# Patient Record
Sex: MALE | Race: WHITE
[De-identification: names, ages, dates, MRNs, and addresses within clinical notes are randomized per-mention and may not be internally consistent; named-entity substitution may affect disease eponyms.]

---

## 2018-07-10 ENCOUNTER — HOSPITAL ENCOUNTER (EMERGENCY)
Dept: HOSPITAL 97 - ER | Age: 83
Discharge: HOME | End: 2018-07-10
Payer: COMMERCIAL

## 2018-07-10 VITALS — TEMPERATURE: 96.9 F | DIASTOLIC BLOOD PRESSURE: 73 MMHG | SYSTOLIC BLOOD PRESSURE: 113 MMHG | OXYGEN SATURATION: 98 %

## 2018-07-10 DIAGNOSIS — Z88.1: ICD-10-CM

## 2018-07-10 DIAGNOSIS — E03.9: ICD-10-CM

## 2018-07-10 DIAGNOSIS — Z88.5: ICD-10-CM

## 2018-07-10 DIAGNOSIS — S51.801A: Primary | ICD-10-CM

## 2018-07-10 DIAGNOSIS — E78.5: ICD-10-CM

## 2018-07-10 DIAGNOSIS — Z79.01: ICD-10-CM

## 2018-07-10 DIAGNOSIS — Z95.1: ICD-10-CM

## 2018-07-10 DIAGNOSIS — I50.9: ICD-10-CM

## 2018-07-10 PROCEDURE — 99282 EMERGENCY DEPT VISIT SF MDM: CPT

## 2018-07-10 NOTE — XMS REPORT
Encounter CCD: 2013 to 2013

 Created on:2018



Patient:TAMAR BREWSTER

Sex:Male

:1935

External Reference #:[id number]





Demographics







 Address  [address] [place] [address]-

 

 Home Phone  [phone]

 

 Preferred Language  Unknown

 

 Marital Status  

 

 Baptism Affiliation  Adventist

 

 Race  White/

 

 Ethnic Group  Non-









Author







 Organization  [facility]









Care Team Providers







 Name  Role  Phone

 

 Jasmin Macedo POLLY  Referring Provider  [phone]









Allergies, Adverse Reactions, Alerts







 Substance  Reaction  Status

 

 morphine    Active

 

 tetracyclines    Active







Problem List







 Condition  Effective Dates  Status

 

 Atrial fibrillation  < 2012  Inactive

 

 Atrial fibrillation    Resolved

 

 CAD - Coronary artery disease    Resolved

 

 Depression    Resolved

 

 GERD - Gastro-esophageal reflux disease    Resolved

 

 Hepatitis C    Resolved

 

 HLD - Hyperlipidemia    Resolved

 

 HTN - Hypertension    Resolved

 

 Hyperlipidemia    Active

 

 Hypertension    Active

 

 Hypothyroidism    Active

 

 Methicillin resistant Staphylococcus aureus1  10/14/2009  Active

 

 Tongue carcinoma    Resolved



1Problem added by Discern Expert.



Medications







 Medication  Instructions  Start Date  End Date  Status

 

 pneumococcal 23-valent  0.5 ml, Route: IM, Drug  2012  
Completed



 vaccine  Form: INJ, Start date:      



   12 9:00:00, Stop      



   date: 12 9:00:00      

 

 influenza virus vaccine,  0.5 ml, Route: IM, Drug  10/15/2009  10/15/2009  
Completed



 inactivated  Form: INJ, ONCE, Start      



   date: 10/15/09 9:00:00,      



   Stop date: 10/15/09      



   9:00:00      







Immunizations







 Vaccine  Date  Status

 

 influenza virus vaccine, inactivated  10/15/2009  Auth (Verified)

 

 pneumococcal 23-valent vaccine  2012  Auth (Verified)

## 2018-07-10 NOTE — XMS REPORT
Summary of Care: 12/10/15 - 12/10/15

 Created on:2017



Patient:TAMAR BREWSTER

Sex:Male

:1935

External Reference #:[id number]





Demographics







 Address  [address] [place] [address]-

 

 Home Phone  [phone]

 

 Preferred Language  English

 

 Marital Status  

 

 Evangelical Affiliation  Adventism

 

 Race  White/

 

 Ethnic Group  Not  or 









Author







 Organization  [facility]

 

 Address  [address] [place] [address]-

 

 Phone  [phone]









Encounter

HQ Encntr_juan(FIN) [id number] Date(s): 12/10/15 - 12/10/15

[address] [address]     
[phone]

Discharge Disposition: Home

Attending Physician: Physician, Non Associated MD





Vital Signs

No data available for this section



Problem List







 Condition  Effective Dates  Status  Health Status  Informant

 

 Atrial fibrillation(Confirmed)    Resolved    

 

 CAD - Coronary artery    Resolved    



 disease(Confirmed)        

 

 Depression(Confirmed)    Resolved    

 

 GERD - Gastro-esophageal reflux    Resolved    



 disease(Confirmed)        

 

 Hepatitis C(Confirmed)    Resolved    

 

 HLD - Hyperlipidemia(Confirmed)    Resolved    

 

 HTN - Hypertension(Confirmed)    Resolved    

 

 Hyperlipidemia(Confirmed)    Active    

 

 Hypertension(Confirmed)    Active    

 

 Hypothyroidism(Confirmed)    Active    

 

 Methicillin resistant Staphylococcus  10/14/09  Active    



 aureus - nares(Confirmed)1        

 

 Tongue carcinoma(Confirmed)    Resolved    



1Problem added by Discern Expert.



Allergies, Adverse Reactions, Alerts







 Substance  Reaction  Severity  Status

 

 morphine      Active

 

 tetracyclines      Active







Medications

No data available for this section



Results

No data available for this section



Immunizations







 Vaccine  Date  Refusal Reason

 

 influenza virus vaccine, inactivated  10/15/09  

 

 pneumococcal 23-valent vaccine  12  







Procedures







 Procedure  Date  Related Diagnosis  Body Site

 

 CABG - Coronary artery bypass graft1      

 

 Hernia repair      

 

 Knee replacement      

 

 Operation2      



[id number] of Pacemaker and AICD ()



Social History

No data available for this section



Assessment and Plan

No data available for this section

## 2018-07-10 NOTE — XMS REPORT
Encounter CCD: 2012 to 2012

 Created on:April 3, 2084



Patient:TAMAR BREWSTER

Sex:Male

:1935

External Reference #:[id number]





Demographics







 Address  [address] [place] [address] [address]



   [place] [address]

 

 Home Phone  [phone]

 

 Preferred Language  Unknown

 

 Marital Status  

 

 Cheondoism Affiliation  Mormon

 

 Race  White/

 

 Ethnic Group  Non-









Author







 Organization  [facility]









Care Team Providers







 Name  Role  Phone

 

 Braden Thomas  Referring Provider  [phone]









Allergies, Adverse Reactions, Alerts







 Substance  Reaction  Status

 

 morphine    Active

 

 tetracyclines    Active







Problem List







 Condition  Effective Dates  Status

 

 Methicillin resistant Staphylococcus aureus1  10/14/2009  Active



1Problem added by Discern Expert.



Medications







 Medication  Instructions  Start Date  End Date  Status

 

 influenza virus vaccine,  0.5 ml, Route: IM, Drug  10/15/2009  10/15/2009  
Completed



 inactivated  Form: INJ, ONCE, Start      



   date: 10/15/09 9:00:00,      



   Stop date: 10/15/09 9:00:00      







Immunizations







 Vaccine  Date  Status

 

 influenza virus vaccine, inactivated  10/15/2009  Auth (Verified)

## 2018-07-10 NOTE — EDPHYS
Physician Documentation                                                                           

 [facility]                                                                

Name: Kennedy Flynn                                                                               

Age: 82 yrs                                                                                       

Sex: Male                                                                                         

: 1935                                                                                   

MRN: [id number]                                                                                   

Arrival Date: 07/10/2018                                                                          

Time: 15:24                                                                                       

Account#: [id number]                                                                            

Bed 25                                                                                            

Private MD: out of town, doctor                                                                   

ED Physician Dinesh Coleman                                                                       

HPI:                                                                                              

07/10                                                                                             

16:20 This 82 yrs old  Male presents to ER via Ambulatory with complaints of Skin    gs  

      Tear(s).                                                                                    

16:20 The laceration(s) is(are) located on the dorsal aspect of right forearm. Onset: The     gs  

      symptoms/episode began/occurred 4 day(s) ago. Associated signs and symptoms: Pertinent      

      positives: mild to mod bleeding, Pertinent negatives: heavy bleeding. The patient has       

      experienced similar episodes in the past, a few times.                                      

                                                                                                  

Historical:                                                                                       

- Allergies:                                                                                      

16:01 TETRACYCLINES;                                                                          aj  

16:01 Morphine;                                                                               aj  

- Home Meds:                                                                                      

16:01 Coumadin 2.5 mg 5 days per week [Active]; digoxin 125 mcg Oral tab 1 tab once daily     aj  

      [Active]; Lipitor 40 mg Oral tab 1 tab once daily [Active]; finasteride 5 mg oral tab 1     

      tab once daily [Active]; Entresto 24-26 mg oral tab [Active]; levothyroxine 100 mcg tab     

      [Active];                                                                                   

- PMHx:                                                                                           

16:01 CHF; Hypothyroidism; Hyperlipidemia;                                                    aj  

- PSHx:                                                                                           

16:01 Hernia repair; defibrillator/pacemaker; CABG; bilateral knee replacements;              aj  

                                                                                                  

- Immunization history:: Adult Immunizations up to date.                                          

- Social history:: Smoking status: Patient/guardian denies using tobacco.                         

- Ebola Screening: : Patient negative for fever greater than or equal to 101.5 degrees            

  Fahrenheit, and additional compatible Ebola Virus Disease symptoms Patient denies               

  exposure to infectious person Patient denies travel to an Ebola-affected area in the            

  21 days before illness onset No symptoms or risks identified at this time.                      

                                                                                                  

                                                                                                  

ROS:                                                                                              

16:20 All other systems are negative.                                                         gs  

                                                                                                  

Exam:                                                                                             

16:20 Constitutional: The patient appears alert, awake.                                       gs  

16:20 Musculoskeletal/extremity: ROM: intact in all extremities, Circulation is intact in all     

      extremities. Sensation intact.                                                              

16:20 Skin: injury, avulsion(s), A moderate sized 6 cm(s), 2 mod sized skin tears active          

      bleeding from edges good result with silver nitrate application tolerated well.             

                                                                                                  

Vital Signs:                                                                                      

16:01  / 73; Pulse 85; Resp 16; Temp 96.9; Pulse Ox 98% on R/A; Weight 71.21 kg; Height aj  

      5 ft. 10 in. (177.80 cm);                                                                   

16:01 Body Mass Index 22.53 (71.21 kg, 177.80 cm)                                               

                                                                                                  

MDM:                                                                                              

16:14 Patient medically screened.                                                               

16:20 Data reviewed: vital signs, nurses notes.                                                 

                                                                                                  

Administered Medications:                                                                         

No medications were administered                                                                  

                                                                                                  

                                                                                                  

Disposition:                                                                                      

07/10/18 16:25 Discharged to Home. Impression: Unspecified open wound of right forearm.           

- Condition is Stable.                                                                            

- Discharge Instructions: Skin Tear Care.                                                         

                                                                                                  

- Medication Reconciliation Form, Thank You Letter, Antibiotic Education, Prescription            

  Opioid Use form.                                                                                

- Follow up: Private Physician; When: 2 - 3 days; Reason: Re-evaluation by your                   

  physician.                                                                                      

                                                                                                  

                                                                                                  

                                                                                                  

Signatures:                                                                                       

Ayesha Hopson RN                       RN   aj                                                   

Dinesh Coleman MD MD                                                      

Kim Ybarra RN                       RN   kr2                                                  

                                                                                                  

Corrections: (The following items were deleted from the chart)                                    

16:30 16:25 07/10/2018 16:25 Discharged to Home. Impression: Unspecified open wound of right  kr2 

      forearm. Condition is Stable. Forms are Medication Reconciliation Form, Thank You           

      Letter, Antibiotic Education, Prescription Opioid Use. Follow up: Private Physician;        

      When: 2 - 3 days; Reason: Re-evaluation by your physician.                                

                                                                                                  

**************************************************************************************************

## 2018-07-10 NOTE — XMS REPORT
Summary of Care: 16 - 16

 Created on:2099



Patient:TAMAR BREWSTER

Sex:Male

:1935

External Reference #:[id number]





Demographics







 Address  [address] [place] [address]

 

 Home Phone  [phone]

 

 Preferred Language  English

 

 Marital Status  

 

 Mormonism Affiliation  Confucianist

 

 Race  White/

 

 Ethnic Group  Not  or 









Author







 Organization  [facility]

 

 Address  [address] [place], [place] [address]-

 

 Phone  [phone]









Encounter

HQ Javed(FIN) [id number] Date(s): 16 - 16

[facility] [address] [place] [address]-     
 

Discharge Disposition: Home

Attending Physician: Rakesh De Dios MD

Referring Physician: Rakesh De Dios MD





Vital Signs







 Most recent to oldest [Reference Range]:  1

 

 Height  177.8 cm



   (16 9:14 AM)

 

 Temperature Oral [96.4-99.1 DegF]  96.9 DegF



   (16 9:14 AM)

 

 Blood Pressure [/60-90 mmHg]  107/67 mmHg



   (16 9:14 AM)

 

 Peripheral Pulse Rate [ bpm]  84 bpm



   (16 9:14 AM)

 

 Weight  64.716 kg



   (16 9:14 AM)

 

 Body Mass Index  20.47 m2



   (16 9:14 AM)







Problem List







 Condition  Effective Dates  Status  Health Status  Informant

 

 Atrial fibrillation(Confirmed)    Resolved    

 

 CAD - Coronary artery    Active    



 disease(Confirmed)        

 

 Depression(Confirmed)    Active    

 

 GERD - Gastro-esophageal reflux    Active    



 disease(Confirmed)        

 

 Hyperlipidemia(Confirmed)    Active    

 

 Hypertension(Confirmed)    Active    

 

 Hypothyroidism(Confirmed)    Active    

 

 Hypothyroidism(Confirmed)    Active    

 

 Other(Confirmed)1    Resolved    

 

 Tongue carcinoma(Confirmed)    Resolved    



1VP arrest- with AICD



Allergies, Adverse Reactions, Alerts







 Substance  Reaction  Severity  Status

 

 morphine      Active

 

 tetracyclines      Active







Medications

AMIODarone 200 mg oral tablet

200 mg=1 tab, PO, Daily, 0 Refill(s)

Start Date: 16

Status: OrderedCoumadin 2.5 mg oral tablet

2.5 mg=1 tab, PO, Daily, 0 Refill(s)

Start Date: 16

Status: OrderedProscar 5 mg oral tablet

5 mg=1 tab, PO, Daily, 0 Refill(s)

Start Date: 16

Status: OrderedRapaflo 8 mg oral capsule

8 mg=1 cap, PO, Daily, 0 Refill(s)

Start Date: 16

Status: Orderedsildenafil 20 mg oral tablet

60 mg=3 tab, PO, TID, 0 Refill(s)

Start Date: 16

Status: Orderedsilver nitrate topical stick

1 appl, TOP, ONCE, APPLY TO AFFECTED AREA ONCE DAILY FOR 6 DAYS, # 6 ea, 0 
Refill(s), Pharmacy: [facility]

Start Date: 16

Status: Orderedspironolactone 25 mg oral tablet

25 mg=1 tab, PO, BID, 0 Refill(s)

Start Date: 16

Status: Ordered



Results

No data available for this section



Immunizations

Given and Recorded





 Vaccine  Date  Status  Refusal Reason

 

 influenza virus vaccine, inactivated  10/15/09  Given  

 

 pneumococcal 23-valent vaccine  12  Given  







Procedures







 Procedure  Date  Related Diagnosis  Body Site

 

 Abdomen endoscopy      

 

 Barium swallow      

 

 CABG - Coronary artery bypass graft1, 2      

 

 Cataract surgery3      

 

 Colonoscopy4      

 

 Hernia repair5, 6      

 

 Knee replacement      

 

 Operation7      



[id number] states had quadraple CWKV8foal eye with [id number] repair (
INGUINAL) x46x'3 in the o0Xnympyfnx of Pacemaker and AICD ()



Social History







 Social History Type  Response

 

 Substance Abuse  Use: None.

 

 Alcohol  Current, Type Wine.  Last use: occational.

 

 Smoking Status  Never smoker; Exposure to Tobacco Smoke None; Cigarette Smoking



   Last 365 Days No; Reg Smoking Cessation Counseling No







Assessment and Plan

No data available for this section

## 2018-07-10 NOTE — XMS REPORT
Summary of Care: 16 - 16

 Created on:2120



Patient:TAMAR BREWSTER

Sex:Male

:1935

External Reference #:[id number]





Demographics







 Address  [address] [place] [address]

 

 Home Phone  [phone]

 

 Preferred Language  English

 

 Marital Status  

 

 Oriental orthodox Affiliation  Jew

 

 Race  White/

 

 Ethnic Group  Not  or 









Author







 Organization  [facility]

 

 Address  [address] [place] [address]-

 

 Phone  [phone]









Encounter

HQ Javed(FIN) [id number] Date(s): 16 - 16

[address] [address]-     [employer] [phone]
[phone]

Discharge Disposition: Home

Attending Physician: Rakesh De Dios MD

Referring Physician: Jasmin Macedo MD





Vital Signs







 Most recent to oldest [Reference Range]:  1

 

 Height  177.8 cm



   (16 9:10 AM)

 

 Temperature Oral [96.4-99.1 DegF]  97.8 DegF



   (16 9:10 AM)

 

 Weight  75.227 kg



   (16 9:10 AM)

 

 Body Mass Index  23.8 m2



   (16 9:10 AM)







Problem List







 Condition  Effective Dates  Status  Health Status  Informant

 

 Atrial fibrillation(Confirmed)    Resolved    

 

 CAD - Coronary artery    Resolved    



 disease(Confirmed)        

 

 Depression(Confirmed)    Active    

 

 GERD - Gastro-esophageal reflux    Resolved    



 disease(Confirmed)        

 

 Hepatitis C(Confirmed)    Resolved    

 

 HLD - Hyperlipidemia(Confirmed)    Active    

 

 HTN - Hypertension(Confirmed)    Active    

 

 Hyperlipidemia(Confirmed)    Active    

 

 Hypertension(Confirmed)    Active    

 

 Hypothyroidism(Confirmed)    Active    

 

 Methicillin resistant Staphylococcus  10/14/09  Active    



 aureus - nares(Confirmed)1        

 

 Tongue carcinoma(Confirmed)    Resolved    



1Problem added by Discern Expert.



Allergies, Adverse Reactions, Alerts







 Substance  Reaction  Severity  Status

 

 morphine      Active

 

 tetracyclines      Active







Medications

digoxin 125 mcg (0.125 mg) oral tablet

125 microgram=1 tab, PO, Daily, 0 Refill(s)

Start Date: 16

Status: OrderedEliquis 5 mg oral tablet

5 mg=1 tab, PO, BID, 0 Refill(s)

Start Date: 16

Status: Orderedfurosemide 40 mg oral tablet

40 mg=1 tab, PO, Daily, 0 Refill(s)

Start Date: 16

Status: OrderedNitrostat 0.4 mg sublingual tablet

0.4 mg=1 tab, SL, Q5Min, PRN Chest Pain, # 100 tab, 0 Refill(s)

Start Date: 16

Status: Ordered



Results

No data available for this section



Immunizations







 Vaccine  Date  Refusal Reason

 

 influenza virus vaccine, inactivated  10/15/09  

 

 pneumococcal 23-valent vaccine  12  







Procedures







 Procedure  Date  Related Diagnosis  Body Site

 

 CABG - Coronary artery bypass graft1      

 

 Hernia repair      

 

 Knee replacement      

 

 Operation2      



651080Appkoupam of Pacemaker and AICD ()



Social History







 Social History Type  Response

 

 Smoking Status  Never smoker; Exposure to Tobacco Smoke None; Cigarette Smoking



   Last 365 Days No; Reg Smoking Cessation Counseling No







Assessment and Plan

No data available for this section

## 2018-07-10 NOTE — XMS REPORT
Summary of Care: 3/8/16 - 3/8/16

 Created on:2031



Patient:TAMAR BREWSTER

Sex:Male

:1935

External Reference #:[id number]





Demographics







 Address  [address] [place] [address]

 

 Home Phone  [phone]

 

 Preferred Language  English

 

 Marital Status  

 

 Spiritism Affiliation  Denominational

 

 Race  White/

 

 Ethnic Group  Not  or 









Author







 Organization  [facility]

 

 Address  [address] [address]-

 

 Phone  [phone]









Encounter

HQ Javed(FIN) [id number] Date(s): 3/8/16 - 3/8/16

[facility] [address] [facility] [address] 

Discharge Disposition: Home

Attending Physician: Rakesh De Dios MD

Referring Physician: Rakesh De Dios MD





Vital Signs







 Most recent to oldest [Reference Range]:  1

 

 Height  160.02 cm



   (3/8/16 1:43 PM)

 

 Temperature Oral [96.4-99.1 DegF]  97.2 DegF



   (3/8/16 1:43 PM)

 

 Blood Pressure [/60-90 mmHg]  120/71 mmHg



   (3/8/16 1:43 PM)

 

 Peripheral Pulse Rate [ bpm]  53 bpm



   *LOW*



   (3/8/16 1:43 PM)

 

 Weight  68.864 kg



   (3/8/16 1:43 PM)

 

 Body Mass Index  26.89 m2



   (3/8/16 1:43 PM)







Problem List







 Condition  Effective Dates  Status  Health Status  Informant

 

 Atrial fibrillation(Confirmed)    Resolved    

 

 CAD - Coronary artery    Active    



 disease(Confirmed)        

 

 Depression(Confirmed)    Active    

 

 Depression(Confirmed)    Active    

 

 GERD - Gastro-esophageal reflux    Active    



 disease(Confirmed)        

 

 HLD - Hyperlipidemia(Confirmed)    Active    

 

 HTN - Hypertension(Confirmed)    Active    

 

 Hyperlipidemia(Confirmed)    Active    

 

 Hypertension(Confirmed)    Active    

 

 Hypothyroidism(Confirmed)    Active    

 

 Hypothyroidism(Confirmed)    Active    

 

 Methicillin resistant Staphylococcus  10/14/09  Active    



 aureus - nares(Confirmed)1        

 

 Other(Confirmed)2    Resolved    

 

 Tongue carcinoma(Confirmed)    Resolved    



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



Allergies, Adverse Reactions, Alerts







 Substance  Reaction  Severity  Status

 

 morphine      Active

 

 tetracyclines      Active







Medications

Nutren 2.0

Nutren 2.0, See Instructions, Nutren 2.0 via PEG 250mL q 4hrs x 4/day + 180 mL 
x 1/day  Water flush 30mL before and after feeds, # 150 can, Refill(s) 12

Start Date: 3/8/16

Status: Ordered



Results

No data available for this section



Immunizations







 Vaccine  Date  Refusal Reason

 

 influenza virus vaccine, inactivated  10/15/09  

 

 pneumococcal 23-valent vaccine  12  







Procedures







 Procedure  Date  Related Diagnosis  Body Site

 

 Abdomen endoscopy      

 

 Barium swallow      

 

 CABG - Coronary artery bypass graft1, 2      

 

 Cataract surgery3      

 

 Colonoscopy4      

 

 Hernia repair5, 6      

 

 Knee replacement      

 

 Operation7      



1patietn states had quadraple [id number] eye with [id number]'3 in the 
l8xieklx repair (INGUINAL) q02Adfworhvd of Pacemaker and AICD ()



Social History







 Social History Type  Response

 

 Substance Abuse  Use: None.

 

 Alcohol  Current, Type Wine.  Last use: occational.

 

 Smoking Status  Never smoker; Exposure to Tobacco Smoke None; Cigarette Smoking



   Last 365 Days No; Reg Smoking Cessation Counseling No







Assessment and Plan

No data available for this section

## 2018-07-10 NOTE — XMS REPORT
Summary of Care: 16 - 16

 Created on:2026



Patient:TAMAR BREWSTER

Sex:Male

:1935

External Reference #:[id number]





Demographics







 Address  [address] [place] [address]-

 

 Home Phone  [phone]

 

 Preferred Language  English

 

 Marital Status  

 

 Evangelical Affiliation  Amish

 

 Race  White/

 

 Ethnic Group  Not  or 









Author







 Organization  [facility]

 

 Address  [address] [place], [place] [address]-

 

 Phone  [phone]









Encounter

HQ Encntr_juan(FIN) [id number] Date(s): 16 - 16

[facility] [address] [place] [address]     [employer] [phone]
[phone]

Discharge Disposition: Home

Attending Physician: Rakesh De Dios MD

Referring Physician: Rakesh De Dios MD





Vital Signs

No data available for this section



Problem List







 Condition  Effective Dates  Status  Health Status  Informant

 

 Atrial fibrillation(Confirmed)    Resolved    

 

 CAD - Coronary artery    Resolved    



 disease(Confirmed)        

 

 Depression(Confirmed)    Active    

 

 GERD - Gastro-esophageal reflux    Resolved    



 disease(Confirmed)        

 

 Hepatitis C(Confirmed)    Resolved    

 

 HLD - Hyperlipidemia(Confirmed)    Active    

 

 HTN - Hypertension(Confirmed)    Active    

 

 Hyperlipidemia(Confirmed)    Active    

 

 Hypertension(Confirmed)    Active    

 

 Hypothyroidism(Confirmed)    Active    

 

 Methicillin resistant Staphylococcus  10/14/09  Active    



 aureus - nares(Confirmed)1        

 

 Tongue carcinoma(Confirmed)    Resolved    



1Problem added by Discern Expert.



Allergies, Adverse Reactions, Alerts







 Substance  Reaction  Severity  Status

 

 morphine      Active

 

 tetracyclines      Active







Medications

No data available for this section



Results

No data available for this section



Immunizations







 Vaccine  Date  Refusal Reason

 

 influenza virus vaccine, inactivated  10/15/09  

 

 pneumococcal 23-valent vaccine  12  







Procedures







 Procedure  Date  Related Diagnosis  Body Site

 

 CABG - Coronary artery bypass graft1      

 

 Hernia repair      

 

 Knee replacement      

 

 Operation2      



[id number] of Pacemaker and AICD ()



Social History







 Social History Type  Response

 

 Smoking Status  Never smoker; Exposure to Tobacco Smoke None; Cigarette Smoking



   Last 365 Days No; Reg Smoking Cessation Counseling No







Assessment and Plan

No data available for this section

## 2018-07-10 NOTE — XMS REPORT
Summary of Care: 16 - 16

 Created on:2039



Patient:KENNEDY BREWSTER

Sex:Male

:1935

External Reference #:[id number]





Demographics







 Address  [address] [place] [address]

 

 Home Phone  [phone]

 

 Preferred Language  English

 

 Marital Status  

 

 Mandaeism Affiliation  Zoroastrianism

 

 Race  White/

 

 Ethnic Group  Not  or 









Author







 Organization  [facility]

 

 Address  [address] [place] [address]

 

 Phone  [phone]









Encounter

HQ Morris_juan(FIN) [id number] Date(s): 16 - 16

[facility] [address] [place] [address] Professional Services provided by        
    [facility]       [place] [address]-  
   [phone]

Discharge Disposition: Home

Attending Physician: Breezy Qiu MD

Admitting Physician: Breezy Qiu MD





Vital Signs







 Most recent to oldest  1  2  3



 [Reference Range]:      

 

 Height  177.8 cm    



   (16 6:59 PM)    

 

 Temperature Oral [96.4-99.1  97.1 DegF  96.9 DegF  



 DegF]  (16 3:56 AM)  (16 11:43 PM)  

 

 Blood Pressure [/60-90  107/63 mmHg  111/66 mmHg  121/76 mmHg



 mmHg]  (16 11:06 AM)  (16 10:00 AM)  (16 8:02 AM)

 

 Respiratory Rate [14-20  18 BRMIN  18 BRMIN  18 BRMIN



 BRMIN]  (16 11:06 AM)  (16 8:02 AM)  (16 4:36 AM)

 

 Peripheral Pulse Rate  86 bpm  86 bpm  85 bpm



 [ bpm]  (16 10:00 AM)  (16 4:36 AM)  (16 11:37 PM)

 

 Weight  78.636 kg    



   (16 6:59 PM)    

 

 Body Mass Index  24.87 m2    



   (16 6:59 PM)    







Problem List







 Condition  Effective Dates  Status  Health Status  Informant

 

 Atrial fibrillation(Confirmed)    Resolved    

 

 CAD - Coronary artery    Active    



 disease(Confirmed)        

 

 Depression(Confirmed)    Active    

 

 Depression(Confirmed)    Active    

 

 GERD - Gastro-esophageal reflux    Active    



 disease(Confirmed)        

 

 HLD - Hyperlipidemia(Confirmed)    Active    

 

 HTN - Hypertension(Confirmed)    Active    

 

 Hyperlipidemia(Confirmed)    Active    

 

 Hypertension(Confirmed)    Active    

 

 Hypothyroidism(Confirmed)    Active    

 

 Hypothyroidism(Confirmed)    Active    

 

 Methicillin resistant Staphylococcus  10/14/09  Active    



 aureus - nares(Confirmed)1        

 

 Other(Confirmed)2    Resolved    

 

 Tongue carcinoma(Confirmed)    Resolved    



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



Allergies, Adverse Reactions, Alerts







 Substance  Reaction  Severity  Status

 

 morphine      Active

 

 tetracyclines      Active







Medications

amLODIPine 5 mg oral tablet

5 mg=1 tab, PO, Daily, # 30 tab, 0 Refill(s)

Start Date: 16

Stop Date: 3/19/16

Status: Orderedcitalopram

20 mg, 1 tab, Route: PO, Drug form: TAB, Daily, Dosing Weight 78.636, kg, Start 
date: 16 9:00:00, Duration: 30 day, Stop date: 16 9:00:00

Notes: (Same As: CeleXA)

Start Date: 16

Stop Date: 16

Status: Discontinuedcitalopram 20 mg oral tablet

20 mg=1 tab, PO, Daily, # 30 tab, 0 Refill(s)

Start Date: 16

Stop Date: 16

Status: Discontinueddigoxin 125 mcg (0.125 mg) oral tablet

125 microgram, 1 tab, Route: PO, Drug form: TAB, Daily, Dosing Weight 78.636, kg
, Start date: 16 9:00:00, Duration: 30 day, Stop date: 16 9:00:00

Notes: Take on an Empty Stomach  (Same as: Lanoxin)

Start Date: 16

Stop Date: 16

Status: Discontinueddigoxin 125 mcg (0.125 mg) oral tablet

0.125 mg, PO, Daily, # 30 tab, 0 Refill(s)

Start Date: 16

Stop Date: 16

Status: DiscontinuedEliquis 5 mg oral tablet

5 mg=1 tab, PO, BID, 0 Refill(s)

Start Date: 16

Stop Date: 16

Status: Discontinuedenalapril

5 mg, 1 tab, Route: PO, Drug form: TAB, BID, Dosing Weight 78.636, kg, Start 
date: 16 21:00:00, Duration: 30 day, Stop date: 16 17:00:00

Notes: (Same as: Vasotec)

Start Date: 16

Stop Date: 16

Status: Discontinuedenalapril 5 mg oral tablet

5 mg=1 tab, PO, BID, 0 Refill(s)

Start Date: 16

Stop Date: 16

Status: Discontinuedfurosemide 40 mg oral tablet

40 mg=1 tab, PO, Daily, # 30 tab, 0 Refill(s)

Start Date: 16

Stop Date: 16

Status: Discontinuedfurosemide 40 mg oral tablet

40 mg, 1 tab, Route: PO, Drug form: TAB, Daily, Dosing Weight 78.636, kg, Start 
date: 16 9:00:00, Duration: 30 day, Stop date: 16 9:00:00

Notes: (Same as: Lasix)  May cause GI upset.  Give with food or milk.

Start Date: 16

Stop Date: 16

Status: Discontinuedlevothyroxine

100 microgram, 1 tab, Route: PO, Drug form: TAB, Daily, Dosing Weight 78.636, kg
, Start date: 16 9:00:00, Duration: 30 day, Stop date: 16 9:00:00

Notes: Take 1 hour before or 2 hours after meal; Enteral feeds may interefere 
with the absorption ofthis medication. (Same as:Levothroid, Synthroid)

Start Date: 16

Stop Date: 16

Status: Discontinuedlevothyroxine 100 mcg (0.1 mg) oral tablet

100 microgram=1 tab, PO, Daily, # 30 tab, 0 Refill(s)

Start Date: 16

Status: OrderedLipitor

40 mg, 1 tab, Route: PO, Drug form: TAB, Bedtime, Dosing Weight 78.636, kg, 
Start date: 16 21:00:00, Duration: 30 day, Stop date: 16 21:00:00

Notes: (Same as: Lipitor)

Start Date: 16

Stop Date: 16

Status: DiscontinuedLipitor 40 mg oral tablet

40 mg=1 tab, PO, Bedtime, # 30 tab, 0 Refill(s)

Start Date: 16

Stop Date: 16

Status: Discontinuedmagnesium sulfate 2 gm in Water 50 ml

2 gm, 50 mL, Route: IVPB, Drug form: INJ, ONCE, Dosing Weight 78.636, kg, Start 
date: 16 8:01:00, Duration: 2 hr, Stop date: 16 8:01:00

Notes: WASTE: F/P - Sink; E - Municipal Trash Bin

Start Date: 16

Stop Date: 16

Status: CompletedMephyton

5 mg, 1 tab, Route: PO, Drug form: TAB, ONCE, Start date: 16 10:30:00, 
Stop date: 16 10:30:00

Notes: (Same as: Mephyton, Vitamin K)

Start Date: 16

Stop Date: 16

Status: Completedmetoprolol 25 mg oral tablet, extended release

25 mg=1 tab, PO, BID, # 60 tab, 0 Refill(s)

Start Date: 16

Stop Date: 3/19/16

Status: Orderedmetoprolol extended release

25 mg, 1 tab, Route: PO, Drug form: ERTAB, BID, Start date: 16 21:00:00, 
Stop date: 16 17:00:00

Notes: (Same as: Toprol XL) Do Not Crush

Start Date: 16

Stop Date: 16

Status: Discontinuedmetoprolol extended release

See Instructions, 75 mgPO BID, 0 Refill(s)

Start Date: 16

Stop Date: 16

Status: DiscontinuedNitrostat 0.4 mg sublingual tablet

0.4 mg, 1 tab, Route: SL, Drug form: TAB, Q5Min, Dosing Weight 78.636, kg, PRN 
Chest Pain, Start date: 16 19:37:00, Duration: 30 day, Stop date:  20:36:00

Notes: (Same as:Nitroquick, Nitrostat)"Do Not Crush"  Sublingual tablet

Start Date: 16

Stop Date: 16

Status: DiscontinuedNorvasc

5 mg, 1 tab, Route: PO, Drug form: TAB, Daily, Dosing Weight 78.636, kg, Start 
date: 16 9:00:00, Stop date: 16 9:00:00

Notes: (Same as: Norvasc)

Start Date: 16

Stop Date: 16

Status: DiscontinuedProtonix

40 mg, 1 pkt, Route: PO, Drug form: GRAN/REC, Daily, Dosing Weight 78.636, kg, 
Start date: 16 9:00:00, Stop date: 16 9:00:00

Notes: Same as: Protonix  Mix in 5 mL apple juice or applesauce for oral &amp; 
10mL apple juice for NG tube

Start Date: 16

Stop Date: 16

Status: DiscontinuedProtonix

40 mg, 1 pkt, Route: PO, Drug form: GRAN/REC, Daily, Start date: 16 11:00:
00, Duration: 30 day, Stop date: 16 9:00:00

Notes: Same as: Protonix  Mix in 5 mL apple juice or applesauce for oral &amp; 
10mL apple juice for NG tube

Start Date: 16

Stop Date: 16

Status: DiscontinuedSaline Flush 0.9%

10 ml, Route: IVP, Drug Form: INJ, Dosing Weight 77.273, kg, PRN, PRN Line Flush
, Start date: 16 14:50:00, Duration: 30 day, Stop date: 16 15:49:00

Notes: (Same as: BD Posiflush)

Start Date: 16

Stop Date: 16

Status: DiscontinuedTylenol

500 mg, 15.62 mL, Route: PEG, Drug form: LIQ, Q4H, Dosing Weight 78.636, kg, 
PRN Pain 4-6/Temp > 100.4 F, Start date: 16 21:45:00, Duration: 30 day, 
Stop date: 16 21:44:00

Notes: Max acetaminophen=4000mg/day (4 gm/day).  (Same as: Tylenol)

Start Date: 16

Stop Date: 16

Status: DiscontinuedVitamin K1

5 mg, 0.5 mL, Route: SUB-Q, Drug form: INJ, ONCE, Dosing Weight 78.636, kg, 
Start date: 16 8:00:00, Duration: 1 doses or times, Stop date: 16 8:
00:00

Notes: (Same as: Aqua-Mephyton, Vitamin K)  *** MEDICATION WASTE ***Product Size
:  10 mgProduct Wasted:  ___ mg

Start Date: 16

Stop Date: 16

Status: Deleted



Results

ELECTROLYTES





 Most recent to oldest [Reference Range]:  1  2  3

 

 Sodium Lvl [135-145 mEq/L]  141 mEq/L    



   (16 6:56 PM)    

 

 Potassium Lvl [3.5-5.1 mEq/L]  4.1 mEq/L    



   (16 6:56 PM)    

 

 Chloride Lvl [ mEq/L]  105 mEq/L    



   (16 6:56 PM)    

 

 CO2 [24-32 mEq/L]  28 mEq/L    



   (16 6:56 PM)    

 

 AGAP [10.0-20.0 mEq/L]  12.1 mEq/L    



   (16 6:56 PM)    



CHEM PANEL





 Most recent to oldest [Reference Range]:  1  2  3

 

 Creatinine Lvl [0.50-1.40 mg/dL]  1.35 mg/dL    



   (16 6:56 PM)    

 

 eGFR  50 mL/min/1.73m2 1    



   *NA*    



   (16 6:56 PM)    

 

 BUN [7-22 mg/dL]  34 mg/dL    



   *HI*    



   (16 6:56 PM)    

 

 Glucose Lvl [70-99 mg/dL]  72 mg/dL    



   (16 6:56 PM)    

 

 Calcium Lvl [8.5-10.5 mg/dL]  8.9 mg/dL    



   (16 6:56 PM)    

 

 Phosphorus [2.5-4.5 mg/dL]  2.9 mg/dL    



   (16 6:56 PM)    

 

 Magnesium Lvl [1.8-2.4 mg/dL]  1.7 mg/dL    



   *LOW*    



   (16 6:56 PM)    



1Result Comment: The eGFR is calculated using the CKD-EPI formula. In most young
, healthy individualsthe eGFR will be >90 mL/min/1.73m2. The eGFR declines with 
age. An eGFR of 60-89 may be normal in some populations, particularly the 
elderly, for whom the CKD-EPI formula has not been extensively validated. Use 
of the eGFR is not recommended in the following populations:



Individuals with unstable creatinine concentrations, including pregnant 
patients and those with serious co-morbid conditions.



Patients with extremes in muscle mass or diet.



The data above are obtained from the National Kidney Disease Education Program (
NKDEP) which additionally recommends that when the eGFR is used in patients 
with extremes of body mass index for purposesof drug dosing, the eGFR should be 
multiplied by the estimated BMI.HEMATOLOGY





 Most recent to oldest  1  2  3



 [Reference Range]:      

 

 WBC [3.7-10.4 K/CMM]  7.0 K/CMM    



   (16 6:56 PM)    

 

 RBC [4.70-6.10 M/CMM]  5.41 M/CMM    



   (16 6:56 PM)    

 

 Hgb [14.0-18.0 g/dL]  13.6 g/dL    



   *LOW*    



   (16 6:56 PM)    

 

 Hct [42.0-54.0 %]  42.4 %    



   (16 6:56 PM)    

 

 MCV [80.0-94.0 fL]  78.3 fL    



   *LOW*    



   (16 6:56 PM)    

 

 MCH [27.0-31.0 pg]  25.2 pg    



   *LOW*    



   (16 6:56 PM)    

 

 MCHC [32.0-36.0 g/dL]  32.1 g/dL    



   (16 6:56 PM)    

 

 RDW [11.5-14.5 %]  18.5 %    



   *HI*    



   (16 6:56 PM)    

 

 Platelet [133-450 K/CMM]  148 K/CMM    



   (16 6:56 PM)    

 

 MPV [7.4-10.4 fL]  8.5 fL    



   (16 6:56 PM)    

 

 Segs [45.0-75.0 %]  53.5 %    



   (16 6:56 PM)    

 

 Lymphocytes [20.0-40.0 %]  33.8 %    



   (16 6:56 PM)    

 

 Monocytes [2.0-12.0 %]  10.9 %    



   (16 6:56 PM)    

 

 Eosinophils [0.0-4.0 %]  1.5 %    



   (16 6:56 PM)    

 

 Basophils [0.0-1.0 %]  0.3 %    



   (16 6:56 PM)    

 

 Segs-Bands # [1.5-8.1 K/CMM]  3.7 K/CMM    



   (16 6:56 PM)    

 

 Lymphocytes # [1.0-5.5  2.4 K/CMM    



 K/CMM]  (16 6:56 PM)    

 

 Monocytes # [0.0-0.8 K/CMM]  0.8 K/CMM    



   (16 6:56 PM)    

 

 Eosinophils # [0.0-0.5  0.1 K/CMM    



 K/CMM]  (16 6:56 PM)    

 

 Microcyte [None Seen]  1+    



   *ABN*    



   (16 6:56 PM)    

 

 PT [12.0-14.7 seconds]  23.0 seconds  29.4 seconds  28.6 seconds



   *HI*  *HI*  *HI*



   (16 9:24 AM)  (16 3:53 AM)  (16 6:56 PM)

 

 INR [0.85-1.17]  2.00  2.75  2.65



   *HI*  *HI*  *HI*



   (16 9:24 AM)  (16 3:53 AM)  (16 6:56 PM)

 

 PTT [22.9-35.8 seconds]  37.6 seconds    



   *HI*    



   (16 6:56 PM)    







Immunizations







 Vaccine  Date  Refusal Reason

 

 influenza virus vaccine, inactivated  10/15/09  

 

 pneumococcal 23-valent vaccine  12  







Procedures







 Procedure  Date  Related Diagnosis  Body Site

 

 Abdomen endoscopy      

 

 Barium swallow      

 

 CABG - Coronary artery bypass graft1, 2      

 

 Cataract surgery3      

 

 Colonoscopy4      

 

 Hernia repair5, 6      

 

 Knee replacement      

 

 Operation7      



1patietn states had quadraple [id number] eye with [id number]'3 in the 
j2tqmtcn repair (INGUINAL) b35Dptyiwtdc of Pacemaker and AICD ()



Social History







 Social History Type  Response

 

 Substance Abuse  Use: None.

 

 Alcohol  Current, Type Wine.  Last use: occational.

 

 Smoking Status  Never smoker; Exposure to Tobacco Smoke None; Cigarette Smoking



   Last 365 Days No; Reg Smoking Cessation Counseling No







Assessment and Plan

Extracted from:





 Title: Clinical Document  Author: Parish Solis  Date: 16









 Discharge Summary



 Name: Kennedy Brewster



 Record Number: [id number]



 Admission Date: 2016



 Discharge Date: 2016



 Copies to:



 



 Diagnoses: Dyphagia



 Procedures: PEG tube placement by GI



 Chief complaint: Dysphagia



 



 Hospital course:



 Patient is a 79 y/o CM with hx of throat cancer s/p radiation and chemo in the 
past, Atrial fibrillation on Eliquis, HTN, CAD, systolic CHF who was admitted 
for worsening of dysphagia since 2015. B



 arium swallow study done showed symptomatic aspiration with thin and nectar 
Barium.  Patient received PEG tube placement on 2016 by GI.  GI re-
evaluated the PEG tube today (2016) and cleared p



 atient for discharge.  Patients denies any fever, abdominal pain, nausea, and 
vomiting.  While patient was admitted, he has been evaluated by Home Health and 
nutritionist.



 



 Discharge therapy:



    Medications: see medication reconciliation



    Diet: enteral tube feedings per PEG



    Activity: regular ADL



    Follow up: Dr. Ng in 2 weeks.  Primary care physician in 2 days









 Addendum by Ela Condon MD on 2016  pt seen and examined on discharge 
day. agree with d/c summary



 19:47  total time 32 min





Extracted from:





 Title: Infection Control Isolation Alert  Author: Korey Monson  Date: 









 ISOLATION ALERT



 



 This patient has a history of infection/colonization with MRSA.



 



  Site  Date



 



  Nares  10/14/2009



 



 



 Isolation Required:  CONTACT



 



 Before isolation precautions may be discontinued for this hospital visit, the 
following protocol must be followed and Infection Control should be notified:



 

 Patient must be off antibiotics for 72 hours or 7 days if on dialysis and 
vancomycin.



 

 Send one MRSA PCR nares specimen.  Please place order for 



MRSA PCR.

  Do not order 



MRSA Culture.





 

 If MRSA PCR is negative, isolation may be discontinued.



 



 



 Patient can only be cleared from isolation for this visit.  If readmitted, 
patient must be isolated and screened for MRSA again.  Consult Infection 
Control for suggested regimens for decolonization of p



 atients with MRSA as well as for any questions.  We can be reached at [phone]
[phone].





Extracted from:





 Title: Hospitalist History and Physical  Author: Siomara Jauregui MD  Date: 16









  Assessment/Plan 1.Dysphagia  PEG in am. npo after midnight will recheck 
INR in am and hold eliquis



  2.Atrial fibrillation  beta blocker and dig has pacemaker in place. hold 
eliquis



  3.CAD - Coronary artery disease  beta blokcer, statin



  4.Systolic CHF  beta blocker, ace i and lasix will continue and monitor. 
has AICD in place



  5.Depression  continue home meds



  6.HLD - Hyperlipidemia  statin



  7.HTN - Hypertension  controlled continuecurrent regimen andmonitor



  8.Hypothyroidism  synthroid at home dose



  Orders:  amLODIPine, 10 mg, 1 tab, Route: PO, Drug form: TAB, Daily, Dosing 
Weight 78.636, kg, Start date: 16 9:00:00, Duration: 30 day, Stop date:  9:00:00



   atorvastatin, 40 mg, 1 tab, Route: PO, Drug form: TAB, Bedtime, Dosing 
Weight 78.636, kg, Start date: 16 21:00:00, Duration: 30 day, Stop date: 
16 21:00:00



   citalopram, 20 mg, 1 tab, Route: PO, Drug form: TAB, Daily, Dosing Weight 
78.636, kg, Start date: 16 9:00:00, Duration: 30 day, Stop date: 16 
9:00:00



   digoxin, 125 microgram, 1 tab, Route: PO, Drug form: TAB, Daily, Dosing 
Weight 78.636, kg, Start date: 16 9:00:00, Duration: 30 day, Stop date:  9:00:00



   enalapril, 5 mg, 1 tab, Route: PO, Drug form: TAB, BID, Dosing Weight 78.636
, kg, Start date: 16 21:00:00, Duration: 30 day, Stop date: 16 17:00
:00



   furosemide, 40 mg, 1 tab, Route: PO, Drug form: TAB, Daily, Dosing Weight 
78.636, kg, Start date: 16 9:00:00, Duration: 30 day, Stop date: 16 
9:00:00



   levothyroxine, 100 microgram, 1 tab, Route: PO, Drug form: TAB, Daily, 
Dosing Weight 78.636, kg, Start date: 16 9:00:00, Duration: 30 day, Stop 
date: 16 9:00:00



   metoprolol, 75 mg, 3 tab, Route: PO, Drug form: ERTAB, BID, Start date:  21:00:00, Duration: 30 day, Stop date: 16 17:00:00



   nitroglycerin, 0.4 mg, 1 tab, Route: SL, Drug form: TAB, Q5Min, Dosing 
Weight 78.636, kg, PRN Chest Pain, Start date: 16 19:37:00, Duration: 30 
day, Stop date: 16 20:36:00



   pantoprazole, 40 mg, 1 tab, Route: PO, Drug form: ECTAB, Daily, Dosing 
Weight 78.636, kg, Start date: 16 9:00:00, Duration: 30 day, Stop date:  9:00:00



   Diet NPO After Midnight



   Diet NPO Except For



   Prophylaxis



   scds on eliquis will hold for procedure



   Disposition

## 2018-07-10 NOTE — XMS REPORT
Continuity of Care Document

 Created on:2017



Patient:TAMAR BREWSTER

Sex:Male

:1935

External Reference #:[id number]





Demographics







 Address  [address]



   [place] [address]

 

 Phone  [phone]

 

 Phone  [phone]

 

 Preferred Language  Unknown

 

 Marital Status  Unknown

 

 Worship Affiliation  Unknown

 

 Race  Unknown

 

 Ethnic Group  Unknown









Author







 Organization  Interface









Problems







 Problem  Status  Onset  Classification  Date  Comments  Source



     Date    Reported    



             



             



             

 

 DEGLOVING INJURY R  Active  20        [facility]



 HAND    [address] [place] [address]



             



             

 

 OTHER  Active  20        [address]



             



             

 

 FOLLOW UP  Active  20        [address]



             



             

 

 FOLLOW UP ***GTUBE  Active  20        [facility]



 REMOVAL***    [address] [place] [address]



             



             

 

 BDDC - F/U  Active  20        [address]



             



             

 

 CRICOPHARYNGEAL  Active  20        [address]



             



             

 

 R10.84 -  Active  20         [facility]



 GENERALIZED    16        [place]



 ABDOMINAL PAIN            



 R60.            



             

 

 HOSPITAL F/U  Active  20        [address]



             



             

 

 DYSPHAGIA WITH  Active  20        [facility]



 ASPIRATION    [address] [place] [address]



             



             

 

 DYSPHAGIA  Active  20        [address]



             



             

 

 DSU-DYSPHAGIA  Active  20        [address]



             



             

 

 R13.10 -  Active  20         [facility]



 "DYSPHAGIA,    16        [place]



 UNSPECIFIED"            



             

 

 BDDC-DYSPHAGIA  Active  20        [address]



             



             

 

 SWALLOWING ISSUE  Active  01/15/20        [address]



             



             

 

 WT. GAIN  Active  20        [address]



             



             

 

 783.21 - ABNORMAL  Active  20         [facility]



 LOSS O 478.2 -    13        [place]



 DISEASE            



             

 

 ABNORMAL WEIGHT  Active  20        [facility]



 LOSS    [address] [place] [address]



             Center



             



             

 

 478.29 - DISEASE  Active  20         [facility]



 OF PHAR    13        [place]



             



             

 

 DYSPHAGIA,ICD.9-78  Active  20        [facility]



 7.2            Medical



             Center



             



             

 

 13,  Active  20        [facility]



 ESCOGSCOPY    [address] [place] [address]



             



             

 

 787.20/478.29  Active  20        [address]



             



             

 

 DDC/ DYSPHAGIA  Active  10/30/20        [address]



             



             

 

 RUQ PAIN  Active  20        [address]



             



             

 

 NEW CLINIC VISIT  Active  20        [address]



             



             

 

 DDC- NEW CLINIC  Active  20        [facility]



 VISIT            Medical



             [place]



             



             

 

 RIGHT UPPER  Active  20        [facility]



 QUADRANT PAIN    [address] [place] [address]



             



             

 

 Methicillin  Active  10/14/20  Problem  2013  1Problem  [facility]



 resistant    09      added by  [facility],



 aureus<sup>1</sup>          Expert.  ALEX Bustamante



             



             

 

 Methicillin  Active  10/14/20  Problem  2016  Problem  



 resistant    09      added by  [facility],



 aureus -          Expert.  [facility]<sup>1</sup>            [facility]



             



             

 

 Atrial  Inactive    Problem  2013    [facility]



             



             

 

 Hyperlipidemia  Active    Problem  2013    [facility]



             



             

 

 Hypertension  Active    Problem  2013    [facility]



             



             

 

 Hypothyroidism  Active    Problem  2013    [facility]



             



             

 

 CAD - Coronary  Resolved    Problem  2013    [facility]



             



             

 

 Depression  Resolved    Problem  2013    [facility]



             



             

 

 GERD -  Resolved    Problem  2013    [facility]



             



             

 

 Hepatitis C  Resolved    Problem  2013    [facility]



             



             

 

 HLD -  Resolved    Problem  2013    [facility]



             



             

 

 HTN - Hypertension  Resolved    Problem  2013    [facility]



             



             

 

 Tongue carcinoma  Resolved    Problem  2013    [facility]



             



             

 

 Atrial  Resolved    Problem  2017    



 fibrillation            [facility]



             



             

 

 CAD - Coronary  Active    Problem  2017    



 artery disease            [facility]



             



             

 

 Depression  Active    Problem  2016    [facility]



             



             

 

 GERD -  Active    Problem  2017    



 [facility]



 [facility] disease            [facility]



             



             

 

 Hepatitis C  Resolved    Problem  2016    [facility]



             



             

 

 HLD -  Active    Problem  2016    



 Hyperlipidemia            [facility]



             



             

 

 HTN - Hypertension  Active    Problem  2016    [facility]



             



             

 

 Hyperlipidemia  Active    Problem  2017    [facility]



             



             

 

 Hypertension  Active    Problem  2017    [facility]



             



             

 

 Hypothyroidism  Active    Problem  2017    [facility]



             



             

 

 Tongue carcinoma  Resolved    Problem  2017    [facility]



             



             

 

 Depression  Active    Problem  2017    [facility]



             



             

 

 Other<sup>1</sup>  Resolved    Problem  2017   arrest-  [facility]



             



             

 

 Other<sup>2</sup>  Resolved    Problem  2016   arrest-  [facility]



           with AICD  Medical



             Center



             



             

 

 Pulmonary  Resolved    Problem  2017  Diagnosed  [facility]



 hypertension<sup>2          in March  Medical



 </sup>          2016  Center



             



             

 

 ABDMNAL PAIN RT  Active          [facility]



 UPR QUAD            Medical



             Center



             



             

 

 DYSPHAGIA NOS  Active          [facility]



             



             

 

 DISEASE OF PHARYNX  Active          [facility]



             



             

 

 MEDICAL SERVICES  Active          [facility]



 NOT AVAILABLE IN            Medical



 HOME            Center



             



             

 

 MALIGNANT TERESSA  Active          [facility]



 LARYNX NOS            Medical



             Center



             



             

 

 UNDERWEIGHT  Active          [facility]



             



             

 

 ALCOHOLIC FATTY  Active          [facility]



 LIVER            [facility]



             Center



             



             

 

 DYSPHAGIA,  Active          [facility]



 UNSPECIFIED            Medical



             Center



             



             

 

 OTHER DISEASES OF  Active          [facility]



 PHARYNX            [facility]



             Center



             



             

 

 ENCNTR FOR GENERAL  Active          [facility]



 ADULT MEDICAL EXAM            Medical



 W/            Center



             



             

 

 UNSPECIFIED OPEN  Active          [facility] [place]



 WOUND OF RIGHT            Medical



 HAND, IN            Center



             



             







Medications







 Medication  Details  Route  Status  Patient  Ordering  Order  Source



         Instructions  Provider  Date  



               



               



               

 

 Bacitracin  1 appl, Route:    No Longer      [facility]



   TOP, Daily,    Active      2017  Medical



   Drug form:            [place]



   OINT, Apply on            



   the affected            



   area on the b/l            



   hands., Start            



   date: 17            



   9:00:00 CDT,            



   Duration: 30            



   day, Stop date:            



   17            



   9:00:00 CDT            



               



               

 

 calcium-vitamin D  See    Active      /  [facility]



 600 mg-400 intl  Instructions,          2017  Medical



 units oral  Take 1 tab BID,            Center



 tablet, chewable  # 30 tab, 0            



   Refill(s)            



               



               

 

 Acetaminophen 300  1 tab, PO, Q6H,    Active      [facility]



 MG / Codeine  PRN Pain, X 7          2017  Medical



 Phosphate 30 MG  day, # 28 tab,            Center



 Oral Tablet  0 Refill(s)            



 [Tylenol with              



 Codeine #3]              



               

 

 Cephalexin 750 MG  750 mg=1 cap,    Active      /  [facility] [place]



 Oral Capsule  PO, Q12H, X 5          2017  Medical



 [Keflex]  day, # 10 cap,            Center



   0 Refill(s)            



               



               

 

 Amiodarone  100 mg, 1 tab,    Inactive      [facility]



   Route: PO, Drug          2017  Medical



   form: TAB,            Center



   Daily, Dosing            



   Weight 68.182,            



   kg, Start date:            



   17            



   9:00:00 CDT,            



   Duration: 30            



   day, Stop date:            



   17            



   9:00:00            



   CDTNotes: Same            



   as: [place],            



   Pacerone            



               

 

 Sodium Chloride  500 mL, 500    Inactive      /  [facility] [place]



 0.154 MEQ/ML  ml/hr, Infuse            Medical



 Injectable  Over: 1 hr,            Center



 Solution  Route: IV, 500,            



   Drug form: INJ,            



   ONCE, Priority:            



   STAT, Dosing            



   Weight 68.182            



   kg, Start date:            



   17            



   13:28:00 CDT,            



   Duration: 1            



   doses or times,            



   Stop date:            



   17            



   13:28:00 CDT            



               



               

 

 Naloxone  0.4 mg, 1 mL,    Inactive         [place]



   Route: IVP,            Medical



   Drug form: INJ,            Center



   Q2MIN, Dosing            



   Weight 68.182,            



   kg, PRN            



   Narcotic            



   Reversal, Start            



   date: 17            



   21:42:00 CDT,            



   Duration: 8            



   doses or times,            



   Stop date:            



   Limited # of            



   timesNotes:            



   Same as Narcan            



               



               

 

 Promethazine  6.25 mg, 0.25    Inactive         [place]



   mL, Route:          2017  Medical



   IVPB, Drug            Center



   form: INJ,            



   ONCE, Dosing            



   Weight 68.182,            



   kg, PRN Nausea            



   & Vomiting,            



   Start date:            



   17            



   21:42:00            



   CDTNotes: Do            



   not give IV            



   push.  (Same            



   as: Phenergan)            



               



               

 

 Acetaminophen  1,000 mg, 2    Inactive         [place]



   tab, Route: PO,            Medical



   Drug form: TAB,            Center



   ONCE, Dosing            



   Weight 68.182,            



   kg, PRN Pain            



   Score 1-3,            



   Start date:            



   17            



   21:42:00 CDT,            



   Duration: 1            



   doses or times,            



   Stop date:            



   Limited # of            



   timesNotes: Max            



   acetaminophen            



   4000 mg/day (4            



   gm/day).  (Same            



   as: Tylenol            



   Extra Strength)            



               



               

 

 Fentanyl  25 microgram,    Inactive         [place]



   0.5 mL, Route:          2017  Medical



   IVP, Drug form:            Center



   INJ, Q5Min,            



   Dosing Weight            



   68.182, kg, PRN            



   Pain Score 4-6,            



   Priority:            



   Routine, Start            



   date: 17            



   21:42:00 CDT,            



   Duration: 4            



   doses or times,            



   Stop date:            



   Limited # of            



   timesNotes:            



   (Same as:            



   Sublimaze)            



   Preservative            



   free.            



               

 

 Oxycodone  5 mg, 1 tab,    Inactive         [place]



   Route: PO, Drug            Medical



   form: TAB, Q4H,            Center



   Dosing Weight            



   68.182, kg, PRN            



   Pain Score 4-6,            



   Start date:            



   17            



   21:42:00 CDT,            



   Duration: 30            



   day, Stop date:            



   17            



   21:41:00            



   CDTNotes: (Same            



   as: Roxicodone)            



               



               

 

 Flumazenil  0.2 mg, 2 mL,    Inactive       [place]



   Route: IV2017  Medical



   Drug form: INJ,            Center



   PRN, Dosing            



   Weight 68.182,            



   kg, PRN            



   Benzodiazepine            



   Reversal,            



   Initial dose,            



   Start date:            



   17            



   21:42:00 CDT,            



   Duration: 30            



   day, Stop date:            



   17            



   21:41:00            



   CDTNotes: (Same            



   as: Romazicon)            



               



               

 

 Lasix  20 mg, 2 mL,    Inactive       [place]



   Route: IVP2017  Medical



   Drug form: INJ,            Center



   ONCE, Dosing            



   Weight 68.182,            



   kg, Start date:            



   17            



   13:28:00 CDT,            



   Stop date:            



   17            



   13:28:00            



   CDTNotes: (Same            



   as: Lasix)            



               



               

 

 sacubitril 24 MG  1 tab, PO, BID,    Active       [place]



 / valsartan 26 MG  # 28 tab, 0          2017  Medical



 Oral Tablet  Refill(s)            Center



 [Entresto]              



               

 

 spironolactone 25  25 mg=1 tab,    Active      03/21/  [facility] [place]



 mg oral tablet  PO, Daily, # 30          2017  Medical



   tab, 3            Center



   Refill(s)            



               



               

 

 digoxin 125 mcg  125 microgram,    No Longer       [place]



 (0.125 mg) oral  1 tab, Route:    Active        Medical



 tablet  PO, Drug form:            Center



   TAB, Daily,            



   Dosing Weight            



   68.182, kg,            



   Start date:            



   17            



   9:00:00 CDT,            



   Duration: 30            



   day, Stop date:            



   17            



   9:00:00            



   CDTNotes: Take            



   on an Empty            



   Stomach  (Same            



   as: Lanoxin)            



               



               

 

 Citalopram  20 mg, 1 tab,    No Longer       [place]



   Route: PO, Drug    Active        Medical



   form: TAB,            Center



   Daily, Dosing            



   Weight 68.182,            



   kg, Start date:            



   17            



   9:00:00 CDT,            



   Duration: 30            



   day, Stop date:            



   17            



   9:00:00            



   CDTNotes: (Same            



   As: CeleXA)            



               



               

 

 Lipitor  40 mg, 1 tab,    No Longer       [place]



   Route: PO, Drug    Active        Medical



   form: TAB,            Center



   Daily, Dosing            



   Weight 68.182,            



   kg, Start date:            



   17            



   9:00:00 CDT,            



   Duration: 30            



   day, Stop date:            



   17            



   9:00:00            



   CDTNotes: (Same            



   as: Lipitor)            



               



               

 

 Amiodarone  200 mg, 1 tab,    No Longer       [place]



   Route: PO, Drug    Active        Medical



   form: TAB,            Center



   Daily, Dosing            



   Weight 68.182,            



   kg, Start date:            



   17            



   9:00:00 CDT,            



   Duration: 30            



   day, Stop date:            



   17            



   9:00:00            



   CDTNotes: (Same            



   as: Cordarone)            



               



               

 

 Calcium Carbonate  1 tab, Route:    No Longer       [place]



 1250 MG /  PO, Drug Form:    Active      2017  Medical



 Cholecalciferol  TAB, Dosing            Center



 200 UNT Oral  Weight 68.182,            



 Tablet  kg, BID, Start            



   date: 17            



   9:00:00 CDT,            



   Duration: 30            



   day, Stop date:            



   17            



   17:00:00            



   CDTNotes: (Same            



   As: Hudson-D,            



   OsCal-D, [place]            



   Calcium)            



               

 

 Spironolactone  25 mg, 1 tab,    No Longer       [place]



   Route: PO, Drug    Active        Medical



   form: TAB, BID,            Center



   Dosing Weight            



   68.182, kg,            



   Start date:            



   17            



   9:00:00 CDT,            



   Duration: 30            



   day, Stop date:            



   17            



   17:00:00            



   CDTNotes: (Same            



   As: Aldactone)            



               



               

 

 Streptococcus  0.5 mL, Route:    Inactive       [place]



 pneumoniae  IM, Drug Form:          2017  Medical



 serotype 1  INJ, Daily,            Center



 capsular antigen  Start date:            



 diphtheria [id number]  17            



 protein conjugate  9:00:00 CDT,            



 vaccine /  Duration: 1            



 Streptococcus  doses or times,            



 pneumoniae  Stop date:            



 serotype 14  17            



 capsular antigen  9:00:00            



 diphtheria [id number]  CDTNotes:            



 protein conjugate  Lightly roll            



 vaccine /  vial (DO NOT            



 Streptococcus  SHAKE) before            



 pneumoniae  administration.            



 serotype 18C   (Same as:            



 capsular antigen  Prevnar 13)            



 d              



               

 

 metoprolol  25 mg, 1 tab,    No Longer       [place]



 tartrate  Route: PO, Drug    Active        Medical



   form: ERTAB,            Center



   BID, Dosing            



   Weight 68.182,            



   kg, Start date:            



   17            



   9:00:00 CDT,            



   Duration: 30            



   day, Stop date:            



   17            



   17:00:00            



   CDTNotes: (Same            



   as: Toprol XL)            



   Do Not Crush            



               



               

 

 Levoxyl  88 microgram, 1    No Longer       [place]



   tab, Route: PO,    Active        Medical



   Drug form: TAB,            Center



   Daily, Dosing            



   Weight 68.182,            



   kg, Start date:            



   17            



   9:00:00 CDT,            



   Duration: 30            



   day, Stop date:            



   17            



   9:00:00            



   CDTNotes: Take            



   1 hour before            



   or 2 hours            



   after meal;            



   Enteral feeds            



   may interefere            



   with the            



   absorption of            



   this            



   medication.            



   (Same            



   as:Synthroid)            



               



               

 

 Finasteride  5 mg, 1 tab,    No Longer       [place]



   Route: PO, Drug    Active        Medical



   form: TAB,            Center



   Daily, Dosing            



   Weight 68.182,            



   kg, Start date:            



   17            



   9:00:00 CDT,            



   Duration: 30            



   day, Stop date:            



   17            



   9:00:00            



   CDTNotes: (Same            



   as: Proscar)            



   "Do Not            



   Crush&quot;            



   Women of            



   childbearing            



   age should not            



   touch or handle            



   broken tablets            



               



               

 

 Lasix  80 mg, 1 tab,    No Longer       [place]



   Route: PO, Drug    Active        Medical



   form: TAB,            Center



   Daily, Dosing            



   Weight 68.182,            



   kg, Start date:            



   17            



   9:00:00 CDT,            



   Duration: 30            



   day, Stop date:            



   17            



   9:00:00            



   CDTNotes: (Same            



   as: Lasix)  May            



   cause GI upset.            



    Give with food            



   or milk.            



               

 

 Protonix  40 mg, 1 tab,    No Longer       [place]



   Route: PO, Drug    Active        Medical



   form: ECTAB,            Center



   Before            



   Breakfast,            



   Dosing Weight            



   68.182, kg,            



   Start date:            



   17            



   7:30:00 CDT,            



   Duration: 30            



   day, Stop date:            



   17            



   7:30:00            



   CDTNotes:            



   Tablet should            



   not be chewed            



   or crushed.            



   (Same as:            



   Protonix)            



               



               

 

 Levothyroxine  88 microgram=1    Active       [place]



 Sodium 0.088 MG  tab, PO, Daily,          2017  Medical



 Oral Tablet  # 30 tab, 0            Center



 [Levoxyl]  Refill(s)            



               



               

 

 Ondansetron  4 mg, 2 mL,    Inactive       [place]



   Route: IVP,          2017  Medical



   Drug form: INJ,            Center



   Q6H, Dosing            



   Weight 68.182,            



   kg, PRN Nausea            



   & Vomiting,            



   Start date:            



   17            



   4:30:00 CDT,            



   Duration: 30            



   day, Stop date:            



   17            



   4:29:00            



   CDTNotes: (Same            



   as: Zofran)            



   *** MEDICATION            



   WASTE ***            



   Product Size:            



   4 mg Product            



   Wasted:  ___ mg            



               



               

 

 Docusate  100 mg, 1 cap,    No Longer       [place]



   Route: PO, Drug    Active      2017  Medical



   form: CAP, BID,            Center



   Dosing Weight            



   68.182, kg, PRN            



   Constipation,            



   Start date:            



   17            



   4:30:00 CDT,            



   Duration: 30            



   day, Stop date:            



   17            



   4:29:00            



   CDTNotes: (Same            



   as: Colace) (Do            



   Not Crush)            



               



               

 

 Sodium Chloride  1,000 mL, Rate:    No Longer         [place]



 0.154 MEQ/ML  42 ml/hr,    Active      2017  Medical



 Injectable  Infuse over:            Center



 Solution  23.8 hr, Route:            



   IV, Dosing            



   Weight 68.182            



   kg, Total            



   Volume: 1,000,            



   Start date:            



   17            



   4:30:00 CDT,            



   Duration: 30            



   day, Stop date:            



   17            



   4:29:00 CDT            



               



               

 

 Saline Flush 0.9%  10 ml, Route:    No Longer       [place]



   IVP, Drug Form:    Active      2017  Medical



   INJ, Dosing            Center



   Weight 68.182,            



   kg, PRN, PRN            



   Line Flush,            



   Start date:            



   17            



   4:30:00 CDT,            



   Duration: 30            



   day, Stop date:            



   17            



   4:29:00            



   CDTNotes: Same            



   as: BD            



   Posiflush            



   Sterile            



               

 

 Acetaminophen 325  1 tab, Route:    No Longer         [place]



 MG / Hydrocodone  PO, Drug Form:    Active      2017  Medical



 Bitartrate 5 MG  TAB, Dosing            Center



 Oral Tablet  Weight 68.182,            



   kg, Q4H, PRN            



   Pain Score 4-6,            



   Start date:            



   17            



   4:30:00 CDT,            



   Duration: 30            



   day, Stop date:            



   17            



   4:29:00            



   CDTNotes: (Same            



   as: Norco            



   325/5)  Do not            



   exceed 4gm/day            



   of            



   acetaminophen.            



               



               

 

 Acetaminophen  650 mg, 2 tab,    No Longer       [place]



   Route: PO, Drug    Active        Medical



   form: TAB, Q4H,            Center



   Dosing Weight            



   68.182, kg, PRN            



   Pain 1-3/Temp >            



   100.4 F, Start            



   date: 17            



   4:30:00 CDT,            



   Duration: 30            



   day, Stop date:            



   17            



   4:29:00            



   CDTNotes: Do            



   not exceed 4            



   gm/day.  (Same            



   as: Tylenol)            



               



               

 

 Dilaudid  0.5 mg, 0.25    Inactive       [place]



   mL, Route: IVP,            Medical



   Drug form: INJ,            Center



   ONCE, Dosing            



   Weight 68.182,            



   kg, Priority:            



   STAT, Start            



   date: 17            



   3:35:00 CDT,            



   Stop date:            



   17            



   3:35:00            



   CDTNotes: Same            



   as: Dilaudid            



               



               

 

 Ancef  1 gm, Route:    No Longer       [place]



   IV, Drug form:    Active        Medical



   PDR/INJ,            Center



   ABXQ8H, Dosing            



   Weight 68.182,            



   kg, Start date:            



   17            



   1:18:00 CDT,            



   Duration: 30            



   day, Stop date:            



   17            



   21:00:00            



   CDTNotes: (Same            



   As: Ancef,            



   Kefzol)    ***            



   MEDICATION            



   WASTE ***            



   Product Size:            



   1000 mg Product            



   Wasted:  ___ mg            



               



               

 

 Gentamicin  341 mg, 8.53    Inactive       [place]



 Sulfate (USP)  mL, Route:          2017  Medical



   IVPB, ONCE,            Center



   Dosing Weight            



   68.182, kg,            



   Priority: STAT,            



   Start date:            



   17            



   1:16:00 CDT,            



   Stop date:            



   17            



   1:16:00            



   CDTNotes: TIME            



   CRITICAL            



   MEDICATION            



   (Same as            



   Garamycin)            



               



               

 

 sildenafil 20 MG  20 mg, 1 tab,    No Longer       [place]



 Oral Tablet  Route: PO, Drug    Active        Medical



   form: TAB, Q8H,            Center



   Dosing Weight            



   68.182, kg,            



   Start date:            



   17            



   0:58:00 CDT,            



   Duration: 30            



   day, Stop date:            



   17            



   0:00:00            



   CDTNotes: (Same            



   as: Revatio)            



               



               

 

 Keflex  500 mg, 1 cap,    Inactive        [facility]



   Route: PO, Drug            Medical



   form: CAP,            Center



   ONCE, Dosing            



   Weight 68.182,            



   kg, Start date:            



   17            



   0:30:00 CDT,            



   Stop date:            



   17            



   0:30:00            



   CDTNotes: Take            



   on empty            



   stomach. (Same            



   As: Keflex)            



               



               

 

 Keflex  500 mg, 1 tab,    Inactive        [facility]



   Route: PO, Drug            Medical



   form: TAB,            Center



   ONCE, Dosing            



   Weight 68.182,            



   kg, Start date:            



   17            



   0:13:00 CDT,            



   Stop date:            



   17            



   0:13:00            



   CDTNotes: Take            



   on empty            



   stomach. (Same            



   As: Keftab)            



               



               

 

 Dilaudid  0.5 mg, 0.25    Inactive        [facility]



   mL, Route: IVP,            Medical



   Drug form: INJ,            Center



   ONCE, Dosing            



   Weight 68.182,            



   kg, Priority:            



   STAT, Start            



   date: 17            



   23:45:00 CDT,            



   Stop date:            



   17            



   23:45:00            



   CDTNotes: Same            



   as: Dilaudid            



               



               

 

 iodixanol  150 mL, Route:    Inactive        [facility]



   IVP, Drug Form:          2017  Medical



   SOLN, Dosing            Center



   Weight 68.182,            



   kg, ONCALL,            



   STAT, Start            



   date: 17            



   22:12:00 CDT,            



   Duration: 1            



   doses or times,            



   Dose=2.2ml/kg,            



   Max oevd=[id number]            



   -- "To be            



   infused by            



   Radiology Staff            



   ONLY"            



               

 

 Dilaudid  0.5 mg, 0.25    Inactive        [facility]



   mL, Route: IVP,            Medical



   Drug form: INJ,            Center



   ONCE, Dosing            



   Weight 68.182,            



   kg, Priority:            



   STAT, Start            



   date: 17            



   21:47:00 CDT,            



   Stop date:            



   17            



   21:47:00            



   CDTNotes: Same            



   as: Dilaudid            



               



               

 

 Ondansetron  4 mg, 2 mL,    Inactive        [facility]



   Route: IVP,          2017  Medical



   Drug form: INJ,            Center



   ONCE, Dosing            



   Weight 68.182,            



   kg, Priority:            



   STAT, Start            



   date: 17            



   21:46:00 CDT,            



   Stop date:            



   17            



   21:46:00            



   CDTNotes: (Same            



   as: Zofran)            



   *** MEDICATION            



   WASTE ***            



   Product Size:            



   4 mg Product            



   Wasted:  ___ mg            



               



               

 

 Saline Flush 0.9%  10 mL, Route:    No Longer        [facility]



   IVP, Drug Form:    Active      2017  Medical



   INJ, Dosing            Center



   Weight 68.182,            



   kg, PRN, PRN            



   Line Flush,            



   Start date:            



   17            



   20:54:00 CDT,            



   Duration: 30            



   day, Stop date:            



   17            



   20:53:00            



   CDTNotes: (Same            



   as: BD            



   Posiflush)            



               



               

 

 predniSONE 50 mg  50 mg=1 tab,    Active      10/21/  [facility] [place]



 oral tablet  PO, Daily, 0            Medical



   Refill(s)            Center



               



               

 

 Prednisone  20 mg, PO,    Inactive      10/21/  [facility]



   Daily, Quantity          2016  Medical



   sufficient, 0            Center



   Refill(s)            



               



               

 

 potassium nitrate  1 appl, TOP,    Active        [facility] [place]



 250 MG/ML /  ONCE, APPLY TO          2016  Medical



 Silver Nitrate  AFFECTED AREA            Center



 750 MG/ML  ONCE DAILY FOR            



 Medicated Pad  6 DAYS, # 6 ea,            



   0 Refill(s),            



   Pharmacy:            



   [facility]            



   Store [id number]            



               



               

 

 Warfarin Sodium  2.5 mg=1 tab,    Active        [facility]



 2.5 MG Oral  PO, Daily, 0          2016  Medical



 Tablet [Coumadin]  Refill(s)            Center



               



               

 

 spironolactone 25  25 mg=1 tab,    Active        [facility] [place]



 mg oral tablet  PO, BID, 0            Medical



   Refill(s)            Center



               



               

 

 silodosin 8 MG  8 mg=1 cap, PO,    Active        [facility] [place]



 Oral Capsule  Daily, 0          2016  Medical



 [Rapaflo]  Refill(s)            Center



               



               

 

 sildenafil 20 MG  60 mg=3 tab,    Active        [facility] [place]



 Oral Tablet  PO, TID, 0          2016  Medical



   Refill(s)            Center



               



               

 

 Finasteride 5 MG  5 mg=1 tab, PO,    Active        [facility] [place]



 Oral Tablet  Daily, 0          2016  Medical



 [Proscar]  Refill(s)            Center



               



               

 

 AMIODarone 200 mg  200 mg=1 tab,    Active        [facility] [place]



 oral tablet  PO, Daily, 0          2016  Medical



   Refill(s)            [place]



               



               

 

 metoprolol  25 mg=1 tab,    Active        [facility] [place]



 tartrate 25 mg  PO, BID, # 60          2016  Medical



 oral tablet  tab, 0            Center



   Refill(s)            



               



               

 

 Furosemide 80 MG  80 mg=1 tab,    Active        [facility] [place]



 Oral Tablet  PO, Daily, # 30          2016  Medical



 [Lasix]  tab, 0            Center



   Refill(s)            



               



               

 

 Warfarin Sodium 5  5 mg=1 tab, PO,    Active        [facility] [place]



 MG Oral Tablet  Daily, # 30            Medical



 [Coumadin]  tab, 0            Center



   Refill(s)            



               



               

 

 silodosin 8 mg  8 mg=1 cap, PO,    Active        [facility] [place]



 oral capsule  Daily, 0          2016  Medical



   Refill(s)            [place]



               



               

 

 sildenafil  0 Refill(s)    Active        [facility]



             2016  Medical



               [place]



               



               

 

 finasteride 5 mg  5 mg=1 tab, PO,    Active        [facility] [place]



 oral tablet  Daily, # 30          2016  Medical



   tab, 0            Center



   Refill(s)            



               



               

 

 Potassium  20 mEq=1 tab,    Active        [facility]



 Chloride 20 MEQ  PO, BID, 0          2016  Medical



 Extended Release  Refill(s)            [place]



 Tablet              



               

 

 Nutren 2.0  Nutren 2.0, See    Active        [facility] [place]



   Instructions,          2016  Medical



   Nutren 2.0 via            Center



   PEG 250mL q            



   4hrs x 4/day +            



   180 mL x 1/day            



   Water flush            



   30mL before and            



   after feeds, #            



   150 can,            



   Refill(s) 12            



               



               

 

 amLODIPine 5 mg  5 mg=1 tab, PO,    Active        [facility] [place]



 oral tablet  Daily, # 30            Medical



   tab, 0            Center



   Refill(s)            



               



               

 

 metoprolol 25 mg  25 mg=1 tab,    Active        [facility] [place]



 oral tablet,  PO, BID, # 60          2016  Medical



 extended release  tab, 0            Center



   Refill(s)            



               



               

 

 Protonix  40 mg, 1 pkt,    Inactive        [facility]



   Route: PO, Drug            Medical



   form: GRAN/REC,            Center



   Daily, Start            



   date: 16            



   11:00:00,            



   Duration: 30            



   day, Stop date:            



   16            



   9:00:00Notes:            



   Same as:            



   Protonix  Mix            



   in 5 mL apple            



   juice or            



   applesauce for            



   oral & 10mL            



   apple juice for            



   NG tube            



               

 

 Tylenol  500 mg, 15.62    No Longer        [facility]



   mL, Route: PEG,    Active        Medical



   Drug form: LIQ,            Center



   Q4H, Dosing            



   Weight 78.636,            



   kg, PRN Pain            



   4-6/Temp >            



   100.4 F, Start            



   date: 16            



   21:45:00,            



   Duration: 30            



   day, Stop date:            



   16            



   21:44:00Notes:            



   Max            



   acetaminophen=4            



   000mg/day (4            



   gm/day).  (Same            



   as: Tylenol)            



               



               

 

 Mephyton  5 mg, 1 tab,    Inactive       [place]



   Route: PO, Drug            Medical



   form: TAB,            Center



   ONCE, Start            



   date: 16            



   10:30:00, Stop            



   date: 16            



   10:30:00Notes:            



   (Same as:            



   Mephy[place],            



   Vitamin K)            



               



               

 

 Protonix  40 mg, 1 pkt,    No Longer       [place]



   Route: PO, Drug    Active        Medical



   form: GRAN/REC,            Center



   Daily, Dosing            



   Weight 78.636,            



   kg, Start date:            



   16            



   9:00:00, Stop            



   date: 16            



   9:00:00Notes:            



   Same as:            



   Protonix  Mix            



   in 5 mL apple            



   juice or            



   applesauce for            



   oral & 10mL            



   apple juice for            



   NG tube            



               

 

 Thyroxine  100 microgram,    No Longer       [place]



   1 tab, Route:    Active        Medical



   PO, Drug form:            Center



   TAB, Daily,            



   Dosing Weight            



   78.636, kg,            



   Start date:            



   16            



   9:00:00,            



   Duration: 30            



   day, Stop date:            



   16            



   9:00:00Notes:            



   Take 1 hour            



   before or 2            



   hours after            



   meal; Enteral            



   feeds may            



   interefere with            



   the absorption            



   of this            



   medication.            



   (Same            



   as:Levothroid,            



   Synthroid)            



               



               

 

 Furosemide 40 MG  40 mg, 1 tab,    No Longer       [place]



 Oral Tablet  Route: PO, Drug    Active        Medical



   form: TAB,            Center



   Daily, Dosing            



   Weight 78.636,            



   kg, Start date:            



   16            



   9:00:00,            



   Duration: 30            



   day, Stop date:            



   16            



   9:00:00Notes:            



   (Same as:            



   Lasix)  May            



   cause GI upset.            



    Give with food            



   or milk.            



               

 

 digoxin 125 mcg  125 microgram,    No Longer       [place]



 (0.125 mg) oral  1 tab, Route:    Active      2016  Medical



 tablet  PO, Drug form:            Center



   TAB, Daily,            



   Dosing Weight            



   78.636, kg,            



   Start date:            



   16            



   9:00:00,            



   Duration: 30            



   day, Stop date:            



   16            



   9:00:00Notes:            



   Take on an            



   Empty Stomach            



   (Same as:            



   Lanoxin)            



               

 

 Citalopram  20 mg, 1 tab,    No Longer       [place]



   Route: PO, Drug    Active        Medical



   form: TAB,            Center



   Daily, Dosing            



   Weight 78.636,            



   kg, Start date:            



   16            



   9:00:00,            



   Duration: 30            



   day, Stop date:            



   16            



   9:00:00Notes:            



   (Same As:            



   CeleXA)            



               

 

 Norvasc  5 mg, 1 tab,    No Longer       [place]



   Route: PO, Drug    Active        Medical



   form: TAB,            Center



   Daily, Dosing            



   Weight 78.636,            



   kg, Start date:            



   16            



   9:00:00, Stop            



   date: 16            



   9:00:00Notes:            



   (Same as:            



   Norvasc)            



               

 

 Magnesium Sulfate  2 gm, 50 mL,    Inactive       [place]



   Route: IVPB,            Medical



   Drug form: INJ,            Center



   ONCE, Dosing            



   Weight 78.636,            



   kg, Start date:            



   16            



   8:01:00,            



   Duration: 2 hr,            



   Stop date:            



   16            



   8:01:00Notes:            



   WASTE: F/P -            



   Sink; E -            



   Municipal Trash            



   Bin            



               

 

 Vitamin K1  5 mg, 0.5 mL,    Inactive       [place]



   Route: SUB-Q,            Medical



   Drug form: INJ,            Center



   ONCE, Dosing            



   Weight 78.636,            



   kg, Start date:            



   16            



   8:00:00,            



   Duration: 1            



   doses or times,            



   Stop date:            



   16            



   8:00:00Notes:            



   (Same as:            



   Aqua-[place]phy[place],            



   Vitamin K)            



   *** MEDICATION            



   WASTE ***            



   Product Size:            



   10 mg Product            



   Wasted:  ___ mg            



               



               

 

 metoprolol  25 mg, 1 tab,    No Longer       [place]



 extended release  Route: PO, Drug    Active        Medical



   form: ERTAB,            Center



   BID, Start            



   date: 16            



   21:00:00, Stop            



   date: 16            



   17:00:00Notes:            



   (Same as:            



   Toprol XL) Do            



   Not Crush            



               



               

 

 enalapril  5 mg, 1 tab,    No Longer       [place]



   Route: PO, Drug    Active        Medical



   form: TAB, BID,            Center



   Dosing Weight            



   78.636, kg,            



   Start date:            



   16            



   21:00:00,            



   Duration: 30            



   day, Stop date:            



   16            



   17:00:00Notes:            



   (Same as:            



   Vasotec)            



               

 

 Lipitor  40 mg, 1 tab,    No Longer       [place]



   Route: PO, Drug    Active        Medical



   form: TAB,            Center



   Bedtime, Dosing            



   Weight 78.636,            



   kg, Start date:            



   16            



   21:00:00,            



   Duration: 30            



   day, Stop date:            



   16            



   21:00:00Notes:            



   (Same as:            



   Lipitor)            



               

 

 Nitroglycerin 0.4  0.4 mg, 1 tab,    No Longer       [place]



 MG Sublingual  Route: SL, Drug    Active        Medical



 Tablet  form: TAB,            Center



 [Nitrostat]  Q5Min, Dosing            



   Weight 78.636,            



   kg, PRN Chest            



   Pain, Start            



   date: 16            



   19:37:00,            



   Duration: 30            



   day, Stop date:            



   16            



   20:36:00Notes:            



   (Same            



   as:Nitroquick,            



   Nitrostat) "Do            



   Not Crush"            



   Sublingual            



   tablet            



               

 

 enalapril 5 mg  5 mg=1 tab, PO,    No Longer       [place]



 oral tablet  BID, 0    Active        Medical



   Refill(s)            Center



               



               

 

 citalopram 20 mg  20 mg=1 tab,    No Longer       [place]



 oral tablet  PO, Daily, # 30    Active        Medical



   tab, 0            Center



   Refill(s)            



               



               

 

 metoprolol  See    No Longer       [place]



 extended release  Instructions,    Active        Medical



   75 mgPO BID, 0            Center



   Refill(s)            



               



               

 

 Furosemide 40 MG  40 mg=1 tab,    No Longer       [place]



 Oral Tablet  PO, Daily, # 30    Active        Medical



   tab, 0            Center



   Refill(s)            



               



               

 

 levothyroxine 100  100 microgram=1    Active       [place]



 mcg (0.1 mg) oral  tab, PO, Daily,          2016  Medical



 tablet  # 30 tab, 0            Center



   Refill(s)            



               



               

 

 digoxin 125 mcg  0.125 mg, PO,    No Longer       [place]



 (0.125 mg) oral  Daily, # 30    Active      2016  Medical



 tablet  tab, 0            Center



   Refill(s)            



               



               

 

 atorvastatin 40  40 mg=1 tab,    No Longer      02/17/  [facility] [place]



 MG Oral Tablet  PO, Bedtime, #    Active      2016  Medical



 [Lipitor]  30 tab, 0            Center



   Refill(s)            



               



               

 

 apixaban 5 MG  5 mg=1 tab, PO,    No Longer       [place]



 Oral Tablet  BID, 0    Active      2016  Medical



 [Eliquis]  Refill(s)            Center



               



               

 

 Saline Flush 0.9%  10 ml, Route:    No Longer       [place]



   IVP, Drug Form:    Active      2016  Medical



   INJ, Dosing            Center



   Weight 77.273,            



   kg, PRN, PRN            



   Line Flush,            



   Start date:            



   16            



   14:50:00,            



   Duration: 30            



   day, Stop date:            



   16            



   15:49:00Notes:            



   (Same as: BD            



   Posiflush)            



               



               

 

 Naloxone  0.04 mg, 0.1    No Longer       [place]



   mL, Route: IVP,    Active      2016  Medical



   Drug form: INJ,            Center



   Q2MIN, Dosing            



   Weight 77.273,            



   kg, PRN            



   Narcotic            



   Reversal, Start            



   date: 16            



   16:02:00,            



   Duration: 8            



   doses or times,            



   Stop date:            



   Limited # of            



   timesNotes:            



   Same as Narcan            



               



               

 

 Flumazenil  0.2 mg, 2 mL,    No Longer       [place]



   Route: IVP,    Active      2016  Medical



   Drug form: INJ,            Center



   PRN, Dosing            



   Weight 77.273,            



   kg, PRN            



   Benzodiazepine            



   Reversal,            



   Initial dose,            



   Start date:            



   16            



   16:02:00,            



   Duration: 30            



   day, Stop date:            



   16            



   16:01:00Notes:            



   (Same as:            



   Romazicon)            



               



               

 

 Ondansetron  4 mg, 2 mL,    No Longer       [place]



   Route: IVP,    Active      2016  Medical



   Drug form: INJ,            Center



   ONCE, Dosing            



   Weight 77.273,            



   kg, PRN Nausea            



   & Vomiting,            



   Start date:            



   16            



   16:02:00Notes:            



   (Same as:            



   Zofran)   ***            



   MEDICATION            



   WASTE ***            



   Product Size:            



   4 mg Product            



   Wasted:  ___ mg            



               



               

 

 Labetalol  10 mg, 2 mL,    No Longer       [place]



   Route: IVP,    Active      2016  Medical



   Drug form: INJ,            Center



   Q5Min, Dosing            



   Weight 77.273,            



   kg, PRN            



   Elevated BP,            



   Start date:            



   16            



   16:02:00,            



   Duration: 5            



   doses or times,            



   Stop date:            



   Limited # of            



   times            



               

 

 Oxycodone  5 mg, 1 tab,    No Longer       [place]



   Route: PO, Drug    Active        Medical



   form: TAB, Q4H,            Center



   Dosing Weight            



   77.273, kg, PRN            



   Pain Score 4-6,            



   Start date:            



   16            



   16:02:00,            



   Duration: 30            



   day, Stop date:            



   16            



   16:01:00Notes:            



   (Same as:            



   Roxicodone)            



               



               

 

 Acetaminophen  1,000 mg, 100    No Longer       [place]



   mL, Route:    Active        Medical



   IVPB, Drug            Center



   form: INJ,            



   ONCE, Dosing            



   Weight 77.273,            



   kg, PRN Pain            



   Score 1-3,            



   Start date:            



   16            



   16:02:00,            



   Duration: 1            



   doses or times,            



   Stop date:            



   Limited # of            



   timesNotes:            



   Infuse over 15            



   minutes  Do not            



   exceed 4gm/day            



   of            



   acetaminophen            



    *** MEDICATION            



   WASTE ***            



   Product Size:            



   1000 mg Product            



   Wasted:  ___ mg            



               



               

 

 Furosemide 40 MG  40 mg=1 tab,    Active       [place]



 Oral Tablet  PO, Daily, 0            Medical



   Refill(s)            Center



               



               

 

 apixaban 5 MG  5 mg=1 tab, PO,    Active       [place]



 Oral Tablet  BID, 0            Medical



 [Eliquis]  Refill(s)            Center



               



               

 

 digoxin 125 mcg  125 microgram=1    Active        [facility] [place]



 (0.125 mg) oral  tab, PO, Daily,          2016  Medical



 tablet  0 Refill(s)            Center



               



               

 

 Nitroglycerin 0.4  0.4 mg=1 tab,    Active        [facility] [place]



 MG Sublingual  SL, Q5Min, PRN            Medical



 Tablet  Chest Pain, #            Center



 [Nitrostat]  100 tab, 0            



   Refill(s)            



               



               

 

 acetaminophen-hyd  15 mL, Route:  PO  No Longer    Solomon   [place]



 rocodone 325  PO, Drug Form:    Active        Medical



 mg-10 mg/15 mL  SOLN, Dosing            Center



 oral solution  Weight 82.273,            



   kg, Q4H, PRN            



   Pain Score 4-6,            



   Start date:            



   13            



   12:41:00,            



   Duration: 30            



   day, Stop date:            



   13            



   12:40:00            



               

 

 metoprolol  1 mg, 1 mL,  IVP  No Longer    Solomon    [facility]



   Route: IVP,    Active        Medical



   Drug form: INJ,            Center



   Q5Min, Dosing            



   Weight 82.273,            



   kg, PRN            



   Elevated BP,            



   Start date:            



   13            



   12:41:00,            



   Duration: 5            



   doses or times,            



   Stop date:            



   13            



   0:00:00            



               

 

 labetalol  5 mg, 1 mL,  IVP  No Longer    Solomon    [facility]



   Route: IVP,    Active        Medical



   Drug form: INJ,            Center



   Q5Min, Dosing            



   Weight 82.273,            



   kg, PRN            



   Elevated BP,            



   Start date:            



   13            



   12:41:00,            



   Duration: 5            



   doses or times,            



   Stop date:            



   13            



   0:00:00            



               

 

 esmolol IV Push  10 mg, 1 mL,  IVP  No Longer    Solomon    [facility]



   Route: IVP,    Active        Medical



   Drug form: INJ,            Center



   Q5Min, Dosing            



   Weight 82.273,            



   kg, PRN            



   Elevated BP,            



   Start date:            



   13            



   12:41:00,            



   Duration: 5            



   doses or times,            



   Stop date:            



   13            



   0:00:00            



               

 

 ondansetron  4 mg, 2 mL,  IVP  No Longer    Solomon    [facility]



   Route: IVP,    Active        Medical



   Drug form: INJ,            Center



   ONCE, Dosing            



   Weight 82.273,            



   kg, PRN Nausea            



   & Vomiting,            



   Start date:            



   13            



   12:41:00            



               

 

 naloxone  0.04 mg, 0.1  IVP  No Longer    Solomon    [facility]



   mL, Route: IVP,    Active        Medical



   Drug form: INJ,            Center



   Q2MIN, Dosing            



   Weight 82.273,            



   kg, PRN            



   Narcotic            



   Reversal, Start            



   date: 13            



   12:41:00,            



   Duration: 8            



   doses or times,            



   Stop date:            



   13            



   0:00:00            



               

 

 flumazenil  0.2 mg, 2 mL,  IVP  No Longer    Solomon    [facility]



   Route: IVP,    Active        Medical



   Drug form: INJ,            Center



   PRN, Dosing            



   Weight 82.273,            



   kg, PRN            



   Benzodiazepine            



   Reversal,            



   Initial dose,            



   Start date:            



   13            



   12:41:00,            



   Duration: 30            



   day, Stop date:            



   13            



   13:40:00            



               

 

 Cleocin HCl  600 mg, 4 mL,  IVPB  No Longer    [place]    [facility]



   Route: IVPB,    Active        Medical



   Drug form: INJ,            Center



   PRE OP, Start            



   date: 13            



   6:00:00,            



   Duration: 1            



   day, Stop date:            



   13            



   5:59:00            



               

 

 rivaroxaban  20 mg, Route:  PO  No Longer    [place]    [facility]



   PO, QPM, Dosing    Active        Medical



   Weight 79.545,            Center



   kg, Start date:            



   12            



   17:00:00,            



   Duration: 30            



   day, Stop date:            



   12            



   17:00:00            



               

 

 Protonix  40 mg, 1 tab,  PO  No Longer    [place]  [facility]



   Route: PO, Drug    Active        Medical



   form: ECTAB,            Center



   Before Dinner,            



   Dosing Weight            



   79.545, kg,            



   Start date:            



   12            



   16:30:00,            



   Duration: 30            



   day, Stop date:            



   12            



   16:30:00            



               

 

 pneumococcal  0.5 ml, Route:  IM  No Longer    SYSTEM    [facility]



 23-valent vaccine  IM, Drug Form:    Active        Medical



   INJ, Start            Center



   date: 12            



   9:00:00, Stop            



   date: 12            



   9:00:00            



               

 

 citalopram  20 mg, 1 tab,  PO  No Longer    [place]  [facility]



   Route: PO, Drug    Active        Medical



   form: TAB,            Center



   Daily, Dosing            



   Weight 79.545,            



   kg, Start date:            



   12            



   9:00:00,            



   Duration: 30            



   day, Stop date:            



   12            



   9:00:00            



               

 

 Toprol-XL 50 mg  50 mg, 1 tab,  PO  No Longer    [place]  [facility]



 oral tablet,  Route: PO, Drug    Active        Medical



 extended release  form: ERTAB,            Center



   Daily, Start            



   date: 12            



   9:00:00,            



   Duration: 30            



   day, Stop date:            



   12            



   9:00:00            



               

 

 enalapril  10 mg, 1 tab,  PO  No Longer    [place]  [facility]



   Route: PO, Drug    Active        Medical



   form: TAB,            Center



   Daily, Dosing            



   Weight 79.545,            



   kg, Start date:            



   12            



   9:00:00,            



   Duration: 30            



   day, Stop date:            



   12            



   9:00:00            



               

 

 dabigatran  150 mg, 1 cap,  PO  No Longer    [place]cker    [facility]



   Route: PO, Drug    Active        Medical



   form: CAP, BID,            Center



   Dosing Weight            



   79.545, kg,            



   Start date:            



   12            



   9:00:00,            



   Duration: 30            



   day, Stop date:            



   12            



   17:00:00            



               

 

 Norvasc  10 mg, 1 tab,  PO  No Longer    [place]    [facility]



   Route: PO, Drug    Active        Medical



   form: TAB,            Center



   Daily, Dosing            



   Weight 79.545,            



   kg, Start date:            



   12            



   9:00:00,            



   Duration: 30            



   day, Stop date:            



   12            



   9:00:00            



               

 

 Lipitor  40 mg, 1 tab,  PO  No Longer    [place]    [facility]



   Route: PO, Drug    Active        Medical



   form: TAB,            Center



   Daily, Dosing            



   Weight 79.545,            



   kg, Start date:            



   12            



   9:00:00,            



   Duration: 30            



   day, Stop date:            



   12            



   9:00:00            



               

 

 levothyroxine  125 microgram,  PO  No Longer    [place]    [facility]



   1 tab, Route:    Active        Medical



   PO, Drug form:            Center



   TAB, Q630AM,            



   Dosing Weight            



   79.545, kg,            



   Start date:            



   12            



   6:30:00,            



   Duration: 30            



   day, Stop date:            



   12            



   6:30:00            



               

 

 clindamycin  600 mg, 4 mL,  IVPB  No Longer    [place]    [facility]



   Route: IVPB,    Active        Medical



   Drug form: INJ,            Center



   ABXQ8H, Dosing            



   Weight 79.545,            



   kg, Priority:            



   STAT, Start            



   date: 12            



   5:47:00,            



   Duration: 30            



   day, Stop date:            



   12            



   21:47:00            



               

 

 magnesium sulfate  2 gm, 50 mL,  IVPB  No Longer    [place]    [facility]



   Route: IVPB,    Active        Medical



   Drug form: INJ,            Center



   ONCE, Dosing            



   Weight 79.545,            



   kg, Total            



   dose=2 gm,            



   Start date:            



   12            



   0:30:00,            



   Duration: 1            



   doses or times,            



   Stop date:            



   12            



   0:30:00            



               

 

 Omnipaque  80 mL, Route:  IVP  No Longer    Decker  [facility]



 350mg/ml  IVP, Drug Form:    Active        Medical



   SOLN, Dosing            Center



   Weight 79.545,            



   kg, ONCALL,            



   STAT, Start            



   date: 12            



   21:07:00,            



   Duration: 1            



   doses or times,            



   Weight=75 -            



   94kgWeight=75 -            



   94kg            



               

 

 docusate  100 mg, 1 cap,  PO  No Longer    Decker    [facility]



   Route: PO, Drug    Active        Medical



   form: CAP, BID,            Center



   Dosing Weight            



   79.545, kg, PRN            



   Constipation,            



   Start date:            



   12            



   18:00:00,            



   Duration: 30            



   day, Stop date:            



   12            



   17:59:00            



               

 

 Trandate  5 mg, 1 mL,  IVP  No Longer    T[place]  [facility]



   Route: IVP,    Active        Medical



   Drug form: INJ,            Center



   Q5Min, PRN            



   Elevated BP,            



   Start date:            



   12            



   14:50:00,            



   Duration: 30            



   day, Stop date:            



   12            



   14:49:00            



               

 

 flumazenil  0.2 mg, 2 mL,  IVP  No Longer    [place]  [facility]



   Route: IVP,    Active        Medical



   Drug form: INJ,            Center



   PRN, Dosing            



   Weight 79.545,            



   kg, PRN            



   Benzodiazepine            



   Reversal,            



   Initial dose,            



   Start date:            



   12            



   13:57:00,            



   Duration: 5            



   doses or times,            



   Stop date:            



   12            



   0:00:00            



               

 

 naloxone  0.04 mg, 0.1  IVP  No Longer    [place]  [facility]



   mL, Route: IVP,    Active        Medical



   Drug form: INJ,            Center



   Q2MIN, Dosing            



   Weight 79.545,            



   kg, PRN            



   Narcotic            



   Reversal, Start            



   date: 12            



   13:57:00,            



   Duration: 8            



   doses or times,            



   Stop date:            



   12            



   0:00:00            



               

 

 ondansetron  4 mg, 2 mL,  IVP  No Longer    [place]  [facility]



   Route: IVP,    Active        Medical



   Drug form: INJ,            Center



   ONCE, Dosing            



   Weight 79.545,            



   kg, PRN Nausea            



   & Vomiting,            



   Start date:            



   12            



   13:57:00            



               

 

 hydrALAZINE  5 mg, 0.25 mL,  IVP  No Longer    [place]  [facility]



   Route: IVP,    Active        Medical



   Drug form: INJ,            Center



   Q5Min, Dosing            



   Weight 79.545,            



   kg, PRN            



   Elevated BP,            



   Start date:            



   12            



   13:57:00,            



   Duration: 4            



   doses or times,            



   Stop date:            



   12            



   0:00:00            



               

 

 niCARdipine  0.25 mg, 0.1  IVP  No Longer    [place]  [facility]



   mL, Route: IVP,    Active        Medical



   Drug form: INJ,            Center



   Q5Min, Dosing            



   Weight 79.545,            



   kg, PRN            



   Elevated BP,            



   Start date:            



   12            



   13:57:00,            



   Duration: 4            



   doses or times,            



   Stop date:            



   12            



   0:00:00            



               

 

 labetalol  5 mg, Route:  IVP  No Longer    [place]  [facility]



   IVP, Q5Min,    Active        Medical



   Dosing Weight            Center



   79.545, kg, PRN            



   Elevated BP,            



   Start date:            



   12            



   13:57:00,            



   Duration: 5            



   doses or times,            



   Stop date:            



   Limited # of            



   times            



               

 

 hydromorphone  0.2 mg, 0.1 mL,  IVP  No Longer    [place] [address]



   Route: IVP,    Active        Medical



   Drug form: INJ,            Center



   Q5Min, Dosing            



   Weight 79.545,            



   kg, PRN Pain            



   Score 4-6,            



   Start date:            



   12            



   13:57:00,            



   Duration: 5            



   doses or times,            



   Stop date:            



   12            



   0:00:00            



               

 

 Xarelto 20 mg  Substitution    Active      [facility]



 oral tablet  Allowed            [facility]



               



               

 

 citalopram 20 mg  20 mg, 1 tab,  PO  Active    [place]  [facility]



 oral tablet  PO, Daily, 2012  Medical



   tab,            Center



   Substitution            



   Allowed, TAB            



               



               

 

 Protonix 40 mg  40 mg, 1 tab,  PO  Active    Decker  [facility]



 oral enteric  PO, Daily, 2012  Medical



 coated tablet  tab,            Center



   Substitution            



   Allowed, ECTAB            



               



               

 

 Pradaxa 150 mg  PO, BID,  PO  No Longer    Decker  [facility]



 oral capsule  Substitution    Active        Medical



   [facility]



               



               

 

 enalapril 10 mg  10 mg, 1 tab,  PO  Active    Decker  [facility]



 oral tablet  PO, Daily, 2012  Medical



   tab,            Center



   Substitution            



   Allowed, TAB            



               



               

 

 metoprolol 50 mg  50 mg, PO,  PO  Active    Decker    [facility]



 oral tablet,  Daily, 30 tab,            Medical



 extended release  Substitution            Center



   Allowed            



               

 

 Norvasc 10 mg  10 mg, 1 tab,  PO  Active    [place]     [place]



 oral tablet  PO, Daily, 30            Medical



   tab,            Center



   Substitution            



   Allowed, TAB            



               



               

 

 Norvasc 10 mg  10 mg, 1 tab,  PO  No Longer         [place]



 oral tablet  PO, Daily, 30    Active        Medical



   tab,            Center



   Substitution            



   Allowed, TAB            



               



               

 

 Lipitor 40 mg  40 mg, 1 tab,  PO  Active    Decker    [facility]



 oral tablet  PO, Daily, 30            Medical



   tab,            Center



   Substitution            



   Allowed, TAB            



               



               

 

 levothyroxine 125  Substitution    Active        [facility] [place]



 mcg (0.125 mg)  Allowed            Medical



 oral capsule              [place]



               



               

 

 influenza virus  0.5 ml, Route:  IM  No Longer    SYSTEM  10/15/  [facility]



 vaccine,  IM, Drug Form:    Active        Medical



 inactivated  INJ, ONCE,            [place],



   Start date:            OPID



   10/15/09            [place]



   9:00:00, Stop            



   date: 10/15/09            



   9:00:00            



               







Allergies, Adverse Reactions, Alerts







 Substance  Category  Reaction  Severity  Reaction  Status  Date  Comments  
Source



         type    Reported    



                 



                 



                 

 

 morphine  Assertion      Drug  Active      [facility]



                 



                 

 

 tetracyclines  Assertion      Drug  Active      [facility]



                 



                 







Immunizations







 Immunization  Date  Site  Status  Last  Comments  Source



   Given      Updated    



             



             

 

 diphtheria/pertus    Left  completed  William    [facility] [place]



 sis, acel/tetanus  7  [facility]



             

 

 pneumococcal      completed  Jose Antonio    [facility]



 23-valent vaccine  2          [facility]



             

 

 pneumococcal    Left  completed  Jose Antonio    [facility]



 23-valent vaccine  2  [facility]



             

 

 influenza virus  10/15/200    completed  Daryn    [facility]



 vaccine,  9      [facility]



             OPID



             [place]



             



             

 

 influenza virus  10/15/200  Right  completed  Augustine    [facility]



 vaccine,  9  [facility]



             







Results







 Order Name  Results  Value  Reference  Date  Interpretation  Comments  Source



       [facility]        



               



               



               

 

 HEMATOLOGY  Monocytes #  1.2 K/CMM  0.0 - 0.8        [address]



               



               



               

 

 HEMATOLOGY  Lymphocytes #  0.9 K/CMM  1.0 - 5.5        [address]



               



               



               

 

 HEMATOLOGY  Basophils #  0.1 K/CMM  0.0 - 0.2        [address]



               



               



               

 

 HEMATOLOGY  Eosinophils #  0.1 K/CMM  0.0 - 0.5         [place]



         /2017      [facility]



               



               



               

 

 HEMATOLOGY  Basophils  0.6 %  0.0 - 1.0         [place]



         /2017      [facility]



               



               



               

 

 HEMATOLOGY  Eosinophils  1.2 %  0.0 - 4.0         [place]



         /2017      [facility]



               



               



               

 

 HEMATOLOGY  Segs-Bands #  10.4 K/CMM  1.5 - 8.1         [place]



         /2017      [facility]



               



               



               

 

 HEMATOLOGY  Monocytes  9.5 %  2.0 - 12.0         [place]



         /2017      [facility]



               



               



               

 

 HEMATOLOGY  Lymphocytes  7.1 %  20.0 -         [place]



       40.0        [facility]



               



               



               

 

 HEMATOLOGY  RBC Morph  Normal           [place]



         /2017      [facility]



     (3/23/17 5:17 AM)          [place]



               



               



               

 

 HEMATOLOGY  Segs  81.6 %  45.0 -         [place]



       75.0        [facility]



               



               



               

 

 HEMATOLOGY  Plt Morph  Normal           [place]



         /2017      [facility]



     (3/23/17 5:17 AM)          [place]



               



               



               

 

 HEMATOLOGY  WBC  12.8 K/CMM  3.7 - 10.4         [place]



         /2017      [facility]



               



               



               

 

 HEMATOLOGY  RBC  3.95 M/CMM  4.70 -         [place]



       6.10        [facility]



               



               



               

 

 HEMATOLOGY  Platelet  146 K/CMM  133 - 450         [place]



         /2017      [facility]



               



               



               

 

 HEMATOLOGY  MCV  91.3 fL  80.0 -         [place]



       94.0        [facility]



               



               



               

 

 HEMATOLOGY  Hgb  12.2 g/dL  14.0 -         [place]



       18.0        [facility]



               



               



               

 

 HEMATOLOGY  MCH  30.8 pg  27.0 -         [place]



       31.0        [facility]



               



               



               

 

 HEMATOLOGY  Hct  36.0 %  42.0 -         [place]



       54.0        [facility]



               



               



               

 

 HEMATOLOGY  MCHC  33.8 g/dL  32.0 -         [place]



       36.0        [facility]



               



               



               

 

 HEMATOLOGY  RDW  15.2 %  11.5 -         [place]



       14.5        [facility]



               



               



               

 

 HEMATOLOGY  MPV  9.2 fL  7.4 - 10.4         [place]



         /2017      [facility]



               



               



               

 

 CHEM PANEL  eGFR  61        Result Comment: The eGFR is calculated using 
the CKD-EPI formula. In most young, healthy individuals the eGFR will be >90 mL/
min/1.73m2. The eGFR declines with age. An eGFR of 60-89 may be normal in  [facility] 
[place]



     mL/min/1.7        some populations, particularly the elderly, for 
whom the CKD-EPI formula has not been extensively validated. Use of the eGFR is 
not recommended in the following populations:  [address]



             Individuals with unstable creatinine concentrations, including 
pregnant patients and those with serious co-morbid conditions.  



               



             Patients with extremes in muscle mass or diet.  



               



             The data above are obtained from the National Kidney Disease 
Education Program (NKDEP) which additionally recommends that when the eGFR is 
used in patients with extremes of body mass index for purposes  



             of drug dosing, the eGFR should be multiplied by the estimated 
BMI.  

 

 CHEM PANEL  Alk Phos  68 unit/L  39 - 136        [address]



               



               



               

 

 CHEM PANEL  Bili Total  1.2 mg/dL  0.2 - 1.3        [address]



               



               



               

 

 CHEM PANEL  Potassium Lvl  4.4 meq/L  3.5 - 5.1        [address]



               



               



               

 

 CHEM PANEL  Chloride Lvl  105 meq/L  95 - 109        [address]



               



               



               

 

 CHEM PANEL  CO2  27 meq/L  24 - 32        [address]



               



               



               

 

 CHEM PANEL  Calcium Lvl  8.9 mg/dL  8.5 - 10.5        [address]



               



               



               

 

 CHEM PANEL  Total Protein  6.0 g/dL  6.4 - 8.4        [address]



               



               



               

 

 CHEM PANEL  Glucose Lvl  114 mg/dL  70 - 99        [address]



               



               



               

 

 CHEM PANEL  BUN  27 mg/dL  7 - 22        [address]



               



               



               

 

 CHEM PANEL  Creatinine  1.14 mg/dL  0.50 -        [facility]



   Lvl    1.40        [facility]



               



               



               

 

 CHEM PANEL  Sodium Lvl  140 meq/L  135 - 145        [address]



               



               



               

 

 CHEM PANEL  Albumin Lvl  2.8 g/dL  3.5 - 5.0        [address]



               



               



               

 

 CHEM PANEL  ALT  25 unit/L  0 - 65        [address]



               



               



               

 

 CHEM PANEL  AST  23 unit/L  0 - 37        [address]



               



               



               

 

 CHEM PANEL  A/G Ratio  0.9  0.7 - 1.6        [address]



               



               



               

 

 CHEM PANEL  Globulin  3.2 g/dL  2.7 - 4.2        [address]



               



               



               

 

 CHEM PANEL  AGAP  12.4 meq/L  10.0 -        [facility]



       .      [facility]



               



               



               

 

 CHEM PANEL  B/C Ratio  24  6 - 25         [place]



         /2017      [facility]



               



               



               

 

 CHEM PANEL  Phosphorus  2.8 mg/dL  2.5 - 4.5        [facility]



               [facility]



               



               



               

 

 CHEM PANEL  Magnesium Lvl  2.3 mg/dL  1.8 - 2.4        [facility]



               [facility]



               



               



               

 

 ELECTROLYTE  AGAP  12.4 meq/L  10.0 -        [facility]      .      [facility]



               



               



               

 

 ELECTROLYTE  eGFR  61        Result Comment: The eGFR is calculated using 
the CKD-EPI formula. In most young, healthy individuals the eGFR will be >90 mL/
min/1.73m2. The eGFR declines with age. An eGFR of 60-89 may be normal in  [facility] 
[place]



 S    mL/min/1.    some populations, particularly the elderly, for 
whom the CKD-EPI formula has not been extensively validated. Use of the eGFR is 
not recommended in the following populations:  [address]



             Individuals with unstable creatinine concentrations, including 
pregnant patients and those with serious co-morbid conditions.  



               



             Patients with extremes in muscle mass or diet.  



               



             The data above are obtained from the National Kidney Disease 
Education Program (NKDEP) which additionally recommends that when the eGFR is 
used in patients with extremes of body mass index for purposes  



             of drug dosing, the eGFR should be multiplied by the estimated 
BMI.  

 

 ELECTROLYTE  CO2  27 meq/L  24 - 32        [facility]



       [facility]



               



               



               

 

 ELECTROLYTE  Calcium Lvl  8.9 mg/dL  8.5 - 10.5        [facility]



       [facility]



               



               



               

 

 ELECTROLYTE  Glucose Lvl  114 mg/dL  70 - 99        [facility]



       [facility]



               



               



               

 

 ELECTROLYTE  Potassium Lvl  4.4 meq/L  3.5 - 5.1        [facility]



       [facility]



               



               



               

 

 ELECTROLYTE  Sodium Lvl  140 meq/L  135 - 145        [facility]



       [facility]



               



               



               

 

 ELECTROLYTE  Creatinine  1.14 mg/dL  0.50 -        [facility]  Lvl    1.40        [facility]



               



               



               

 

 ELECTROLYTE  BUN  27 mg/dL  7 -       [facility]



       [facility]



               



               



               

 

 ELECTROLYTE  Chloride Lvl  105 meq/L  95 - 109        [facility]



       [facility]



               



               



               

 

 HEMATOLOGY  INR  1.85  0.85 -        [facility]



       1.17        [facility]



               



               



               

 

 HEMATOLOGY  PT  21.7 s  12.0 -         [place]



       14.      [facility]



               



               



               

 

 HEMATOLOGY  Segs  83.5 %  45.0 -         [place]



       75.0        [facility]



               



               



               

 

 HEMATOLOGY  Eosinophils  0.1 %  0.0 - 4.0         [place]



         /2017      [facility]



               



               



               

 

 HEMATOLOGY  Basophils  0.1 %  0.0 - 1.0         [place]



         /2017      [facility]



               



               



               

 

 HEMATOLOGY  Lymphocytes  10.2 %  20.0 -         [place]



       40.0        [facility]



               



               



               

 

 HEMATOLOGY  Monocytes  6.1 %  2.0 - 12.0         [place]



         /2017      [facility]



               



               



               

 

 HEMATOLOGY  Segs-Bands #  6.1 K/CMM  1.5 - 8.1         [place]



         /2017      [facility]



               



               



               

 

 HEMATOLOGY  Lymphocytes #  0.7 K/CMM  1.0 - 5.5         [place]



         /2017      [facility]



               



               



               

 

 HEMATOLOGY  Monocytes #  0.4 K/CMM  0.0 - 0.8         [place]



         /[address] [place] [address]



               



               



               

 

 HEMATOLOGY  MPV  8.2 fL  7.4 - 10.4         [place]



         /2017      [facility]



               



               



               

 

 HEMATOLOGY  Platelet  129 K/CMM  133 - 450         [place]



         /2017      [facility]



               



               



               

 

 HEMATOLOGY  WBC  7.3 K/CMM  3.7 - 10.4         [place]



         /2017      [facility]



               



               



               

 

 HEMATOLOGY  RBC  4.04 M/CMM  4.70 -         [place]



       6.10        [facility]



               



               



               

 

 HEMATOLOGY  Hgb  12.2 g/dL  14.0 -         [place]



       18.0        [facility]



               



               



               

 

 HEMATOLOGY  Hct  37.0 %  42.0 -         [place]



       54.0        [facility]



               



               



               

 

 HEMATOLOGY  MCV  91.7 fL  80.0 -         [place]



       94.0        [facility]



               



               



               

 

 HEMATOLOGY  MCHC  33.0 g/dL  32.0 -         [place]



       36.0        [facility]



               



               



               

 

 HEMATOLOGY  MCH  30.2 pg  27.0 -         [place]



       31.0        [facility]



               



               



               

 

 HEMATOLOGY  RDW  15.4 %  11.5 -         [place]



       14.      [facility]



               



               



               

 

 HEMATOLOGY  INR  1.79  0.85 -         [place]



       1.17        [facility]



               



               



               

 

 HEMATOLOGY  PT  21.1 s  12.0 -         [place]



       14.      [facility]



               



               



               

 

 DRUG SCREEN  UDS Note  See Note           [place]



         /2017      [facility]



     (3/21/17 2:53 AM)          Center



               



               



               

 

 DRUG SCREEN  U Phencyc Scr  Negative  Negative         [place]



         /2017      [facility]



     *NA*          Center



               



     (3/21/17 2:53 AM)          



               

 

 DRUG SCREEN  U Opiate Scr  Positive  Negative         [place]



         /2017      Medical



     *ABN*          [place]



               



     (3/21/17 2:53 AM)          



               

 

 DRUG SCREEN  U Cocaine Scr  Negative  Negative         [place]



         /2017      Medical



     *NA*          [place]



               



     (3/21/17 2:53 AM)          



               

 

 DRUG SCREEN  U Benzodia  Negative  Negative         [place]



   Scr      /2017      Medical



     *NA*          [place]



               



     (3/21/17 2:53 AM)          



               

 

 DRUG SCREEN  U Mirna Scr  Negative  Negative         [place]



         /2017      Medical



     *NA*          [place]



               



     (3/21/17 2:53 AM)          



               

 

 DRUG SCREEN  U Amph Scr  Negative  Negative         [place]



         /2017      Medical



     *NA*          [place]



               



     (3/21/17 2:53 AM)          



               

 

 DRUG SCREEN  U Cannab Scr  Negative  Negative         [place]



         /2017      Medical



     *NA*          [place]



               



     (3/21/17 2:53 AM)          



               

 

 URINE AND  UA Nitrite  Negative  Negative         [place]



 STOOL              [facility]



     (3/21/17 2:53 AM)          [place]



               



               



               

 

 URINE AND  UA  0.2 EU/dL  0.1 - 1.0        [facility]  Urobilinogen            [facility]



               



               



               

 

 URINE AND  UA Blood  Small  Negative        [facility]



 STOOL        /2017      Medical



     *ABN*          [place]



               



     (3/21/17 2:53 AM)          



               

 

 URINE AND  UA Sq Epi  None Seen  Few        [facility]



 STOOL              [facility]



     (3/21/17 2:53 AM)          Center



               



               



               

 

 URINE AND  UA Spec Grav  null  <=1.030         [place]



 STOOL              [facility]



               



               



               

 

 URINE AND  UA Color  Yellow  Yellow         [place]



 STOOL        /2017      Medical



     *NA*          [place]



               



     (3/21/17 2:53 AM)          



               

 

 URINE AND  UA Bili  Negative  Negative        [facility]



 STOOL        /2017      Medical



     *NA*          [place]



               



     (3/21/17 2:53 AM)          



               

 

 URINE AND  UA Ketones  Negative  Negative        [facility]



 STOOL              Medical



     *NA*          [place]



               



     (3/21/17 2:53 AM)          



               

 

 URINE AND  UA Glucose  Negative  Negative        [facility]



 STOOL              [facility]



     (3/21/17 2:53 AM)          Center



               



               



               

 

 URINE AND  UA Protein  Trace  Negative        [facility]



 STOOL        /2017      Medical



     *ABN*          Center



               



     (3/21/17 2:53 AM)          



               

 

 URINE AND  UA pH  5.0  5.0 - 8.0        [facility]              [facility]



               



               



               

 

 URINE AND  UA Leuk Est  Negative  Negative        [facility]              [facility]



     (3/21/17 2:53 AM)          [place]



               



               



               

 

 URINE AND  UA WBC  None Seen  None Seen        [facility]              [facility]



     (3/21/17 2:53 AM)          [place]



               



               



               

 

 URINE AND  UA Turbidity  Clear  Clear        [facility]              [facility]



     (3/21/17 2:53 AM)          [place]



               



               



               

 

 URINE AND  UA RBC  None Seen  0 - 2        [facility]              [facility]



     (3/21/17 2:53 AM)          [place]



               



               



               

 

 CARDIAC  Total CK  92 unit/L  12 - 191        [facility]              [facility]



               



               



               

 

 CHEM PANEL  Lactic Acid  0.9 mMol/L  0.5 - 2.2        [facility]            [facility]



               



               



               

 

 ELECTROLYTE  AGAP  15.3 meq/L  10.0 -        [facility]      20.      [facility]



               



               



               

 

 ELECTROLYTE  eGFR  51        Result Comment: The eGFR is calculated using 
the CKD-EPI formula. In most young, healthy individuals the eGFR will be >90 mL/
min/1.73m2. The eGFR declines with age. An eGFR of 60-89 may be normal in  [facility] 
[place]



 S    mL/min/1.7        some populations, particularly the elderly, for 
whom the CKD-EPI formula has not been extensively validated. Use of the eGFR is 
not recommended in the following populations:  [address]



             Individuals with unstable creatinine concentrations, including 
pregnant patients and those with serious co-morbid conditions.  



               



             Patients with extremes in muscle mass or diet.  



               



             The data above are obtained from the National Kidney Disease 
Education Program (NKDEP) which additionally recommends that when the eGFR is 
used in patients with extremes of body mass index for purposes  



             of drug dosing, the eGFR should be multiplied by the estimated 
BMI.  

 

 ELECTROLYTE  Calcium Lvl  9.0 mg/dL  8.5 - 10.5        [facility]              [facility]



               



               



               

 

 ELECTROLYTE  Potassium Lvl  4.3 meq/L  3.5 - 5.1        [facility]      [facility]



               



               



               

 

 ELECTROLYTE  Chloride Lvl  101 meq/L  95 - 109        [facility]              [facility]



               



               



               

 

 ELECTROLYTE  CO2  26 meq/L  24 - 32        [facility] [place]



 S        /2017      [facility]



               



               



               

 

 ELECTROLYTE  Creatinine  1.32 mg/dL  0.50 -        [facility]



 S  Lvl    1.40  /      [facility]



               



               



               

 

 ELECTROLYTE  Sodium Lvl  138 meq/L  135 - 145         [place]



 S        /2017      [facility]



               



               



               

 

 ELECTROLYTE  Glucose Lvl  133 mg/dL  70 - 99         [place]



 S        /2017      [facility]



               



               



               

 

 ELECTROLYTE  BUN  26 mg/dL  7 - 22         [place]



 S        /2017      [facility]



               



               



               

 

 HEMATOLOGY  INR  2.00  0.85 -         [place]



       1.17        [facility]



               



               



               

 

 HEMATOLOGY  PT  23.0 s  12.0 -         [place]



       14.7        [facility]



               



               



               

 

 HEMATOLOGY  PTT  35.0 s  22.9 -         [place]



       35.8        [facility]



               



               



               

 

 HEMATOLOGY  MPV  8.1 fL  7.4 - 10.4         [place]



         /2017      [facility]



               



               



               

 

 HEMATOLOGY  MCHC  34.1 g/dL  32.0 -         [place]



       36.0        [facility]



               



               



               

 

 HEMATOLOGY  Platelet  133 K/CMM  133 - 450         [place]



         /2017      [facility]



               



               



               

 

 HEMATOLOGY  RDW  15.2 %  11.5 -         [place]



       14.5        [facility]



               



               



               

 

 HEMATOLOGY  Hgb  12.9 g/dL  14.0 -         [place]



       18.0        [facility]



               



               



               

 

 HEMATOLOGY  RBC  4.18 M/CMM  4.70 -         [place]



       6.10        [facility]



               



               



               

 

 HEMATOLOGY  MCH  30.9 pg  27.0 -         [place]



       31.0        [facility]



               



               



               

 

 HEMATOLOGY  MCV  90.6 fL  80.0 -         [place]



       94.0        [facility]



               



               



               

 

 HEMATOLOGY  Hct  37.9 %  42.0 -         [place]



       54.0        [facility]



               



               



               

 

 HEMATOLOGY  WBC  7.5 K/CMM  3.7 - 10.4         [place]



         /2017      [facility]



               



               



               

 

 HEMATOLOGY  G-value Rapid  9.4 K d/sc  5.0 - 11.6         [place]



         /2017      [facility]



               



               



               

 

 HEMATOLOGY  Max Amplitude  65 mm  52 - 71         [place]



   Rapid      /2017      [facility]



               



               



               

 

 HEMATOLOGY  R-time Rapid  0.9 min  0.4 - 0.7         [place]



         /2017      [facility]



               



               



               

 

 HEMATOLOGY  ACT (TEG)  136 s  86 - 118         [place]



   Rapid      /2017      [facility]



               



               



               

 

 HEMATOLOGY  Angle Rapid  76 degrees  64 - 80         [place]



         /2017      [facility]



               



               



               

 

 HEMATOLOGY  K-time Rapid  1.0 min  0.6 - 2.3         [place]



         /      [facility]



               



               



               

 

 HEMATOLOGY  [place]  0.8 min          [facility]



   Rapid      /2017      [facility]



               



               



               

 

 HEMATOLOGY  Estimated %  0.0 %  0.0 - 7.5        [facility]



   Lysis Rapid      /2017      [facility]



               



               



               

 

 HEMATOLOGY  Monocytes  8.2 %  2.0 - 12.0        [address]



               



               



               

 

 HEMATOLOGY  Eosinophils  1.5 %  0.0 - 4.0        [facility]      [facility]



               



               



               

 

 HEMATOLOGY  Segs-Bands #  5.8 K/CMM  1.5 - 8.1        [address]



               



               



               

 

 HEMATOLOGY  Lymphocytes #  0.9 K/CMM  1.0 - 5.5        [address]



               



               



               

 

 HEMATOLOGY  Basophils  0.4 %  0.0 - 1.0        [address]



               



               



               

 

 HEMATOLOGY  Monocytes #  0.6 K/CMM  0.0 - 0.8        [address]



               



               



               

 

 HEMATOLOGY  Eosinophils #  0.1 K/CMM  0.0 - 0.5        [address]



               



               



               

 

 HEMATOLOGY  Lymphocytes  12.5 %  20.0 -        [facility]



       40.0        [facility]



               



               



               

 

 HEMATOLOGY  Segs  77.4 %  45.0 -        [facility]



       75.      [facility]



               



               



               

 

 BLOOD BANK  Antibody Scrn  Negative          [facility]



 RESULTS        /      [facility]



     (3/20/17 9:13 PM)          [place]



               



               



               

 

 BLOOD BANK  ABO/Rh  O POS          [facility]



 RESULTS              [facility]



               



               



               

 

 Brain wo  Brain wo  CT HEAD WITHOUT CONTRAST        -  [facility]



 contrast CT  contrast CT      /    [facility]



               



     DATE: 3/20/2017 at 10:10 PM.        Read by:  Humphrey Rey MD  



             Dictated Date/time:  17 03:15  



             Electronically Signed by:  Humphrey Rey MD               
   17 03:16  



             FINAL REPORT  



     COMPARISON: None.          



               



               



               



     HISTORY: Pain Post Trauma - trauma.          



               



               



               



     TECHNIQUE: Contiguous axial images of the brain were obtained without 
intravenous contrast administration. Sagittal and coronal reformats were also 
provided. DLP: 997 mGy-cm.          



               



               



               



     FINDINGS:          



               



     There are no acute hemorrhages or acute infarcts. The gray-white 
interfaces are well defined.          



               



               



               



     Low density is noted within the periventricular white matter consistent 
with chronic small vessel ischemic disease. There is prominence of the cortical 
sulci, fissures, cisterns and ventricles consisten          



     t with cortical atrophy appropriate for the patient's stated age of 81 
years.          



               



               



               



     There are no acute bony abnormalities. The calvarium is intact. 
Atherosclerotic calcification is noted within the walls of the left vertebral 
and bilateral cavernous internal carotid arteries. A prosthe          



     tic lens is noted within the left lobe consistent with prior cataract 
surgery.          



               



               



               



     IMPRESSION:          



               



     1. No acute intracranial abnormality.          



               



     2. Age related volume loss.          



               



     3. White matter hypodensity consistent with chronic small vessel ischemic 
disease.          



               



     4. Arterial atherosclerosis.          



               



               



               



     Resident preliminary report by Dr. Connor Dan:  neg acute diffuse 
cerebral volume loss and chronic microangiopathic ischemic changes.          



               



               



               



               

 

 Chest/Abdom  Chest/Abdomen  EXAM: CT CHEST WITH CONTRAST        -  [facility]



 en/Pelvis w  /Pelvis w       /    -  Medical



 IV contrast  contrast CT  EXAM: CT ABDOMEN AND PELVIS WITH CONTRAST        
This report was dictated by a Radiology Resident/Fellow.  I have personally 
reviewed the images as  Center



 CT            well as the Resident's interpretation and agree with the 
findings.  



             Read by:  [id number] Segovia RES [id number]       Resident:  [id number] Dan RES [id number]  



             Dictated Date/time:  17 22:32  



     DATE: 3/20/2017 8:54 PM CDT        Electronically Signed by:  Nolbe Patel MD                   17 23:52  



             FINAL REPORT  



               



               



     INDICATION: Pain Post Trauma - trauma          



               



               



               



     COMPARISON: None.          



               



               



               



     TECHNIQUE: Volumetric acquisition of the chest, abdomen and pelvis 
following intravenous administration of contrast. Delayed imaging was then 
performed through the abdomen and pelvis, using a radiation          



     reduction technique. Axial, coronal and sagittal reformats.          



               



     IV contrast: 150 mL Visipaque 320          



               



     Oral contrast: None.          



               



     DLP: 2304 mGy-cm          



               



               



               



     FINDINGS:          



               



               



               



     Lines and Tubes: None.          



               



               



               



     Lower Neck: Visible portions unremarkable.          



               



               



               



     Thoracic Aorta and Mediastinum: No mediastinal hematoma or thoracic aortic 
injury. Postsurgical changes status post coronary artery          



               



               



               



     Lungs and Pleura: There is biapical scarring and/or atelectasis. right 
upper lung scattered hazy groundglass opacities. Mild bibasilar subsegmental 
atelectasis. No pleural fluid or pneumothorax.          



               



               



               



     Hepatobiliary: Normal.          



               



               



               



     Gallbladder: No injury.          



               



               



               



     Spleen: Normal.          



               



               



               



     Pancreas: Normal.          



               



               



               



     Adrenals: Normal.          



               



               



               



     Kidneys: No acute renal injury. Multiple cysts are noted bilaterally with 
the largest measuring 2.4 cm in the lower pole of the right kidney.          



               



               



               



     Ureters and Bladder: No injury.          



               



               



               



     Reproductive Organs: No injury.          



               



               



               



     Gastrointestinal Tract: No injury.          



               



               



               



     Peritoneum and Retroperitoneum: No fluid collections or free air.          



               



               



               



     Abdominal/Pelvic Vasculature: No vascular injury. The abdominal aorta and 
bilateral proximal iliac arteries are severely calcified.          



               



               



               



     Lymphadenopathy: None.          



               



               



               



     Spine/Bones: There is a chronic appearing compression deformity of L2 40% 
height loss. No acute abnormalities noted of spine. Mild multilevel 
degenerative changes are present. No other acute bony injury.  Diffuse 
osteopenia seen          



               



               



               



     Soft Tissues: No acute injury. A small lipoma is seen in the left anterior 
pelvic wall.          



               



               



               



     IMPRESSION:          



               



     1.  No acute thoracic, abdominal, or pelvic abnormality          



               



     2.  Right upper lobe scarring and edema, but no anastasiya contusion          



               



     3.  Chronic compression deformity of L2 with approximately 40% height loss
          



               



     4.  Mild multilevel degenerative changes of thoracic and lumbar spine     
     



               



     5.  Biapical scarring and/or atelectasis          



               



     6.  Diffuse osteopenia          



               



     7.  Multiple bilateral renal cysts          



               



     8.  Small lipoma in the left anterior pelvic wall          



               



               



               



               

 

 Facial  Facial Bones  EXAM: CT ORBITS WITHOUT CONTRAST        -  [facility]



 Bones   wo       /    -  Medical



 contrast CT  CT (ER)          This report was dictated by a Radiology Resident/
Fellow.  I have personally reviewed the images as  Center



 (ER)            well as the Resident's interpretation and agree with the 
findings.  



     DATE: 3/20/2017 8:54 PM CDT        Read by:  [id number] Segovia RES 
[id number]       Resident:  [id number] Segovia [id number]  



             Dictated Date/time:  17 22:43  



             Electronically Signed by:  Noble Patel MD                
   17 23:13  



             FINAL REPORT  



     INDICATION: Pain Post Trauma - trauma          



               



               



               



     COMPARISON: None          



               



               



               



     TECHNIQUE: Volumetric CT acquisition of the orbits without contrast. Axial
, coronal and sagittal reconstructions.          



               



     IV contrast: None.          



               



     DLP: 123 mGy-cm          



               



               



               



     FINDINGS:          



               



               



               



     Bones: No fracture or other acute bony abnormality is identified.          



               



     The paranasal sinuses and mastoid air cells are clear. Dentures are 
visualized and are intact.          



               



               



               



     Soft tissues: No abnormality of the globes is seen. There is no intraconal 
hematoma. Mild soft tissue contusion is seen in the left frontal region and 
possibly at the left cheek. No radiopaque foreign body is identified.          



               



               



               



     IMPRESSION:          



               



     1.  Mild soft tissue contusion seen in the left frontal region and 
possibly the left cheek          



               



     2.  No acute fracture or malalignment abnormality.          



               



     3.  Diffuse osteopenia.          



               



     4.  Mild atherosclerotic calcification of the internal carotid arteries.  
        



               



               



               



               

 

 Spine  Spine  EXAM: CT CERVICAL SPINE WITHOUT CONTRAST        -  [facility]



 cervical wo  cervical     -  Medical



 contrast CT  contrast CT          This report was dictated by a Radiology 
Resident/Fellow.  I have personally reviewed the images as  Center



             well as the Resident's interpretation and agree with the findings.
  



     DATE: 3/20/2017 8:54 PM CDT        Read by:  [id number] Segovia RES 
[id number]       Resident:  [id number] Segovia RES [id number]  



             Dictated Date/time:  17 22:25  



             Electronically Signed by:  Noble Patel MD                
   17 23:10  



             FINAL REPORT  



     INDICATION: Pain Post Trauma - trauma          



               



               



               



     COMPARISON: None          



               



               



               



     TECHNIQUE:  Volumetric acquisition of the cervical spine without contrast. 
Axial, sagittal and coronal reconstructions.          



               



     IV contrast: None.          



               



     DLP: 478 mGy-cm          



               



               



               



     FINDINGS: The spine is imaged from the skull base to the level of superior 
endplate of T2.          



               



               



               



     No acute fracture or malalignment is identified. There is severe diffuse 
bone demineralization. Moderate to severe degenerative changes as evidenced by 
multilevel uncovertebral joint arthropathy C4-C7,          



     posterior disc osteophyte complexes from C5 to C7 and anterior endplate 
osteophytosis from C2 to C6. No soft tissue abnormality is identified.          



               



               



               



     Bilateral carotid bulbs are moderately calcified. Biapical atelectasis and/
or scarring is seen.          



               



               



               



     IMPRESSION:          



               



     1.  No acute abnormality of the cervical spine.          



               



     2.  Severe diffuse bone demineralization          



               



     3.  Moderate to severe degenerative changes.          



               



     4.  Biapical atelectasis and/or scarring.          



               



     5.  Moderate desiccation of bilateral carotid bulbs.          



               



               



               



               

 

 Elbow 3  Elbow 3 views  EXAM: XR LEFT ELBOW 3 VIEWS        -   [place]



 views DX  DX      /    -  Medical



               Center



               



     DATE: 3/20/2017 9:20 PM CDT        Read by:  Nolbe Patel MD  



             Dictated Date/time:  17 23:47  



             Electronically Signed by:  Noble Patel MD                
   17 23:47  



             FINAL REPORT  



     INDICATION: trauma - trauma          



               



               



               



     COMPARISON: None          



               



               



               



     TECHNIQUE:  AP, lateral and oblique radiographs of the left elbow          



               



               



               



     DISCUSSION: No acute fracture or malalignment is identified. There is no 
excessive joint fluid. No soft tissue abnormality is identified.          



               



               



               



     IMPRESSION:  No acute abnormality.          



               



               



               



               

 

 Hand 3  Hand 3 views  EXAM: XR LEFT HAND 3 VIEWS        -   [place]



 views DX      -  Medical



     EXAM: XR RIGHT HAND 3 VIEWS          Center



               



             Read by:  Noble Patel MD  



             Dictated Date/time:  17 23:48  



     DATE: 3/20/2017 9:20 PM CDT        Electronically Signed by:  Noble Patel MD                   17 23:49  



             FINAL REPORT  



               



               



     INDICATION: trauma - trauma          



               



               



               



     COMPARISON: None          



               



               



               



     TECHNIQUE:  PA, lateral and oblique radiographs of each hand          



               



               



               



     DISCUSSION: No acute fracture or malalignment is identified. 
Osteoarthrosis is noted bilaterally and rather diffuse distribution. There is 
swelling in the dorsal aspect of the metacarpophalangeal area o          



     f the right hand, on the lateral view. Swelling is also present in the 
left wrist area.          



               



               



               



     IMPRESSION:  Soft tissue swelling at the right hand, and left wrist, but 
no other acute abnormality.          



               



               



               



               

 

 Knee 3  Knee 3 views  EXAM: XR LEFT KNEE 3 VIEWS        -   [place]



 views DX  DX          -  Medical



               Center



               



     DATE: 3/20/2017 9:20 PM CDT        Read by:  Noble Patel MD  



             Dictated Date/time:  17 23:45  



             Electronically Signed by:  Noble Patel MD                
   17 23:46  



             FINAL REPORT  



     INDICATION: trauma - trauma          



               



               



               



     COMPARISON: None.          



               



               



               



     TECHNIQUE:  AP, lateral and oblique views of the left knee          



               



               



               



     DISCUSSION: Total arthroplasty hardware is seen in usual position and 
anatomic alignment. No acute fracture or malalignment is identified. There is 
no excessive joint fluid. No soft tissue abnormality is identified.          



               



               



               



     IMPRESSION:  No acute abnormality.          



               



               



               



               

 

 Hand 3  Hand 3 views  EXAM: XR LEFT HAND 3 VIEWS        -   [place]



 views DX  DX          -  Medical



     EXAM: XR RIGHT HAND 3 VIEWS          Center



               



             Read by:  Noble Patel MD  



             Dictated Date/time:  17 23:48  



     DATE: 3/20/2017 9:20 PM CDT        Electronically Signed by:  Noble Patle MD                   17 23:49  



             FINAL REPORT  



               



               



     INDICATION: trauma - trauma          



               



               



               



     COMPARISON: None          



               



               



               



     TECHNIQUE:  PA, lateral and oblique radiographs of each hand          



               



               



               



     DISCUSSION: No acute fracture or malalignment is identified. 
Osteoarthrosis is noted bilaterally and rather diffuse distribution. There is 
swelling in the dorsal aspect of the metacarpophalangeal area o          



     f the right hand, on the lateral view. Swelling is also present in the 
left wrist area.          



               



               



               



     IMPRESSION:  Soft tissue swelling at the right hand, and left wrist, but 
no other acute abnormality.          



               



               



               



               

 

 Knee 3  Knee 3 views  EXAM: XR RIGHT KNEE 3 VIEWS        -   [place]



 views DX  DX      /    -  Medical



               Center



               



     DATE: 3/20/2017 9:20 PM CDT        Read by:  Noble Patel MD  



             Dictated Date/time:  17 23:46  



             Electronically Signed by:  Noble Patel MD                
   17 23:46  



             FINAL REPORT  



     INDICATION: trauma - trauma          



               



               



               



     COMPARISON: None.          



               



               



               



     TECHNIQUE:  AP, lateral and oblique views of the right knee          



               



               



               



     DISCUSSION: Total arthroplasty hardware is seen in usual position and 
anatomic alignment. No acute fracture or malalignment is identified. There is 
no excessive joint fluid. No soft tissue abnormality is identified.          



               



               



               



     IMPRESSION:  No acute abnormality.          



               



               



               



               

 

 Pelvis AP  Pelvis AP DX  EXAM: XR PELVIS 1 VIEW      [facility]



 DX            -  Medical



             This report was dictated by a Radiology Resident/Fellow.  I have 
personally reviewed the images as  Center



             well as the Resident's interpretation and agree with the findings.
  



     DATE: 3/20/2017 at 2043 hours        Read by:  Jonnathan Farrell MD
          Resident:  Jonnathan Farrell MD  



             Dictated Date/time:  17 21:35  



             Electronically Signed by:  Noble Patel MD                
   17 22:50  



             FINAL REPORT  



     INDICATION: Pain Post Trauma - trauma          



               



               



               



     COMPARISON: None available.          



               



               



               



     TECHNIQUE:  A single AP supine radiograph of the pelvis          



               



               



               



     FINDINGS:     No acute fracture or malalignment is identified.          



               



               



               



     Large and small bowel loops are filled with stool and gas and are 
nondilated.          



               



               



               



     IMPRESSION:  No acute abnormality.          



               



               



               



               

 

 Chest 1view  Chest 1view  EXAM: XR CHEST 1 VIEW       [place]



 DX  DX          -  Medical



             This report was dictated by a Radiology Resident/Fellow.  I have 
personally reviewed the images as  Center



             well as the Resident's interpretation and agree with the findings.
  



     DATE: 3/20/2017 at 2043 hours        Read by:  Jonnathan Farrell MD
          Resident:  Jonnathan Farrell MD  



             Dictated Date/time:  17 21:29  



             Electronically Signed by:  Noble Patel MD                
   17 22:49  



             FINAL REPORT  



     INDICATION: Pain Post Trauma - trauma          



               



               



               



     COMPARISON: None available.          



               



               



               



     TECHNIQUE: AP chest          



               



               



               



     FINDINGS:          



               



               



               



     Lines, tubes and hardware: Pulse generator projects over the left anterior 
chest with pacing lead in the right atrium, AICD lead in the right ventricle, 
and pacing lead in the coronary sinus. Median sternotomy wires are seen and 
intact.          



               



               



               



     Lungs and pleura: Diffuse bilateral airspace disease consistent with 
pulmonary edema.          



               



               



               



     Heart and mediastinum: The cardiomediastinal silhouette is enlarged.      
    



               



               



               



     Bones: No acute bony abnormality is identified.          



               



               



               



     IMPRESSION:          



               



     Cardiomegaly with diffuse pulmonary edema          



               



               



               



               

 

 HEMATOLOGY  INR  2.00  0.85 -        [facility]



             [facility]



               



               



               

 

 HEMATOLOGY  PT  23.0 s  12.0 -        [facility]



             [facility]



               



               



               

 

 HEMATOLOGY  PT  29.4 s  12.0 -        [facility]



             [facility]



               



               



               

 

 HEMATOLOGY  INR  2.75  0.85 -        [facility]



             [facility]



               



               



               

 

 CHEM PANEL  Magnesium Lvl  1.7 mg/dL  1.8 - 2.4        [facility]      [facility]



               



               



               

 

 CHEM PANEL  Phosphorus  2.9 mg/dL  2.5 - 4.5        [facility]      [facility]



               



               



               

 

 ELECTROLYTE  AGAP  12.1 meq/L  10.0 -        [facility]            [facility]



               



               



               

 

 ELECTROLYTE  eGFR  50        Result Comment: The eGFR is calculated using 
the CKD-EPI formula. In most young, healthy individuals the eGFR will be >90 mL/
min/1.73m2. The eGFR declines with age. An eGFR of 60-89 may be normal in  [facility] 
[place]



 S    mL/min/1    some populations, particularly the elderly, for 
whom the CKD-EPI formula has not been extensively validated. Use of the eGFR is 
not recommended in the following populations:  [address]



             Individuals with unstable creatinine concentrations, including 
pregnant patients and those with serious co-morbid conditions.  



               



             Patients with extremes in muscle mass or diet.  



               



             The data above are obtained from the National Kidney Disease 
Education Program (NKDEP) which additionally recommends that when the eGFR is 
used in patients with extremes of body mass index for purposes  



             of drug dosing, the eGFR should be multiplied by the estimated 
BMI.  

 

 ELECTROLYTE  CO2  28 meq/L  24 - 32        [facility]              [facility]



               



               



               

 

 ELECTROLYTE  Calcium Lvl  8.9 mg/dL  8.5 - 10.5        [facility]



       [facility]



               



               



               

 

 ELECTROLYTE  Potassium Lvl  4.1 meq/L  3.5 - 5.1        [facility]              [facility]



               



               



               

 

 ELECTROLYTE  Sodium Lvl  141 meq/L  135 - 145        [facility]



       [facility]



               



               



               

 

 ELECTROLYTE  Chloride Lvl  105 meq/L  95 - 109        [facility]      [facility]



               



               



               

 

 ELECTROLYTE  Creatinine  1.35 mg/dL  0.50 -        [facility]  Lvl    1.40        [facility]



               



               



               

 

 ELECTROLYTE  BUN  34 mg/dL  7 - 22        [facility]      [facility]



               



               



               

 

 ELECTROLYTE  Glucose Lvl  72 mg/dL  70 - 99        [facility]      [facility]



               



               



               

 

 HEMATOLOGY  Microcyte  1+  None Seen         [place]



         /2016      [facility]



               



     (16 6:56 PM)          



               

 

 HEMATOLOGY  Lymphocytes #  2.4 K/CMM  1.0 - 5.5        [address]



               



               



               

 

 HEMATOLOGY  Monocytes #  0.8 K/CMM  0.0 - 0.8        [facility]      [facility]



               



               



               

 

 HEMATOLOGY  Eosinophils #  0.1 K/CMM  0.0 - 0.5         [place]



         [address]



               



               



               

 

 HEMATOLOGY  Segs  53.5 %  45.0 -        [facility]



       75.0        [facility]



               



               



               

 

 HEMATOLOGY  Monocytes  10.9 %  2.0 - 12.0        [address]



               



               



               

 

 HEMATOLOGY  Eosinophils  1.5 %  0.0 - 4.0         [place]



         [address]



               



               



               

 

 HEMATOLOGY  Basophils  0.3 %  0.0 - 1.0        [facility]      [facility]



               



               



               

 

 HEMATOLOGY  Lymphocytes  33.8 %  20.0 -         [place]



       40.0        [facility]



               



               



               

 

 HEMATOLOGY  Segs-Bands #  3.7 K/CMM  1.5 - 8.1        [facility]      [facility]



               



               



               

 

 HEMATOLOGY  MPV  8.5 fL  7.4 - 10.4        [facility]      [facility]



               



               



               

 

 HEMATOLOGY  WBC  7.0 K/CMM  3.7 - 10.4        [facility]      [facility]



               



               



               

 

 HEMATOLOGY  RBC  5.41 M/CMM  4.70 -         [place]



       6.10        [facility]



               



               



               

 

 HEMATOLOGY  Hgb  13.6 g/dL  14.0 -         [place]



       18.0        [facility]



               



               



               

 

 HEMATOLOGY  Hct  42.4 %  42.0 -         [place]



       54.0  /2016      [facility]



               



               



               

 

 HEMATOLOGY  MCV  78.3 fL  80.0 -         [place]



       94.0  /      [facility]



               



               



               

 

 HEMATOLOGY  MCH  25.2 pg  27.0 -         [place]



       31.0  /      [facility]



               



               



               

 

 HEMATOLOGY  MCHC  32.1 g/dL  32.0 -         [place]



       36.0  /      [facility]



               



               



               

 

 HEMATOLOGY  RDW  18.5 %  11.5 -         [place]



       14.5  /      [facility]



               



               



               

 

 HEMATOLOGY  Platelet  148 K/CMM  133 - 450         [place]



         /      [facility]



               



               



               

 

 HEMATOLOGY  PTT  37.6 s  22.9 -         [place]



       35.8  /      [facility]



               



               



               

 

 HEMATOLOGY  PT  28.6 s  12.0 -        [facility]



       14.7  /      [facility]



               



               



               

 

 HEMATOLOGY  INR  2.65  0.85 -        [facility]



       1.      [facility]



               



               



               

 

 Esophagus  Esophagus BA  EXAM: FLUOROSCOPY MODIFIED BARIUM SWALLOW        
-  [facility]



 BA swallow  swallow          -  Medical



 function  function          This report was dictated by a Radiology Resident/
Fellow.  I have personally reviewed the images as  Center



 video DX  video DX          well as the Resident's interpretation and agree 
with the findings.  



     DATE: 2016 at 0958 hours        Read by:  Brook Ford MD
           Resident:  Brook Ford MD  



             Dictated Date/time:  16 10:29  



             Electronically Signed by:  Jas Simpson MD                     
   16 18:04  



             FINAL REPORT  



     INDICATION: Dysphagia.          



               



               



               



     COMPARISON: Modified barium swallow 2013.          



               



               



               



     TECHNIQUE: Oral barium contrast of differing consistencies was given to 
the patient to assess swallowing mechanism. The study was performed in 
conjunction with speech pathology.          



               



               



               



     FLUOROSCOPY TIME: 2 minutes 8 seconds          



               



               



               



     DISCUSSION:          



               



               



               



     The patient was given barium contrast of different consistencies.  The 
swallowing reflex is delayed with difficulty with pharyngeal peristalsis.      
    



               



               



               



     Thin barium with spoon: Symptomatic aspiration.          



               



               



               



     Nectar barium with spoon: Symptomatic aspiration.          



               



               



               



     Honey barium with spoon: Severe vallecular pooling.          



               



               



               



     Pudding barium with spoon: Severe vallecular pooling causing laryngeal 
penetration.          



               



               



               



     IMPRESSION:          



               



               



               



     1.  Please refer to speech pathologist's notes for further details and 
recommendations.          



               



     2.  Symptomatic aspiration with thin and nectar barium.          



               



               



               



               

 

 ELECTROLYTE  Potassium WB  5.1 meq/L  3.5 - 5.1        [address]



               



               



               

 

 ELECTROLYTE  AGAP  12.2 meq/L  10.0 -        [facility]      20.0        [facility]



               



               



               

 

 ELECTROLYTE  eGFR  63        Result Comment: The eGFR is calculated using 
the CKD-EPI formula. In most young, healthy individuals the eGFR will be >90 mL/
min/1.73m2. The eGFR declines with age. An eGFR of 60-89 may be normal in  [facility] 
[place]



 S    mL/min/1.7        some populations, particularly the elderly, for 
whom the CKD-EPI formula has not been extensively validated. Use of the eGFR is 
not recommended in the following populations:  [address]



             Individuals with unstable creatinine concentrations, including 
pregnant patients and those with serious co-morbid conditions.  



               



             Patients with extremes in muscle mass or diet.  



               



             The data above are obtained from the National Kidney Disease 
Education Program (NKDEP) which additionally recommends that when the eGFR is 
used in patients with extremes of body mass index for purposes  



             of drug dosing, the eGFR should be multiplied by the estimated 
BMI.  

 

 ELECTROLYTE  CO2  30 meq/L  24 - 32        [address]



               



               



               

 

 ELECTROLYTE  Calcium Lvl  8.6 mg/dL  8.5 - 10.5        [address]



               



               



               

 

 ELECTROLYTE  Chloride Lvl  104 meq/L  95 - 109        [address]



               



               



               

 

 ELECTROLYTE  Potassium Lvl  5.2 meq/L  3.5 - 5.1        [address]



               



               



               

 

 ELECTROLYTE  Sodium Lvl  141 meq/L  135 - 145        [address]



               



               



               

 

 ELECTROLYTE  Creatinine  1.12 mg/dL  0.50 -        [facility]  Lvl    1.40  /      [facility]



               



               



               

 

 ELECTROLYTE  BUN  27 mg/dL  7 - 22        [address]



               



               



               

 

 ELECTROLYTE  Glucose Lvl  84 mg/dL  70 - 99        [address]



               



               



               

 

 HEMATOLOGY  Eosinophils #  0.1 K/CMM  0.0 - 0.5  02      [address]



               



               



               

 

 HEMATOLOGY  Monocytes #  0.7 K/CMM  0.0 - 0.8        [address]



               



               



               

 

 HEMATOLOGY  Lymphocytes #  2.0 K/CMM  1.0 - 5.5        [address]



               



               



               

 

 HEMATOLOGY  Segs-Bands #  3.3 K/CMM  1.5 - 8.1        [address]



               



               



               

 

 HEMATOLOGY  Lymphocytes  32.2 %  20.0 -         [place]



       40.0        [facility]



               



               



               

 

 HEMATOLOGY  Segs  53.9 %  45.0 -         [place]



       75.0        [facility]



               



               



               

 

 HEMATOLOGY  Basophils  0.6 %  0.0 - 1.0         [place]



         /2016      [facility]



               



               



               

 

 HEMATOLOGY  Eosinophils  1.7 %  0.0 - 4.0         [place]



         /2016      [facility]



               



               



               

 

 HEMATOLOGY  Monocytes  11.6 %  2.0 - 12.0         [place]



         /2016      [facility]



               



               



               

 

 HEMATOLOGY  G-value  5.3 K d/sc  4.5 - 11.0         [place]



         /2016      [facility]



               



               



               

 

 HEMATOLOGY  Angle  66.9  53.0 -        [facility]



     degrees  72.0        [facility]



               



               



               

 

 HEMATOLOGY  K-time  1.8 min  1.0 - 3.0        [facility]



               [facility]



               



               



               

 

 HEMATOLOGY  Max Amp  51.5 mm  50.0 -         [place]



       70.0        [facility]



               



               



               

 

 HEMATOLOGY  Ly30  3.7 %  0.0 - 7.5         [place]



         /2016      [facility]



               



               



               

 

 HEMATOLOGY  Coag Index  0.0  -3.0-3.0 -         [place]



       3.0        [facility]



               



               



               

 

 HEMATOLOGY  R-time  4.5 min  5.0 - 10.0        [facility]



               [facility]



               



               



               

 

 HEMATOLOGY  TEG Data  See Note           [place]



         /2016      [facility]



     (16 1:45 PM)          [place]



               



               



               

 

 HEMATOLOGY  TEG Interp  Thrombelas          [facility]



     ra      [facility]



     show          



     shortened          



     value of          



     R. This          



     finding is          



     suggestive          



     of          



     enzymatic          



     hypercoagu          



     lation.CPT          



     :32328          



               



               

 

 HEMATOLOGY  PT  20.2 s  12.0 -         [place]



       14.7  /      [facility]



               



               



               

 

 HEMATOLOGY  INR  1.69  0.85 -         [place]



       1.17        [facility]



               



               



               

 

 HEMATOLOGY  PTT  34.9 s  22.9 -         [place]



       35.8        [facility]



               



               



               

 

 HEMATOLOGY  MCHC  31.3 g/dL  32.0 -         [place]



       36.0        [facility]



               



               



               

 

 HEMATOLOGY  MCH  25.1 pg  27.0 -  02       [place]



       31.0        [facility]



               



               



               

 

 HEMATOLOGY  Platelet  153 K/CMM  133 - 450         [place]



         /2016      [facility]



               



               



               

 

 HEMATOLOGY  RDW  18.7 %  11.5 -  02      [facility] [place]



       14.5  /      [facility]



               



               



               

 

 HEMATOLOGY  MPV  9.0 fL  7.4 - 10.4         [place]



         /2016      [facility]



               



               



               

 

 HEMATOLOGY  RBC  5.12 M/CMM  4.70 -        [facility]



       6.10  /      [facility]



               



               



               

 

 HEMATOLOGY  WBC  6.1 K/CMM  3.7 - 10.4         [place]



         /2016      [facility]



               



               



               

 

 HEMATOLOGY  Hgb  12.9 g/dL  14.0 -        [facility]



       18.0  /      [facility]



               



               



               

 

 HEMATOLOGY  Hct  41.0 %  42.0 -        [facility]



       54.0  /      [facility]



               



               



               

 

 HEMATOLOGY  MCV  80.0 fL  80.0 -        [facility]



       94.0  /      [facility]



               



               



               

 

 HEMATOLOGY  INR  1.26  0.85 -    [place]  3Interpretive Data: RECOMMENDED 
RANGES FOR PROTIME INR:  [facility] [place]



              2.0-3.0 for most medical and surgical thromboembolic 
states.  Medical



                2.5-3.5 for artificial heart valves and recurrent embolism.  
Center



               



             INR SHOULD BE USED ONLY FOR PATIENTS ON STABLE ANTICOAGULANT 
THERAPY.  



               

 

 HEMATOLOGY  PT  16.0 s  12.0 -  03  \[facility]\""



       14.7  /      [facility]



               



               



               

 

 HEMATOLOGY  PTT  33.4 s  22.9 -  /  Normal  5Interpretive  [facility] [place]



       35.8      Data: Heparin  [facility]



             Range:  57 -  



             [address]  



               



               

 

 CHEMISTRY  AGAP  12.8 meq/L  10.0 -    Normal    [facility]



       20.0        [facility]



               



               



               

 

 CHEMISTRY  eGFR  73      NA  1Result Comment: The eGFR is calculated 
using the CKD-EPI formula. In most young, healthy individuals the eGFR will be >
90 mL/min/1.73m2. The eGFR declines with age. An eGFR of 60-89 may be normal in
  [facility] [place]



     mL/min/1.7        some populations, particularly the elderly, for 
whom the CKD-EPI formula has not been extensively validated. Use of the eGFR is 
not recommended in the following populations:  Medical



     [facility]          Center



             Individuals with unstable creatinine concentrations, including 
pregnant patients and those with serious co-morbid conditions.  



               



             Patients with extremes in muscle mass or diet.  



               



             The data above are obtained from the National Kidney Disease 
Education Program (NKDEP) which additionally recommends that when the eGFR is 
used in patients with extremes of body mass index for purposes  



             of drug dosing, the eGFR should be multiplied by the estimated 
BMI.  

 

 CHEMISTRY  Potassium Lvl  4.8 meq/L  3.5 - 5.1    Normal     [place]



         /2013      [facility]



               



               



               

 

 CHEMISTRY  Glucose Lvl  85 mg/dL  70 - 99    Normal  2Interpretive Data: 
Adult reference range values reflect the clinical guidelines   [place]



         /2013    of the American Diabetes Association.  Medical



               Center



               



               



               

 

 CHEMISTRY  CO2  32 meq/L  24 - 32    Normal     [place]



         /2013      [facility]



               



               



               

 

 CHEMISTRY  Chloride Lvl  103 meq/L  95 - 109    Normal     [place]



         /2013      [facility]



               



               



               

 

 CHEMISTRY  Sodium Lvl  143 meq/L  135 - 145    Normal     [place]



         /2013      [facility]



               



               



               

 

 CHEMISTRY  Creatinine  1.0 mg/dL  0.5 - 1.4    Normal    [facility]            [facility]



               



               



               

 

 CHEMISTRY  BUN  19 mg/dL  7 -   Normal     [place]



         /2013      [facility]



               



               



               

 

 CHEMISTRY  Calcium Lvl  8.9 mg/dL  8.5 - 10.5    Normal     [place]



         /2013      [facility]



               



               



               

 

 HEMATOLOGY  Segs  58.9 %  45.0 -    Normal    [facility]



       75.0        [facility]



               



               



               

 

 HEMATOLOGY  Monocytes #  0.8 K/CMM  0.0 - 0.8    Normal     [place]



         /2013      [facility]



               



               



               

 

 HEMATOLOGY  Eosinophils #  0.2 K/CMM  0.0 - 0.5    Normal     [place]



         /2013      [facility]



               



               



               

 

 HEMATOLOGY  Lymphocytes  26.3 %  20.0 -    Normal    [facility]



       40.0        [facility]



               



               



               

 

 HEMATOLOGY  Monocytes  11.1 %  2.0 - 12.0    Normal     [place]



         /2013      [facility]



               



               



               

 

 HEMATOLOGY  Eosinophils  3.2 %  0.0 - 4.0    Normal     [place]



         /2013      [facility]



               



               



               

 

 HEMATOLOGY  Basophils  0.5 %  0.0 - 1.0    Normal     [place]



         /2013      [facility]



               



               



               

 

 HEMATOLOGY  Segs-Bands #  4.4 K/CMM  1.5 - 8.1    Normal     [place]



         /2013      [facility]



               



               



               

 

 HEMATOLOGY  Lymphocytes #  2.0 K/CMM  1.0 - 5.5    Normal     [place]



         /2013      [facility]



               



               



               

 

 HEMATOLOGY  PTT  48.3 s  22.9 -    HI  6Interpretive  [facility] [place]



       35.8  /    Data: Heparin  Medical



             Therapeutic  Center



             Range:  57 -  



             [address]  



               



               

 

 HEMATOLOGY  INR  3.03  0.85 -    [place]  4Interpretive Data: RECOMMENDED 
RANGES FOR PROTIME INR:  [facility] [place]



       1.17         2.0-3.0 for most medical and surgical thromboembolic 
states.  Medical



                2.5-3.5 for artificial heart valves and recurrent embolism.  
Center



               



             INR SHOULD BE USED ONLY FOR PATIENTS ON STABLE ANTICOAGULANT 
THERAPY.  



               

 

 HEMATOLOGY  PT  31.2 s  12.0 -    \[facility]\""



       14.7  /      [facility]



               



               



               

 

 HEMATOLOGY  RBC  4.66 M/CMM  4.70 -    [facility]



       6.10  /      [facility]



               



               



               

 

 HEMATOLOGY  Platelet  154 K/CMM  133 - 450    Normal     [place]



         /2013      [facility]



               



               



               

 

 HEMATOLOGY  RDW  15.5 %  11.5 -    \[facility]\""



       14.5  /      [facility]



               



               



               

 

 HEMATOLOGY  MPV  8.7 fL  7.4 - 10.4    Normal     [place]



         /2013      [facility]



               



               



               

 

 HEMATOLOGY  Hct  41.4 %  42.0 -    [facility]



       54.0        [facility]



               



               



               

 

 HEMATOLOGY  Hgb  13.5 g/dL  14.0 -    [facility]



       18.0  /      [facility]



               



               



               

 

 HEMATOLOGY  MCHC  32.6 g/dL  32.0 -    [facility]



       36.0        [facility]



               



               



               

 

 HEMATOLOGY  MCV  88.9 fL  80.0 -    [facility]



       94.0        [facility]



               



               



               

 

 HEMATOLOGY  MCH  29.0 pg  27.0 -    [facility]



       31.0        [facility]



               



               



               

 

 HEMATOLOGY  WBC  7.5 K/CMM  3.7 - 10.4    Normal     [place]



         /2013      [facility]



               



               



               

 

 HEMATOLOGY  TEG Interp  Thrombelas      NA    [facility]



     tograph          [facility]



     are within          



     reference          



     ranges.          



     These          



     indicate          



     adequate          



     hemostasis          



     .  If          



     there is          



     evidence          



     of          



     bleeding,          



     consider          



     anatomical          



     or          



     surgical          



     bleeding.          



     CPT:50861          



               



               



               

 

 HEMATOLOGY  Angle  63.7  53.0 -    Normal    [facility]



     degrees  72.0        [facility]



               



               



               

 

 HEMATOLOGY  K-time  1.9 min  1.0 - 3.0    Normal    [facility]      [facility]



               



               



               

 

 HEMATOLOGY  R-time  7.2 min  5.0 - 10.0    Normal    [facility]      [facility]



               



               



               

 

 HEMATOLOGY  TEG Data  See Note 7      Normal  7Interpretive Data: Normal 
ranges are for citrated whole blood with kaolin activator.   [place]



         /2013      [facility]



     (2013 15:10:00)        R TIME:  Reflects the degree of anti-
coagulation due to LMWH, unfractionated heparin, and coumadin as well as non-
specific  Center



             factor deficiencies.  



               



             K TIME, ALPHA ANGLE, AND MA(MAX AMPLITUDE):  Have been  



             associated with the platelet release reaction and fibrin  



             polymer formation. These parameters assess the speed of clot  



             formation and the tensil strength of the clot.  Higher levels  



             are associated with hypercoagulable states.  



               



             G VALUE:  Another measure of clot strength.  



               



             LY30:  Percent lysis in 30 Minutes is associated with the degree  



             of fibrinolysis.  



               



             CI or COAG INDEX:  A calculation from the above measured data  



             indicating an overall hyper or hypo coagulability with values  



             above (plus) +3.0 being relatively hypercoagulable and those  



             below (minus) -3.0 being relatively hypocoagulable.  



               



             These measurements are functional in nature with many variables  



             and require close clinical correlation.  



               



             Thromboelasography (TEG) is mostly used to monitor significant 
coagulopathy in trauma patients or surgical patients.  It assesses gloabal (
primary and secondary) hemostasis using whole blood and therefo  



             re, not expected to correlate well with conventional coagulation 
tests.  TEG results need to be correlated with clinical evaluation for patient 
management.  

 

 HEMATOLOGY  Coag Index  -1.3  -3.0-3.0 -    Normal    [facility]



       3.0        [facility]



               



               



               

 

 HEMATOLOGY  Ly30  0.6 %  0.0 - 7.5    Normal    [facility]



         /      [facility]



               



               



               

 

 HEMATOLOGY  G-value  6.8 K d/sc  4.5 - 11.0    Normal    [facility]      [facility]



               



               



               

 

 HEMATOLOGY  Max Amp  57.7 mm  50.0 -    Normal    [facility]



       70.0        [facility]



               



               



               

 

 CHEMISTRY  Troponin-T  null  0.000 -    Normal    [facility]



       0.100        [facility]



               



               



               

 

 CHEMISTRY  Troponin-I  null  0.00 -    Normal    [facility]



       0.40        [facility]



               



               



               

 

 CHEMISTRY  Total CK  43 unit/L  12 - 191    Normal    [facility]      [facility]



               



               



               

 

 CHEMISTRY  eGFR  87      NA  1Result Comment: The eGFR is calculated 
using the CKD-EPI formula. In most young, healthy individuals the eGFR will be >
90 mL/min/1.73m2. The eGFR declines with age. An eGFR of 60-89 may be normal in
  [facility] [place]



     mL/min/1.7    /    some populations, particularly the elderly, for 
whom the CKD-EPI formula has not been extensively validated. Use of the eGFR is 
not recommended in the following populations:  [address]



             Individuals with unstable creatinine concentrations, including 
pregnant patients and those with serious co-morbid conditions.  



               



             Patients with extremes in muscle mass or diet.  



               



             The data above are obtained from the National Kidney Disease 
Education Program (NKDEP) which additionally recommends that when the eGFR is 
used in patients with extremes of body mass index for purposes  



             of drug dosing, the eGFR should be multiplied by the estimated 
BMI.  

 

 CHEMISTRY  Calcium Lvl  8.4 mg/dL  8.5 - 10.5    LOW    [facility]



               [facility]



               



               



               

 

 CHEMISTRY  CO2  24 meq/L  24 - 32    Normal    [facility]



               [facility]



               



               



               

 

 CHEMISTRY  Chloride Lvl  105 meq/L  95 - 109    Normal    [facility]



               [facility]



               



               



               

 

 CHEMISTRY  Potassium Lvl  4.3 meq/L  3.5 - 5.1    Normal    [facility]      [facility]



               



               



               

 

 CHEMISTRY  Sodium Lvl  142 meq/L  135 - 145    Normal    [facility]      [facility]



               



               



               

 

 CHEMISTRY  Creatinine  0.8 mg/dL  0.5 - 1.4    Normal    [facility]            [facility]



               



               



               

 

 CHEMISTRY  BUN  20 mg/dL  7 - 22    Normal    [facility]      [facility]



               



               



               

 

 CHEMISTRY  Glucose Lvl  131 mg/dL  70 - 99    HI  4Interpretive Data: 
Adult reference range values reflect the clinical guidelines   [place]



         /2012    of the [employer].  [facility]



               



               



               

 

 CHEMISTRY  AGAP  17.3 meq/L  10.0 -    Normal    [facility]



       20.0        [facility]



               



               



               

 

 CHEMISTRY  A/G Ratio  1.5  0.7 - 1.6    Normal     [place]



         /2012      [facility]



               



               



               

 

 CHEMISTRY  Globulin  2.5 g/dL  2.0 - 4.0    Normal     [place]



         /2012      [facility]



               



               



               

 

 CHEMISTRY  Bili Indirect  1.1 mg/dL  0.0 - 1.0    [facility] [place]



         /2012      [facility]



               



               



               

 

 CHEMISTRY  AST  22 unit/L  0 - 37    Normal    [facility]      [facility]



               



               



               

 

 CHEMISTRY  Total Protein  6.2 g/dL  6.4 - 8.4    LOW    [facility]      [facility]



               



               



               

 

 CHEMISTRY  Bili Total  1.7 mg/dL  0.2 - 1.3    \[facility]\""



               [facility]



               



               



               

 

 CHEMISTRY  Bili Direct  0.6 mg/dL  0.0 - 0.3    [facility] [place]



         /2012      [facility]



               



               



               

 

 CHEMISTRY  Alk Phos  113 unit/L  39 - 136    Normal    [facility]      Medical



               Center



               



               



               

 

 CHEMISTRY  Albumin Lvl  3.7 g/dL  3.5 - 5.0    Normal     [place]



         /2012      Medical



               Center



               



               



               

 

 CHEMISTRY  ALT  23 unit/L  0 - 65    Normal     [place]



         /2012      Medical



               Center



               



               



               

 

 CHEMISTRY  Phosphorus  4.3 mg/dL  2.5 - 4.5    Normal     [place]



         /2012      Medical



               Center



               



               



               

 

 CHEMISTRY  Magnesium Lvl  2.3 mg/dL  1.8 - 2.4    Normal     [place]



         /2012      [facility]



               



               



               

 

 CHEMISTRY  Ca Norm mgdL  4.64 mg/dL  4.65 -    LOW     [place]



       5.20        Medical



               Center



               



               



               

 

 CHEMISTRY  Ca Ion mgdL  4.64 mg/dL  4.65 -    LOW    [facility]



       5.20        Medical



               Center



               



               



               

 

 CHEMISTRY  Ca Norm  1.16  1.16 -    Normal     [place]



     mMol/L  1.      Medical



               [place]



               



               



               

 

 CHEMISTRY  Ca Ion  1.16  1.16 -    Normal     [place]



     mMol/L  1.30        Medical



               [place]



               



               



               

 

 CHEMISTRY  Troponin-T  null  0.000 -    Normal    [facility]



       0.100        Medical



               [place]



               



               



               

 

 CHEMISTRY  Troponin-I  null  0.00 -    Normal    [facility]



       0.40        Medical



               Center



               



               



               

 

 CHEMISTRY  Total CK  38 unit/L  12 - 191    Normal     [place]



         /2012      Medical



               Center



               



               



               

 

 CHEMISTRY  POC A O2 Sat  91.0 %  95.0 -    LOW    [facility]



       100.0        Medical



               [place]



               



               



               

 

 CHEMISTRY  POC A HCO3  25 mMol/L  22 - 26    Normal     [place]



         /2012      [facility]



               



               



               

 

 CHEMISTRY  POC A Na  138 meq/L  135 - 145    Normal     [place]



         /2012      [facility]



               



               



               

 

 CHEMISTRY  POC A K  4.0 meq/L  3.5 - 5.1    Normal     [place]



         /2012      [facility]



               



               



               

 

 CHEMISTRY  POC A BE  1 mMol/L  -2-2 - 2    Normal     [place]



         /2012      Medical



               Center



               



               



               

 

 CHEMISTRY  POC A LA  1.8 mMol/L  0.5 - 2.2    Normal     [place]



         /2012      Medical



               Center



               



               



               

 

 CHEMISTRY  POC A Glu  145 mg/dL  70 - 99    HI     [place]



         /2012      Medical



               [place]



               



               



               

 

 CHEMISTRY  POC A Ca Ion  1.16  1.16 -    Normal     [place]



     mMol/L  1.30        Medical



               [place]



               



               



               

 

 CHEMISTRY  POC A Source  ART      NA     [place]



         /2012      Medical



               Center



               



               



               

 

 CHEMISTRY  POC A Temp  37.0 Shanique      NA     [place]



         /2012      Medical



               Center



               



               



               

 

 CHEMISTRY  POC A PCO2  40 mm[Hg]  35 - 45    Normal     [place]



         /2012      [facility]



               



               



               

 

 CHEMISTRY  POC A pH  7.41  7.35 -    Normal     [place]



       7.45        [facility]



               



               



               

 

 CHEMISTRY  POC A PO2  60 mm[Hg]  80 - 100    CRIT     [place]



         /2012      [facility]



               



               



               

 

 HEMATOLOGY  Segs  84.9 %  45.0 -    HI    [facility]



       75.0        [facility]



               



               



               

 

 HEMATOLOGY  Lymphocytes  10.4 %  20.0 -    LOW    [facility]



       40.0        [facility]



               



               



               

 

 HEMATOLOGY  Monocytes  3.4 %  2.0 - 12.0    Normal     [place]



         /2012      [facility]



               



               



               

 

 HEMATOLOGY  Monocytes #  0.3 K/CMM  0.0 - 0.8    Normal     [place]



         /2012      [facility]



               



               



               

 

 HEMATOLOGY  Basophils #  0.1 K/CMM  0.0 - 0.2    Normal     [place]



         /2012      [facility]



               



               



               

 

 HEMATOLOGY  Eosinophils  0.1 %  0.0 - 4.0    Normal     [place]



         /2012      [facility]



               



               



               

 

 HEMATOLOGY  Basophils  1.2 %  0.0 - 1.0    HI     [place]



         /2012      [facility]



               



               



               

 

 HEMATOLOGY  Segs-Bands #  7.7 K/CMM  1.5 - 8.1    Normal     [place]



         /2012      [facility]



               



               



               

 

 HEMATOLOGY  Lymphocytes #  1.0 K/CMM  1.0 - 5.5    Normal     [place]



         /2012      [facility]



               



               



               

 

 HEMATOLOGY  PTT  31.2 s  22.9 -    Normal  10Interpretiv  [facility]



       35.8      e Data:  [facility]



             Therapeutic  



             Range:  57 -  



             92 Seconds  



               



               

 

 HEMATOLOGY  INR  1.24  0.85 -    HI  7Interpretive Data: RECOMMENDED 
RANGES FOR PROTIME INR:  [facility] [place]



       1.       2.0-3.0 for most medical and surgical thromboembolic 
states.  Medical



                2.5-3.5 for artificial heart valves and recurrent embolism.  
Center



               



             INR SHOULD BE USED ONLY FOR PATIENTS ON STABLE ANTICOAGULANT 
THERAPY.  



               

 

 HEMATOLOGY  PT  15.8 s  12.0 -    HI    [facility]



       14.7        [facility]



               



               



               

 

 HEMATOLOGY  Platelet  134 K/CMM  133 - 450    Normal     [place]



         /2012      [facility]



               



               



               

 

 HEMATOLOGY  MCH  30.0 pg  27.0 -    Normal    [facility]



       31.0        [facility]



               



               



               

 

 HEMATOLOGY  MCHC  33.5 g/dL  32.0 -    Normal    [facility]



       36.0        [facility]



               



               



               

 

 HEMATOLOGY  MCV  89.5 fL  80.0 -    Normal    [facility]



       94.0  /      [facility]



               



               



               

 

 HEMATOLOGY  RDW  16.2 %  11.5 -    HI    [facility]



       14.5  /      [facility]



               



               



               

 

 HEMATOLOGY  WBC  9.1 K/CMM  3.7 - 10.4    Normal     [place]



         /2012      [facility]



               



               



               

 

 HEMATOLOGY  RBC  4.22 M/CMM  4.70 -    LOW    [facility]



       6.10  /      [facility]



               



               



               

 

 HEMATOLOGY  Hgb  12.7 g/dL  14.0 -    LOW    [facility]



       18.0  /      [facility]



               



               



               

 

 HEMATOLOGY  Hct  37.8 %  42.0 -    LOW    [facility]



       54.0  /      [facility]



               



               



               

 

 HEMATOLOGY  MPV  8.8 fL  7.4 - 10.4    Normal     [place]



         /2012      [facility]



               



               



               

 

 URINALYSIS  UA  <=1.0  0.1 - 1.0    NA    [facility]



   Urobilinogen  mg/dL          Medical



     <br/>*NA*<          Center



     br/>(          



     22:50:00)          



     <sup>          



     </sup>          



               



               

 

 URINALYSIS  UA Sq Epi  None Seen      NA    [facility]



               [facility]



               



               



               

 

 URINALYSIS  UA Hyal Cast  1 /LPF  0 - 2    Normal    [facility]      [facility]



               



               



               

 

 URINALYSIS  UA Bacteria  Occasional /HPF  None Seen    [facility] [place]



         /2012      Medical



     *NA*          [place]



               



     (2012 22:50:00)          



               

 

 URINALYSIS  UA RBC  null  0 - 2    Normal     [place]



         /2012      [facility]



               



               



               

 

 URINALYSIS  UA WBC  2 /HPF  0 - 5    Normal    [facility]      [facility]



               



               



               

 

 URINALYSIS  UA Leuk Est  Trace  Negative    ABN     [place]



         /2012      Medical



     *ABN*          Center



               



     (2012 22:50:00)          



               

 

 URINALYSIS  UA Nitrite  Negative  Negative    Normal     [place]



         /2012      [facility]



     (2012 22:50:00)          Center



               



               



               

 

 URINALYSIS  UA Blood  Negative  Negative    Normal     [place]



         /2012      [facility]



     (2012 22:50:00)          Center



               



               



               

 

 URINALYSIS  UA Ketones  Negative mg/dL  Negative    NA     [place]



         /2012      Medical



     *NA*          Center



               



     (2012 22:50:00)          



               

 

 URINALYSIS  UA Mucus  Few /LPF  None Seen    NA     [place]



         /2012      Medical



     *NA*          Center



               



     (2012 22:50:00)          



               

 

 URINALYSIS  UA Bili  Negative  Negative    [facility] [place]



         /2012      Medical



     *NA*          [place]



               



     (2012 22:50:00)          



               

 

 URINALYSIS  UA Glucose  Negative mg/dL  Negative    NA     [place]



         /2012      Medical



     *NA*          [place]



               



     (2012 22:50:00)          



               

 

 URINALYSIS  UA Spec Grav  1.011  <=1.030    Normal    [facility]



               [facility]



               



               



               

 

 URINALYSIS  UA Turbidity  Clear  Clear    Normal    [facility]



               [facility]



     (2012 22:50:00)          [place]



               



               



               

 

 URINALYSIS  UA Color  Yellow  Yellow    NA     [place]



         /2012      Medical



     *NA*          [place]



               



     (2012 22:50:00)          



               

 

 URINALYSIS  UA Protein  Negative mg/dL  Negative    Normal     [place]



         /2012      [facility]



     (2012 22:50:00)          [place]



               



               



               

 

 URINALYSIS  UA pH  5.5  5.0 - 8.0    Normal    [facility]



               [facility]



               



               



               

 

 CHEMISTRY  Total CK  38 unit/L  12 - 191    Normal    [facility]



               [facility]



               



               



               

 

 CHEMISTRY  Troponin-T  null  0.000 -    Normal    [facility]



       0.100        [facility]



               



               



               

 

 CHEMISTRY  Troponin-I  null  0.00 -    Normal    [facility]



       0.40        [facility]



               



               



               

 

 CHEMISTRY  T4 Free  1.45 ng/dL  0.76 -    Normal    [facility]



       1.46        [facility]



               



               



               

 

 Microbiolog  Culture:            [facility]



 y  Urine            [facility]



               



               



               

 

 CHEMISTRY  Magnesium Lvl  1.7 mg/dL  1.8 - 2.4    LOW    [facility]



               [facility]



               



               



               

 

 CHEMISTRY  Phosphorus  4.5 mg/dL  2.5 - 4.5    Normal    [facility]



               [facility]



               



               



               

 

 CHEMISTRY  TSH  0.291  0.360 -    LOW    [facility]



     uIU/mL  3.740        [facility]



               



               



               

 

 CHEMISTRY  eGFR  83      NA  2Result Comment: The eGFR is calculated 
using the CKD-EPI formula. In most young, healthy individuals the eGFR will be >
90 mL/min/1.73m2. The eGFR declines with age. An eGFR of 60-89 may be normal in
  [facility] [place]



     mL/min/1.7        some populations, particularly the elderly, for 
whom the CKD-EPI formula has not been extensively validated. Use of the eGFR is 
not recommended in the following populations:  [address]



             Individuals with unstable creatinine concentrations, including 
pregnant patients and those with serious co-morbid conditions.  



               



             Patients with extremes in muscle mass or diet.  



               



             The data above are obtained from the National Kidney Disease 
Education Program (NKDEP) which additionally recommends that when the eGFR is 
used in patients with extremes of body mass index for purposes  



             of drug dosing, the eGFR should be multiplied by the estimated 
BMI.  

 

 CHEMISTRY  BUN  18 mg/dL  7 - 22    Normal    [facility]



               [facility]



               



               



               

 

 CHEMISTRY  Glucose Lvl  129 mg/dL  70 - 99    HI  5Interpretive Data: 
Adult reference range values reflect the clinical guidelines   [place]



         /2012    of the American Diabetes Association.  [facility]



               



               



               

 

 CHEMISTRY  Alk Phos  130 unit/L  39 - 136    Normal    [facility]      [facility]



               



               



               

 

 CHEMISTRY  Creatinine  0.9 mg/dL  0.5 - 1.4    Normal    [facility]            [facility]



               



               



               

 

 CHEMISTRY  Sodium Lvl  141 meq/L  135 - 145    Normal    [facility]      [facility]



               



               



               

 

 CHEMISTRY  ALT  24 unit/L  0 - 65    Normal    [facility]      [facility]



               



               



               

 

 CHEMISTRY  Albumin Lvl  4.4 g/dL  3.5 - 5.0    [facility]      [facility]



               



               



               

 

 CHEMISTRY  Total Protein  7.2 g/dL  6.4 - 8.4    Normal    [facility]      [facility]



               



               



               

 

 CHEMISTRY  AST  18 unit/L  0 - 37    Normal    [facility]      [facility]



               



               



               

 

 CHEMISTRY  Calcium Lvl  8.8 mg/dL  8.5 - 10.5    [facility]      [facility]



               



               



               

 

 CHEMISTRY  Potassium Lvl  4.5 meq/L  3.5 - 5.1    Normal    [facility]      [facility]



               



               



               

 

 CHEMISTRY  CO2  25 meq/L  24 - 32    [facility]      [facility]



               



               



               

 

 CHEMISTRY  Chloride Lvl  105 meq/L  95 - 109    Normal    [facility]      [facility]



               



               



               

 

 CHEMISTRY  Bili Total  1.9 mg/dL  0.2 - 1.3    HI    [facility]      [facility]



               



               



               

 

 CHEMISTRY  A/G Ratio  1.6  0.7 - 1.6    Normal    [facility]      [facility]



               



               



               

 

 CHEMISTRY  Globulin  2.8 g/dL  2.0 - 4.0    Normal    [facility]      [facility]



               



               



               

 

 CHEMISTRY  B/C Ratio  20  6 - 25    Normal    [facility]      [facility]



               



               



               

 

 CHEMISTRY  AGAP  15.5 meq/L  10.0 -    Normal     [place]



       20.0        Medical



               [place]



               



               



               

 

 HEMATOLOGY  RDW  16.0 %  11.5 -    HI     [place]



       14.5  /      Medical



               [place]



               



               



               

 

 HEMATOLOGY  MCHC  32.8 g/dL  32.0 -    Normal     [place]



       36.0  /      [facility]



               



               



               

 

 HEMATOLOGY  Platelet  156 K/CMM  133 - 450    Normal     [place]



         /2012      [facility]



               



               



               

 

 HEMATOLOGY  MPV  8.9 fL  7.4 - 10.4    Normal     [place]



         /2012      [facility]



               



               



               

 

 HEMATOLOGY  WBC  8.3 K/CMM  3.7 - 10.4    Normal     [place]



         /2012      [facility]



               



               



               

 

 HEMATOLOGY  RBC  4.57 M/CMM  4.70 -    LOW     [place]



       6.10        [facility]



               



               



               

 

 HEMATOLOGY  Hgb  13.5 g/dL  14.0 -    [facility] [place]



       18.0        [facility]



               



               



               

 

 HEMATOLOGY  MCV  90.0 fL  80.0 -    Normal     [place]



       94.0        [facility]



               



               



               

 

 HEMATOLOGY  Hct  41.1 %  42.0 -    [facility] [place]



       54.0        Medical



               [place]



               



               



               

 

 HEMATOLOGY  MCH  29.6 pg  27.0 -    Normal     [place]



       31.0        [facility]



               



               



               

 

 HEMATOLOGY  Basophils  0.2 %  0.0 - 1.0    Normal     [place]



         /2012      [facility]



               



               



               

 

 HEMATOLOGY  Monocytes #  0.1 K/CMM  0.0 - 0.8    Normal     [place]



         /2012      [facility]



               



               



               

 

 HEMATOLOGY  Lymphocytes #  0.9 K/CMM  1.0 - 5.5    [facility] [place]



         /2012      [facility]



               



               



               

 

 HEMATOLOGY  Segs-Bands #  7.2 K/CMM  1.5 - 8.1    Normal     [place]



         /2012      [facility]



               



               



               

 

 HEMATOLOGY  Segs  86.6 %  45.0 -    [facility] [place]



       75.0        [facility]



               



               



               

 

 HEMATOLOGY  Eosinophils  0.3 %  0.0 - 4.0    Normal     [place]



         /2012      [facility]



               



               



               

 

 HEMATOLOGY  Monocytes  1.5 %  2.0 - 12.0    [facility] [place]



         /2012      [facility]



               



               



               

 

 HEMATOLOGY  Lymphocytes  11.4 %  20.0 -    [facility] [place]



       40.0        [facility]



               



               



               

 

 CHEMISTRY  POC A Ca Ion  1.15  1.16 -    [facility] [place]



     mMol/L  1.30        [facility]



               



               



               

 

 CHEMISTRY  POC A LA  0.7 mMol/L  0.5 - 2.2    Normal     [place]



         /2012      [facility]



               



               



               

 

 CHEMISTRY  POC A O2 Sat  81.0 %  95.0 -    LOW    [facility]



       100.0        [facility]



               



               



               

 

 CHEMISTRY  POC A BE  1 mMol/L  -2-2 - 2    Normal     [place]



         /2012      [facility]



               



               



               

 

 CHEMISTRY  POC A Na  137 meq/L  135 - 145    Normal     [place]



         /2012      [facility]



               



               



               

 

 CHEMISTRY  POC A K  4.3 meq/L  3.5 - 5.1    Normal     [place]



         /2012      [facility]



               



               



               

 

 CHEMISTRY  POC A Hct  41.0 %  42.0 -    LOW    [facility]



       54.0        [facility]



               



               



               

 

 CHEMISTRY  POC A Glu  115 mg/dL  70 - 99    HI     [place]



         /2012      [facility]



               



               



               

 

 CHEMISTRY  POC A PO2  46 mm[Hg]  80 - 100    CRIT     [place]



         /2012      [facility]



               



               



               

 

 CHEMISTRY  POC A pH  7.39  7.35 -    Normal    [facility]



       7.45        [facility]



               



               



               

 

 CHEMISTRY  POC A HCO3  26 mMol/L  22 - 26    Normal     [place]



         /2012      [facility]



               



               



               

 

 CHEMISTRY  POC A PCO2  43 mm[Hg]  35 - 45    Normal     [place]



         /2012      [facility]



               



               



               

 

 CHEMISTRY  POC A Temp  37.0 Shanique      NA     [place]



         /2012      [facility]



               



               



               

 

 CHEMISTRY  POC A Source  ART      NA     [place]



         /2012      [facility]



               



               



               

 

 HEMATOLOGY  PT  15.4 s  12.0 -    HI    [facility]



       14.7        [facility]



               



               



               

 

 HEMATOLOGY  INR  1.20  0.85 -    HI  8Interpretive Data: RECOMMENDED 
RANGES FOR PROTIME INR:  [facility] [place]



       1.       2.0-3.0 for most medical and surgical thromboembolic 
states.  Medical



                2.5-3.5 for artificial heart valves and recurrent embolism.  
Center



               



             INR SHOULD BE USED ONLY FOR PATIENTS ON STABLE ANTICOAGULANT 
THERAPY.  



               

 

 HEMATOLOGY  PTT  32.9 s  22.9 -    Normal  11Interpretiv  [facility] [place]



       35.8      e Data:  [facility]  Center



             Therapeutic  



             Range:  57 -  



             [address] Second[address]  



               



               

 

 CHEMISTRY  Globulin  2.8 g/dL  2.0 - 4.0    Normal     [place]



         /2012      [facility]



               



               



               

 

 CHEMISTRY  A/G Ratio  1.5  0.7 - 1.6    Normal     [place]



         /2012      [facility]



               



               



               

 

 CHEMISTRY  AGAP  13.0 meq/L  10.0 -    Normal    [facility]



       20.      [facility]



               



               



               

 

 CHEMISTRY  B/C Ratio  20  6 - 25    Normal    [facility]      [facility]



               



               



               

 

 CHEMISTRY  eGFR  84      NA  3Result Comment: The eGFR is calculated 
using the CKD-EPI formula. In most young, healthy individuals the eGFR will be >
90 mL/min/1.73m2. The eGFR declines with age. An eGFR of 60-89 may be normal in
  [facility] [place]



     mL/min/1.7        some populations, particularly the elderly, for 
whom the CKD-EPI formula has not been extensively validated. Use of the eGFR is 
not recommended in the following populations:  [address]



             Individuals with unstable creatinine concentrations, including 
pregnant patients and those with serious co-morbid conditions.  



               



             Patients with extremes in muscle mass or diet.  



               



             The data above are obtained from the National Kidney Disease 
Education Program (NKDEP) which additionally recommends that when the eGFR is 
used in patients with extremes of body mass index for purposes  



             of drug dosing, the eGFR should be multiplied by the estimated 
BMI.  

 

 CHEMISTRY  AST  28 unit/L  0 - 37    Normal    [facility]      [facility]



               



               



               

 

 CHEMISTRY  Sodium Lvl  141 meq/L  135 - 145    Normal    [address]



               



               



               

 

 CHEMISTRY  Total Protein  7.0 g/dL  6.4 - 8.4    Normal    [address]



               



               



               

 

 CHEMISTRY  Chloride Lvl  104 meq/L  95 - 109    Normal    [address]



               



               



               

 

 CHEMISTRY  Potassium Lvl  5.0 meq/L  3.5 - 5.1    Normal    [address]



               



               



               

 

 CHEMISTRY  CO2  29 meq/L  24 - 32    Normal    [address]



               



               



               

 

 CHEMISTRY  Bili Total  1.3 mg/dL  0.2 - 1.3    Normal    [address]



               



               



               

 

 CHEMISTRY  Calcium Lvl  8.8 mg/dL  8.5 - 10.5    Normal    [address]



               



               



               

 

 CHEMISTRY  Creatinine  0.9 mg/dL  0.5 - 1.4    Normal    [facility]            [facility]



               



               



               

 

 CHEMISTRY  BUN  18 mg/dL  7 - 22    Normal    [address]



               



               



               

 

 CHEMISTRY  Albumin Lvl  4.2 g/dL  3.5 - 5.0    Normal    [address]



               



               



               

 

 CHEMISTRY  Glucose Lvl  93 mg/dL  70 - 99    Normal  6Interpretive Data: 
Adult reference range values reflect the clinical guidelines  [facility] [place]



         /2012    of the American Diabetes Association.  Medical



               Center



               



               



               

 

 CHEMISTRY  Alk Phos  102 unit/L  39 - 136  11  Normal     [place]



         /2012      Medical



               Center



               



               



               

 

 CHEMISTRY  ALT  27 unit/L  0 - 65    Normal     [place]



         /2012      [facility]



               



               



               

 

 HEMATOLOGY  Basophils #  0.1 K/CMM  0.0 - 0.2    Normal     [place]



         /2012      [facility]



               



               



               

 

 HEMATOLOGY  Basophils  2.0 %  0.0 - 1.0    HI     [place]



         /2012      Medical



               [place]



               



               



               

 

 HEMATOLOGY  Eosinophils  2.9 %  0.0 - 4.0    Normal     [place]



         /2012      [facility]



               



               



               

 

 HEMATOLOGY  Segs-Bands #  4.4 K/CMM  1.5 - 8.1    Normal     [place]



         /2012      Medical



               [place]



               



               



               

 

 HEMATOLOGY  Lymphocytes #  1.8 K/CMM  1.0 - 5.5    Normal     [place]



         /2012      [facility]



               



               



               

 

 HEMATOLOGY  Monocytes #  0.7 K/CMM  0.0 - 0.8    Normal     [place]



         /2012      Medical



               [place]



               



               



               

 

 HEMATOLOGY  Eosinophils #  0.2 K/CMM  0.0 - 0.5    Normal     [place]



         /2012      [facility]



               



               



               

 

 HEMATOLOGY  Monocytes  9.7 %  2.0 - 12.0    Normal     [place]



         /2012      Medical



               Center



               



               



               

 

 HEMATOLOGY  Segs  61.0 %  45.0 -  11  Normal     [place]



       75.0        Medical



               Center



               



               



               

 

 HEMATOLOGY  Lymphocytes  24.4 %  20.0 -  11  [facility] [place]



       40.0        [facility]



               



               



               

 

 HEMATOLOGY  RBC  4.44 M/CMM  4.70 -    LOW     [place]



       6.10        Medical



               [place]



               



               



               

 

 HEMATOLOGY  Hgb  13.0 g/dL  14.0 -    [facility]



       18.0        Medical



               [place]



               



               



               

 

 HEMATOLOGY  Hct  40.2 %  42.0 -  11  [facility] [place]



       54.0        Medical



               Center



               



               



               

 

 HEMATOLOGY  WBC  7.3 K/CMM  3.7 - 10.4    Normal     [place]



         /2012      Medical



               Center



               



               



               

 

 HEMATOLOGY  MCH  29.4 pg  27.0 -  11  [facility] [place]



       31.0        Medical



               [place]



               



               



               

 

 HEMATOLOGY  MCHC  32.4 g/dL  32.0 -  11  [facility] [place]



       36.0        Medical



               [place]



               



               



               

 

 HEMATOLOGY  RDW  16.0 %  11.5 -  11  [facility] [place]



       14.5        Medical



               Center



               



               



               

 

 HEMATOLOGY  MCV  90.6 fL  80.0 -  11  Normal     [place]



       94.0        Medical



               Center



               



               



               

 

 HEMATOLOGY  Platelet  153 K/CMM  133 - 450    Normal     [place]



         /2012      [facility]



               



               



               

 

 HEMATOLOGY  MPV  8.9 fL  7.4 - 10.4    Normal     [place]



         /2012      [facility]



               



               



               

 

 HEMATOLOGY  PT  20.1 s  12.0 -    [facility] [place]



       14.7  /      [facility]



               



               



               

 

 HEMATOLOGY  PTT  60.9 s  22.9 -    HI  12Interpretiv  [facility] [place]



       35.8  /2012    e Data:  Medical



             Heparin  Center



             Therapeutic  



             Range:  57 -  



             [address]  



               



               

 

 HEMATOLOGY  INR  1.70  0.85 -    HI  9Interpretive Data: RECOMMENDED 
RANGES FOR PROTIME INR:  [facility] [place]



       .       2.0-3.0 for most medical and surgical thromboembolic 
states.  Medical



                2.5-3.5 for artificial heart valves and recurrent embolism.  
Center



               



             INR SHOULD BE USED ONLY FOR PATIENTS ON STABLE ANTICOAGULANT 
THERAPY.  



               







Vital Signs







 Vital Sign  Value  Date  Comments  Source



         

 

 Respitory Rate  20  2017    [facility]



         

 

 Systolic (mm Hg)  125  2017    [facility]



         

 

 Diastolic (mm Hg)  74  2017    [facility]



         

 

 Heart Rate  85  2017    [facility]



         

 

 Temperature Oral (F)  97.9 F  2017    [facility]



         

 

 Temperature Oral (F)  97.6 F  2017    [facility]



         

 

 Heart Rate  86  2017    [facility]



         

 

 Respitory Rate  20  2017    [facility]



         

 

 Systolic (mm Hg)  99  2017    [facility]



         

 

 Diastolic (mm Hg)  59  2017    [facility]



         

 

 Temperature Oral (F)  98.0 F  2017    [facility]



         

 

 Systolic (mm Hg)  97  2017    [facility]



         

 

 Diastolic (mm Hg)  60  2017    [facility]



         

 

 Heart Rate  84  2017    [facility]



         

 

 Respitory Rate  20  2017    [facility]



         

 

 Height  180.34 cm  2017    [facility]



         

 

 Weight  68.182  2017    [facility]



         

 

 BMI Calculated  20.96  2017    [facility]



         

 

 BMI Calculated  20.96  2017    [facility]



         

 

 Weight  68.182  2017    [facility]



         

 

 Height  180.34 cm  2017    [facility]



         

 

 Height  180.34 cm  10/21/2016    [facility] [place] [facility]



         Center



         

 

 Respitory Rate  18  10/21/2016    [facility]



         Center



         

 

 Heart Rate  86  10/21/2016    [facility]



         

 

 BMI Calculated  19.18  10/21/2016    [facility]



         Center



         

 

 Weight  62.386  10/21/2016    [facility]



         Center



         

 

 Systolic (mm Hg)  105  10/21/2016    [facility]



         Center



         

 

 Diastolic (mm Hg)  63  10/21/2016    [facility]



         

 

 Weight  64.716  2016    [facility]



         

 

 Height  177.8 cm  2016    [facility]



         

 

 BMI Calculated  20.47  2016    [facility]



         Center



         

 

 Systolic (mm Hg)  107  2016    [facility]



         Center



         

 

 Diastolic (mm Hg)  67  2016    [facility]



         

 

 Heart Rate  84  2016    [facility]



         

 

 Temperature Oral (F)  96.9 F  2016    [facility]



         

 

 Weight  62.273  2016    [facility]



         

 

 BMI Calculated  19.7  2016    [facility]



         

 

 Height  177.8 cm  2016    [facility]



         

 

 Heart Rate  54  2016    [facility]



         Center



         

 

 Systolic (mm Hg)  76  2016    [facility]



         Center



         

 

 Diastolic (mm Hg)  51  2016    [facility]



         

 

 BMI Calculated  26.89  2016    [facility]



         

 

 Weight  68.864  2016    [facility]



         

 

 Height  160.02 cm  2016    [facility]



         Center



         

 

 Systolic (mm Hg)  120  2016    [facility]



         Center



         

 

 Diastolic (mm Hg)  71  2016    [facility]



         

 

 Temperature Oral (F)  97.2 F  2016    [facility]



         Center



         

 

 Heart Rate  53  2016    [facility]



         Center



         

 

 Systolic (mm Hg)  107  2016    [facility]



         Center



         

 

 Diastolic (mm Hg)  63  2016    [facility]



         Center



         

 

 Respitory Rate  18  2016    [facility]



         Center



         

 

 Heart Rate  86  2016    [facility]



         Center



         

 

 Systolic (mm Hg)  111  2016    [facility]



         Center



         

 

 Diastolic (mm Hg)  66  2016    [facility]



         Center



         

 

 Respitory Rate  18  2016    [facility]



         Center



         

 

 Systolic (mm Hg)  121  2016    [facility]



         Center



         

 

 Diastolic (mm Hg)  76  2016    [facility]



         Center



         

 

 Heart Rate  86  2016    [facility]



         Center



         

 

 Respitory Rate  18  2016    [facility]



         

 

 Heart Rate  85  2016    [facility]



         

 

 Temperature Oral (F)  97.1 F  2016    [facility]



         

 

 Temperature Oral (F)  96.9 F  2016    [facility]



         

 

 BMI Calculated  24.87  2016    [facility]



         

 

 Weight  78.636  2016    [facility]



         

 

 Height  177.8 cm  2016    [facility]



         Center



         

 

 Systolic (mm Hg)  117  2016    [facility]



         Center



         

 

 Diastolic (mm Hg)  74  2016    [facility]



         

 

 Respitory Rate  84  2016    [facility]



         

 

 BMI Calculated  24.44  2016    [facility]



         

 

 Height  177.8 cm  2016    [facility]



         

 

 Weight  77.273  2016    [facility]



         

 

 Respitory Rate  16  2016    [facility]



         Center



         

 

 Systolic (mm Hg)  107  2016    [facility]



         Center



         

 

 Diastolic (mm Hg)  65  2016    [facility]



         

 

 Respitory Rate  20  2016    [facility]



         Center



         

 

 Systolic (mm Hg)  112  2016    [facility]



         Center



         

 

 Diastolic (mm Hg)  74  2016    [facility]



         

 

 Respitory Rate  19  2016    [facility]



         Center



         

 

 Systolic (mm Hg)  110  2016    [facility]



         Center



         

 

 Diastolic (mm Hg)  71  2016    [facility]



         

 

 Heart Rate  93  2016    [facility]



         

 

 Height  177.8 cm  2016    [facility]



         

 

 BMI Calculated  24.44  2016    [facility]



         

 

 Weight  77.273  2016    [facility]



         

 

 Temperature Oral (F)  97.8 F  2016    [facility]



         

 

 BMI Calculated  23.8  2016    [facility]



         

 

 Weight  75.227  2016    [facility]



         

 

 Height  177.8 cm  2016    [facility]



         Center



         

 

 Diastolic (mm Hg)  63  2013    [facility]



         Center



         

 

 Systolic (mm Hg)  98  2013    [facility]



         Center



         

 

 Respitory Rate  18  2013    [facility]



         Center



         

 

 Diastolic (mm Hg)  66  2013    [facility]



         Center



         

 

 Systolic (mm Hg)  103  2013    [facility]



         Center



         

 

 Respitory Rate  18  2013    [facility]



         Center



         

 

 Systolic (mm Hg)  102  2013    [facility]



         Center



         

 

 Diastolic (mm Hg)  66  2013    [facility]



         

 

 Respitory Rate  20  2013    [facility]



         

 

 Height  180.34 cm  2013    [facility]



         

 

 Weight  82.273  2013    [facility]



         

 

 Heart Rate  63  2013    [facility]



         

 

 Weight  82.273  2013    [facility]



         

 

 Height  180.34 cm  2013    [facility]



         Center



         

 

 Diastolic (mm Hg)  58  2012    [facility]



         

 

 Respitory Rate  20  2012    [facility]



         

 

 Systolic (mm Hg)  112  2012    [facility]



         

 

 Temperature Oral (F)  96.0 F  2012    [facility]



         

 

 Heart Rate  83  2012    [facility]



         

 

 Systolic (mm Hg)  121  2012    [facility]



         Center



         

 

 Diastolic (mm Hg)  76  2012    [facility]



         

 

 Heart Rate  82  2012    [facility]



         

 

 Systolic (mm Hg)  120  2012    [facility]



         

 

 Respitory Rate  20  2012    [facility]



         

 

 Diastolic (mm Hg)  75  2012    [facility]



         

 

 Heart Rate  83  2012    [facility]



         

 

 Temperature Oral (F)  97.1 F  2012    [facility]



         

 

 Respitory Rate  18  2012    [facility]



         

 

 Temperature Oral (F)  97.0 F  2012    [facility]



         

 

 Weight  79.545  2012    [facility]



         

 

 Height  180.34 cm  2012    [facility]



         

 

 Height  180.34 cm  2012    [facility]



         

 

 Weight  80.909  2012    [facility]



         

 

 Weight  86.136  2012    [facility]



         

 

 Height  180.34 cm  2012    [facility]



         Center



         

 

 Systolic (mm Hg)  111  2012    [facility]



         

 

 Diastolic (mm Hg)  72  2012    [facility]



         

 

 Heart Rate  70  2012    [facility]



         







Encounters







 Location  Location  Encounter  Encounter  Reason  Attending  ADM  DC  Status  
Source



   Details  Type  Number  For  Provider  Date  Date    



         Visit          



                   



                   



                   

 

 [facility]    Outpatient  [id number]  RIGHT  ELLEN      Active  [facility]        UPPER  IBARRA  /      Medical



 [place]        QUADRANT          Center



         PAIN          



                   



                   



                   

 

 [facility] [place]    Outpatient  [id number]  DDC- NEW  JAS NORTON      Active  
[facility]        CLINIC    /      Medical



 [place]        VISIT          Center



                   



                   



                   

 

 [facility] [place]    Outpatient  [id number]  RUQ PAIN  JAS NORTON      Active  
[facility]            /      Medical



 [facility]



                   



                   



                   

 

 [facility] [place]    OU  [id number]  DDC/  JAS NORTON    Active  [facility] [place]



 Medical        DYSPHAGI    /2012    Medical



 [facility]



                   



                   



                   

 

 [facility] [place]    Outpatient  [id number]  787.20/4  GENTRY      Active  MH 
[facility]        78.29  [place]        [facility]



                   



                   



                   

 

 [facility] [place]    DS  [id number]  DYSPHAGI  GENTRY      Active  [facility],ICD.9-  [place]        [facility]        787.2          Center



                   



                   



                   

 

 [facility]    Outpatient  [id number]  DYSPHAGI  GENTRY      Active  MH 
[facility]        [facility]    Outpatient  [id number]    Non  12/10  12/11    



 Caleb          Physician  /2015    [facility]    Outpatient  [id number]    Rakesh      [facility]  /2016    [facility]    Bedded  [id number]        [facility]    Outpatient      Lanre  /2016    [facility]    OBS Day  [id number]    Rakesh      [facility]



 Ceres    Surgery      Waldorf      [facility]    Outpatient  [id number]    Rakesh      [facility]          W[place]  /2016    [facility]    Outpatient  [id number]    Rakesh      [facility]          W[place]  /2016    [facility]    OBS  [id number]    Breezy      [facility]    Observation      Carlos Welsh  /2016    [facility]    Outpatient  [id number]    Rakesh      [facility]  /2016    [facility]



                   



                   



                   

 

     Outpatient  [id number]    ALEXANDRA OSULLIVAN      Active  [facility]



                   [facility]    Outpatient  [id number]    Rakesh      [facility]  /2016    [facility]    Outpatient  [id number]    Gentry      [facility]  /2016    [facility]    Outpatient  [id number]    Rakesh      [facility]  /2016    [facility]



 [facility]  [id number]    Rakesh  10/21  10/22    [facility]  /2016    [facility]    Inpatient  [id number]    Carmen Willoughby      [facility]            /2017    [facility]



 Hospital                  [facility]    Outpatient  [id number]  ABNORMAL  NON      Cancel  [facility]  PHYSICIAN        [facility]        LOSS          Center



                   



                   



                   

 

     OD  [id number]  478.29 -  GENTRY      Cancel   OPID



         DISEASE  [place]        [place]



         OF PHAR          



                   



                   



                   

 

     OD  [id number]  783.21 -  GENTRY      Cancel   OPID



         ABNORMAL  [place]        [place]



         LOSS O          [place]



         478.2 -          



         DISEASE          



         OF PHAR          



         787.20          



                   



                   



                   







Procedures







 Procedure  Code  Date  Perfomer  Comments  Source



           



           

 

 CABG - Coronary  [id number]      [id number]  [facility]



 graft          [place]



 <sup>1</sup>          



           

 

 Hernia repair  [id number]        [facility] [facility]



           

 

 Knee replacement  [id number]        [facility]



           

 

 Operation  [id number]      2Placement of  [facility]



 <sup>2</sup>        Pacemaker and  Medical



         AICD ()  Center



           



           

 

 CABG - Coronary  [id number]        [facility]



 graft<sup>1</sup>          



           



           

 

 Hernia repair  [id number]        [facility]



           

 

 Knee replacement  [id number]        [facility]



           

 

 Operation<sup>2</  [id number]      Placement of  [facility]



 sup>        Pacemaker and  City



         AICD ()  



           



           

 

 CABG - Coronary  [id number]        [facility]          Medical



 graft<sup>1</sup>          Center



           



           

 

 Hernia repair  [id number]        [facility]



           

 

 Knee replacement  [id number]        [facility]



           

 

 Operation<sup>2</  [id number]      Placement of  [facility]



 sup>        Pacemaker and  Medical



         AICD ()  Center



           



           

 

 Abdomen endoscopy  [id number]        [facility]



           

 

 Barium swallow  [id number]        [facility]



           

 

 CABG - Coronary  [id number]      1993patietn  [facility]



 artery bypass        [facility] had  Medical



 graft<sup>1,        quadraple CABG  Center



 2</sup>          



           

 

 Cataract  [id number]      left eye with  [facility]



 surgery<sup>3</rosario        lens  Medical



 p>          Center



           

 

 Colonoscopy<sup>4  [id number]        [facility]



 </sup>          Medical



           [place]



           

 

 Hernia  [id number]      hernia repair  [facility]



 repair<sup>5,        (INGUINAL)  Medical



 6</sup>        x4x'3 in the  Center



           



           

 

 Operation<sup>7</  [id number]      Placement of  [facility]



 sup>        Pacemaker and  Medical



         AICD ()  Center



           



           

 

 Colonoscopy<sup>2  [id number]        [facility]



 </sup>          [facility]



           

 

 Hernia  [id number]      x'3 in the  [facility]



 repair<sup>3</sup          Medical



 >          Center



           

 

 Operation<sup>4</  [id number]      Placement of  [facility]



 sup>        Pacemaker and  Medical



         AICD (2009)  [place]

## 2018-07-10 NOTE — XMS REPORT
Encounter CCD: 2013 to 2013

 Created on:2028



Patient:TAMAR BREWSTER

Sex:Male

:1935

External Reference #:[id number]





Demographics







 Address  [address] [place] [address] [address]



   [place] [address]-

 

 Home Phone  [phone]

 

 Preferred Language  Unknown

 

 Marital Status  

 

 Taoist Affiliation  Hindu

 

 Race  White/

 

 Ethnic Group  Non-









Author







 Organization  [facility]









Care Team Providers







 Name  Role  Phone

 

 Jasmin Macedo POLLY  Referring Provider  [phone]









Allergies, Adverse Reactions, Alerts







 Substance  Reaction  Status

 

 morphine    Active

 

 tetracyclines    Active







Problem List







 Condition  Effective Dates  Status

 

 Atrial fibrillation  < 2012  Inactive

 

 Atrial fibrillation    Resolved

 

 CAD - Coronary artery disease    Resolved

 

 Depression    Resolved

 

 GERD - Gastro-esophageal reflux disease    Resolved

 

 Hepatitis C    Resolved

 

 HLD - Hyperlipidemia    Resolved

 

 HTN - Hypertension    Resolved

 

 Hyperlipidemia    Active

 

 Hypertension    Active

 

 Hypothyroidism    Active

 

 Methicillin resistant Staphylococcus aureus1  10/14/2009  Active

 

 Tongue carcinoma    Resolved



1Problem added by Discern Expert.



Medications







 Medication  Instructions  Start Date  End Date  Status

 

 acetaminophen-hydrocodon  15 mL, Route: PO, Drug  2013  
Discontinued



 e 325 mg-10 mg/15 mL  Form: SOLN, Dosing Weight      



 oral solution  82.273, kg, Q4H, PRN Pain      



   Score 4-6, Start date:      



   13 12:41:00,      



   Duration: 30 day, Stop      



   date: 13 12:40:00      

 

 metoprolol  1 mg, 1 mL, Route: IVP,  2013  Completed



   Drug form: INJ, Q5Min,      



   Dosing Weight 82.273, kg,      



   PRN Elevated BP, Start      



   date: 13 12:41:00,      



   Duration: 5 doses or      



   times, Stop date: 13      



   0:00:00      

 

 labetalol  5 mg, 1 mL, Route: IVP,  2013  Completed



   Drug form: INJ, Q5Min,      



   Dosing Weight 82.273, kg,      



   PRN Elevated BP, Start      



   date: 13 12:41:00,      



   Duration: 5 doses or      



   times, Stop date: 13      



   0:00:00      

 

 esmolol IV Push  10 mg, 1 mL, Route: IVP,  2013  Completed



   Drug form: INJ, Q5Min,      



   Dosing Weight 82.273, kg,      



   PRN Elevated BP, Start      



   date: 13 12:41:00,      



   Duration: 5 doses or      



   times, Stop date: 13      



   0:00:00      

 

 ondansetron  4 mg, 2 mL, Route: IVP,  2013  Discontinued



   Drug form: INJ, ONCE,      



   Dosing Weight 82.273, kg,      



   PRN Nausea & Vomiting,      



   Start date: 13      



   12:41:00      

 

 naloxone  0.04 mg, 0.1 mL, Route:  2013  Completed



   IVP, Drug form: INJ,      



   Q2MIN, Dosing Weight      



   82.273, kg, PRN Narcotic      



   Reversal, Start date:      



   13 12:41:00,      



   Duration: 8 doses or      



   times, Stop date: 13      



   0:00:00      

 

 flumazenil  0.2 mg, 2 mL, Route: IVP,  2013  Discontinued



   Drug form: INJ, PRN,      



   Dosing Weight 82.273, kg,      



   PRN Benzodiazepine      



   Reversal, Initial dose,      



   Start date: 13      



   12:41:00, Duration: 30      



   day, Stop date: 13      



   13:40:00      

 

 pneumococcal 23-valent  0.5 ml, Route: IM, Drug  2012  
Completed



 vaccine  Form: INJ, Start date:      



   12 9:00:00, Stop      



   date: 12 9:00:00      

 

 Cleocin HCl  600 mg, 4 mL, Route: IVPB,  2013  Discontinued



   Drug form: INJ, PRE OP,      



   Start date: 13      



   6:00:00, Duration: 1 day,      



   Stop date: 13      



   5:59:00      

 

 influenza virus vaccine,  0.5 ml, Route: IM, Drug  10/15/2009  10/15/2009  
Completed



 inactivated  Form: INJ, ONCE, Start      



   date: 10/15/09 9:00:00,      



   Stop date: 10/15/09      



   9:00:00      







Immunizations







 Vaccine  Date  Status

 

 influenza virus vaccine, inactivated  10/15/2009  Auth (Verified)

 

 pneumococcal 23-valent vaccine  2012  Auth (Verified)







Vital Signs







 Most recent to oldest  1  2  3



 [Reference Range]:      

 

 Height  180.34 cm  180.34 cm  



   (2013 08:29:00)  (2013 15:54:00)  

 

 Systolic Blood Pressure  98 mmHg  103 mmHg  102 mmHg



 [ mmHg]  (2013 14:15:00)  (2013 13:45:00)  (2013 13:30:
00)

 

 Diastolic Blood Pressure  63 mmHg  66 mmHg  66 mmHg



 [60-90 mmHg]  (2013 14:15:00)  (2013 13:45:00)  (2013 13:30:
00)

 

 Respiratory Rate [14-20  18 BRMIN  18 BRMIN  20 BRMIN



 BRMIN]  (2013 14:15:00)  (2013 13:45:00)  (2013 13:30:00)

 

 Peripheral Pulse Rate  63 bpm    



 [ bpm]  (2013 08:20:00)    

 

 Weight  82.273 kg  82.273 kg  



   (2013 08:29:00)  (2013 15:54:00)  







Results

CHEMISTRY





 Most recent to oldest [Reference Range]:  1  2

 

 Sodium Lvl [135-145 mEq/L]  143 mEq/L  



   (2013 15:10:00)  

 

 Potassium Lvl [3.5-5.1 mEq/L]  4.8 mEq/L  



   (2013 15:10:00)  

 

 Chloride Lvl [ mEq/L]  103 mEq/L  



   (2013 15:10:00)  

 

 CO2 [24-32 mEq/L]  32 mEq/L  



   (2013 15:10:00)  

 

 AGAP [10.0-20.0 mEq/L]  12.8 mEq/L  



   (2013 15:10:00)  

 

 Creatinine Lvl [0.5-1.4 mg/dL]  1.0 mg/dL  



   (2013 15:10:00)  

 

 eGFR  73 mL/min/1.73m2 1  



   *NA*  



   (2013 15:10:00)  

 

 BUN [7-22 mg/dL]  19 mg/dL  



   (2013 15:10:00)  

 

 Glucose Lvl [70-99 mg/dL]  85 mg/dL 2  



   (2013 15:10:00)  

 

 Calcium Lvl [8.5-10.5 mg/dL]  8.9 mg/dL  



   (2013 15:10:)  



1Result Comment: The eGFR is calculated using the CKD-EPI formula. In most young
, healthy individualsthe eGFR will be >90 mL/min/1.73m2. The eGFR declines with 
age. An eGFR of 60-89 may be normal in some populations, particularly the 
elderly, for whom the CKD-EPI formula has not been extensively validated. Use 
of the eGFR is not recommended in the following populations:



Individuals with unstable creatinine concentrations, including pregnant 
patients and those with serious co-morbid conditions.



Patients with extremes in muscle mass or diet.



The data above are obtained from the National Kidney Disease Education Program (
NKDEP) which additionally recommends that when the eGFR is used in patients 
with extremes of body mass index for purposesof drug dosing, the eGFR should be 
multiplied by the estimated BMI.2Interpretive Data: Adult reference range 
values reflect the clinical guidelines

of the American Diabetes Association.HEMATOLOGY





 Most recent to oldest  1  2



 [Reference Range]:    

 

 WBC [3.7-10.4 K/CMM]  7.5 K/CMM  



   (2013 15:10:00)  

 

 RBC [4.70-6.10 M/CMM]  4.66 M/CMM  



   *LOW*  



   (2013 15:10:)  

 

 Hgb [14.0-18.0 g/dL]  13.5 g/dL  



   *LOW*  



   (2013 15:10:)  

 

 Hct [42.0-54.0 %]  41.4 %  



   *LOW*  



   (2013 15:10:)  

 

 MCV [80.0-94.0 fL]  88.9 fL  



   (2013 15:10:00)  

 

 MCH [27.0-31.0 pg]  29.0 pg  



   (2013 15:10:)  

 

 MCHC [32.0-36.0 g/dL]  32.6 g/dL  



   (2013 15:10:00)  

 

 RDW [11.5-14.5 %]  15.5 %  



   *HI*  



   (2013 15:10:00)  

 

 Platelet [133-450 K/CMM]  154 K/CMM  



   (2013 15:10:00)  

 

 MPV [7.4-10.4 fL]  8.7 fL  



   (2013 15:10:00)  

 

 Segs [45.0-75.0 %]  58.9 %  



   (2013 15:10:00)  

 

 Lymphocytes [20.0-40.0 %]  26.3 %  



   (2013 15:10:00)  

 

 Monocytes [2.0-12.0 %]  11.1 %  



   (2013 15:10:00)  

 

 Eosinophils [0.0-4.0 %]  3.2 %  



   (2013 15:10:00)  

 

 Basophils [0.0-1.0 %]  0.5 %  



   (2013 15:10:00)  

 

 Segs-Bands # [1.5-8.1 K/CMM]  4.4 K/CMM  



   (2013 15:10:00)  

 

 Lymphocytes # [1.0-5.5  2.0 K/CMM  



 K/CMM]  (2013 15:10:00)  

 

 Monocytes # [0.0-0.8 K/CMM]  0.8 K/CMM  



   (2013 15:10:00)  

 

 Eosinophils # [0.0-0.5  0.2 K/CMM  



 K/CMM]  (2013 15:10:00)  

 

 PT [12.0-14.7 seconds]  16.0 seconds  31.2 seconds



   *HI*  *HI*



   (2013 08:35:00)  (2013 15:10:00)

 

 INR [0.85-1.17]  1.26 3  3.03 4



   *HI*  *HI*



   (2013 08:35:00)  (2013 15:10:00)

 

 PTT [22.9-35.8 seconds]  33.4 seconds 5  48.3 seconds 6



   (2013 08:35:00)  *HI*



     (2013 15:10:00)

 

 R-time [5.0-10.0 minutes]  7.2 minutes  



   (2013 15:10:00)  

 

 K-time [1.0-3.0 minutes]  1.9 minutes  



   (2013 15:10:)  

 

 Angle [53.0-72.0 degrees]  63.7 degrees  



   (2013 15:10:)  

 

 Max Amp [50.0-70.0 mm]  57.7 mm  



   (2013 15:10:00)  

 

 G-value [4.5-11.0 K d/sc]  6.8 K d/sc  



   (2013 15:10:)  

 

 Ly30 [0.0-7.5 %]  0.6 %  



   (2013 15:10:)  

 

 Coag Index [-3.0-3.0]  -1.3  



   (2013 15:10:)  

 

 TEG Interp  Thrombelastograph results are within reference ranges.  These 
indicate adequate hemostasis.  If there is evidence of bleeding, consider 
anatomical or surgical bleeding.  



   CPT:[id number]  



   *NA*  



   (2013 15:10:)  

 

 TEG Data  See Note 7  



   (2013 15:10:)  



3Interpretive Data: RECOMMENDED RANGES FOR PROTIME INR:

   2.0-3.0 for most medical and surgical thromboembolic states.

   2.5-3.5 for artificial heart valves and recurrent embolism.



INR SHOULD BE USED ONLY FOR PATIENTS ON STABLE ANTICOAGULANT 
THERAPY.4Interpretive Data: RECOMMENDED RANGES FOR PROTIME INR:

   2.0-3.0 for most medical and surgical thromboembolic states.

   2.5-3.5 for artificial heart valves and recurrent embolism.



INR SHOULD BE USED ONLY FOR PATIENTS ON STABLE ANTICOAGULANT 
THERAPY.5Interpretive Data: Heparin Therapeutic Range:  57 - 92 
[id number] Data: Heparin Therapeutic Range:  57 - 92 
[id number] Data: Normal ranges are for citrated whole blood with 
kaolin activator.



R TIME:  Reflects the degree of anti-coagulation due to LMWH, unfractionated 
heparin, and coumadin as well as non-specific

factor deficiencies.



K TIME, ALPHA ANGLE, AND MA(MAX AMPLITUDE):  Have been

associated with the platelet release reaction and fibrin

polymer formation. These parameters assess the speed of clot

formation and the tensil strength of the clot.  Higher levels

are associated with hypercoagulable states.



G VALUE:  Another measure of clot strength.



LY30:  Percent lysis in 30 Minutes is associated with the degree

of fibrinolysis.



CI or COAG INDEX:  A calculation from the above measured data

indicating an overall hyper or hypo coagulability with values

above (plus) +3.0 being relatively hypercoagulable and those

below (minus) -3.0 being relatively hypocoagulable.



These measurements are functional in nature with many variables

and require close clinical correlation.



Thromboelasography (TEG) is mostly used to monitor significant coagulopathy in 
trauma patients or surgical patients.  It assesses gloabal (primary and 
secondary) hemostasis using whole blood and therefore, not expected to 
correlate well with conventional coagulation tests.  TEG results need to be 
correlated with clinical evaluation for patient management.



Procedures







 Procedures  Date  Related Diagnosis

 

 CABG - Coronary artery bypass graft  1    

 

 Hernia repair    

 

 Knee replacement    

 

 Operation  2    



[id number] of Pacemaker and AICD ()

## 2018-07-10 NOTE — XMS REPORT
Encounter CCD: 2012 to 2012

 Created on:2075



Patient:TAMAR BREWSTER

Sex:Male

:1935

External Reference #:[id number]





Demographics







 Address  [address]



   [place] [address]-

 

 Home Phone  [phone]

 

 Preferred Language  Unknown

 

 Marital Status  

 

 Shinto Affiliation  Che[employer]ism

 

 Race  White/

 

 Ethnic Group  Non-









Author







 Organization  [facility]









Care Team Providers







 Name  Role  Phone

 

 Braden Thomas  Referring Provider  [phone]









Allergies, Adverse Reactions, Alerts







 Substance  Reaction  Status

 

 morphine    Active

 

 tetracyclines    Active







Problem List







 Condition  Effective Dates  Status

 

 Atrial fibrillation  < 2012  Inactive

 

 Hyperlipidemia    Active

 

 Hypertension    Active

 

 Hypothyroidism    Active

 

 Methicillin resistant Staphylococcus aureus1  10/14/2009  Active



1Problem added by Discern Expert.



Medications







 Medication  Instructions  Start Date  End Date  Status

 

 citalopram  20 mg, 1 tab, Route: PO,  2012  Discontinued



   Drug form: TAB, Daily,      



   Dosing Weight 79.545, kg,      



   Start date: 12      



   9:00:00, Duration: 30      



   day, Stop date: 12      



   9:00:00      

 

 rivaroxaban  20 mg, Route: PO, QPM,  2012  Deleted



   Dosing Weight 79.545, kg,      



   Start date: 12      



   17:00:00, Duration: 30      



   day, Stop date: 12      



   17:00:00      

 

 Protonix  40 mg, 1 tab, Route: PO,  2012  Discontinued



   Drug form: ECTAB, Before      



   Dinner, Dosing Weight      



   79.545, kg, Start date:      



   12 16:30:00,      



   Duration: 30 day, Stop      



   date: 12 16:30:00      

 

 Toprol-XL 50 mg oral  50 mg, 1 tab, Route: PO,  2012  
Discontinued



 tablet, extended release  Drug form: ERTAB, Daily,      



   Start date: 12      



   9:00:00, Duration: 30      



   day, Stop date: 12      



   9:00:00      

 

 levothyroxine  125 microgram, 1 tab,  2012  Discontinued



   Route: PO, Drug form:      



   TAB, Q630AM, Dosing      



   Weight 79.545, kg, Start      



   date: 12 6:30:00,      



   Duration: 30 day, Stop      



   date: 12 6:30:00      

 

 Omnipaque 350mg/ml  80 mL, Route: IVP, Drug Form: SOLN, Dosing Weight 79.545, 
kg, ONCALL, STAT, Start date: 12 21:07:00, Duration: 1 doses or times, 
Weight=75 - 94kg  2012  Completed



   Weight=75 - 94kg      

 

 Protonix 40 mg oral  40 mg, 1 tab, PO, Daily,  2012    Ordered



 enteric coated tablet  30 tab, Substitution      



   Allowed, ECTAB      

 

 enalapril  10 mg, 1 tab, Route: PO,  2012  Discontinued



   Drug form: TAB, Daily,      



   Dosing Weight 79.545, kg,      



   Start date: 12      



   9:00:00, Duration: 30      



   day, Stop date: 12      



   9:00:00      

 

 dabigatran  150 mg, 1 cap, Route: PO,  2012  Canceled



   Drug form: CAP, BID,      



   Dosing Weight 79.545, kg,      



   Start date: 12      



   9:00:00, Duration: 30      



   day, Stop date: 12      



   17:00:00      

 

 Norvasc  10 mg, 1 tab, Route: PO,  2012  Discontinued



   Drug form: TAB, Daily,      



   Dosing Weight 79.545, kg,      



   Start date: 12      



   9:00:00, Duration: 30      



   day, Stop date: 12      



   9:00:00      

 

 Lipitor  40 mg, 1 tab, Route: PO,  2012  Discontinued



   Drug form: TAB, Daily,      



   Dosing Weight 79.545, kg,      



   Start date: 12      



   9:00:00, Duration: 30      



   day, Stop date: 12      



   9:00:00      

 

 clindamycin  600 mg, 4 mL, Route:  2012  Discontinued



   IVPB, Drug form: INJ,      



   [id number], Dosing Weight      



   79.545, kg, Priority:      



   STAT, Start date:      



   12 5:47:00,      



   Duration: 30 day, Stop      



   date: 12 21:47:00      

 

 Pradaxa 150 mg oral  PO, BID, Substitution  2012  
Discontinued



 capsule  Allowed      

 

 enalapril 10 mg oral  10 mg, 1 tab, PO, Daily,  2012    Ordered



 tablet  30 tab, Substitution      



   Allowed, TAB      

 

 metoprolol 50 mg oral  50 mg, PO, Daily, 30 tab,  2012  
Ordered



 tablet, extended release  Substitution Allowed      

 

 pneumococcal 23-valent  0.5 ml, Route: IM, Drug  2012  
Completed



 vaccine  Form: INJ, Start date:      



   12 9:00:00, Stop      



   date: 12 9:00:00      

 

 Norvasc 10 mg oral tablet  10 mg, 1 tab, PO, Daily,  2012    Ordered



   30 tab, Substitution      



   Allowed, TAB      

 

 docusate  100 mg, 1 cap, Route: PO,  2012  Discontinued



   Drug form: CAP, BID,      



   Dosing Weight 79.545, kg,      



   PRN Constipation, Start      



   date: 12 18:00:00,      



   Duration: 30 day, Stop      



   date: 12 17:59:00      

 

 influenza virus vaccine,  0.5 ml, Route: IM, Drug  10/15/2009  10/15/2009  
Completed



 inactivated  Form: INJ, ONCE, Start      



   date: 10/15/09 9:00:00,      



   Stop date: 10/15/09      



   9:00:00      

 

 pneumococcal 23-valent  0.5 ml, Route: IM, Drug  2012  
Completed



 vaccine  Form: INJ, Daily, Start      



   date: 12 9:00:00,      



   Duration: 1 doses or      



   times, Stop date:      



   12 9:00:00      

 

 magnesium sulfate  2 gm, 50 mL, Route: IVPB,  2012  Completed



   Drug form: INJ, ONCE,      



   Dosing Weight 79.545, kg,      



   Total dose=2 gm, Start      



   date: 12 0:30:00,      



   Duration: 1 doses or      



   times, Stop date:      



   12 0:30:00      

 

 Norvasc 10 mg oral tablet  10 mg, 1 tab, PO, Daily,  2012  
Discontinued



   30 tab, Substitution      



   Allowed, TAB      

 

 Lipitor 40 mg oral tablet  40 mg, 1 tab, PO, Daily,  2012    Ordered



   30 tab, Substitution      



   Allowed, TAB      

 

 levothyroxine 125 mcg  Substitution Allowed  2012    Ordered



 (0.125 mg) oral capsule        

 

 flumazenil  0.2 mg, 2 mL, Route: IVP,  2012  Discontinued



   Drug form: INJ, PRN,      



   Dosing Weight 79.545, kg,      



   PRN Benzodiazepine      



   Reversal, Initial dose,      



   Start date: 12      



   13:57:00, Duration: 5      



   doses or times, Stop      



   date: 12 0:00:00      

 

 naloxone  0.04 mg, 0.1 mL, Route:  2012  Discontinued



   IVP, Drug form: INJ,      



   Q2MIN, Dosing Weight      



   79.545, kg, PRN Narcotic      



   Reversal, Start date:      



   12 13:57:00,      



   Duration: 8 doses or      



   times, Stop date:      



   12 0:00:00      

 

 ondansetron  4 mg, 2 mL, Route: IVP,  2012  Discontinued



   Drug form: INJ, ONCE,      



   Dosing Weight 79.545, kg,      



   PRN Nausea & Vomiting,      



   Start date: 12      



   13:57:00      

 

 hydrALAZINE  5 mg, 0.25 mL, Route:  2012  Discontinued



   IVP, Drug form: INJ,      



   Q5Min, Dosing Weight      



   79.545, kg, PRN Elevated      



   BP, Start date: 12      



   13:57:00, Duration: 4      



   doses or times, Stop      



   date: 12 0:00:00      

 

 niCARdipine  0.25 mg, 0.1 mL, Route:  2012  Discontinued



   IVP, Drug form: INJ,      



   Q5Min, Dosing Weight      



   79.545, kg, PRN Elevated      



   BP, Start date: 12      



   13:57:00, Duration: 4      



   doses or times, Stop      



   date: 12 0:00:00      

 

 labetalol  5 mg, Route: IVP, Q5Min,  2012  Discontinued



   Dosing Weight 79.545, kg,      



   PRN Elevated BP, Start      



   date: 12 13:57:00,      



   Duration: 5 doses or      



   times, Stop date: Limited      



   # of times      

 

 hydromorphone  0.2 mg, 0.1 mL, Route:  2012  Discontinued



   IVP, Drug form: INJ,      



   Q5Min, Dosing Weight      



   79.545, kg, PRN Pain      



   Score 4-6, Start date:      



   12 13:57:00,      



   Duration: 5 doses or      



   times, Stop date:      



   12 0:00:00      

 

 Trandate  5 mg, 1 mL, Route: IVP,  2012  Discontinued



   Drug form: INJ, Q5Min,      



   PRN Elevated BP, Start      



   date: 12 14:50:00,      



   Duration: 30 day, Stop      



   date: 12 14:49:00      

 

 Xarelto 20 mg oral tablet  Substitution Allowed  2012    Ordered

 

 rivaroxaban  20 mg, 1 tab, Route: PO,  2012  Discontinued



   Drug form: TAB, QPM,      



   Dosing Weight 79.545, kg,      



   Start date: 12      



   17:00:00, Duration: 30      



   day, Stop date: 12      



   17:00:00      

 

 citalopram 20 mg oral  20 mg, 1 tab, PO, Daily,  2012    Ordered



 tablet  30 tab, Substitution      



   Allowed, TAB      







Immunizations







 Vaccine  Date  Status

 

 influenza virus vaccine, inactivated  10/15/2009  Auth (Verified)

 

 pneumococcal 23-valent vaccine  2012  Auth (Verified)







Vital Signs







 Most recent to oldest  1  2  3



 [Reference Range]:      

 

 Height  180.34 cm  180.34 cm  



   (2012 10:33:00)  (2012 09:43:00)  

 

 Temperature Oral  96.0 DegF  97.1 DegF  97.0 DegF



 [96.4-99.1 DegF]  *LOW*  (2012 07:31:00)  (2012 05:07:00)



   (2012 11:42:00)    

 

 Systolic Blood Pressure  112 mmHg  121 mmHg  120 mmHg



 [ mmHg]  (2012 11:42:00)  (2012 10:04:00)  (2012 07:31:
00)

 

 Diastolic Blood Pressure  58 mmHg  76 mmHg  75 mmHg



 [60-90 mmHg]  *LOW*  (2012 10:04:00)  (2012 07:31:00)



   (2012 11:42:00)    

 

 Respiratory Rate [14-20  20 BRMIN  20 BRMIN  18 BRMIN



 BRMIN]  (2012 11:42:00)  (2012 07:31:00)  (2012 05:07:00)

 

 Peripheral Pulse Rate  83 bpm  82 bpm  83 bpm



 [ bpm]  (2012 11:42:00)  (2012 10:04:00)  (2012 07:31:
00)

 

 Weight  79.545 kg  80.909 kg  



   (2012 10:33:00)  (2012 09:43:00)  







Results

URINALYSIS





 Most recent to oldest [Reference Range]:  1  2  3

 

 UA Turbidity [Clear]  Clear    



   (2012 22:50:00)    

 

 UA Color [Yellow]  Yellow    



   *NA*    



   (2012 22:50:00)    

 

 UA pH [5.0-8.0]  5.5    



   (2012 22:50:00)    

 

 UA Spec Grav [<=1.030]  1.011    



   (2012 22:50:00)    

 

 UA Glucose [Negative mg/dL]  Negative mg/dL    



   *NA*    



   (2012 22:50:00)    

 

 UA Blood [Negative]  Negative    



   (2012 22:50:00)    

 

 UA Ketones [Negative mg/dL]  Negative mg/dL    



   *NA*    



   (2012 22:50:00)    

 

 UA Protein [Negative mg/dL]  Negative mg/dL    



   (2012 22:50:00)    

 

 UA Urobilinogen [0.1-1.0 mg/dL]  <=1.0 mg/dL    



   *NA*    



   (2012 22:50:00)    

 

 UA Bili [Negative]  Negative    



   *NA*    



   (2012 22:50:00)    

 

 UA Leuk Est [Negative]  Trace    



   *ABN*    



   (2012 22:50:00)    

 

 UA Nitrite [Negative]  Negative    



   (2012 22:50:00)    

 

 UA WBC [0-5 /HPF]  2 /HPF    



   (2012 22:50:00)    

 

 UA RBC [0-2 /HPF]  <1 /HPF    



   (2012 22:50:00)    

 

 UA Bacteria [None Seen /HPF]  Occasional /HPF    



   *NA*    



   (2012 22:50:00)    

 

 UA Sq Epi  None Seen    



   *NA*    



   (2012 22:50:00)    

 

 UA Hyal Cast [0-2 /LPF]  1 /LPF    



   (2012 22:50:00)    

 

 UA Mucus [None Seen /LPF]  Few /LPF    



   *NA*    



   (2012 22:50:00)    



CHEMISTRY





 Most recent to oldest  1  2  3



 [Reference Range]:      

 

 Sodium Lvl [135-145 mEq/L]  142 mEq/L  141 mEq/L  141 mEq/L



   (2012 06:20:00)  (2012 18:20:00)  (2012 12:15:00)

 

 Potassium Lvl [3.5-5.1  4.3 mEq/L  4.5 mEq/L  5.0 mEq/L



 mEq/L]  (2012 06:20:00)  (2012 18:20:00)  (2012 12:15:00)

 

 Chloride Lvl [  105 mEq/L  105 mEq/L  104 mEq/L



 mEq/L]  (2012 06:20:00)  (2012 18:20:00)  (2012 12:15:00)

 

 CO2 [24-32 mEq/L]  24 mEq/L  25 mEq/L  29 mEq/L



   (2012 06:20:00)  (2012 18:20:00)  (2012 12:15:00)

 

 AGAP [10.0-20.0 mEq/L]  17.3 mEq/L  15.5 mEq/L  13.0 mEq/L



   (2012 06:20:00)  (2012 18:20:00)  (2012 12:15:00)

 

 Creatinine Lvl [0.5-1.4  0.8 mg/dL  0.9 mg/dL  0.9 mg/dL



 mg/dL]  (2012 06:20:00)  (2012 18:20:00)  (2012 12:15:00)

 

 eGFR  87 mL/min/1.73m2 1  83 mL/min/1.73m2 2  84 mL/min/1.73m2 3



   *NA*  *NA*  *NA*



   (2012 06:20:00)  (2012 18:20:00)  (2012 12:15:00)

 

 BUN [7-22 mg/dL]  20 mg/dL  18 mg/dL  18 mg/dL



   (2012 06:20:00)  (2012 18:20:00)  (2012 12:15:00)

 

 B/C Ratio [6-25]  20  20  



   (2012 18:20:00)  (2012 12:15:00)  

 

 Glucose Lvl [70-99 mg/dL]  131 mg/dL 4  129 mg/dL 5  93 mg/dL 6



   *HI*  *HI*  (2012 12:15:00)



   (2012 06:20:00)  (2012 18:20:00)  

 

 Total Protein [6.4-8.4  6.2 g/dL  7.2 g/dL  7.0 g/dL



 g/dL]  *LOW*  (2012 18:20:00)  (2012 12:15:00)



   (2012 06:20:00)    

 

 Albumin Lvl [3.5-5.0 g/dL]  3.7 g/dL  4.4 g/dL  4.2 g/dL



   (2012 06:20:00)  (2012 18:20:00)  (2012 12:15:00)

 

 Globulin [2.0-4.0 g/dL]  2.5 g/dL  2.8 g/dL  2.8 g/dL



   (2012 06:20:00)  (2012 18:20:00)  (2012 12:15:00)

 

 A/G Ratio [0.7-1.6]  1.5  1.6  1.5



   (2012 06:20:00)  (2012 18:20:00)  (2012 12:15:00)

 

 Calcium Lvl [8.5-10.5  8.4 mg/dL  8.8 mg/dL  8.8 mg/dL



 mg/dL]  *LOW*  (2012 18:20:00)  (2012 12:15:00)



   (2012 06:20:00)    

 

 Phosphorus [2.5-4.5 mg/dL]  4.3 mg/dL  4.5 mg/dL  



   (2012 06:20:00)  (2012 18:20:00)  

 

 Magnesium Lvl [1.8-2.4  2.3 mg/dL  1.7 mg/dL  



 mg/dL]  (2012 06:20:00)  *LOW*  



     (2012 18:20:00)  

 

 ALT [0-65 unit/L]  23 unit/L  24 unit/L  27 unit/L



   (2012 06:20:00)  (2012 18:20:00)  (2012 12:15:00)

 

 AST [0-37 unit/L]  22 unit/L  18 unit/L  28 unit/L



   (2012 06:20:00)  (2012 18:20:00)  (2012 12:15:00)

 

 Alk Phos [ unit/L]  113 unit/L  130 unit/L  102 unit/L



   (2012 06:20:00)  (2012 18:20:00)  (2012 12:15:00)

 

 Bili Total [0.2-1.3 mg/dL]  1.7 mg/dL  1.9 mg/dL  1.3 mg/dL



   *HI*  *HI*  (2012 12:15:00)



   (2012 06:20:00)  (2012 18:20:00)  

 

 Bili Direct [0.0-0.3  0.6 mg/dL    



 mg/dL]  *HI*    



   (2012 06:20:00)    

 

 Bili Indirect [0.0-1.0  1.1 mg/dL    



 mg/dL]  *HI*    



   (2012 06:20:00)    

 

 Total CK [ unit/L]  43 unit/L  38 unit/L  38 unit/L



   (2012 11:18:00)  (2012 06:20:00)  (2012 22:45:00)

 

 Troponin-T [0.000-0.100  <0.010 ng/mL  <0.010 ng/mL  <0.010 ng/mL



 ng/mL]  (2012 11:18:00)  (2012 06:20:00)  (2012 22:45:00)

 

 Troponin-I [0.00-0.40  <0.02 ng/mL  <0.02 ng/mL  <0.02 ng/mL



 ng/mL]  (2012 11:18:00)  (2012 06:20:00)  (2012 22:45:00)

 

 T4 Free [0.76-1.46 ng/dL]  1.45 ng/dL    



   (2012 22:45:00)    

 

 TSH [0.360-3.740 uIU/mL]  0.291 uIU/mL    



   *LOW*    



   (2012 18:20:00)    

 

 Ca Ion mgdL [4.65-5.20  4.64 mg/dL    



 mg/dL]  *LOW*    



   (2012 06:20:00)    

 

 Ca Ion [1.16-1.30 mMol/L]  1.16 mMol/L    



   (2012 06:20:00)    

 

 Ca Norm [1.16-1.30 mMol/L]  1.16 mMol/L    



   (2012 06:20:00)    

 

 Ca Norm mgdL [4.65-5.20  4.64 mg/dL    



 mg/dL]  *LOW*    



   (2012 06:20:00)    

 

 POC A Hct [42.0-54.0 %]  41.0 %    



   *LOW*    



   (2012 15:56:00)    

 

 POC A Ca Ion [1.16-1.30  1.16 mMol/L  1.15 mMol/L  



 mMol/L]  (2012 05:25:00)  *LOW*  



     (2012 15:56:00)  

 

 POC A K [3.5-5.1 mEq/L]  4.0 mEq/L  4.3 mEq/L  



   (2012 05:25:00)  (2012 15:56:00)  

 

 POC A Source  ART  ART  



   *NA*  *NA*  



   (2012 05:25:00)  (2012 15:56:00)  

 

 POC A Temp  37.0 DegC  37.0 DegC  



   *NA*  *NA*  



   (2012 05:25:00)  (2012 15:56:00)  

 

 POC A pH [7.35-7.45]  7.41  7.39  



   (2012 05:25:00)  (2012 15:56:00)  

 

 POC A PCO2 [35-45 mmHg]  40 mmHg  43 mmHg  



   (2012 05:25:00)  (2012 15:56:00)  

 

 POC A PO2 [ mmHg]  60 mmHg  46 mmHg  



   *CRIT*  *CRIT*  



   (2012 05:25:00)  (2012 15:56:00)  

 

 POC A HCO3 [22-26 mMol/L]  25 mMol/L  26 mMol/L  



   (2012 05:25:00)  (2012 15:56:00)  

 

 POC A BE [-2-2 mMol/L]  1 mMol/L  1 mMol/L  



   (2012 05:25:00)  (2012 15:56:00)  

 

 POC A O2 Sat [95.0-100.0  91.0 %  81.0 %  



 %]  *LOW*  *LOW*  



   (2012 05:25:00)  (2012 15:56:00)  

 

 POC A Glu [70-99 mg/dL]  145 mg/dL  115 mg/dL  



   *HI*  *HI*  



   (2012 05:25:00)  (2012 15:56:00)  

 

 POC A LA [0.5-2.2 mMol/L]  1.8 mMol/L  0.7 mMol/L  



   (2012 05:25:00)  (2012 15:56:00)  

 

 POC A Na [135-145 mEq/L]  138 mEq/L  137 mEq/L  



   (2012 05:25:00)  (2012 15:56:00)  



1Result Comment: The eGFR is calculated using the CKD-EPI formula. In most young
, healthy individualsthe eGFR will be >90 mL/min/1.73m2. The eGFR declines with 
age. An eGFR of 60-89 may be normal in some populations, particularly the 
elderly, for whom the CKD-EPI formula has not been extensively validated. Use 
of the eGFR is not recommended in the following populations:



Individuals with unstable creatinine concentrations, including pregnant 
patients and those with serious co-morbid conditions.



Patients with extremes in muscle mass or diet.



The data above are obtained from the National Kidney Disease Education Program (
NKDEP) which additionally recommends that when the eGFR is used in patients 
with extremes of body mass index for purposesof drug dosing, the eGFR should be 
multiplied by the estimated BMI.2Result Comment: The eGFR is calculated using 
the CKD-EPI formula. In most young, healthy individualsthe eGFR will be >90 mL/
min/1.73m2. The eGFR declines with age. An eGFR of 60-89 may be normal in some 
populations, particularly the elderly, for whom the CKD-EPI formula has not 
been extensively validated. Use of the eGFR is not recommended in the following 
populations:



Individuals with unstable creatinine concentrations, including pregnant 
patients and those with serious co-morbid conditions.



Patients with extremes in muscle mass or diet.



The data above are obtained from the National Kidney Disease Education Program (
NKDEP) which additionally recommends that when the eGFR is used in patients 
with extremes of body mass index for purposesof drug dosing, the eGFR should be 
multiplied by the estimated BMI.3Result Comment: The eGFR is calculated using 
the CKD-EPI formula. In most young, healthy individualsthe eGFR will be >90 mL/
min/1.73m2. The eGFR declines with age. An eGFR of 60-89 may be normal in some 
populations, particularly the elderly, for whom the CKD-EPI formula has not 
been extensively validated. Use of the eGFR is not recommended in the following 
populations:



Individuals with unstable creatinine concentrations, including pregnant 
patients and those with serious co-morbid conditions.



Patients with extremes in muscle mass or diet.



The data above are obtained from the National Kidney Disease Education Program (
NKDEP) which additionally recommends that when the eGFR is used in patients 
with extremes of body mass index for purposesof drug dosing, the eGFR should be 
multiplied by the estimated BMI.4Interpretive Data: Adult reference range 
values reflect the clinical guidelines

of the American Diabetes Association.5Interpretive Data: Adult reference range 
values reflect the clinical guidelines

of the American Diabetes Association.6Interpretive Data: Adult reference range 
values reflect the clinical guidelines

of the American Diabetes Association.HEMATOLOGY





 Most recent to oldest  1  2  3



 [Reference Range]:      

 

 WBC [3.7-10.4 K/CMM]  9.1 K/CMM  8.3 K/CMM  7.3 K/CMM



   (2012 04:40:00)  (2012 18:20:00)  (2012 12:15:00)

 

 RBC [4.70-6.10 M/CMM]  4.22 M/CMM  4.57 M/CMM  4.44 M/CMM



   *LOW*  *LOW*  *LOW*



   (2012 04:40:00)  (2012 18:20:00)  (2012 12:15:00)

 

 Hgb [14.0-18.0 g/dL]  12.7 g/dL  13.5 g/dL  13.0 g/dL



   *LOW*  *LOW*  *LOW*



   (2012 04:40:00)  (2012 18:20:00)  (2012 12:15:00)

 

 Hct [42.0-54.0 %]  37.8 %  41.1 %  40.2 %



   *LOW*  *LOW*  *LOW*



   (2012 04:40:00)  (2012 18:20:00)  (2012 12:15:00)

 

 MCV [80.0-94.0 fL]  89.5 fL  90.0 fL  90.6 fL



   (2012 04:40:00)  (2012 18:20:00)  (2012 12:15:00)

 

 MCH [27.0-31.0 pg]  30.0 pg  29.6 pg  29.4 pg



   (2012 04:40:00)  (2012 18:20:00)  (2012 12:15:00)

 

 MCHC [32.0-36.0 g/dL]  33.5 g/dL  32.8 g/dL  32.4 g/dL



   (2012 04:40:00)  (2012 18:20:00)  (2012 12:15:00)

 

 RDW [11.5-14.5 %]  16.2 %  16.0 %  16.0 %



   *HI*  *HI*  *HI*



   (2012 04:40:00)  (2012 18:20:00)  (2012 12:15:00)

 

 Platelet [133-450 K/CMM]  134 K/CMM  156 K/CMM  153 K/CMM



   (2012 04:40:00)  (2012 18:20:00)  (2012 12:15:00)

 

 MPV [7.4-10.4 fL]  8.8 fL  8.9 fL  8.9 fL



   (2012 04:40:00)  (2012 18:20:00)  (2012 12:15:00)

 

 Segs [45.0-75.0 %]  84.9 %  86.6 %  61.0 %



   *HI*  *HI*  (2012 12:15:00)



   (2012 04:40:00)  (2012 18:20:00)  

 

 Lymphocytes [20.0-40.0 %]  10.4 %  11.4 %  24.4 %



   *LOW*  *LOW*  (2012 12:15:00)



   (2012 04:40:00)  (2012 18:20:00)  

 

 Monocytes [2.0-12.0 %]  3.4 %  1.5 %  9.7 %



   (2012 04:40:00)  *LOW*  (2012 12:15:00)



     (2012 18:20:00)  

 

 Eosinophils [0.0-4.0 %]  0.1 %  0.3 %  2.9 %



   (2012 04:40:00)  (2012 18:20:00)  (2012 12:15:00)

 

 Basophils [0.0-1.0 %]  1.2 %  0.2 %  2.0 %



   *HI*  (2012 18:20:00)  *HI*



   (2012 04:40:00)    (2012 12:15:00)

 

 Segs-Bands # [1.5-8.1  7.7 K/CMM  7.2 K/CMM  4.4 K/CMM



 K/CMM]  (2012 04:40:00)  (2012 18:20:00)  (2012 12:15:00)

 

 Lymphocytes # [1.0-5.5  1.0 K/CMM  0.9 K/CMM  1.8 K/CMM



 K/CMM]  (2012 04:40:00)  *LOW*  (2012 12:15:00)



     (2012 18:20:00)  

 

 Monocytes # [0.0-0.8  0.3 K/CMM  0.1 K/CMM  0.7 K/CMM



 K/CMM]  (2012 04:40:00)  (2012 18:20:00)  (2012 12:15:00)

 

 Eosinophils # [0.0-0.5  0.2 K/CMM    



 K/CMM]  (2012 12:15:00)    

 

 Basophils # [0.0-0.2  0.1 K/CMM  0.1 K/CMM  



 K/CMM]  (2012 04:40:00)  (2012 12:15:00)  

 

 PT [12.0-14.7 seconds]  15.8 seconds  15.4 seconds  20.1 seconds



   *HI*  *HI*  *HI*



   (2012 04:40:00)  (2012 11:00:00)  (2012 12:15:00)

 

 INR [0.85-1.17]  1.24 7  1.20 8  1.70 9



   *HI*  *HI*  *HI*



   (2012 04:40:00)  (2012 11:00:00)  (2012 12:15:00)

 

 PTT [22.9-35.8 seconds]  31.2 seconds 10  32.9 seconds 11  60.9 seconds 12



   (2012 04:40:00)  (2012 11:00:00)  *HI*



       (2012 12:15:00)



7Interpretive Data: RECOMMENDED RANGES FOR PROTIME INR:

   2.0-3.0 for most medical and surgical thromboembolic states.

   2.5-3.5 for artificial heart valves and recurrent embolism.



INR SHOULD BE USED ONLY FOR PATIENTS ON STABLE ANTICOAGULANT 
THERAPY.8Interpretive Data: RECOMMENDED RANGES FOR PROTIME INR:

   2.0-3.0 for most medical and surgical thromboembolic states.

   2.5-3.5 for artificial heart valves and recurrent embolism.



INR SHOULD BE USED ONLY FOR PATIENTS ON STABLE ANTICOAGULANT 
THERAPY.9Interpretive Data: RECOMMENDED RANGES FOR PROTIME INR:

   2.0-3.0 for most medical and surgical thromboembolic states.

   2.5-3.5 for artificial heart valves and recurrent embolism.



INR SHOULD BE USED ONLY FOR PATIENTS ON STABLE ANTICOAGULANT 
THERAPY.10Interpretive Data: Heparin Therapeutic Range:  57 - 92 
[id number] Data: Heparin Therapeutic Range:  57 - 92 
[id number] Data: Heparin Therapeutic Range:  57 - 92 Seconds      
Microbiology Reports



     PROCEDURE:Culture: Urine

                  STATUS:                  In Progress





 BODY SITE:    COLLECTED DATE/TIME:  2012 22:45:00

 

 SOURCE:  Urine, Clean Catch  FREE TEXT SOURCE:  



***PRELIMINARY REPORTS***       Preliminary Prylov18,000 -  50,000 CFU/mL Gram 
Negative Rods, Lactose Fermenters

Identification And Sensitivity Pending

## 2018-07-10 NOTE — XMS REPORT
Summary of Care: 16 - 16

 Created on:2049



Patient:TAMAR BREWSTER

Sex:Male

:1935

External Reference #:[id number]





Demographics







 Address  [address] [place] [id number]

 

 Home Phone  [phone]

 

 Preferred Language  English

 

 Marital Status  

 

 Jainism Affiliation  Congregation

 

 Race  White/

 

 Ethnic Group  Not  or 









Author







 Organization  [facility]

 

 Address  [address], [place] [address]-

 

 Phone  [phone]









Encounter

HQ Encntr_juan(FIN) [id number] Date(s): 16 - 16

[address] [address]-     [employer] [phone]
[phone]

Discharge Disposition: Home

Attending Physician: Jasmin Macedo MD

Referring Physician: Jasmin Macedo MD





Vital Signs

No data available for this section



Problem List







 Condition  Effective Dates  Status  Health Status  Informant

 

 Atrial fibrillation(Confirmed)    Resolved    

 

 CAD - Coronary artery    Active    



 disease(Confirmed)        

 

 Depression(Confirmed)    Active    

 

 GERD - Gastro-esophageal reflux    Active    



 disease(Confirmed)        

 

 Hyperlipidemia(Confirmed)    Active    

 

 Hypertension(Confirmed)    Active    

 

 Hypothyroidism(Confirmed)    Active    

 

 Hypothyroidism(Confirmed)    Active    

 

 Other(Confirmed)1    Resolved    

 

 Tongue carcinoma(Confirmed)    Resolved    



1VP arrest- with AICD



Allergies, Adverse Reactions, Alerts







 Substance  Reaction  Severity  Status

 

 morphine      Active

 

 tetracyclines      Active







Medications

No data available for this section



Results

No data available for this section



Immunizations







 Vaccine  Date  Refusal Reason

 

 influenza virus vaccine, inactivated  10/15/09  

 

 pneumococcal 23-valent vaccine  12  







Procedures







 Procedure  Date  Related Diagnosis  Body Site

 

 Abdomen endoscopy      

 

 Barium swallow      

 

 CABG - Coronary artery bypass graft1, 2      

 

 Cataract surgery3      

 

 Colonoscopy4      

 

 Hernia repair5, 6      

 

 Knee replacement      

 

 Operation7      



[id number] states had quadraple [id number] eye with [id number] repair (
INGUINAL) x46x'3 in the [id number] of Pacemaker and AICD ()



Social History







 Social History Type  Response

 

 Substance Abuse  Use: None.

 

 Alcohol  Current, Type Wine.  Last use: occational.

 

 Smoking Status  Never smoker; Exposure to Tobacco Smoke None; Cigarette Smoking



   Last 365 Days No; Reg Smoking Cessation Counseling No







Assessment and Plan

No data available for this section

## 2018-07-10 NOTE — XMS REPORT
Clinical Summary

 Created on:July 10, 2018



Patient:Tamar Brewster

Sex:Male

:1935

External Reference #:[id number]





Demographics







 Address  [address] [place] [address]

 

 Mobile Phone  [phone]

 

 Home Phone  [phone]

 

 Email Address  [email or web address]

 

 Preferred Language  English

 

 Marital Status  

 

 Uats[place] Affiliation  Unknown

 

 Race  White

 

 Ethnic Group  Not  or 









Author







 Organization  [facility]

 

 Address  [address] [place] [address]









Support







 Name  Relationship  Address  Phone

 

 Lacey Brewster  Spouse  Unavailable  [phone]









Care Team Providers







 Name  Role  Phone

 

 Chantal Das MD  Primary Care Provider  [phone]









Allergies







 Active Allergy  Reactions  Severity  Noted Date  Comments

 

 Morphine      2016  

 

 Tetracycline      2016  







Current Medications







 Prescription  Sig.  Disp.  Refills  Start Date  End Date  Status

 

 latanoprost  INT 1 GTT IN    3  2016    Active



 (XALATAN) 0.005 %  OU HS          



 ophthalmic solution            

 

 FLUZONE HIGH-DOSE  ADM 0.5ML IM    0  2017    Active



 2017-18, PF, 180  UTD          



 mcg/0.5 mL syringe            



 vaccine            

 

 PREVNAR 13, PF, 0.5  ADM 0.5ML IM    0  2017    Active



 mL vaccine  UTD          

 

 warfarin (COUMADIN)  TAKE 1 TABLET  90 tablet  0  2018    Active



 2.5 MG tablet  BY MOUTH DAILY          

 

 sacubitril-valsartan  Take 1 tablet  60 tablet  11  2018    Active



 (ENTRESTO) 24-26 mg  by mouth 2          



 tablet per tablet  (two) times a          



   day.          

 

 levothyroxine  TAKE 1  45 tablet  1  2018    Active



 (SYNTHROID, LEVOXYL)  TABLET(100          



 100 mcg tablet  MCG) BY MOUTH          



   EVERY OTHER          



   DAY          

 

 atorvastatin  TAKE 1 TABLET  90 tablet  0  2018    Active



 (LIPITOR) 40 MG  BY MOUTH DAILY          



 tabletIndications:            



 Persistent atrial            



 fibrillation,            



 Essential            



 hypertension            

 

 finasteride  TAKE 1  90 tablet  0  2018    Active



 (PROSCAR) 5 mg  TABLET(5 MG)          



 tablet  BY MOUTH EVERY          



   DAY          

 

 DIGOX 125 mcg  TAKE 1 TABLET  90 tablet  0  2018    Active



 tabletIndications:  BY MOUTH EVERY          



 Persistent atrial  DAY          



 fibrillation,            



 Essential            



 hypertension            

 

 nitroglycerin  Place 0.4 mg        10/25/201  Discontinued



 (NITROSTAT) 0.4 MG  under the        7  



 SL tablet  tongue every 5          



   (five) minutes          



   as needed for          



   chest pain.          

 

 nutritional  Take by mouth.        10/06/201  Discontinued



 supplements (nutren  Take as        7  



 2.0) liquid  directed by          



   your          



   physician.          

 

 warfarin (COUMADIN)  Take 1 tablet  30 tablet  11  2016  
Discontinued



 2.5 MG tablet  by mouth daily        7  

 

 levothyroxine  Take 1 tab  16 tablet  2  2016  07/15/201  Discontinued



 (SYNTHROID) 100 MCG  every other        7  



 tablet  day          

 

 finasteride  TAKE 1  90 tablet  0  2016  10/06/201  Discontinued



 (PROSCAR) 5 mg  TABLET(5 MG)        7  



 tablet  BY MOUTH EVERY          



   DAY          

 

 digOXIN (LANOXIN)  Take 1 tablet  90 tablet  3  2017  
Discontinued



 125 mcg  (125 mcg        8  



 tabletIndications:  total) by          



 Persistent atrial  mouth daily.          



 fibrillation,            



 Essential            



 hypertension            

 

 atorvastatin  Take 1 tablet  90 tablet  3  2017  Discontinued



 (LIPITOR) 40 MG  (40 mg total)        8  



 tabletIndications:  by mouth          



 Persistent atrial  daily.          



 fibrillation,            



 Essential            



 hypertension            

 

 amIODarone  Take 1 tablet  90 tablet  3  2017  Discontinued



 (PACERONE) 100 MG  (100 mg total)        8  



 tablet  by mouth          



   daily.          

 

 finasteride  TAKE 1  90 tablet  0  2017  10/06/201  Discontinued



 (PROSCAR) 5 mg  TABLET(5 MG)        7  



 tablet  BY MOUTH EVERY          



   DAY          

 

 sacubitril-valsartan  Take 1 tablet  60 tablet  11  2017  
Discontinued



 (ENTRESTO) 24-26 mg  by mouth 2        8  



 tablet per tablet  (two) times a          



   day.          

 

 warfarin (COUMADIN)  Take 5 mg by          Discontinued



 5 MG tablet  mouth daily.        8  



   Take 1 tablet          



   (5mg) by mouth          



   daily for 30          



   days.          

 

 spironolactone  Take 0.5  45 tablet  3  2017  Discontinued



 (ALDACTONE) 25 MG  tablets (12.5        8  



 tabletIndications:  mg total) by          



 Chronic combined  mouth daily.          



 systolic and            



 diastolic heart            



 failure            

 

 finasteride  TAKE 1  90 tablet  0  06/15/2017  09/11/201  Discontinued



 (PROSCAR) 5 mg  TABLET(5 MG)        7  



 tablet  BY MOUTH EVERY          



   DAY          

 

 levothyroxine  TAKE 1  30 tablet  0  2017  10/06/201  Discontinued



 (SYNTHROID, LEVOXYL)  TABLET(88 MCG)        7  



 88 mcg tablet  BY MOUTH EVERY          



   MORNING          

 

 furosemide (LASIX)  Take 1 tablet  180 tablet  3  2017  
Discontinued



 20 mg tablet  (20 mg total)        7  



   by mouth 2          



   (two) times a          



   day.          

 

 levothyroxine  TAKE 1 TABLET  30 tablet  0  2017  10/06/201  Discontinued



 (SYNTHROID, LEVOXYL)  BY MOUTH EVERY        7  



 100 mcg tablet  OTHER DAY          

 

 furosemide (LASIX)  Take 1 tablet  90 tablet  3  2017  10/06/201  
Discontinued



 20 mg tablet  (20 mg total)        7  



   by mouth          



   daily.          

 

 finasteride  TAKE 1  90 tablet  0  2017  Discontinued



 (PROSCAR) 5 mg  TABLET(5 MG)        7  



 tablet  BY MOUTH EVERY          



   DAY          

 

 levothyroxine  TAKE 1 TABLET  16 tablet  0  2017  10/25/201  Discontinued



 (SYNTHROID, LEVOXYL)  BY MOUTH EVERY        7  



 100 mcg tablet  OTHER DAY          

 

 levothyroxine  TAKE 1  30 tablet  0  2017  10/25/201  Discontinued



 (SYNTHROID, LEVOXYL)  TABLET(88 MCG)        7  



 88 mcg tablet  BY MOUTH EVERY          



   MORNING          

 

 furosemide (LASIX)  Take 1 tablet  180 tablet  3  10/06/2017  06/09/201  
Discontinued



 20 mg  (20 mg total)        8  



 tabletIndications:  by mouth every          



 Coronary  other day.          



 arteriosclerosis,            



 Pulmonary            



 hypertension            

 

 levothyroxine  Take 1 tablet  45 tablet  1  10/25/2017  04/14/201  Discontinued



 (SYNTHROID, LEVOXYL)  (100 mcg        8  



 100 mcg tablet  total) by          



   mouth every          



   other day.          

 

 levothyroxine  Take 1 tablet  45 tablet  1  10/25/2017  04/14/201  Discontinued



 (SYNTHROID, LEVOXYL)  (88 mcg total)        8  



 88 mcg tablet  by mouth every          



   other day.          

 

 nystatin  APPLY TO BELLY    1  2017  Discontinued



 (MYCOSTATIN) 100,000  BUTTON RASH        8  



 unit/gram cream  TWICE DAILY          

 

 warfarin (COUMADIN)  TAKE 1 TABLET  30 tablet  0  2017  
Discontinued



 2.5 MG tablet  BY MOUTH DAILY        8  

 

 finasteride  TAKE 1  90 tablet  0  2017  Discontinued



 (PROSCAR) 5 mg  TABLET(5 MG)        8  



 tablet  BY MOUTH EVERY          



   DAY          

 

 DIGOX 125 mcg  TAKE 1 TABLET  90 tablet  0  2018  Discontinued



 tabletIndications:  BY MOUTH DAILY        8  



 Persistent atrial            



 fibrillation,            



 Essential            



 hypertension            

 

 amIODarone  TAKE 1 TABLET  90 tablet  0  2018  Discontinued



 (PACERONE) 100 MG  BY MOUTH DAILY        8  



 tablet            

 

 atorvastatin  TAKE 1 TABLET  90 tablet  0  2018  Discontinued



 (LIPITOR) 40 MG  BY MOUTH DAILY        8  



 tabletIndications:            



 Persistent atrial            



 fibrillation,            



 Essential            



 hypertension            

 

 warfarin (COUMADIN)  Take 1 tablet  90 tablet  0  2018  




 5 MG tablet  (5 mg total)        8  



   by mouth daily          



   for 90 days.          

 

 metoprolol succinate  Take 0.5  90 tablet  3  2018  06/15/201  
Discontinued



 XL (TOPROL-XL) 25 mg  tablets (12.5        8  



 24 hr  mg total) by          



 tabletIndications:  mouth daily.          



 Essential            



 hypertension            

 

 ENTRESTO 24-26 mg  TAKE 1 TABLET  60 tablet  0  2018  
Discontinued



 tablet per tablet  BY MOUTH TWICE        8  



   DAILY          

 

 finasteride  TAKE 1  90 tablet  0  2018  Discontinued



 (PROSCAR) 5 mg  TABLET(5 MG)        8  



 tablet  BY MOUTH EVERY          



   DAY          

 

 levothyroxine  TAKE 1  45 tablet  1  2018  06/15/201  Discontinued



 (SYNTHROID, LEVOXYL)  TABLET(88 MCG)        8  



 88 mcg tablet  BY MOUTH EVERY          



   OTHER DAY          

 

 DIGOX 125 mcg  TAKE 1 TABLET  90 tablet  0  2018  Discontinued



 tabletIndications:  BY MOUTH EVERY        8  



 Persistent atrial  DAY          



 fibrillation,            



 Essential            



 hypertension            

 

 amIODarone  TAKE 1 TABLET  90 tablet  0  2018  06/15/201  Discontinued



 (PACERONE) 100 MG  BY MOUTH EVERY        8  



 tablet  DAY          







Active Problems







 Problem  Noted Date

 

 Orthostatic dizziness  2018

 

 Hypotension  2018

 

 ALISON (acute kidney injury)  2018

 

 Biventricular implantable cardioverter-defibrillator in situ  2018

 

 Abdominal pain  2017

 

 Abnormal findings on diagnostic imaging of other abdominal regions,  2017



 including retroperitoneum  

 

 Abnormal weight loss  2017

 

 Acute on chronic combined systolic and diastolic congestive heart failure  

 

 Atrial tachycardia  2017

 

 Open wound of hand with complication  2017

 

 Spasm of the cricopharyngeus muscle  2017

 

 Pharyngeal spasm  2017

 

 Deep vein thrombosis (DVT)  2017

 

 Esophageal dysmotility  2017

 

 History of cardiac arrest  2017

 

 Hematuria  10/25/2017









 Overview: 







 Likely partially due to coumadin use; exhaustive work up twice yielded no clear



 cause.









 Gynecomastia  10/25/2017









 Overview: 







 Normal breast tissue on mammogram 2017









 Coronary arteriosclerosis  2016

 

 Edema of lower extremity  2016

 

 Ischemic congestive cardiomyopathy  2016

 

 Mitral and aortic incompetence  2016

 

 Ventricular tachycardia  2016

 

 Pulmonary hypertension  2016

 

 Hypothyroidism  06/10/2016

 

 Benign non-nodular prostatic hyperplasia with lower urinary tract symptoms  2016

 

 Retention of urine  

 

 Hyperlipidemia  

 

 Congestive heart failure  

 

 Atrial fibrillation  

 

 On continuous oral anticoagulation  









 Overview: 







 now on warfarin; changed from elaquis









 Hearing loss  









 Overview: 







 with hearing aides







Resolved Problems







 Problem  Noted Date  Resolved Date

 

 Mitral valve regurgitation  2016  10/25/2017

 

 Intractable vomiting with nausea  10/31/2016  10/25/2017

 

 Essential hypertension    2018

 

 Dysphagia    10/25/2017









 Overview: 







 on tube bolus feeding; thought 2/2 radiation previously--starting to eat some 
thick



 liquids









 BPH (benign prostatic hypertrophy)    10/25/2017

 

 Aortocoronary bypass status    10/25/2017







Encounters







 Date  Type  Specialty  Care Team  Description

 

 2018  Refill  Cardiology  Rory Shin  Med Dayna VIVAR MD PhD  

 

 2018  Anticoagulation Visit  Cardiology    Paroxysmal atrial



         fibrillation

 

 06/15/2018  Office Visit  Cardiology  Dagoberto Rodriguez,  Drug therapy (Primary Dx)
;



       MD  Paroxysmal atrial fibrillation;



         Chronic combined systolic and diastolic congestive heart failure;



         Ischemic congestive cardiomyopathy;



         Pulmonary hypertension

 

 2018  Anticoagulation Visit  Cardiology    Paroxysmal atrial



         fibrillation

 

 2018  Emergency  General Internal  David Sandoval  Orthostatic 
hypotension (Primary Dx);



 -    Medicine  MD Bertin



  Dehydration;



 2018      Fidelina Castellanos  Chronic congestive heart failure, unspecified 
congestive heart failure type



       MD REGGIE  

 

 2018  Refill  Urology  April Sandoval MD  

 

 2018  Anticoagulation Visit  Cardiology    Persistent atrial



         fibrillation

 

 2018  Office Visit  Cardiology  Rory Shin  Biventricular implantable 
cardioverter-defibrillator in situ (Primary Dx);



       MD CB PhD  Persistent atrial fibrillation;



         Ischemic congestive cardiomyopathy;



         History of cardiac arrest

 

 2018  Orders Only  Cardiology  Rory Shin MD PhD  

 

 2018  Anticoagulation Visit  Cardiology    Atrial fibrillation,



         unspecified type

 

 2018  Refill  Cardiology  Rory Shin Refsergio VIVAR MD PhD  

 

 2018  Refill  Cardiology  Rory Shin MD PhD  

 

 2018  Anticoagulation Visit  Cardiology    Atrial fibrillation,



         unspecified type

 

 2018  Refill  Cardiology  Rory Shin MD PhD  

 

 2018  Refill  Internal  Chantal Das  



     Medicine  MD Latrice  

 

 2018  Anticoagulation Visit  Cardiology    Atrial fibrillation,



         unspecified type

 

 2018  Ancillary Orders  Procedural  Rory Shin  Atrial fibrillation,



     Cardiology  MD CB PhD  unspecified type

 

 2018  Lab  Lab  Chantal Das  Essential hypertension;



       MD Latrice  Hyperlipidemia, unspecified hyperlipidemia type;



         Hypothyroidism, unspecified type

 

 2018  Anticoagulation Visit  Cardiology    Atrial fibrillation,



         unspecified type

 

 2018  Hospital Encounter  Procedural  Rory Shin  Atrial fibrillation,



     Cardiology  MD CB PhD  unspecified type

 

 2018  Anticoagulation Visit  Cardiology    Atrial fibrillation,



         unspecified type

 

 2018  Refill  Cardiology  [place],  Med Refill (Entresto



       [place], [place]  24/ MG)

 

 2018  Anticoagulation Visit  Cardiology    Atrial fibrillation,



         unspecified type

 

 2018  Refill  Urology  April Sandoval MD  

 

 2018  Refill  Cardiology  Dagoberto Rodriguez,  Med Refill



       MD  

 

 2018  Office Visit  Cardiology  Dagoberto Rodriguez,  Essential hypertension (
Primary Dx);



       MD  Chronic combined systolic and diastolic congestive heart failure;



         Ischemic congestive cardiomyopathy;



         Pulmonary hypertension

 

 2018  Anticoagulation Visit  Cardiology    Atrial fibrillation,



         unspecified type

 

 2018  Refill  Cardiology  [place],  Med Refill (Warfarin 5



       [place]  mg)

 

 2018  Refill  Cardiology  Rory Shin Refsergio VIVAR MD PhD  

 

 2018  Refill  Cardiology  Dagoberto Rodriguez,  Med Samanthaill



       MD  

 

 2018  Anticoagulation Visit  Cardiology    Atrial fibrillation,



         unspecified type

 

 2018  Refill  Cardiology  Rory Shin MD PhD  

 

 2018  Refill  Cardiology  Rory Shin Refsergio VIVAR MD PhD  

 

 2017  Ancillary Orders  Procedural  Rory Shin  Atrial fibrillation,



     Cardiology  MD CB PhD  unspecified type

 

 2017  Anticoagulation Visit  Cardiology    Atrial fibrillation,



         unspecified type

 

 2017  Hospital Encounter  Procedural  Rory Shin  Atrial fibrillation,



     Cardiology  MD CB PhD  unspecified type

 

 2017  Refill  Urology  April Sandoval MD  

 

 2017  Refill  Cardiology  Dagoberto Rodriguez Med Refill MD  

 

 2017  Office Visit  Orthopedic  Rene Hawk  Other osteomyelitis of



     Surgery  MD LYLE  left foot (Primary Dx)

 

 2017  Office Visit  Internal  Chantal Das  Osteomyelitis, unspecified 
site, unspecified type (Primary Dx);



     Marii Pollard MD  Gynecomastia;



         Essential hypertension;



         Hypothyroidism, unspecified type;



         Hyperlipidemia, unspecified hyperlipidemia type

 

 2017  Anticoagulation Visit  Cardiology    Atrial fibrillation,



         unspecified type

 

 2017  Hospital Encounter  Radiology  Chantal Das  Gyneckathya harrell MD  

 

 2017  Hospital Encounter  Radiology  Chantal Das  Gyneckathya harrell MD  

 

 2017  Ancillary Orders  Access  Chantal Das  Gynecomaskathya masterson MD  

 

 2017  Hospital Encounter  Radiology  Rene Hawk II, MD  

 

 2017  Hospital Encounter  Radiology  Rene Hawk II, MD  

 

 2017  Hospital Encounter  Radiology  Rene Hawk II, MD  

 

 2017  Hospital Encounter  Radiology  Rene Hawk  Other osteomyelitis 
of



       MD LYLE  left foot

 

 2017  Ancillary Orders  Orthopedic  Rene Hawk  Other osteomyelitis of



     Surgery  MD LYLE  left foot

 

 2017  Ancillary Orders  Access  Rene Hawk  Other osteomyelitis of



       MD LYLE  left foot

 

 2017  Anticoagulation Visit  Cardiology    Atrial fibrillation,



         unspecified type

 

 2017  Transcribe Orders  Access  Chantal Das  Gyneckathya harrell MD  (Primary Dx)

 

 10/30/2017  Office Visit  Orthopedic  Chantal Das  Other osteomyelitis of 
left foot (Primary Dx);



     Surgery  MD Latrice



  Pain of toe of left foot



       Rene Hawk II, MD  

 

 10/25/2017  Office Visit  Internal  Chantal Das  Gynecomaszelalem (Primary Dx);



     Medicine  MD Latrice  Hematuria, unspecified type;



         Pain of toe of left foot;



         Acquired hypothyroidism;



         Skin lesion

 

 10/06/2017  Office Visit  Cardiology  Dagoberto Rodriguez,  Coronary 
arteriosclerosis (Primary Dx);



       MD  Pulmonary hypertension;



         Atrial fibrillation, unspecified type

 

 10/06/2017  Lab  Lab  Chantal Das  Hyperlipidemia, unspecified 
hyperlipidemia type;



       MD Latrice  Hypothyroidism (acquired)

 

 2017  Ancillary Orders  Procedural  KipRory turner  Atrial fibrillation,



     Holger VIVAR MD PhD  unspecified type

 

 2017  Hospital Encounter  Procedural  Rory Shin  Atrial fibrillation,



     Cardiology  MD CB PhD  unspecified type

 

 2017  Anticoagulation Visit  Cardiology    Atrial fibrillation,



         unspecified type

 

 2017  Refill  Internal  DasChantal  Hyperlipidemia, unspecified 
hyperlipidemia type (Primary Dx);



     Medicine  MD Latrice  Hypothyroidism (acquired)

 

 2017  Refill  Urology  April Sandoval MD  

 

 2017  Refill  Cardiology  [place],  Med Refill



       DANNI Alves  

 

 2017  Telephone  Cardiology  Eliezer Iraheta MA  

 

 2017  Anticoagulation Visit  Cardiology    Atrial fibrillation,



         unspecified type

 

 2017  Anticoagulation Visit  Cardiology    Atrial fibrillation,



         unspecified type

 

 07/15/2017  Refill  Internal  Chantal Das  



     Medicine  MD Latrice  



after 2017



Immunizations







 Name  Dates Previously Given  Next Due

 

 FLUZONE HIGH-DOSE PF  2017  

 

 Influenza (IM) Preservative Free  2013  

 

 Pneumococcal Conjugate 13-Valent  2017  







Family History







 Medical History  Relation  Name  Comments

 

 Hypertension  Brother    

 

 Prostate cancer  Brother    

 

 Coronary artery disease  Father    

 

 Diabetes  Father    

 

 Hypertension  Father    

 

 Heart disease  Mother    

 

 Hypertension  Mother    

 

 Hypertension  Sister    









 Relation  Name  Status  Comments

 

 Brother      

 

 Father      (Age 68)  

 

 Mother      (Age 73)  

 

 Sister      







Social History







 Tobacco Use  Types  Packs/Day  Years Used  Date

 

 Never Smoker        









 Smokeless Tobacco: Never Used      









 Tobacco Cessation: Counseling Given: No









 Alcohol Use  Drinks/Week  oz/Week  Comments

 

 No      









 Sex Assigned at Birth  Date Recorded

 

 Not on file  







Last Filed Vital Signs







 Vital Sign  Reading  Time Taken

 

 Blood Pressure  108/59  2018  2:36 PM CDT

 

 Pulse  86  2018  2:36 PM CDT

 

 Temperature  35.6 C (96 F)  2018  2:36 PM CDT

 

 Respiratory Rate  17  2018  2:36 PM CDT

 

 Oxygen Saturation  96%  2018  2:36 PM CDT

 

 Inhaled Oxygen Concentration  -  -

 

 Weight  72 kg (158 lb 11.2 oz)  2018  8:14 PM CDT

 

 Height  179.1 cm (5' 10.5")  2018  8:14 PM CDT

 

 Body Mass Index  22.45  2018  8:14 PM CDT







Plan of Treatment







 Date  Type  Specialty  Care Team  Description

 

 2018  Anticoagulation Visit  Cardiology    

 

 2018  Office Visit  Cardiology  Rory Shin MD PhD



  



       [address] [facility]



  



       [address] [place] [id number] [phone] (Fax)  

 

 2018  Office Visit  Cardiology  Dagoberto Rodriguez MD



  



       [address] [facility] [address]



  



       [place], [place] [address] [phone] (Fax)  









 Health Maintenance  Due Date  Last Done  Comments

 

 SHINGRIX VACCINE (#1)  1987    

 

 ZOSTER VACCINE  1997    

 

 INFLUENZA VACCINE  2018, 2013,  



     10/15/2009  

 

 PNEUMOCOCCAL POLYSACCHARIDE VACCINE  Completed  2012  



 AGE 65 AND OVER      

 

 PNEUMOCOCCAL-13  Completed  2017  







Procedures







 Procedure Name  Priority  Date/Time  Associated Diagnosis  Comments

 

 POC PT/INR  Routine  2018 10:50  Paroxysmal atrial  Results for this



     AM CDT  fibrillation  procedure are in



         the results



         section.

 

 POC PT/INR  Routine  06/15/2018  1:27  Drug therapy  Results for this



     PM CDT    procedure are in



         the results



         section.

 

 POC PT/INR  Routine  2018 11:18  Paroxysmal atrial  Results for this



     AM CDT  fibrillation  procedure are in



         the results



         section.

 

 ESTIMATED GFR  Routine  2018  4:38    Results for this



     AM CDT    procedure are in



         the results



         section.

 

 LIPID PANEL  Routine  2018  4:38    Results for this



     AM CDT    procedure are in



         the results



         section.

 

 T4, FREE  Routine  2018  4:38    Results for this



     AM CDT    procedure are in



         the results



         section.

 

 THYROID STIMULATING  Routine  2018  4:38    Results for this



 HORMONE    AM CDT    procedure are in



         the results



         section.

 

 MAGNESIUM LEVEL  Routine  2018  4:38    Results for this



     AM CDT    procedure are in



         the results



         section.

 

 BASIC METABOLIC PANEL  Routine  2018  4:38    Results for this



     AM CDT    procedure are in



         the results



         section.

 

 PARTIAL  Routine  2018  4:38    Results for this



 THROMBOPLASTIN TIME    AM CDT    procedure are in



 (PTT)        the results



         section.

 

 PROTHROMBIN TIME WITH  Routine  2018  4:38    Results for this



 INR    AM CDT    procedure are in



         the results



         section.

 

 HC COMPLETE BLD COUNT  Routine  2018  4:38    Results for this



 W/AUTO DIFF    AM CDT    procedure are in



         the results



         section.

 

 UREA NITROGEN, URINE,  Routine  2018 10:20    Results for this



 RANDOM    PM CDT    procedure are in



         the results



         section.

 

 CREATININE LEVEL,  Routine  2018 10:20    Results for this



 URINE, RANDOM    PM CDT    procedure are in



         the results



         section.

 

 T4, FREE  STAT  2018  6:41    Results for this



     PM CDT    procedure are in



         the results



         section.

 

 THYROID STIMULATING  STAT  2018  6:41    Results for this



 HORMONE    PM CDT    procedure are in



         the results



         section.

 

 PROTHROMBIN TIME WITH  Routine  2018  6:41    Results for this



 INR, I-STAT    PM CDT    procedure are in



         the results



         section.

 

 TROPONIN, I-STAT  Timed  2018  6:41    Results for this



     PM CDT    procedure are in



         the results



         section.

 

 URINALYSIS  STAT  2018  5:48    Results for this



     PM CDT    procedure are in



         the results



         section.

 

 XR CHEST 1 VW  STAT  2018  5:02    Results for this



 PORTABLE    PM CDT    procedure are in



         the results



         section.

 

 CT HEAD WO CONTRAST  STAT  2018  4:31    Results for this



     PM CDT    procedure are in



         the results



         section.

 

 ESTIMATED GFR  STAT  2018  3:56    Results for this



     PM CDT    procedure are in



         the results



         section.

 

 DIGOXIN LEVEL  STAT  2018  3:56    Results for this



     PM CDT    procedure are in



         the results



         section.

 

 B NATRIURETIC PEP,  STAT  2018  3:56    Results for this



 I-STAT    PM CDT    procedure are in



         the results



         section.

 

 TROPONIN, I-STAT  STAT  2018  3:56    Results for this



     PM CDT    procedure are in



         the results



         section.

 

 CREATINE KINASE,  STAT  2018  3:56    Results for this



 TOTAL (CPK)    PM CDT    procedure are in



         the results



         section.

 

 COMPREHENSIVE  STAT  2018  3:56    Results for this



 METABOLIC PANEL    PM CDT    procedure are in



         the results



         section.

 

 HC COMPLETE BLD COUNT  STAT  2018  3:56    Results for this



 W/AUTO DIFF    PM CDT    procedure are in



         the results



         section.

 

 ECG ED PRELIMINARY  Routine  2018  3:41    Results for this



 INTERPRETATION    PM CDT    procedure are in



         the results



         section.

 

 ECG 12-LEAD  Routine  2018  3:25    Results for this



     PM CDT    procedure are in



         the results



         section.

 

 CREATININE LEVEL,  Routine  2018 10:20    Results for this



 URINE, RANDOM    AM CDT    procedure are in



         the results



         section.

 

 UREA NITROGEN, URINE,  Routine  2018 10:20    Results for this



 RANDOM    AM CDT    procedure are in



         the results



         section.

 

 POC PT/INR  Routine  2018 10:48  Persistent atrial  Results for this



     AM CDT  fibrillation  procedure are in



         the results



         section.

 

 POC PT/INR  Routine  2018  4:37  Persistent atrial  Results for this



     PM CDT  fibrillation



  procedure are in



       Ischemic congestive  the results



       cardiomyopathy



  section.



       History of cardiac  



       arrest



  



       Biventricular  



       implantable  



       cardioverter-defibrilla  



       tor in situ  

 

 ECG 12-LEAD  Routine  2018  3:16  Persistent atrial  Results for this



     PM CDT  fibrillation



  procedure are in



       Ischemic congestive  the results



       cardiomyopathy



  section.



       History of cardiac  



       arrest



  



       Biventricular  



       implantable  



       cardioverter-defibrilla  



       tor in situ  

 

 CV PACEMAKER DEFIB  Routine  2018 12:00    



 ILR INTERROGATION    AM CDT    

 

 POC PT/INR  Routine  2018 10:11  Atrial fibrillation,  Results for this



     AM CDT  unspecified type  procedure are in



         the results



         section.

 

 POC PT/INR  Routine  2018 10:28  Atrial fibrillation,  Results for this



     AM CDT  unspecified type  procedure are in



         the results



         section.

 

 POC PT/INR  Routine  2018  9:59  Atrial fibrillation,  Results for this



     AM CDT  unspecified type  procedure are in



         the results



         section.

 

 CV PACEMAKER DEFIB  Routine  2018  9:29  Atrial fibrillation,  



 REMOTE TECH SERVICE    AM CDT  unspecified type  

 

 URINALYSIS, AUTOMATED  Routine  2018 10:19  Essential hypertension  
Results for this



 WITH MICROSCOPY    AM CDT    procedure are in



         the results



         section.

 

 T4, FREE  Routine  2018 10:19  Hypothyroidism,  Results for this



     AM CDT  unspecified type  procedure are in



         the results



         section.

 

 THYROID STIMULATING  Routine  2018 10:19  Hypothyroidism,  Results for 
this



 HORMONE    AM CDT  unspecified type  procedure are in



         the results



         section.

 

 LIPID PANEL  Routine  2018 10:19  Hyperlipidemia,  Results for this



     AM CDT  unspecified  procedure are in



       hyperlipidemia type  the results



         section.

 

 CBC WITH PLATELET AND  Routine  2018 10:19  Essential hypertension  
Results for this



 DIFFERENTIAL    AM CDT    procedure are in



         the results



         section.

 

 COMPREHENSIVE  Routine  2018 10:19  Essential hypertension  Results for 
this



 METABOLIC PANEL    AM CDT    procedure are in



         the results



         section.

 

 POC PT/INR  Routine  2018 10:08  Atrial fibrillation,  Results for this



     AM CDT  unspecified type  procedure are in



         the results



         section.

 

 POC PT/INR  Routine  2018 10:36  Atrial fibrillation,  Results for this



     AM CDT  unspecified type  procedure are in



         the results



         section.

 

 POC PT/INR  Routine  2018 10:43  Atrial fibrillation,  Results for this



     AM CST  unspecified type  procedure are in



         the results



         section.

 

 ECHOCARDIOGRAM 2D  Routine  2018  1:51  Coronary  Results for this



 COMPLETE W MMODE    PM CST  arteriosclerosis



  procedure are in



 SPECTRAL COLOR      Pulmonary hypertension  the results



 DOPPLER ([id number])        section.

 

 POC PT/INR  Routine  2018 10:54  Atrial fibrillation,  Results for this



     AM CST  unspecified type  procedure are in



         the results



         section.

 

 POC PT/INR  Routine  2018 11:08  Atrial fibrillation,  Results for this



     AM CST  unspecified type  procedure are in



         the results



         section.

 

 CV PACEMAKER DEFIB  Routine  2017 10:59  Atrial fibrillation,  



 REMOTE TECH SERVICE    AM CST  unspecified type  

 

 POC PT/INR  Routine  2017 10:53  Atrial fibrillation,  Results for this



     AM CST  unspecified type  procedure are in



         the results



         section.

 

 POC PT/INR  Routine  2017 10:40  Atrial fibrillation,  Results for this



     AM CST  unspecified type  procedure are in



         the results



         section.

 

 US BREAST COMPLETE  Routine  2017  1:52  Gynecomastia, male  Results for 
this



 RIGHT    PM CST    procedure are in



         the results



         section.

 

 MAMMO DIAGNOSTIC W  Routine  2017  1:28  Gynecomastia, male  Results for 
this



 CAD BILATERAL    PM CST    procedure are in



         the results



         section.

 

 NM BONE SCAN 3 PHASE  Routine  2017  3:39  Other osteomyelitis of  
Results for this



     PM CST  left foot  procedure are in



         the results



         section.

 

 POC PT/INR  Routine  2017 10:49  Atrial fibrillation,  Results for this



     AM CDT  unspecified type  procedure are in



         the results



         section.

 

 AMB REFERRAL TO  Routine  10/30/2017  7:04  Pain of toe of left  



 ORTHOPEDIC SURGERY    PM CDT  foot  

 

 XR FOOT 3+ VW LEFT  Routine  10/26/2017  3:07  Pain of toe of left  Results 
for this



     PM CDT  foot  procedure are in



         the results



         section.

 

 POC PT/INR  Routine  10/06/2017  3:23  Coronary  Results for this



     PM CDT  arteriosclerosis  procedure are in



         the results



         section.

 

 T4, FREE  Routine  10/06/2017 11:58  Hypothyroidism  Results for this



     AM CDT  (acquired)  procedure are in



         the results



         section.

 

 THYROID STIMULATING  Routine  10/06/2017 11:58  Hypothyroidism  Results for 
this



 HORMONE    AM CDT  (acquired)  procedure are in



         the results



         section.

 

 LIPID PANEL  Routine  10/06/2017 11:58  Hyperlipidemia,  Results for this



     AM CDT  unspecified  procedure are in



       hyperlipidemia type  the results



         section.

 

 CV PACEMAKER DEFIB  Routine  2017 11:30  Atrial fibrillation,  



 REMOTE TECH SERVICE    AM CDT  unspecified type  

 

 POC PT/INR  Routine  2017 10:32  Atrial fibrillation,  Results for this



     AM CDT  unspecified type  procedure are in



         the results



         section.

 

 POC PT/INR  Routine  2017 11:06  Atrial fibrillation,  Results for this



     AM CDT  unspecified type  procedure are in



         the results



         section.

 

 POC PT/INR  Routine  2017  1:06  Atrial fibrillation,  Results for this



     PM CDT  unspecified type  procedure are in



         the results



         section.



after 2017



Results

POC PT/INR (2018 10:50 AM)Only the most recent of20 resultswithin the 
time period is included.





 Component  Value  Ref Range  Performed At

 

 POC prothrombin time  20.7    

 

 POC INR  1.7    









 Specimen

 

 Blood



Estimated GFR (2018  4:38 AM)Only the most recent of2 resultswithin the 
time period is included.





 Component  Value  Ref Range  Performed At

 

 GFR Non Af Amer  49 (A)  mL/min/1.73 m2  [facility] DEPARTMENT OF



       PATHOLOGY AND GENOMIC



       MEDICINE

 

 GFR Af Amer  59 (A)  mL/min/1.73 m2  [facility] DEPARTMENT OF



   Comment:    PATHOLOGY AND GENOMIC



   Chronic kidney disease: <60 mL/min/1.73m2    MEDICINE



   Kidney failure: <15 mL/min/1.73m2    



   The estimated GFR is calculated from the IDMS-traceable Modification of Diet
    



   in Renal Disease Equation. The accuracy of the calculation is poor when the 
   



   creatinine is normal. Calculated values >90 mL/min/1.73m2 are not reported. 
   



   This equation has not been validated in children (<18 years), pregnant    



   women, the elderly (>70 years), or ethnic groups other than Caucasians and  
  



    Americans.    



       









 Specimen

 

 Plasma specimen









 Performing Organization  Address  City/[facility]  Phone Number

 

 [facility] DEPARTMENT OF PATHOLOGY AND  [address] [place] [address]      



Partial thromboplastin time, activated (2018  4:38 AM)





 Component  Value  Ref Range  Performed At

 

 PTT  39.7 (H)  23.0 - 36.0 sec  [facility] DEPARTMENT OF PATHOLOGY



   Comment:    AND GENOMIC MEDICINE



   PTT therapeutic range for unfractionated heparin is    



   61.0-112.0 seconds which corresponds to Anti-Xa    



   0.3-0.7 U/ml.    



       









 Specimen

 

 Blood









 Performing Organization  Address  City/[facility]  Phone Number

 

 [facility] DEPARTMENT OF PATHOLOGY AND  [address] [place] [id number]  



 GENOMIC MEDICINE      



Prothrombin time with INR (2018  4:38 AM)





 Component  Value  Ref Range  Performed At

 

 Prothrombin time  29.6 (H)  12.0 - 15.0 sec  [facility] DEPARTMENT OF



       PATHOLOGY AND GENOMIC



       MEDICINE

 

 INR  2.7    [facility] DEPARTMENT OF



   Comment:    PATHOLOGY AND GENOMIC



   The International Normalized Ratio (INR) is a therapeutic    MEDICINE



   monitoring tool for patients who are stable on oral    



   anticoagulant therapy. An INR of 2.0-3.0 is suggested for deep    



   vein thrombosis/pulmonary embolism.    



       









 Specimen

 

 Blood









 Performing Organization  Address  City/[facility]ode  Phone Number

 

 [facility] DEPARTMENT OF PATHOLOGY AND  [address] [place] [address]      



CBC with platelet and differential (2018  4:38 AM)Only the most recent 
of3 resultswithin the time period is included.





 Component  Value  Ref Range  Performed At

 

 WBC  5.88  4.50 - 11.00 k/uL  [facility] DEPARTMENT OF



       PATHOLOGY AND GENOMIC



       MEDICINE

 

 RBC  4.22 (L)  4.40 - 6.00 m/uL  [facility] DEPARTMENT OF



       PATHOLOGY AND GENOMIC



       MEDICINE

 

 HGB  13.2 (L)  14.0 - 18.0 g/dL  [facility] DEPARTMENT OF



       PATHOLOGY AND GENOMIC



       MEDICINE

 

 HCT  40.1 (L)  41.0 - 51.0 %  [facility] DEPARTMENT OF



       PATHOLOGY AND GENOMIC



       MEDICINE

 

 MCV  95.0  82.0 - 100.0 fL  [facility] DEPARTMENT OF



       PATHOLOGY AND GENOMIC



       MEDICINE

 

 MCH  31.3  27.0 - 34.0 pg  [facility] DEPARTMENT OF



       PATHOLOGY AND GENOMIC



       MEDICINE

 

 MCHC  32.9  31.0 - 37.0 g/dL  [facility] DEPARTMENT OF



       PATHOLOGY AND GENOMIC



       MEDICINE

 

 RDW - SD  49.7  37.0 - 55.0 fL  [facility] DEPARTMENT OF



       PATHOLOGY AND GENOMIC



       MEDICINE

 

 MPV  10.0  8.8 - 13.2 fL  [facility] DEPARTMENT OF



       PATHOLOGY AND GENOMIC



       MEDICINE

 

 Platelet count  125 (L)  150 - 400 k/uL  [facility] DEPARTMENT OF



       PATHOLOGY AND GENOMIC



       MEDICINE

 

 Nucleated RBC  0.00  /100 WBC  [facility] DEPARTMENT OF



       PATHOLOGY AND GENOMIC



       MEDICINE

 

 Neutrophils  57.4  39.0 - 69.0 %  [facility] DEPARTMENT OF



       PATHOLOGY AND GENOMIC



       MEDICINE

 

 Lymphocytes  30.1  25.0 - 45.0 %  [facility] DEPARTMENT OF



       PATHOLOGY AND GENOMIC



       MEDICINE

 

 Monocytes  8.5  0.0 - 10.0 %  [facility] DEPARTMENT OF



       PATHOLOGY AND GENOMIC



       MEDICINE

 

 Eosinophils  3.2  0.0 - 5.0 %  [facility] DEPARTMENT OF



       PATHOLOGY AND GENOMIC



       MEDICINE

 

 Basophils  0.5  0.0 - 1.0 %  [facility] DEPARTMENT OF



       PATHOLOGY AND GENOMIC



       MEDICINE

 

 Immature granulocytes  0.3Comment:  0.0 - 1.0 %  [facility] DEPARTMENT OF



   "Immature    PATHOLOGY AND GENOMIC



   granulocytes"    MEDICINE



   (promyelocytes,    



   myelocytes,    



   metamyelocytes)    









 Specimen

 

 Blood









 Performing Organization  Address  City/[facility]  Phone Number

 

 [facility] DEPARTMENT OF PATHOLOGY AND  [address] [place] [address]      



Thyroid stimulating hormone (2018  4:38 AM)Only the most recent of4 
resultswithin the time period is included.





 Component  Value  Ref Range  Performed At

 

 TSH  2.74  0.27 - 4.20 uIU/mL  [facility] DEPARTMENT OF PATHOLOGY AND GENOMIC MEDICINE









 Specimen

 

 Plasma specimen









 Performing Organization  Address  City/[facility]  Phone Number

 

 [facility] DEPARTMENT OF PATHOLOGY AND  [address] [place] [address]      



T4, free (2018  4:38 AM)Only the most recent of4 resultswithin the time 
period is included.





 Component  Value  Ref Range  Performed At

 

 T4, free  1.5  0.9 - 1.7 ng/dL  [facility] DEPARTMENT OF PATHOLOGY AND GENOMIC 
MEDICINE









 Specimen

 

 Plasma specimen









 Performing Organization  Address  City/[facility]ercode  Phone Number

 

 [facility] DEPARTMENT OF PATHOLOGY AND  [address] [place] [address]      



Magnesium level (2018  4:38 AM)





 Component  Value  Ref Range  Performed At

 

 Magnesium  2.0  1.6 - 2.4 mg/dL  [facility] DEPARTMENT OF PATHOLOGY AND GENOMIC 
MEDICINE









 Specimen

 

 Plasma specimen









 Performing Organization  Address  City/[facility]ercode  Phone Number

 

 [facility] DEPARTMENT OF PATHOLOGY AND  [address] [place] [id number]  



 GENOMIC MEDICINE      



Lipid panel (2018  4:38 AM)Only the most recent of3 resultswithin the 
time period is included.





 Component  Value  Ref Range  Performed At

 

 Cholesterol  103  <200 mg/dL  [facility] DEPARTMENT OF



       PATHOLOGY AND GENOMIC



       MEDICINE

 

 Triglycerides  67  <150 mg/dL  [facility] DEPARTMENT OF



       PATHOLOGY AND GENOMIC



       MEDICINE

 

 HDL cholesterol  36 (L)  >40 mg/dL  [facility] DEPARTMENT OF



       PATHOLOGY AND GENOMIC



       MEDICINE

 

 LDL cholesterol  61Comment: Result  <100 mg/dL  [facility] DEPARTMENT 



   obtained by direct LDL    PATHOLOGY AND GENOMIC



   measurement    MEDICINE

 

 Lipid panel interpretation  SeeBelow    [facility] DEPARTMENT OF



   Comment:    PATHOLOGY AND GENOMIC



   Total Cholesterol (mg/dL)    MEDICINE



    <200 Desirable    



    200-239Borderline-high    



    >=240High    



       



   Triglycerides (mg/dL)    



    <150 Normal    



    [id number]    



    200-499High    



    >=500Very high    



   HDL Cholesterol (mg/dL)    



    <40Low (male)    



    <40Low (female)    



   LDL Cholesterol (mg/dL)    



    <100 Optimal    



    100-129Near or above optimal    



    130-159Borderline-high    



    160-189High    



    >=190Very high    



   
    



   Risk Catergories that modify LDL goals.    



   Risk CatergoriesLDL goal (mg/dL)    



   CHD and CHD risk equivalent<100    



   (10-year risk >20%)    



   Multiple (2+) risk factors <130    



   (10-year risk=<20%)    



   0-1 risk factors <160    



   (<10-year risk)    



   Defining levels of lipids in metabolic syndrome    



   Triglycerides>=150 mg/dL    



   HDL Cholesterol    



   Men<40 mg/dL    



   Women<40 mg/dL    



   Non-HDL cholesterol is a second target for therapy in persons    



   with high triglycerides (>=200 mg/dL)    









 Specimen

 

 Plasma specimen









 Performing Organization  Address  City/State/[facility]  Phone Number

 

 [facility] OF PATHOLOGY AND  [address] [place] [address]  



 [employer] MEDICINE      



Basic metabolic panel (2018  4:38 AM)





 Component  Value  Ref Range  Performed At

 

 Sodium  139  135 - 148 mEq/L  [facility] DEPARTMENT OF PATHOLOGY AND GENOMIC MEDICINE

 

 Potassium  4.5  3.5 - 5.0 mEq/L  [facility] DEPARTMENT OF PATHOLOGY AND GENOMIC 
MEDICINE

 

 Chloride  102  98 - 112 mEq/L  [facility] DEPARTMENT OF PATHOLOGY AND GENOMIC MEDICINE

 

 CO2  25  24 - 31 mEq/L  [facility] DEPARTMENT OF PATHOLOGY AND GENOMIC MEDICINE

 

 Anion gap  12@ANIO  7 - 15 mEq/L  [facility] DEPARTMENT OF PATHOLOGY AND GENOMIC 
MEDICINE

 

 BUN  29 (H)  8 - 23 mg/dL  [facility] DEPARTMENT OF PATHOLOGY AND GENOMIC MEDICINE

 

 Creatinine  1.4 (H)  0.7 - 1.2 mg/dL  [facility] DEPARTMENT OF PATHOLOGY AND GENOMIC 
MEDICINE

 

 Glucose  89  65 - 99 mg/dL  [facility] DEPARTMENT OF PATHOLOGY AND GENOMIC MEDICINE

 

 Calcium  8.5 (L)  8.8 - 10.2 mg/dL  [facility] DEPARTMENT OF PATHOLOGY AND GENOMIC 
MEDICINE









 Specimen

 

 Plasma specimen









 Performing Organization  Address  City/[facility]ercode  Phone Number

 

 [facility] DEPARTMENT OF PATHOLOGY AND  [address] [place] [address]      



Urea nitrogen, urine, random (2018 10:20 PM)Only the most recent of2 
resultswithin the time period is included.





 Component  Value  Ref Range  Performed At

 

 Urea nitrogen, urine, random  720  mg/dL  [facility] DEPARTMENT OF PATHOLOGY AND 
GENOMIC



       MEDICINE









 Specimen

 

 Urine









 Performing Organization  Address  City/[facility]ercode  Phone Number

 

 [facility] DEPARTMENT OF PATHOLOGY AND  [address] [place] [address]      



Creatinine level, urine, random (2018 10:20 PM)Only the most recent of2 
resultswithin the time period is included.





 Component  Value  Ref Range  Performed At

 

 Creatinine, urine, random  74  mg/dL  [facility] DEPARTMENT OF PATHOLOGY AND GENOMIC



       MEDICINE









 Specimen

 

 Urine









 Performing Organization  Address  City/[facility] Centercode  Phone Number

 

 [facility] DEPARTMENT OF PATHOLOGY AND  [address] [place] [address]      



Troponin, I-Stat (2018  6:41 PM)Only the most recent of2 resultswithin 
the time period is included.





 Component  Value  Ref Range  Performed At

 

 Troponin, I-Stat  0.01  0.00 - 0.08 ng/mL   DEPARTMENT OF



   Comment:    PATHOLOGY AND GENOMIC



   0.09 - 1.49 ng/mlMay indicate increased risk of acute
    [facility]



    coronary syndrome.
    [facility]



   >=1.5 ng/mlConsistent with acute myocardial    



    infarction.    



   
    



   The diagnostic value of a single normal or non-diagnostic    



   result is questionable.Serial samples at 2-6 hour intervals    



   are required to rule out acute myocardial injury.    



       









 Specimen

 

 Plasma specimen









 Performing Organization  Address  City/State/Zipcode  Phone Number

 

  DEPARTMENT OF PATHOLOGY AND  [id number] [place] [id number]  



 [facility]      



 [facility]      



Prothrombin time with INR, I-Stat (2018  6:41 PM)





 Component  Value  Ref Range  Performed At

 

 POC prothrombin time  40.3 (H)  11.0 - 14.5 sec   DEPARTMENT OF



       PATHOLOGY [facility] GENOMIC



       [facility]

 

 POC INR  3.6     DEPARTMENT OF



   Comment:    PATHOLOGY AND GENOMIC



   The International Normalized Ratio (INR) is a [facility]



   monitoring tool for patients who are stable on oral    [facility]



   vitamin K antagonist therapy. An INR of 2.0-3.0 is suggested    



   for deep vein thrombosis/pulmonary embolism.    



   An INR of 2.5-3.5 (high dose) is suggested for some patients with    



   mechanical heart valves)    



       









 Specimen

 

 Blood









 Performing Organization  Address  City/[facility] Centercode  Phone Number

 

  DEPARTMENT OF PATHOLOGY AND  [address] [place] [address]      



Urinalysis (2018  5:48 PM)





 Component  Value  Ref Range  Performed At

 

 Glucose, UA  Negative  Negative   DEPARTMENT OF PATHOLOGY AND GENOMIC



       [facility]

 

 Bilirubin, UA  Negative  Negative   DEPARTMENT OF PATHOLOGY AND GENOMIC



       [facility]

 

 Ketones, UA  Negative  Negative   DEPART[facility] OF PATHOLOGY AND GENOMIC



       [facility]

 

 Specific gravity, UA  1.015  1.001 - 1.035   DEPARTMENT OF PATHOLOGY AND 
GENOMIC



       [facility]

 

 Blood, UA  Negative  Negative   DEPARTMENT OF PATHOLOGY AND GENOMIC



       [facility]

 

 pH, UA  6.0  5.0 - 8.5   DEPARTMENT OF PATHOLOGY AND GENOMIC



       [facility]

 

 Protein, UA  Negative  Negative   DEPARTMENT OF PATHOLOGY AND GENOMIC



       [facility]

 

 Urobilinogen, UA  <2.0  <2.0   DEPARTMENT OF PATHOLOGY AND GENOMIC



       [facility]

 

 Nitrite, UA  Negative  Negative   DEPARTMENT OF PATHOLOGY AND GENOMIC



       [facility]

 

 Leukocyte esterase, UA  Trace (A)  Negative   DEPARTMENT OF PATHOLOGY AND 
GENOMIC



       [facility]

 

 Color, UA  Yellow     DEPARTMENT OF PATHOLOGY AND GENOMIC



       [facility]

 

 Appearance, UA  Clear     DEPARTMENT OF PATHOLOGY AND GENOMIC



       [facility]









 Specimen

 

 Urine









 Performing Organization  Address  City/[facility]/Zipcode  Phone Number

 

  DEPARTMENT OF PATHOLOGY AND  [address] [place] [id number] [place] [address]      



XR Chest 1 Vw Portable (2018  5:02 PM)





 Narrative  Performed At

 

 XR CHEST 1 VW PORTABLE



   RADIANT



 CLINICAL INDICATION:Chest Pain



  



  



  



 COMPARISON:10/31/2016



  



  



  



 IMPRESSION:



  



  



  



 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  



 Pulmonary vascularity is top normal and similar to priors. There is mild  



 scarring in the left midlung with decreased volume in the left  



 hemithorax and mild elevation of left 



  



 hemidiaphragm. No acute change is identified. 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  



 demineralized and unchanged.



  



  



  



  



  



 Thank you for allowing us to participate in the care of your patient.



  



 [facility]-[id number]



  



   









 Procedure Note

 

  Interface, Radiology Results Incoming - 2018  5:12 PM CDT



XR CHEST 1 VW PORTABLE



 CLINICAL INDICATION:  Chest Pain



 



 COMPARISON:  10/31/2016



 



 IMPRESSION:



 



 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 
Pulmonary vascularity is top normal and similar to priors. There is mild 
scarring in the left midlung with decreased volume in the left hemithorax and 
mild elevation of left



 hemidiaphragm. No acute change is identified. Bones are severely demineralized 
and unchanged.



 



 



 Thank you for allowing us to participate in the care of your patient.



 [facility]-[id number]









 Performing Organization  Address  City/State/Zipcode  Phone Number

 

  [facility]  [address] [place] [address]  



CT Head Wo Contrast (2018  4:31 PM)





 Narrative  Performed At

 

 Study: CT HEAD WO CONTRAST



   [facility]



  



  



 History:headaches



  



  



  



 COMPARISON:2016



  



  



  



 TECHNIQUE: Multiple axial CT images of the head obtained without IV  



 contrast.



  



  



  



 CT imaging was performed with iterative reconstruction technique and/or  



 automated exposure control to reduce radiation dose.



  



  



  



 FINDINGS:



  



  



  



 Parenchymal volume is mildly diffusely decreased likely age  



 related.. There is no acute hemorrhage, midline shift,  



 hydrocephalus, edema, or extra-axial collections. .The visualized  



 paranasal sinuses are well aerated.The mastoid air cells are well 



  



 aerated. No destructive calvarial lesions are seen. The visualized  



 orbits are unremarkable.



  



  



  



 IMPRESSION:



  



  



  



 No acute intracranial abnormality. 



  



  



  



 [facility]-[id number]



  



   









 Procedure Note

 

  Interface, Radiology Results Incoming - 2018  4:37 PM CDT



Study: CT HEAD WO CONTRAST



 



 History:headaches



 



 COMPARISON:2016



 



 TECHNIQUE: Multiple axial CT images of the head obtained without IV contrast.



 



 CT imaging was performed with iterative reconstruction technique and/or 
automated exposure control to reduce radiation dose.



 



 FINDINGS:



 



 Parenchymal volume is mildly diffusely decreased likely age related..     
There is no acute hemorrhage, midline shift, hydrocephalus, edema, or extra-
axial collections. .The visualized paranasal sinuses are well



 aerated.  The mastoid air cells are well



 aerated. No destructive calvarial lesions are seen. The visualized orbits are 
unremarkable.



 



 IMPRESSION:



 



 No acute intracranial abnormality.



 



 [facility]-[id number]









 Performing Organization  Address  City/[facility]/Zipcode  Phone Number

 

  [facility]  [address] [place] [address]  



B natriuretic pep, I-Stat (2018  3:56 PM)





 Component  Value  Ref Range  Performed At

 

 BNP, I-Stat  456 (H)  0 - 100 pg/mL   [facility] 
[facility]









 Specimen

 

 Blood









 Performing Organization  Address  City/[facility]  Phone Number

 

  DEPARTMENT OF PATHOLOGY AND  [address] [place] [address]      



Creatine kinase, total (CPK) (2018  3:56 PM)





 Component  Value  Ref Range  Performed At

 

 Creatine kinase  94  39 - 380 U/L   [facility]Y [facility] GENOM[facility] 
[facility]









 Specimen

 

 Plasma specimen









 Performing Organization  Address  City/[facility] Centercode  Phone Number

 

  DEPARTMENT OF PATHOLOGY AND  [address] [place] [address]      



Digoxin level (2018  3:56 PM)





 Component  Value  Ref Range  Performed At

 

 Digoxin  1.3  0.8 - 2.0 ng/mL  [facility] DEPARTMENT OF PATHOLOGY



   Comment:    AND [facility]



   For valid Digoxin results, at least 6 hours should elapse    



   between time of last dose and collection of blood.    



   Otherwise, result may be false high.    



   Therapeutic Range:    



   0.8 - 2.0 ng/mL    



       









 Specimen

 

 Plasma specimen









 Narrative  Performed At

 

 [facility] DEPARTMENT OF PATHOLOGY AND GENOMIC MEDICINE









 Performing Organization  Address  City/[facility] Centercode  Phone Number

 

 [facility] DEPARTMENT OF PATHOLOGY AND  [address] [place] [id number]  



 [facility]      



Comprehensive metabolic panel (2018  3:56 PM)Only the most recent of2 
resultswithin the time period is included.





 Component  Value  Ref Range  Performed At

 

 Sodium  137  128 - 145 mEq/L   DEPARTMENT OF PATHOLOGY AND GENOMIC



       MEDICINE, [facility]

 

 Potassium  5.0  3.6 - 5.1 mEq/L   DEPARTMENT OF PATHOLOGY AND GENOMIC



       MEDICINE, [facility]

 

 CO2  27  18 - 33 mEq/L   DEPARTMENT OF PATHOLOGY AND GENOMIC



       [facility]

 

 Chloride  102  98 - 108 mEq/L   [facility]PARTMENT OF PATHOLOGY AND GENOMIC



       [facility]

 

 Glucose  112  73 - 118 mg/dL   DEPART[facility] OF PATHOLOGY AND GENOMIC



       [facility]

 

 Calcium  8.7  8.0 - 10.3 mg/dL   DEPARTMENT OF PATHOLOGY AND GENOMIC



       [facility]

 

 BUN  35 (H)  7 - 22 mg/dL   DEPART[facility] OF PATHOLOGY AND GENOMIC



       [facility]

 

 Creatinine  1.9 (H)  0.6 - 1.2 mg/dL   DEPARTMENT OF PATHOLOGY AND GENOMIC



       [facility]

 

 Alkaline phosphatase  78  53 - 128 U/L   DE[facility] OF PATHOLOGY AND GENOMIC



       [facility]

 

 ALT  29  10 - 47 U/L   [facility] OF PATHOLOGY AND GENOMIC



       [facility]

 

 AST  36  11 - 38 U/L   [facility] OF PATHOLOGY AND GENOMIC



       [facility]

 

 Total bilirubin  1.0  0.2 - 1.6 mg/dL   DEPARTMENT OF PATHOLOGY AND GENOMIC



       [facility]

 

 Albumin  3.7  3.3 - 5.5 g/dL   DEPARTMENT OF PATHOLOGY AND GENOMIC



       [facility]

 

 Protein  6.7  6.4 - 8.1 g/dL   DEPARTMENT OF PATHOLOGY AND GENOMIC



       [facility]

 

 Anion gap  8@ANIO  7 - 15 mEq/L   DEPARTMENT OF PATHOLOGY AND GENOMIC



       [facility]

 

 A/G ratio  1.2  0.7 - 3.8   DEPARTMENT OF PATHOLOGY AND GENOMIC



       [facility]









 Specimen

 

 Plasma specimen









 Performing Organization  Address  City/State/[facility] Centercode  Phone Number

 

  [place] [address]  



 [facility]      



ECG ED Preliminary Interpretation - NOT AN ORDER (2018  3:41 PM)





 Narrative  Performed At

 

 David Sandoval MD 20187:43 PM



  



 ECG ED Preliminary Interpretation - Not an Order



  



 Performed by: DAVID SANDOVAL



  



 Authorized by: DAVID SANDOVAL 



  



  



  



 ECG reviewed by ED Physician in the absence of a cardiologist: yes



  



 Previous ECG: 



  



 Previous ECG:Compared to current



  



 Comparison ECG info:17



  



 Similarity:Changes noted



  



 Interpretation: 



  



 Interpretation: non-specific



  



 Quality: 



  



 Tracing quality:Limited by artifact



  



 Rate: 



  



 ECG rate:85



  



 ECG rate assessment: normal



  



 Rhythm: 



  



 Rhythm: paced



  



 Pacing: 



  



 Capture:Complete



  



 Ectopy: 



  



 Ectopy: none



  



 ST segments: 



  



 ST segments:Non-specific



  



 Depression:III  



ECG 12 lead (2018  3:25 PM)Only the most recent of2 resultswithin the 
time period is included.





 Component  Value  Ref Range  Performed At

 

 Ventricular rate  85    [facility]

 

 Atrial rate  57    [facility]

 

 QRSD interval  196    [facility]

 

 QT interval  446    [facility]

 

 QTC interval  530    [facility]

 

 QRS axis 1  203    [facility]

 

 T wave axis  30    [facility]

 

 EKG impression  Ventricular-paced rhythm-Biventricular    [facility]



   pacemaker detected-Abnormal ECG-In automated    



   comparison with ECG of 09-MAY-2018 15:16,-No    



   significant change was found-Electronically    



   Signed By Clyde Acuna MD ([id number]) on 6/10/2018    



   8:16:55 AM    









 Performing Organization  Address  City/[facility]/Zipcode  Phone Number

 

 [facility]  [address] [place] [address]  



CV pacemaker defib or ilr interrogation (2018)





 Narrative  Performed At

 

 This result has an attachment that is not available.



  



Cv pacemaker defib or ilr interrogation (2018  9:29 AM)





 Narrative  Performed At

 

 This result has an attachment that is not available.



  









 Performing Organization  Address  City/[facility] Centercode  Phone Number

 

 [facility]  [address] [place] [address]  



Urinalysis, automated with microscopy (2018 10:19 AM)





 Component  Value  Ref Range  Performed At

 

 Color, UA  YELLOW  YELLOW  [facility]

 

 Appearance  CLEAR  CLEAR  [facility]

 

 Specific gravity, urine  1.020  1.001 - 1.035  [facility]

 

 pH, urine  6.0  5.0 - 8.0  [facility]

 

 Glucose, urine  NEGATIVE  NEGATIVE  [facility]

 

 Bilirubin, UA  NEGATIVE  NEGATIVE  [facility]

 

 Ketones, UA  NEGATIVE  NEGATIVE  [facility]

 

 Occult blood, urine  NEGATIVE  NEGATIVE  [facility]

 

 Protein, UA  NEGATIVE  NEGATIVE  [facility]

 

 Nitrite, UA  NEGATIVE  NEGATIVE  [facility]

 

 Leukocyte esterase, UA  NEGATIVE  NEGATIVE  [facility]

 

 WBC, UA  NONE SEEN  < OR=5 /HPF  [facility]

 

 RBC, UA  NONE SEEN  < OR=2 /HPF  [facility]

 

 Squamous epithelial cells, UA  NONE SEEN  < OR=5 /HPF  [facility]

 

 Bacteria, UA  NONE SEEN  NONE SEEN /HPF  [facility]

 

 Hyaline casts, UA  NONE SEEN  NONE SEEN /LPF  [facility]









 Specimen

 

 Blood









 Resulting Agency Comment

 

 Performing Organization Information:







 Site ID: [id number]







 [facility]







 Address: [address] [place] [address]







 Director: Ella Lucio MD









 Performing Organization  Address  City/State/Zipcode  Phone Number

 

 [facility]  [address] [place], [place] [id number] [phone]



Echocardiogram complete w contrast and 3D if needed (2018  1:51 PM)





 Narrative  Performed At

 

  



  [facility]



  



  



  



 Echocardiography Report



  



  



  



 Pat.Name:TAMAR BREWSTER  



 WPat.ID:[id number] 



  



 St.Date: 2018 Refer.MD:STEFAN MARIA MD 



  



 Exam Time: 1:11:00 PMStudy Type:Routine  



 Echo



  



 Height:70inWeight:  



 156.67lb



  



 BSA: 1.88 m2  



 DOBAge:1935,82Y



  



 Sex:  



 MALEBP:112/66  



 



  



 Sonogrphr[facility] Johnson [facility].  



 Stat.:Outpatient



  



 ICD - 9: I25.10 Pulmonary Hypertension



  



 Study Status:Final 



  



 Echo Event ID:[id number]



  



 Order ID:[id number]



  



 Reason for Study:Pulmonary Hypertension



  



 History / Clinical:Congestive Heart Failure, Pulmonary Hypertension



  



 Procedures:2D Echo, Colorflow Doppler



  



 Race:C 



  



 ------------------------------------



  



 SUMMARY:



  



 ------------------------------------



  



 LV size is normal.



  



  Estimated EF is 40-44%.



  



  RV systolic function is normal.



  



  LA/RA volume is severely enlarged.



  



  LV filling pressure is elevated.



  



  Estimated PA systolic pressure is 31 mmHg, assuming a mean RAP of 5



  



 mmHg.



  



 ------------------------------------



  



 FINDINGS:



  



 ------------------------------------



  



 LV: LV size is normal. LV EF is mild to moderately  



 depressed.



  



 EstimatedEF is 40-44%.



  



 RV: RV size is mildly enlarged. A pacemaker wire is seen in  



 the



  



 RV.RV systolic function is normal.



  



 LA: LA volume is severely enlarged.



  



 RA: RA volume is severely enlarged. A pacemaker wire is  



 seen.



  



 AO: Aortic root diameter is normal.



  



 VALERIE: No pericardial effusion.



  



 AV: Focal calcification of AV leaflets. Mild aortic



  



 regurgitation. 



  



 MV: Focal thickening of mitral leaflets. Mild mitral



  



 regurgitation. 



  



 PV: No structural PV abnormalities noted.



  



 TV: No structural TV abnormalities noted. Mild tricuspid



  



 regurgitation 



  



 Colunga: LV relaxation is impaired. LV filling pressure is  



 elevated.



  



 Other:Estimated PA systolic pressure is 31 mmHg, assuming a mean



  



 RAPof 5 mmHg.



  



 ------------------------------------



  



 MEASUREMENTS:



  



 ------------------------------------



  



 2D



  



 Parasternal Long Zamora



  



  LVIDd4.9  



 cmIndex2.6  



 cm/m



  



  LV Pvkd786.1 g(122-174)



  



  LVIDs3.9 cmLVM  



 Ktlhj643.9 g/m2



  



  LV%fs 20.4 %  



 RWT0.5 



  



  IVSd 1  



 cmLVOT 2.1 cm



  



  LVPWd1.2 cmAo  



 An2.5 cm



  



  LA Ds5 cmAo  



 Rtd 3.4 cm



  



  Index1.8 cm/m



  



 Right Ventricle



  



  RVIDd4.2 cm (2.6-4.3)



  



 RA Sng Plane



  



  RA Area 30.4 cm2(8.3-19.5) RA Vol  



 118.7 ml



  



  Index63.2 ml/m



  



  RA LngAx 6.7 cm 



  



 LA Biplane



  



  LA 4Ch Area 38.3 cm2 LA Vol  



 163.8 ml



  



  Index87.1 ml/m



  



  LA 2Ch Area 35.2 cm2



  



 DOPPLER



  



 LVOT For Flow



  



  LVOT Area3.5 cm2 LVOT  



 SV 52.9 ml



  



  LVOTpkVel 98.6  



 cm/sHR85.5 bpm 



  



  LVOTpkPG 3.9 mmHgLVOT  



 CO4.5 l/min



  



  LVOTmnPG 1.6 mmHgLVOT  



 CI2.4 l/m/m2



  



  LVOT TVI15.3 cm



  



  



  



 ------------------------------------



  



 WALL MOTION:



  



 ------------------------------------



  



 RESTING WALL MOTION:



  



 Basal Inferolateral, Mid Inferolateral walls are akinetic.Apical



  



 Inferior, Apical walls are hypokinetic.Basal Anterolateral, Mid



  



 Anterolateral, Apical Septal, Apical Lateral walls are mildly



  



 hypokinetic. Normal in all other walls.



  



 Wall Index=1.5



  



 Signed 2018 03:45 PM



  



 Gagan Patel M.D.  









 Procedure Note

 

 Interface, Radiology Results In - 2018  3:45 PM CST







                 [facility]



 



                         Echocardiography Report



 



 Pat.Name:  TAMAR BREWSTER        [facility].ID:    [id number]



 .Date:   2018               Refer.MD:  STEFAN AMRIA MD



 Exam Time: 1:11:00 PM              Study Type:Routine Echo



 Height:    70in                    Weight:    156.67lb



 BSA:       1.88 m2                   Age:  1935,82Y



 Sex:       MALE                    BP:        112/66



 Sonogrphr: Justin Johnson RDCS    Pat. Stat.:Outpatient



 ICD - 9:   I25.10 Pulmonary Hypertension



 Study Status:Final



 Echo Event ID:[id number]



 Order ID:  [id number]



 Reason for Study:Pulmonary Hypertension



 History / Clinical:Congestive Heart Failure, Pulmonary Hypertension



 Procedures:2D Echo, Colorflow Doppler



 Race:      C



 ------------------------------------



 SUMMARY:



 ------------------------------------



 LV size is normal.



  Estimated EF is 40-44%.



  RV systolic function is normal.



  LA/RA volume is severely enlarged.



  LV filling pressure is elevated.



  Estimated PA systolic pressure is 31 mmHg, assuming a mean RAP of 5



 mmHg.



 ------------------------------------



 FINDINGS:



 ------------------------------------



 LV:       LV size is normal. LV EF is mild to moderately depressed.



           Estimated  EF is 40-44%.



 RV:       RV size is mildly enlarged. A pacemaker wire is seen in the



           RV.  RV systolic function is normal.



 LA:       LA volume is severely enlarged.



 RA:       RA volume is severely enlarged. A pacemaker wire is seen.



 AO:       Aortic root diameter is normal.



 VALERIE:     No pericardial effusion.



 AV:       Focal calcification of AV leaflets. Mild aortic



           regurgitation.



 MV:       Focal thickening of mitral leaflets. Mild mitral



           regurgitation.



 PV:       No structural PV abnormalities noted.



 TV:       No structural TV abnormalities noted. Mild tricuspid



           regurgitation



 Colunga:     LV relaxation is impaired. LV filling pressure is elevated.



 Other:    Estimated PA systolic pressure is 31 mmHg, assuming a mean



           RAP  of 5 mmHg.



 ------------------------------------



 MEASUREMENTS:



 ------------------------------------



                                   2D



 Parasternal Long Zamora



  LVIDd            4.9 cm            Index        2.6 cm/m



  LV Mass        197.1 g    (122-174)



  LVIDs            3.9 cm            LVM Index      104.9 g/m2



  LV%fs           20.4 %             RWT              0.5



  IVSd               1 cm            LVOT             2.1 cm



  LVPWd            1.2 cm            Ao An            2.5 cm



  LA Ds              5 cm            Ao Rtd           3.4 cm



  Index        1.8 cm/m



 Right Ventricle



  RVIDd            4.2 cm   (2.6-4.3)



 RA Sng Plane



  RA Area         30.4 cm2  (8.3-19.5) RA Vol         118.7 ml



  Index        63.2 ml/m



  RA LngAx         6.7 cm



 LA Biplane



  LA 4Ch Area     38.3 cm2           LA Vol         163.8 ml



  Index        87.1 ml/m



  LA 2Ch Area     35.2 cm2



                                 DOPPLER



 LVOT For Flow



  LVOT Area        3.5 cm2           LVOT SV         52.9 ml



  LVOTpkVel       98.6 cm/s          HR              85.5 bpm



  LVOTpkPG         3.9 mmHg          LVOT CO          4.5 l/min



  LVOTmnPG         1.6 mmHg          LVOT CI          2.4 l/m/m2



  LVOT TVI        15.3 cm



 



 ------------------------------------



 WALL MOTION:



 ------------------------------------



 RESTING WALL MOTION:



 Basal Inferolateral, Mid Inferolateral walls are akinetic.  Apical



 Inferior, Apical walls are hypokinetic.  Basal Anterolateral, Mid



 Anterolateral, Apical Septal, Apical Lateral walls are mildly



 hypokinetic.   Normal in all other walls.



 Wall Index=1.5



 Signed 2018 03:45 PM



 Gagan Patel M.D.









 Performing Organization  Address  City/[facility] Centercode  Phone Number

 

  [facility]  [address] [place] [address]  



Cv pacemaker defib or ilr interrogation (2017 10:59 AM)





 Narrative  Performed At

 

 This result has an attachment that is not available.



  









 Performing Organization  Address  City/[facility]/[employer]  Phone Number

 

  [facility]  [address] [place] [address]  



US Breast Complete Right (2017  1:52 PM)





 Narrative  Performed At

 

 PROCEDURE: 



   Phraxis



 MAMMO DIAGNOSTIC W CAD BILATERAL, US BREAST COMPLETE RIGHT 2017  



 1:00 PM 



  



  



  



 COMPARISON:



  



  



  



 None.



  



  



  



 TECHNIQUE:



  



  



  



 Digital bilateral diagnostic mammography was performed and interpreted  



 using computer-assisted detection.



  



  



  



 Hand-held high resolution sonographic images of the right breast(s)  



 including all 4 quadrants and the retroareolar regions were  



 obtained with color Doppler imaging as needed.



  



  



  



 CLINICAL HISTORY:



  



  



  



 82-year-old male with approximately a month history of a tender palpable  



 abnormality in the right breast. No personal or family history of breast  



 malignancy. 



  



  



  



 FINDINGS: 



  



  



  



 MAMMOGRAM:



  



  



  



 There are scattered fibroglandular densities.There are no suspicious  



 masses, calcifications or distortions in either breast. There are  



 bilateral mammographic findings consistent with gynecomastia, right  



 greater than left.



  



  



  



 ULTRASOUND:



  



  



  



 Corresponding to the palpable area of concern in the right subareolar  



 breast is benign appearing fibroglandular tissue consistent with  



 gynecomastia. There are no suspicious cystic or solid masses.



  



  



  



 IMPRESSION:



  



  



  



 Bilateral gynecomastia, right greater than left. Recommend clinical  



 correlation with risk factors.



  



  



  



 BI-RADS Category 2. Benign finding(s). Recommend comparison with  



 physical examination.



  



  



  



 Findings and recommendations were discussed with the patient at the time  



 of the examination.



  



  



  



 This facility is accredited by The American [employer] for  



 Mammography. 



  



  



  



 A negative x-ray report should not delay biopsy if a dominant or  



 clinically suspicious mass is present. Not all cancers are identified by  



 x-ray. 



  



  



  



 [facility]-[id number]



  



   









 Performing Organization  Address  City/[facility] Centercode  Phone Number

 

 [facility]  [address] [place] [address]  



Mammo Diagnostic w Cad Bilateral (2017  1:28 PM)





 Narrative  Performed At

 

 PROCEDURE: 



   [facility]



 MAMMO DIAGNOSTIC W CAD BILATERAL, US BREAST COMPLETE RIGHT 2017  



 1:00 PM 



  



  



  



 COMPARISON:



  



  



  



 None.



  



  



  



 TECHNIQUE:



  



  



  



 Digital bilateral diagnostic mammography was performed and interpreted  



 using computer-assisted detection.



  



  



  



 Hand-held high resolution sonographic images of the right breast(s)  



 including all 4 quadrants and the retroareolar regions were  



 obtained with color Doppler imaging as needed.



  



  



  



 CLINICAL HISTORY:



  



  



  



 82-year-old male with approximately a month history of a tender palpable  



 abnormality in the right breast. No personal or family history of breast  



 malignancy. 



  



  



  



 FINDINGS: 



  



  



  



 MAMMOGRAM:



  



  



  



 There are scattered fibroglandular densities.There are no suspicious  



 masses, calcifications or distortions in either breast. There are  



 bilateral mammographic findings consistent with gynecomastia, right  



 greater than left.



  



  



  



 ULTRASOUND:



  



  



  



 Corresponding to the palpable area of concern in the right subareolar  



 breast is benign appearing fibroglandular tissue consistent with  



 gynecomastia. There are no suspicious cystic or solid masses.



  



  



  



 IMPRESSION:



  



  



  



 Bilateral gynecomastia, right greater than left. Recommend clinical  



 correlation with risk factors.



  



  



  



 BI-RADS Category 2. Benign finding(s). Recommend comparison with  



 physical examination.



  



  



  



 Findings and recommendations were discussed with the patient at the time  



 of the examination.



  



  



  



 This facility is accredited by The American College [employer] for  



 Mammography. 



  



  



  



 A negative x-ray report should not delay biopsy if a dominant or  



 clinically suspicious mass is present. Not all cancers are identified by  



 x-ray. 



  



  



  



 [facility]-[id number]



  



   









 Performing Organization  Address  City/[facility] Centercode  Phone Number

 

 [facility]  [address] [place] [address]  



NM Bone Scan 3 Phase (2017  3:39 PM)





 Narrative  Performed At

 

 This result has an attachment that is not available.



PROCEDURE:NM BONE SCAN 3 PHASE   [facility]



   



 INDICATION:Evaluate for osteomyelitis of left foot.  



   



 COMPARISON:3 views left foot dated 10/26/2017.  



   



 TECHNIQUE: The patient was injected with 25 mCi of Tc-99m labeled MDP IV and a 
three phase bone scan of the feet was performed. Immediate flow and pool images 
were followed by delayed images acquired th  



 ree hours later. Delayed planar whole body imaging  



 was also performed.  



   



 FINDINGS:Flow and pool images demonstrate hyperemia to the left forefoot.
Delayed images demonstrate focal uptake in the left third digit, involving 
the distal phalanx.Degenerative uptake is  



 present in both great toes and in both mid feet.Whole  



 body imaging demonstrates degenerative uptake in the shoulders and spine.
Bilateral knee prostheses appear uncomplicated.  



   



 IMPRESSION:  



   



 1. Osteomyelitis involving the left third toe, most pronounced about the 
distal phalanx.  



 2.Degenerative uptake elsewhere in both feet.  



   



 [facility]-[id number]  



   









 Procedure Note

 

  Interface, Radiology Results Incoming - 2017  5:06 PM CST



PROCEDURE:  NM BONE SCAN 3 PHASE



 



 INDICATION:  Evaluate for osteomyelitis of left foot.



 



 COMPARISON:  3 views left foot dated 10/26/2017.



 



 TECHNIQUE: The patient was injected with 25 mCi of Tc-99m labeled MDP IV and a 
three phase bone scan of the feet was performed. Immediate flow and pool images 
were followed by delayed images acquired three hours later.



 Delayed planar whole body imaging



 was also performed.



 



 FINDINGS:  Flow and pool images demonstrate hyperemia to the left forefoot.  
Delayed images demonstrate focal uptake in the left third digit, involving the 
distal phalanx.  Degenerative uptake is present in



 both great toes and in both mid feet.  Whole



 body imaging demonstrates degenerative uptake in the shoulders and spine.  
Bilateral knee prostheses appear uncomplicated.



 



 IMPRESSION:



 



 1. Osteomyelitis involving the left third toe, most pronounced about the 
distal phalanx.



 2.  Degenerative uptake elsewhere in both feet.



 



 [facility]-[id number]









 Performing Organization  Address  City/State/Zipcode  Phone Number

 

  [facility]  [address] [place] [address]  



Ambulatory referral to Orthopedic Surgery (10/30/2017  7:04 PM)XR Foot 3+ Vw 
Left (10/26/2017  3:07 PM)





 Narrative  Performed At

 

 EXAMINATION:XR FOOT 3VW LEFT



   [facility]



  



  



 CLINICAL HISTORY:M79.675 Pain in left toe(s), signs of possible  



 infection



  



  



  



 COMPARISON:None.



  



  



  



 IMPRESSION:



  



  



  



  There is no evidence of acute fracture or dislocation



  



  



  



 The bones are demineralized



  



  



  



 No radiopaque foreign bodies are present



  



  



  



 No cortical irregularities to suggest osteomyelitis



  



  



  



 [id number]



  



   









 Procedure Note

 

 Hm Interface, Radiology Results Incoming - 10/26/2017  3:41 PM CDT



EXAMINATION:  XR FOOT 3  VW LEFT



 



 CLINICAL HISTORY:  M79.675 Pain in left toe(s), signs of possible infection



 



 COMPARISON:  None.



 



 IMPRESSION:



 



  There is no evidence of acute fracture or dislocation



 



 The bones are demineralized



 



 No radiopaque foreign bodies are present



 



 No cortical irregularities to suggest osteomyelitis



 



 [id number]









 Performing Organization  Address  City/State/Zipcode  Phone Number

 

  [facility]  [address] [place] [address]  



Cv pacemaker defib or ilr interrogation (2017 11:30 AM)





 Narrative  Performed At

 

 This result has an attachment that is not available.



  









 Performing Organization  Address  City/State/Zipcode  Phone Number

 

  [facility]  [address] [place] [address]  



after 2017



Insurance







 Payer  Benefit Plan / Group  Subscriber ID  Type  Phone  Address

 

 AETNA MEDICARE  AETNA MEDICARE HMO/PPO [facility]  xxxxxxxx  HMO    









 Guarantor Name  Account Type  Relation to  Date of  Phone  Billing



     Patient  Birth    Address

 

 TAMAR BREWSTER JUANY  Personal/Family  Self  1935  Home:  [address] [place] [address]







         [phone]  [place] [address]

## 2018-07-10 NOTE — XMS REPORT
Encounter CCD: 2012 to 2012

 Created on:2066



Patient:TAMAR BREWSTER

Sex:Male

:1935

External Reference #:[id number]





Demographics







 Address  [address] [place] [address] [address]



   [place] [address]

 

 Home Phone  [phone]

 

 Preferred Language  Unknown

 

 Marital Status  

 

 Mandaeism Affiliation  Sikh

 

 Race  White/

 

 Ethnic Group  Non-









Author







 Organization  [facility]









Care Team Providers







 Name  Role  Phone

 

 Braden Thomas  Referring Provider  [phone]

 

 Roro Law  Consulting Provider  [phone]









Allergies, Adverse Reactions, Alerts







 Substance  Reaction  Status

 

 morphine    Active

 

 tetracyclines    Active







Problem List







 Condition  Effective Dates  Status

 

 Methicillin resistant Staphylococcus aureus1  10/14/2009  Active



1Problem added by Discern Expert.



Medications







 Medication  Instructions  Start Date  End Date  Status

 

 influenza virus vaccine,  0.5 ml, Route: IM, Drug  10/15/2009  10/15/2009  
Completed



 inactivated  Form: INJ, ONCE, Start      



   date: 10/15/09 9:00:00,      



   Stop date: 10/15/09 9:00:00      







Immunizations







 Vaccine  Date  Status

 

 influenza virus vaccine, inactivated  10/15/2009  Auth (Verified)

## 2018-07-10 NOTE — XMS REPORT
Encounter CCD: 2012 to 2012

 Created on:2073



Patient:TAMAR BREWSTER

Sex:Male

:1935

External Reference #:[id number]





Demographics







 Address  [address] [place] [address] 



   [place] [address]

 

 Home Phone  [phone]

 

 Preferred Language  Unknown

 

 Marital Status  

 

 Presybeterian Affiliation  Rastafari

 

 Race  White/

 

 Ethnic Group  Non-









Author







 Organization  [facility] Team Providers







 Name  Role  Phone

 

 Braden Thomas  Consulting Provider  [phone]

 

 Braden Gutierrez  Referring Provider  Unavailable









Allergies, Adverse Reactions, Alerts







 Substance  Reaction  Status

 

 morphine    Active

 

 tetracyclines    Active







Problem List







 Condition  Effective Dates  Status

 

 Methicillin resistant Staphylococcus aureus1  10/14/2009  Active



1Problem added by Discern Expert.



Medications







 Medication  Instructions  Start Date  End Date  Status

 

 influenza virus vaccine,  0.5 ml, Route: IM, Drug  10/15/2009  10/15/2009  
Completed



 inactivated  Form: INJ, ONCE, Start      



   date: 10/15/09 9:00:00,      



   Stop date: 10/15/09 9:00:00      







Immunizations







 Vaccine  Date  Status

 

 influenza virus vaccine, inactivated  10/15/2009  Auth (Verified)

## 2018-07-10 NOTE — XMS REPORT
Summary of Care: 16 - 16

 Created on:2118



Patient:TAMAR BREWSTER

Sex:Male

:1935

External Reference #:[id number]





Demographics







 Address  [address] [place] [address]-

 

 Home Phone  [phone]

 

 Preferred Language  English

 

 Marital Status  

 

 Amish Affiliation  Hoahaoism

 

 Race  White/

 

 Ethnic Group  Not  or 









Author







 Organization  [facility]

 

 Address  [address] [place], [place] [address]-

 

 Phone  [phone]









Encounter

HQ Javed(FIN) [id number] Date(s): 16 - 16

[facility] [address] [place] [address]-     "[employer] [phone]
[phone]

Discharge Disposition: Home

Attending Physician: Rakesh De Dios MD





Vital Signs







 Most recent to oldest  1  2  3



 [Reference Range]:      

 

 Height  177.8 cm    



   (16 1:49 PM)    

 

 Blood Pressure [/60-90  107/65 mmHg  112/74 mmHg  110/71 mmHg



 mmHg]  (16 4:52 PM)  (16 4:30 PM)  (16 4:15 PM)

 

 Respiratory Rate [14-20 BRMIN]  16 BRMIN  20 BRMIN  19 BRMIN



   (16 4:52 PM)  (16 4:30 PM)  (16 4:15 PM)

 

 Peripheral Pulse Rate [  93 bpm    



 bpm]  (16 2:17 PM)    

 

 Weight  77.273 kg    



   (16 1:49 PM)    

 

 Body Mass Index  24.44 m2    



   (16 1:49 PM)    







Problem List







 Condition  Effective Dates  Status  Health Status  Informant

 

 Atrial fibrillation(Confirmed)    Resolved    

 

 CAD - Coronary artery    Active    



 disease(Confirmed)        

 

 Depression(Confirmed)    Active    

 

 Depression(Confirmed)    Active    

 

 GERD - Gastro-esophageal reflux    Active    



 disease(Confirmed)        

 

 Hepatitis C(Confirmed)    Resolved    

 

 HLD - Hyperlipidemia(Confirmed)    Active    

 

 HTN - Hypertension(Confirmed)    Active    

 

 Hyperlipidemia(Confirmed)    Active    

 

 Hypertension(Confirmed)    Active    

 

 Hypothyroidism(Confirmed)    Active    

 

 Hypothyroidism(Confirmed)    Active    

 

 Methicillin resistant Staphylococcus  10/14/09  Active    



 aureus - nares(Confirmed)1        

 

 Tongue carcinoma(Confirmed)    Resolved    



1Problem added by Discern Expert.



Allergies, Adverse Reactions, Alerts







 Substance  Reaction  Severity  Status

 

 morphine      Active

 

 tetracyclines      Active







Medications

acetaminophen

1,000 mg, 100 mL, Route: IVPB, Drug form: INJ, ONCE, Dosing Weight 77.273, kg, 
PRN Pain Score 1-3, Start date: 16 16:02:00, Duration: 1 doses or times, 
Stop date: Limited # of times

Notes: Infuse over 15 minutesDo not exceed 4gm/day of acetaminophen  *** 
MEDICATION WASTE ***ProductSize:  1000 mgProduct Wasted:  ___ mg

Start Date: 16

Stop Date: 16

Status: Discontinuedflumazenil

0.2 mg, 2 mL, Route: IVP, Drug form: INJ, PRN, Dosing Weight 77.273, kg, PRN 
Benzodiazepine Reversal, Initial dose, Start date: 16 16:02:00, Duration: 
30 day, Stop date: 16 16:01:00

Notes: (Same as: Romazicon)

Start Date: 16

Stop Date: 16

Status: Discontinuedlabetalol

10 mg, 2 mL, Route: IVP, Drug form: INJ, Q5Min, Dosing Weight 77.273, kg, PRN 
Elevated BP, Start date: 16 16:02:00, Duration: 5 doses or times, Stop 
date: Limited # of times

Start Date: 16

Stop Date: 16

Status: Discontinuednaloxone

0.04 mg, 0.1 mL, Route: IVP, Drug form: INJ, Q2MIN, Dosing Weight 77.273, kg, 
PRN Narcotic Reversal,Start date: 16 16:02:00, Duration: 8 doses or times
, Stop date: Limited # of times

Notes: Same as Narcan

Start Date: 16

Stop Date: 16

Status: Discontinuedondansetron

4 mg, 2 mL, Route: IVP, Drug form: INJ, ONCE, Dosing Weight 77.273, kg, PRN 
Nausea &amp; Vomiting, Start date: 16 16:02:00

Notes: (Same as: Zofran)  *** MEDICATION WASTE ***Product Size:  4 mgProduct 
Wasted:  ___ mg

Start Date: 16

Stop Date: 16

Status: DiscontinuedoxyCODONE

5 mg, 1 tab, Route: PO, Drug form: TAB, Q4H, Dosing Weight 77.273, kg, PRN Pain 
Score 4-6, Start date: 16 16:02:00, Duration: 30 day, Stop date: 16 
16:01:00

Notes: (Same as: Roxicodone)

Start Date: 16

Stop Date: 16

Status: Discontinued



Results

ELECTROLYTES





 Most recent to oldest [Reference Range]:  1

 

 Sodium Lvl [135-145 mEq/L]  141 mEq/L



   (16 1:45 PM)

 

 Potassium Lvl [3.5-5.1 mEq/L]  5.2 mEq/L



   *HI*



   (16 1:45 PM)

 

 Potassium WB [3.5-5.1 mEq/L]  5.1 mEq/L



   (16 2:16 PM)

 

 Chloride Lvl [ mEq/L]  104 mEq/L



   (16 1:45 PM)

 

 CO2 [24-32 mEq/L]  30 mEq/L



   (16 1:45 PM)

 

 AGAP [10.0-20.0 mEq/L]  12.2 mEq/L



   (16 1:45 PM)



CHEM PANEL





 Most recent to oldest [Reference Range]:  1

 

 Creatinine Lvl [0.50-1.40 mg/dL]  1.12 mg/dL



   (16 1:45 PM)

 

 eGFR  63 mL/min/1.73m2 1



   *NA*



   (16 1:45 PM)

 

 BUN [7-22 mg/dL]  27 mg/dL



   *HI*



   (16 1:45 PM)

 

 Glucose Lvl [70-99 mg/dL]  84 mg/dL



   (16 1:45 PM)

 

 Calcium Lvl [8.5-10.5 mg/dL]  8.6 mg/dL



   (16 1:45 PM)



1Result Comment: The eGFR is calculated using the CKD-EPI formula. In most young
, healthy individualsthe eGFR will be >90 mL/min/1.73m2. The eGFR declines with 
age. An eGFR of 60-89 may be normal in some populations, particularly the 
elderly, for whom the CKD-EPI formula has not been extensively validated. Use 
of the eGFR is not recommended in the following populations:



Individuals with unstable creatinine concentrations, including pregnant 
patients and those with serious co-morbid conditions.



Patients with extremes in muscle mass or diet.



The data above are obtained from the National Kidney Disease Education Program (
NKDEP) which additionally recommends that when the eGFR is used in patients 
with extremes of body mass index for purposesof drug dosing, the eGFR should be 
multiplied by the estimated BMI.HEMATOLOGY





 Most recent to oldest [Reference Range]:  1

 

 WBC [3.7-10.4 K/CMM]  6.1 K/CMM



   (16 1:45 PM)

 

 RBC [4.70-6.10 M/CMM]  5.12 M/CMM



   (16 1:45 PM)

 

 Hgb [14.0-18.0 g/dL]  12.9 g/dL



   *LOW*



   (16 1:45 PM)

 

 Hct [42.0-54.0 %]  41.0 %



   *LOW*



   (16 1:45 PM)

 

 MCV [80.0-94.0 fL]  80.0 fL



   (16 1:45 PM)

 

 MCH [27.0-31.0 pg]  25.1 pg



   *LOW*



   (16 1:45 PM)

 

 MCHC [32.0-36.0 g/dL]  31.3 g/dL



   *LOW*



   (16 1:45 PM)

 

 RDW [11.5-14.5 %]  18.7 %



   *HI*



   (16 1:45 PM)

 

 Platelet [133-450 K/CMM]  153 K/CMM



   (16 1:45 PM)

 

 MPV [7.4-10.4 fL]  9.0 fL



   (16 1:45 PM)

 

 Segs [45.0-75.0 %]  53.9 %



   (16 1:45 PM)

 

 Lymphocytes [20.0-40.0 %]  32.2 %



   (16 1:45 PM)

 

 Monocytes [2.0-12.0 %]  11.6 %



   (16 1:45 PM)

 

 Eosinophils [0.0-4.0 %]  1.7 %



   (16 1:45 PM)

 

 Basophils [0.0-1.0 %]  0.6 %



   (16 1:45 PM)

 

 Segs-Bands # [1.5-8.1 K/CMM]  3.3 K/CMM



   (16 1:45 PM)

 

 Lymphocytes # [1.0-5.5 K/CMM]  2.0 K/CMM



   (16 1:45 PM)

 

 Monocytes # [0.0-0.8 K/CMM]  0.7 K/CMM



   (16 1:45 PM)

 

 Eosinophils # [0.0-0.5 K/CMM]  0.1 K/CMM



   (16 1:45 PM)

 

 PT [12.0-14.7 seconds]  20.2 seconds



   *HI*



   (16 1:45 PM)

 

 INR [0.85-1.17]  1.69



   *HI*



   (16 1:45 PM)

 

 PTT [22.9-35.8 seconds]  34.9 seconds



   (16 1:45 PM)

 

 R-time [5.0-10.0 minutes]  4.5 minutes



   *LOW*



   (16 1:45 PM)

 

 K-time [1.0-3.0 minutes]  1.8 minutes



   (16 1:45 PM)

 

 Angle [53.0-72.0 degrees]  66.9 degrees



   (16 1:45 PM)

 

 Max Amp [50.0-70.0 mm]  51.5 mm



   (16 1:45 PM)

 

 G-value [4.5-11.0 K d/sc]  5.3 K d/sc



   (16 1:45 PM)

 

 Ly30 [0.0-7.5 %]  3.7 %



   (16 1:45 PM)

 

 Coag Index [-3.0-3.0]  0.0



   (16 1:45 PM)

 

 TEG Interp  Thrombelastograph results show shortened value of R. This finding 
is suggestive of enzymatic hypercoagulation.



   CPT:26150



   *NA*



   (16 1:45 PM)

 

 TEG Data  See Note



   (16 1:45 PM)







Immunizations







 Vaccine  Date  Refusal Reason

 

 influenza virus vaccine, inactivated  10/15/09  

 

 pneumococcal 23-valent vaccine  12  







Procedures







 Procedure  Date  Related Diagnosis  Body Site

 

 CABG - Coronary artery bypass graft1      

 

 Colonoscopy2      

 

 Hernia repair3      

 

 Knee replacement      

 

 Operation4      



[id number]'3 in the y9Ppipvjenl of Pacemaker and AICD ()



Social History







 Social History Type  Response

 

 Alcohol  Current, Type Wine.  Last use: occational.

 

 Smoking Status  Never smoker; Exposure to Tobacco Smoke None; Cigarette Smoking



   Last 365 Days No; Reg Smoking Cessation Counseling No







Assessment and Plan

No data available for this section

## 2018-07-10 NOTE — XMS REPORT
Summary of Care: 16 - 16

 Created on:2103



Patient:TAMAR BREWSTER

Sex:Male

:1935

External Reference #:[id number]





Demographics







 Address  [address] [place] [address]

 

 Home Phone  [phone]

 

 Preferred Language  English

 

 Marital Status  

 

 Sabianist Affiliation  Episcopal

 

 Race  White/

 

 Ethnic Group  Not  or 









Author







 Organization  [facility]

 

 Address  [address] [place] [address]-

 

 Phone  [phone]









Encounter

HQ Javed(FIN) [id number] Date(s): 16 - 16

[facility] [address] [facility] [address] 

Discharge Disposition: Home

Attending Physician: Rakesh De Dios MD

Referring Physician: Rakesh De Dios MD





Vital Signs







 Most recent to oldest [Reference Range]:  1

 

 Height  177.8 cm



   (16 2:06 PM)

 

 Blood Pressure [/60-90 mmHg]  76/51 mmHg



   *LOW*



   (16 2:06 PM)

 

 Peripheral Pulse Rate [ bpm]  54 bpm



   *LOW*



   (16 2:06 PM)

 

 Weight  62.273 kg



   (16 2:06 PM)

 

 Body Mass Index  19.7 m2



   (16 2:06 PM)







Problem List







 Condition  Effective Dates  Status  Health Status  Informant

 

 Atrial fibrillation(Confirmed)    Resolved    

 

 CAD - Coronary artery    Active    



 disease(Confirmed)        

 

 Depression(Confirmed)    Active    

 

 GERD - Gastro-esophageal reflux    Active    



 disease(Confirmed)        

 

 Hyperlipidemia(Confirmed)    Active    

 

 Hypertension(Confirmed)    Active    

 

 Hypothyroidism(Confirmed)    Active    

 

 Hypothyroidism(Confirmed)    Active    

 

 Other(Confirmed)1    Resolved    

 

 Tongue carcinoma(Confirmed)    Resolved    



1VP arrest- with AICD



Allergies, Adverse Reactions, Alerts







 Substance  Reaction  Severity  Status

 

 morphine      Active

 

 tetracyclines      Active







Medications

Coumadin 5 mg oral tablet

5 mg=1 tab, PO, Daily, # 30 tab, 0 Refill(s)

Start Date: 16

Status: Orderedfinasteride 5 mg oral tablet

5 mg=1 tab, PO, Daily, # 30 tab, 0 Refill(s)

Start Date: 16

Status: OrderedLasix 80 mg oral tablet

80 mg=1 tab, PO, Daily, # 30 tab, 0 Refill(s)

Start Date: 16

Status: Orderedmetoprolol tartrate 25 mg oral tablet

25 mg=1 tab, PO, BID, # 60 tab, 0 Refill(s)

Start Date: 16

Status: Orderedpotassium chloride 20 mEq oral tablet, extended release

20 mEq=1 tab, PO, BID, 0 Refill(s)

Start Date: 16

Status: Orderedsildenafil

0 Refill(s)

Start Date: 16

Status: Orderedsilodosin 8 mg oral capsule

8 mg=1 cap, PO, Daily, 0 Refill(s)

Start Date: 16

Status: Ordered



Results

No data available for this section



Immunizations







 Vaccine  Date  Refusal Reason

 

 influenza virus vaccine, inactivated  10/15/09  

 

 pneumococcal 23-valent vaccine  12  







Procedures







 Procedure  Date  Related Diagnosis  Body Site

 

 Abdomen endoscopy      

 

 Barium swallow      

 

 CABG - Coronary artery bypass graft1, 2      

 

 Cataract surgery3      

 

 Colonoscopy4      

 

 Hernia repair5, 6      

 

 Knee replacement      

 

 Operation7      



[id number] states had quadraple [id number] eye with [id number] repair (
INGUINAL) x46x'3 in the e6Cpxwilmfq of Pacemaker and AICD ()



Social History







 Social History Type  Response

 

 Substance Abuse  Use: None.

 

 Alcohol  Current, Type Wine.  Last use: occational.

 

 Smoking Status  Never smoker; Exposure to Tobacco Smoke None; Cigarette Smoking



   Last 365 Days No; Reg Smoking Cessation Counseling No







Assessment and Plan

No data available for this section

## 2018-07-10 NOTE — ER
Nurse's Notes                                                                                     

 [facility]                                                                

Name: Kennedy Flynn                                                                               

Age: 82 yrs                                                                                       

Sex: Male                                                                                         

: 1935                                                                                   

MRN: [id number]                                                                                   

Arrival Date: 07/10/2018                                                                          

Time: 15:24                                                                                       

Account#: [id number]                                                                            

Bed 25                                                                                            

Private MD: out of town, doctor                                                                   

Diagnosis: Unspecified open wound of right forearm                                                

                                                                                                  

Presentation:                                                                                     

07/10                                                                                             

15:57 Presenting complaint: Patient states: Skin tears to right forearm that happened while   aj  

      moving furniture. Reports wound continues to bleed. Transition of care: patient was not     

      received from another setting of care. Onset of symptoms was 2018. Risk            

      Assessment: Do you want to hurt yourself or someone else? Patient reports no desire to      

      harm self or others. Initial Sepsis Screen: Does the patient meet any 2 criteria? No.       

      Patient's initial sepsis screen is negative. Does the patient have a suspected source       

      of infection? No. Patient's initial sepsis screen is negative. Care prior to arrival:       

      None.                                                                                       

15:57 Method Of Arrival: Ambulatory                                                             

15:57 Acuity: JOSE D 5                                                                             

                                                                                                  

Triage Assessment:                                                                                

16:01 General: Appears in no apparent distress. comfortable, Behavior is calm, cooperative,   aj  

      appropriate for age. Pain: Denies pain. Neuro: Level of Consciousness is awake, alert,      

      obeys commands, Oriented to person, place, time, situation, Appropriate for age.            

      Respiratory: Airway is patent Respiratory effort is even, unlabored, Respiratory            

      pattern is regular, symmetrical. Derm: Skin is intact, is healthy with good turgor,         

      Skin is pink, warm \T\ dry. normal. Injury Description: Skin tear x 2 to right forearm.     

                                                                                                  

Historical:                                                                                       

- Allergies:                                                                                      

16:01 TETRACYCLINES;                                                                          aj  

16:01 Morphine;                                                                               aj  

- Home Meds:                                                                                      

16:01 Coumadin 2.5 mg 5 days per week [Active]; digoxin 125 mcg Oral tab 1 tab once daily     aj  

      [Active]; Lipitor 40 mg Oral tab 1 tab once daily [Active]; finasteride 5 mg oral tab 1     

      tab once daily [Active]; Entresto 24-26 mg oral tab [Active]; levothyroxine 100 mcg tab     

      [Active];                                                                                   

- PMHx:                                                                                           

16:01 CHF; Hypothyroidism; Hyperlipidemia;                                                    aj  

- PSHx:                                                                                           

16:01 Hernia repair; defibrillator/pacemaker; CABG; bilateral knee replacements;              aj  

                                                                                                  

- Immunization history:: Adult Immunizations up to date.                                          

- Social history:: Smoking status: Patient/guardian denies using tobacco.                         

- Ebola Screening: : Patient negative for fever greater than or equal to 101.5 degrees            

  Fahrenheit, and additional compatible Ebola Virus Disease symptoms Patient denies               

  exposure to infectious person Patient denies travel to an Ebola-affected area in the            

  21 days before illness onset No symptoms or risks identified at this time.                      

                                                                                                  

                                                                                                  

Screenin:13 Abuse screen: Denies threats or abuse. Denies injuries from another. Nutritional        kr2 

      screening: No deficits noted. Tuberculosis screening: No symptoms or risk factors           

      identified. Fall Risk None identified.                                                      

                                                                                                  

Assessment:                                                                                       

16:10 General: Appears in no apparent distress. comfortable, well groomed, well developed,    kr2 

      well nourished, Behavior is calm, cooperative, appropriate for age. Pain: Complains of      

      pain in right arm Pain does not radiate. Pain currently is 0 out of 10 on a pain scale.     

      at worst was 5 out of 10 on a pain scale. Quality of pain is described as tender, Is        

      continuous, Alleviated by rest, Aggravated by touch. Neuro: Level of Consciousness is       

      awake, alert, obeys commands, Oriented to person, place, time, situation, Appropriate       

      for age. Cardiovascular: Capillary refill < 3 seconds in bilateral fingers Patient's        

      skin is warm and dry. Respiratory: Airway is patent Respiratory effort is even,             

      unlabored, Respiratory pattern is regular, symmetrical. Derm: Skin is fragile, Skin is      

      pink, warm \T\ dry. Injury Description: Skin tears to right forearm. One oozing blood.      

      Wounds are clean.                                                                           

16:29 Reassessment: Patient appears in no apparent distress at this time. Skin tears covered  kr2 

      with non-adherent pad, wrapped with gauze and secured with tape as instructed by            

      provider. Tolerated well.                                                                   

                                                                                                  

Vital Signs:                                                                                      

16:01  / 73; Pulse 85; Resp 16; Temp 96.9; Pulse Ox 98% on R/A; Weight 71.21 kg; Height aj  

      5 ft. 10 in. (177.80 cm);                                                                   

16:01 Body Mass Index 22.53 (71.21 kg, 177.80 cm)                                             aj  

                                                                                                  

ED Course:                                                                                        

15:24 Patient arrived in ED.                                                                  mr  

15:25 out of town, doctor is Private Physician.                                               mr  

15:58 Triage completed.                                                                       aj  

16:01 Arm band placed on left wrist. Patient placed in an exam room.                          aj  

16:05 Dinesh Coleman MD is Attending Physician.                                              abdoulaye  

16:10 Kim Ybarra, RN is Primary Nurse.                                                     kr2 

16:13 Patient has correct armband on for positive identification. Bed in low position. Call   kr2 

      light in reach. Side rails up X 1. Door closed. Head of bed elevated.                       

16:19 No provider procedures requiring assistance completed. Patient did not have IV access   kr2 

      during this emergency room visit.                                                           

                                                                                                  

Administered Medications:                                                                         

No medications were administered                                                                  

                                                                                                  

                                                                                                  

Outcome:                                                                                          

16:19 Condition: good                                                                         kr2 

16:19 Discharge instructions given to patient, family, Instructed on discharge instructions,      

      follow up and referral plans. wound care, Demonstrated understanding of instructions,       

      follow-up care, wound care.                                                                 

16:25 Discharge ordered by MD.                                                                  

16:30 Discharged to home ambulatory, with family.                                             kr2 

16:30 Patient left the ED.                                                                    kr2 

                                                                                                  

Signatures:                                                                                       

Ayesha Hopson, RN                       RN   Cindy Carolina                                mr                                                   

Dinesh Coleman MD MD                                                      

Kim Ybarra, RN                       RN   kr2                                                  

                                                                                                  

**************************************************************************************************

## 2018-07-10 NOTE — XMS REPORT
Encounter CCD: 2013 to 2013

 Created on:2062



Patient:TAMAR BREWSTER

Sex:Male

:1935

External Reference #:[id number]





Demographics







 Address  [address] [place] [address] [address]



   [place] [address]-

 

 Home Phone  [phone]

 

 Preferred Language  Unknown

 

 Marital Status  

 

 Worship Affiliation  [employer]

 

 Race  White/

 

 Ethnic Group  Non-









Author







 Organization  [facility]









Care Team Providers







 Name  Role  Phone

 

 Jasmin Macedo POLLY  Referring Provider  [phone]









Allergies, Adverse Reactions, Alerts







 Substance  Reaction  Status

 

 morphine    Active

 

 tetracyclines    Active







Problem List







 Condition  Effective Dates  Status

 

 Atrial fibrillation  < 2012  Inactive

 

 Hyperlipidemia    Active

 

 Hypertension    Active

 

 Hypothyroidism    Active

 

 Methicillin resistant Staphylococcus aureus1  10/14/2009  Active



1Problem added by Discern Expert.



Medications







 Medication  Instructions  Start Date  End Date  Status

 

 pneumococcal 23-valent  0.5 ml, Route: IM, Drug  2012  
Completed



 vaccine  Form: INJ, Start date:      



   12 9:00:00, Stop      



   date: 12 9:00:00      

 

 influenza virus vaccine,  0.5 ml, Route: IM, Drug  10/15/2009  10/15/2009  
Completed



 inactivated  Form: INJ, ONCE, Start      



   date: 10/15/09 9:00:00,      



   Stop date: 10/15/09      



   9:00:00      







Immunizations







 Vaccine  Date  Status

 

 influenza virus vaccine, inactivated  10/15/2009  Auth (Verified)

 

 pneumococcal 23-valent vaccine  2012  Auth (Verified)

## 2018-07-10 NOTE — XMS REPORT
Summary of Care: 3/20/17 - 3/23/17

 Created on:2030



Patient:TAMAR BREWSTER

Sex:Male

:1935

External Reference #:[id number]





Demographics







 Address  [address] [place] [id number]

 

 Home Phone  [phone]

 

 Email Address  [email or web address]

 

 Preferred Language  English

 

 Marital Status  

 

 Spiritism Affiliation  Bahai

 

 Race  White/

 

 Ethnic Group  Not  or 









Author







 Organization  [facility]

 

 Address  [address] [place] [address]

 

 Phone  [phone]









Encounter

HQ Macheller_juan(FIN) [id number] Date(s): 3/20/17 - 3/23/17

[address] Professional Services provided by        
    [facility]       [place] [address]-  
   [phone]

Discharge Disposition: Home or Self Care

Attending Physician: Carmen Willoughby MD

Admitting Physician: Cherise Lu DO





Vital Signs







 Most recent to oldest  1  2  3



 [Reference Range]:      

 

 Height  180.34 cm  180.34 cm  



   (3/21/17 4:25 AM)  (3/20/17 8:34 PM)  

 

 Temperature Oral [96.4-99.1  97.9 DegF  97.6 DegF  98.0 DegF



 DegF]  (3/23/17 8:44 AM)  (3/23/17 3:45 AM)  (3/22/17 11:50 PM)

 

 Blood Pressure [/60-90  125/74 mmHg  99/59 mmHg  97/60 mmHg



 mmHg]  (3/23/17 8:44 AM)  (3/23/17 3:45 AM)  (3/22/17 11:50 PM)

 

 Respiratory Rate [14-20 BRMIN]  20 BRMIN  20 BRMIN  20 BRMIN



   (3/23/17 8:44 AM)  (3/23/17 3:45 AM)  (3/22/17 7:50 PM)

 

 Peripheral Pulse Rate [  85 bpm  86 bpm  84 bpm



 bpm]  (3/23/17 8:44 AM)  (3/23/17 3:45 AM)  (3/22/17 11:50 PM)

 

 Weight  68.182 kg  68.182 kg  



   (3/21/17 4:25 AM)  (3/20/17 8:34 PM)  

 

 Body Mass Index  20.96 m2  20.96 m2  



   (3/21/17 4:25 AM)  (3/20/17 8:34 PM)  







Problem List







 Condition  Effective Dates  Status  Health Status  Informant

 

 Atrial fibrillation(Confirmed)    Resolved    

 

 CAD - Coronary artery    Active    



 disease(Confirmed)        

 

 Depression(Confirmed)    Active    

 

 GERD - Gastro-esophageal reflux    Active    



 disease(Confirmed)        

 

 Hyperlipidemia(Confirmed)    Active    

 

 Hypertension(Confirmed)    Active    

 

 Hypothyroidism(Confirmed)    Active    

 

 Hypothyroidism(Confirmed)    Active    

 

 Other(Confirmed)1    Resolved    

 

 Pulmonary hypertension(Confirmed)2    Resolved    

 

 Tongue carcinoma(Confirmed)    Resolved    



1VP arrest- with [id number] in 2016



Allergies, Adverse Reactions, Alerts







 Substance  Reaction  Severity  Status

 

 morphine      Active

 

 tetracyclines      Active







Medications

acetaminophen

650 mg, 2 tab, Route: PO, Drug form: TAB, Q4H, Dosing Weight 68.182, kg, PRN 
Pain 1-3/Temp > 100.4 F, Start date: 17 4:30:00 CDT, Duration: 30 day, 
Stop date: 17 4:29:00 CDT

Notes: Do not exceed 4 gm/day.  (Same as: Tylenol)

Start Date: 3/21/17

Stop Date: 3/23/17

Status: Discontinuedacetaminophen-hydrocodone 325 mg-5 mg oral tablet

1 tab, Route: PO, Drug Form: TAB, Dosing Weight 68.182, kg, Q4H, PRN Pain Score 
4-6, Start date: 17 4:30:00 CDT, Duration: 30 day, Stop date: 17 4:
29:00 CDT

Notes: (Same as: Norco 325/5)  Do not exceed 4gm/day of acetaminophen.

Start Date: 3/21/17

Stop Date: 3/23/17

Status: DiscontinuedAMIODarone

200 mg, 1 tab, Route: PO, Drug form: TAB, Daily, Dosing Weight 68.182, kg, 
Start date: 17 9:00:00 CDT, Duration: 30 day, Stop date: 17 9:00:00 
CDT

Notes: (Same as: Cordarone)

Start Date: 3/21/17

Stop Date: 3/22/17

Status: DiscontinuedAMIODarone

100 mg, 1 tab, Route: PO, Drug form: TAB, Daily, Dosing Weight 68.182, kg, 
Start date: 17 9:00:00 CDT, Duration: 30 day, Stop date: 17 9:00:00 
CDT

Notes: Same as: Jostin Herrerae

Start Date: 3/23/17

Stop Date: 3/23/17

Status: DiscontinuedAncef

1 gm, Route: IV, Drug form: PDR/INJ, ABXQ8H, Dosing Weight 68.182, kg, Start 
date: 17 1:18:00 CDT, Duration: 30 day, Stop date: 17 21:00:00 CDT

Notes: (Same As: Brooke Chavez)  *** MEDICATION WASTE ***Product Size:  1000 
mgProduct Wasted:  ___ mg

Start Date: 3/21/17

Stop Date: 3/23/17

Status: DiscontinuedANES acetaminophen

1,000 mg, 2 tab, Route: PO, Drug form: TAB, ONCE, Dosing Weight 68.182, kg, PRN 
Pain Score 1-3, Start date: 17 21:42:00 CDT, Duration: 1 doses or times, 
Stop date: Limited # of times

Notes: Max acetaminophen 4000 mg/day (4 gm/day).  (Same as: Tylenol Extra 
Strength)

Start Date: 3/21/17

Stop Date: 3/21/17

Status: DiscontinuedANES fentaNYL

25 microgram, 0.5 mL, Route: IVP, Drug form: INJ, Q5Min, Dosing Weight 68.182, 
kg, PRN Pain Score 4-6, Priority: Routine, Start date: 17 21:42:00 CDT, 
Duration: 4 doses or times, Stop date: Limited # of times

Notes: (Same as: Sublimaze)  Preservative free.

Start Date: 3/21/17

Stop Date: 3/21/17

Status: DiscontinuedANES flumazenil

0.2 mg, 2 mL, Route: IVP, Drug form: INJ, PRN, Dosing Weight 68.182, kg, PRN 
Benzodiazepine Reversal, Initial dose, Start date: 17 21:42:00 CDT, 
Duration: 30 day, Stop date: 17 21:41:00 CDT

Notes: (Same as: Romazicon)

Start Date: 3/21/17

Stop Date: 3/21/17

Status: DiscontinuedANES naloxone

0.4 mg, 1 mL, Route: IVP, Drug form: INJ, Q2MIN, Dosing Weight 68.182, kg, PRN 
Narcotic Reversal, Start date: 17 21:42:00 CDT, Duration: 8 doses or times
, Stop date: Limited # of times

Notes: Same as Narcan

Start Date: 3/21/17

Stop Date: 3/21/17

Status: DiscontinuedANES oxyCODONE

5 mg, 1 tab, Route: PO, Drug form: TAB, Q4H, Dosing Weight 68.182, kg, PRN Pain 
Score 4-6, Start date: 17 21:42:00 CDT, Duration: 30 day, Stop date:  21:41:00 CDT

Notes: (Same as: Roxicodone)

Start Date: 3/21/17

Stop Date: 3/21/17

Status: DiscontinuedANES promethazine

6.25 mg, 0.25 mL, Route: IVPB, Drug form: INJ, ONCE, Dosing Weight 68.182, kg, 
PRN Nausea &amp; Vomiting, Start date: 17 21:42:00 CDT

Notes: Do not give IV push.  (Same as: Phenergan)

Start Date: 3/21/17

Stop Date: 3/21/17

Status: Discontinuedbacitracin topical

1 appl, Route: TOP, Daily, Drug form: OINT, Apply on the affected area on the b/
l hands., Start date: 17 9:00:00 CDT, Duration: 30 day, Stop date:  9:00:00 CDT

Start Date: 3/24/17

Stop Date: 3/23/17

Status: Canceledcalcium-vitamin D 500 mg-200 intl units oral tablet

1 tab, Route: PO, Drug Form: TAB, Dosing Weight 68.182, kg, BID, Start date:  9:00:00 CDT, Duration: 30 day, Stop date: 17 17:00:00 CDT

Notes: (Same As: Hudson-D, OsCal-D, Oyster Calcium)

Start Date: 3/21/17

Stop Date: 3/23/17

Status: Discontinuedcalcium-vitamin D 600 mg-400 intl units oral tablet, 
chewable

See Instructions, Take 1 tab BID, # 30 tab, 0 Refill(s)

Start Date: 3/23/17

Status: Orderedcitalopram

20 mg, 1 tab, Route: PO, Drug form: TAB, Daily, Dosing Weight 68.182, kg, Start 
date: 17 9:00:00 CDT, Duration: 30 day, Stop date: 17 9:00:00 CDT

Notes: (Same As: CeleXA)

Start Date: 3/21/17

Stop Date: 3/23/17

Status: Discontinueddigoxin 125 mcg (0.125 mg) oral tablet

125 microgram, 1 tab, Route: PO, Drug form: TAB, Daily, Dosing Weight 68.182, kg
, Start date: 17 9:00:00 CDT, Duration: 30 day, Stop date: 17 9:00:
00 CDT

Notes: Take on an Empty Stomach  (Same as: Lanoxin)

Start Date: 3/21/17

Stop Date: 3/23/17

Status: DiscontinuedDilaudid

0.5 mg, 0.25 mL, Route: IVP, Drug form: INJ, ONCE, Dosing Weight 68.182, kg, 
Priority: STAT, Start date: 17 21:47:00 CDT, Stop date: 17 21:47:00 
CDT

Notes: Same as: Dilaudid

Start Date: 3/20/17

Stop Date: 3/20/17

Status: CompletedDilaudid

0.5 mg, 0.25 mL, Route: IVP, Drug form: INJ, ONCE, Dosing Weight 68.182, kg, 
Priority: STAT, Start date: 17 23:45:00 CDT, Stop date: 17 23:45:00 
CDT

Notes: Same as: Dilaudid

Start Date: 3/20/17

Stop Date: 3/20/17

Status: CompletedDilaudid

0.5 mg, 0.25 mL, Route: IVP, Drug form: INJ, ONCE, Dosing Weight 68.182, kg, 
Priority: STAT, Start date: 17 3:35:00 CDT, Stop date: 17 3:35:00 
CDT

Notes: Same as: Dilaudid

Start Date: 3/21/17

Stop Date: 3/21/17

Status: Completeddocusate

100 mg, 1 cap, Route: PO, Drug form: CAP, BID, Dosing Weight 68.182, kg, PRN 
Constipation, Start date: 17 4:30:00 CDT, Duration: 30 day, Stop date:  4:29:00 CDT

Notes: (Same as: Colace) (Do Not Crush)

Start Date: 3/21/17

Stop Date: 3/23/17

Status: DiscontinuedEntresto 24 mg-26 mg oral tablet

1 tab, PO, BID, # 28 tab, 0 Refill(s)

Start Date: 3/21/17

Stop Date: 17

Status: Orderedfinasteride

5 mg, 1 tab, Route: PO, Drug form: TAB, Daily, Dosing Weight 68.182, kg, Start 
date: 17 9:00:00 CDT, Duration: 30 day, Stop date: 17 9:00:00 CDT

Notes: (Same as: Proscar) "Do Not Crush"Women of childbearing age should not 
touch or handle broken tablets

Start Date: 3/21/17

Stop Date: 3/23/17

Status: Discontinuedgentamicin + sodium chloride 0.9%  mL

341 mg, 8.53 mL, Route: IVPB, ONCE, Dosing Weight 68.182, kg, Priority: STAT, 
Start date: 17 1:16:00 CDT, Stop date: 17 1:16:00 CDT

Notes: TIME CRITICAL MEDICATION(Same as Garamycin)

Start Date: 3/21/17

Stop Date: 3/21/17

Status: CompletedKeflex

500 mg, 1 cap, Route: PO, Drug form: CAP, ONCE, Dosing Weight 68.182, kg, Start 
date: 17 0:30:00 CDT, Stop date: 17 0:30:00 CDT

Notes: Take on empty stomach. (Same As: Keflex)

Start Date: 3/21/17

Stop Date: 3/21/17

Status: CompletedKeflex

500 mg, 1 tab, Route: PO, Drug form: TAB, ONCE, Dosing Weight 68.182, kg, Start 
date: 17 0:13:00 CDT, Stop date: 17 0:13:00 CDT

Notes: Take on empty stomach. (Same As: Keftab)

Start Date: 3/21/17

Stop Date: 3/21/17

Status: DeletedKeflex 750 mg oral capsule

750 mg=1 cap, PO, Q12H, X 5 day, # 10 cap, 0 Refill(s)

Start Date: 3/23/17

Stop Date: 3/28/17

Status: OrderedLasix

20 mg, 2 mL, Route: IVP, Drug form: INJ, ONCE, Dosing Weight 68.182, kg, Start 
date: 17 13:28:00 CDT, Stop date: 17 13:28:00 CDT

Notes: (Same as: Lasix)

Start Date: 3/21/17

Stop Date: 3/21/17

Status: CompletedLasix

80 mg, 1 tab, Route: PO, Drug form: TAB, Daily, Dosing Weight 68.182, kg, Start 
date: 17 9:00:00 CDT, Duration: 30 day, Stop date: 17 9:00:00 CDT

Notes: (Same as: Lasix) May cause GI upset.  Give with food or milk.

Start Date: 3/21/17

Stop Date: 3/23/17

Status: DiscontinuedLevoxyl

88 microgram, 1 tab, Route: PO, Drug form: TAB, Daily, Dosing Weight 68.182, kg
, Start date: 17 9:00:00 CDT, Duration: 30 day, Stop date: 17 9:00:
00 CDT

Notes: Take 1 hour before or 2 hours after meal; Enteral feeds may interefere 
with the absorption ofthis medication. (Same as:Synthroid)

Start Date: 3/21/17

Stop Date: 3/23/17

Status: DiscontinuedLevoxyl 88 mcg (0.088 mg) oral tablet

88 microgram=1 tab, PO, Daily, # 30 tab, 0 Refill(s)

Start Date: 3/21/17

Status: OrderedLipitor

40 mg, 1 tab, Route: PO, Drug form: TAB, Daily, Dosing Weight 68.182, kg, Start 
date: 17 9:00:00 CDT, Duration: 30 day, Stop date: 17 9:00:00 CDT

Notes: (Same as: Lipitor)

Start Date: 3/21/17

Stop Date: 3/23/17

Status: Discontinuedmetoprolol tartrate

25 mg, 1 tab, Route: PO, Drug form: ERTAB, BID, Dosing Weight 68.182, kg, Start 
date: 17 9:00:00 CDT, Duration: 30 day, Stop date: 17 17:00:00 CDT

Notes: (Same as: Toprol XL) Do Not Crush

Start Date: 3/21/17

Stop Date: 3/23/17

Status: Discontinuedondansetron

4 mg, 2 mL, Route: IVP, Drug form: INJ, ONCE, Dosing Weight 68.182, kg, Priority
: STAT, Start date: 17 21:46:00 CDT, Stop date: 17 21:46:00 CDT

Notes: (Same as: Zofran)  *** MEDICATION WASTE ***Product Size:  4 mgProduct 
Wasted:  ___ mg

Start Date: 3/20/17

Stop Date: 3/20/17

Status: Completedondansetron

4 mg, 2 mL, Route: IVP, Drug form: INJ, ONCE, Dosing Weight 68.182, kg, Priority
: STAT, Start date: 17 21:46:00 CDT, Stop date: 17 21:46:00 CDT

Notes: (Same as: Zofran)  *** MEDICATION WASTE ***Product Size:  4 mgProduct 
Wasted:  ___ mg

Start Date: 3/20/17

Stop Date: 3/20/17

Status: Completedondansetron

4 mg, 2 mL, Route: IVP, Drug form: INJ, Q6H, Dosing Weight 68.182, kg, PRN 
Nausea &amp; Vomiting, Start date: 17 4:30:00 CDT, Duration: 30 day, Stop 
date: 17 4:29:00 CDT

Notes: (Same as: Zofran)  *** MEDICATION WASTE ***Product Size:  4 mgProduct 
Wasted:  ___ mg

Start Date: 3/21/17

Stop Date: 3/21/17

Status: Discontinuedpneumococcal 13-valent vaccine

0.5 mL, Route: IM, Drug Form: INJ, Daily, Start date: 17 9:00:00 CDT, 
Duration: 1 doses or times, Stop date: 17 9:00:00 CDT

Notes: Lightly roll vial (DO NOT SHAKE) before administration.  (Same as: 
Prevnar 13)

Start Date: 3/21/17

Stop Date: 3/21/17

Status: Pending CompleteProtonix

40 mg, 1 tab, Route: PO, Drug form: ECTAB, Before Breakfast, Dosing Weight 
68.182, kg, Start date: 17 7:30:00 CDT, Duration: 30 day, Stop date:  7:30:00 CDT

Notes: Tablet should not be chewed or crushed.(Same as: Protonix)

Start Date: 3/21/17

Stop Date: 3/23/17

Status: DiscontinuedSaline Flush 0.9%

10 mL, Route: IVP, Drug Form: INJ, Dosing Weight 68.182, kg, PRN, PRN Line Flush
, Start date: 17 20:54:00 CDT, Duration: 30 day, Stop date: 17 20:53
:00 CDT

Notes: (Same as: BD Posiflush)

Start Date: 3/20/17

Stop Date: 3/23/17

Status: DiscontinuedSaline Flush 0.9%

10 ml, Route: IVP, Drug Form: INJ, Dosing Weight 68.182, kg, PRN, PRN Line Flush
, Start date: 17 4:30:00 CDT, Duration: 30 day, Stop date: 17 4:29:
00 CDT

Notes: Same as: BD Posiflush Sterile

Start Date: 3/21/17

Stop Date: 3/23/17

Status: Discontinuedsildenafil 20 mg oral tablet

20 mg, 1 tab, Route: PO, Drug form: TAB, Q8H, Dosing Weight 68.182, kg, Start 
date: 17 0:58:00CDT, Duration: 30 day, Stop date: 17 0:00:00 CDT

Notes: (Same as: Revatio)

Start Date: 3/21/17

Stop Date: 3/23/17

Status: DiscontinuedSodium Chloride 0.9% (Bolus) IV

500 mL, 500 ml/hr, Infuse Over: 1 hr, Route: IV, 500, Drug form: INJ, ONCE, 
Priority: STAT, Dosing Weight 68.182 kg, Start date: 17 13:28:00 CDT, 
Duration: 1 doses or times, Stop date: 17 13:28:00 CDT

Start Date: 3/22/17

Stop Date: 3/22/17

Status: Completedsodium chloride 0.9% 1000 ml INJ 1,000 mL

1,000 mL, Rate: 42 ml/hr, Infuse over: 23.8 hr, Route: IV, Dosing Weight 68.182 
kg, Total Volume: 1,000, Start date: 17 4:30:00 CDT, Duration: 30 day, 
Stop date: 17 4:29:00 CDT

Start Date: 3/21/17

Stop Date: 3/23/17

Status: Discontinuedspironolactone

25 mg, 1 tab, Route: PO, Drug form: TAB, BID, Dosing Weight 68.182, kg, Start 
date: 17 9:00:00CDT, Duration: 30 day, Stop date: 17 17:00:00 CDT

Notes: (Same As: Aldactone)

Start Date: 3/21/17

Stop Date: 3/23/17

Status: Discontinuedspironolactone 25 mg oral tablet

25 mg=1 tab, PO, Daily, # 30 tab, 3 Refill(s)

Start Date: 3/21/17

Stop Date: 17

Status: OrderedTylenol with Codeine #3 oral tablet

1 tab, PO, Q6H, PRN Pain, X 7 day, # 28 tab, 0 Refill(s)

Start Date: 3/23/17

Stop Date: 3/30/17

Status: OrderedVisipaque 320mg/ml

150 mL, Route: IVP, Drug Form: SOLN, Dosing Weight 68.182, kg, ONCALL, STAT, 
Start date: 17 22:12:00 CDT, Duration: 1 doses or times, Dose=2.2ml/kg,  
Max dose=150ml -- "To be infused by RadiologyStaff ONLY"

Start Date: 3/20/17

Stop Date: 3/20/17

Status: Completed



Results

BLOOD BANK RESULTS





 Most recent to oldest [Reference Range]:  1  2  3

 

 ABO/Rh  O POS    



   *Unknown*    



   (3/20/17 9:13 PM)    

 

 Antibody Scrn  Negative    



   (3/20/17 9:13 PM)    



ELECTROLYTES





 Most recent to oldest  1  2  3



 [Reference Range]:      

 

 Sodium Lvl [135-145 mEq/L]  140 mEq/L  140 mEq/L  138 mEq/L



   (3/22/17 6:09 AM)  (3/22/17 6:09 AM)  (3/20/17 9:23 PM)

 

 Potassium Lvl [3.5-5.1  4.4 mEq/L  4.4 mEq/L  4.3 mEq/L



 mEq/L]  (3/22/17 6:09 AM)  (3/22/17 6:09 AM)  (3/20/17 9:23 PM)

 

 Chloride Lvl [ mEq/L]  105 mEq/L  105 mEq/L  101 mEq/L



   (3/22/17 6:09 AM)  (3/22/17 6:09 AM)  (3/20/17 9:23 PM)

 

 CO2 [24-32 mEq/L]  27 mEq/L  27 mEq/L  26 mEq/L



   (3/22/17 6:09 AM)  (3/22/17 6:09 AM)  (3/20/17 9:23 PM)

 

 AGAP [10.0-20.0 mEq/L]  12.4 mEq/L  12.4 mEq/L  15.3 mEq/L



   (3/22/17 6:09 AM)  (3/22/17 6:09 AM)  (3/20/17 9:23 PM)



CHEM PANEL





 Most recent to oldest  1  2  3



 [Reference Range]:      

 

 Creatinine Lvl [0.50-1.40  1.14 mg/dL  1.14 mg/dL  1.32 mg/dL



 mg/dL]  (3/22/17 6:09 AM)  (3/22/17 6:09 AM)  (3/20/17 9:23 PM)

 

 eGFR  61 mL/min/1.73m2 1  61 mL/min/1.73m2 2  51 mL/min/1.73m2 3



   *NA*  *NA*  *NA*



   (3/22/17 6:09 AM)  (3/22/17 6:09 AM)  (3/20/17 9:23 PM)

 

 BUN [7-22 mg/dL]  27 mg/dL  27 mg/dL  26 mg/dL



   *HI*  *HI*  *HI*



   (3/22/17 6:09 AM)  (3/22/17 6:09 AM)  (3/20/17 9:23 PM)

 

 B/C Ratio [6-25]  24    



   (3/22/17 6:09 AM)    

 

 Glucose Lvl [70-99 mg/dL]  114 mg/dL  114 mg/dL  133 mg/dL



   *HI*  *HI*  *HI*



   (3/22/17 6:09 AM)  (3/22/17 6:09 AM)  (3/20/17 9:23 PM)

 

 Total Protein [6.4-8.4 g/dL]  6.0 g/dL    



   *LOW*    



   (3/22/17 6:09 AM)    

 

 Albumin Lvl [3.5-5.0 g/dL]  2.8 g/dL    



   *LOW*    



   (3/22/17 6:09 AM)    

 

 Globulin [2.7-4.2 g/dL]  3.2 g/dL    



   (3/22/17 6:09 AM)    

 

 A/G Ratio [0.7-1.6]  0.9    



   (3/22/17 6:09 AM)    

 

 Calcium Lvl [8.5-10.5 mg/dL]  8.9 mg/dL  8.9 mg/dL  9.0 mg/dL



   (3/22/17 6:09 AM)  (3/22/17 6:09 AM)  (3/20/17 9:23 PM)

 

 Phosphorus [2.5-4.5 mg/dL]  2.8 mg/dL    



   (3/22/17 6:09 AM)    

 

 Magnesium Lvl [1.8-2.4  2.3 mg/dL    



 mg/dL]  (3/22/17 6:09 AM)    

 

 ALT [0-65 unit/L]  25 unit/L    



   (3/22/17 6:09 AM)    

 

 AST [0-37 unit/L]  23 unit/L    



   (3/22/17 6:09 AM)    

 

 Alk Phos [ unit/L]  68 unit/L    



   (3/22/17 6:09 AM)    

 

 Bili Total [0.2-1.3 mg/dL]  1.2 mg/dL    



   (3/22/17 6:09 AM)    

 

 Lactic Acid Lvl [0.5-2.2  0.9 mMol/L    



 mMol/L]  (3/20/17 9:23 PM)    



1Result Comment: The eGFR is calculated using the CKD-EPI formula. In most young
, healthy individualsthe eGFR will be >90 mL/min/1.73m2. The eGFR declines with 
age. An eGFR of 60-89 may be normal in some populations, particularly the 
elderly, for whom the CKD-EPI formula has not been extensively validated. Use 
of the eGFR is not recommended in the following populations:



Individuals with unstable creatinine concentrations, including pregnant 
patients and those with serious co-morbid conditions.



Patients with extremes in muscle mass or diet.



The data above are obtained from the National Kidney Disease Education Program (
NKDEP) which additionally recommends that when the eGFR is used in patients 
with extremes of body mass index for purposesof drug dosing, the eGFR should be 
multiplied by the estimated BMI.2Result Comment: The eGFR is calculated using 
the CKD-EPI formula. In most young, healthy individualsthe eGFR will be >90 mL/
min/1.73m2. The eGFR declines with age. An eGFR of 60-89 may be normal in some 
populations, particularly the elderly, for whom the CKD-EPI formula has not 
been extensively validated. Use of the eGFR is not recommended in the following 
populations:



Individuals with unstable creatinine concentrations, including pregnant 
patients and those with serious co-morbid conditions.



Patients with extremes in muscle mass or diet.



The data above are obtained from the National Kidney Disease Education Program (
NKDEP) which additionally recommends that when the eGFR is used in patients 
with extremes of body mass index for purposesof drug dosing, the eGFR should be 
multiplied by the estimated BMI.3Result Comment: The eGFR is calculated using 
the CKD-EPI formula. In most young, healthy individualsthe eGFR will be >90 mL/
min/1.73m2. The eGFR declines with age. An eGFR of 60-89 may be normal in some 
populations, particularly the elderly, for whom the CKD-EPI formula has not 
been extensively validated. Use of the eGFR is not recommended in the following 
populations:



Individuals with unstable creatinine concentrations, including pregnant 
patients and those with serious co-morbid conditions.



Patients with extremes in muscle mass or diet.



The data above are obtained from the National Kidney Disease Education Program (
NKDEP) which additionally recommends that when the eGFR is used in patients 
with extremes of body mass index for purposesof drug dosing, the eGFR should be 
multiplied by the estimated BMI.CARDIAC ENZYMES





 Most recent to oldest [Reference Range]:  1  2  3

 

 Total CK [ unit/L]  92 unit/L    



   (3/20/17 9:23 PM)    



DRUG SCREEN





 Most recent to oldest [Reference Range]:  1  2  3

 

 U Amph Scr [Negative]  Negative    



   *NA*    



   (3/21/17 2:53 AM)    

 

 U Mirna Scr [Negative]  Negative    



   *NA*    



   (3/21/17 2:53 AM)    

 

 U Benzodia Scr [Negative]  Negative    



   *NA*    



   (3/21/17 2:53 AM)    

 

 U Cocaine Scr [Negative]  Negative    



   *NA*    



   (3/21/17 2:53 AM)    

 

 U Opiate Scr [Negative]  Positive    



   *ABN*    



   (3/21/17 2:53 AM)    

 

 U Phencyc Scr [Negative]  Negative    



   *NA*    



   (3/21/17 2:53 AM)    

 

 U Cannab Scr [Negative]  Negative    



   *NA*    



   (3/21/17 2:53 AM)    

 

 UDS Note  See Note    



   (3/21/17 2:53 AM)    



TOXICOLOGY





 Most recent to oldest [Reference Range]:  1  2  3

 

 Etoh (%)  <.003 %    



   *NA*    



   (3/20/17 9:23 PM)    

 

 Ethanol Lvl  <3 mg/dL    



   *NA*    



   (3/20/17 9:23 PM)    



URINE AND STOOL





 Most recent to oldest [Reference Range]:  1  2  3

 

 UA Turbidity [Clear]  Clear    



   (3/21/17 2:53 AM)    

 

 UA Color [Yellow]  Yellow    



   *NA*    



   (3/21/17 2:53 AM)    

 

 UA pH [5.0-8.0]  5.0    



   (3/21/17 2:53 AM)    

 

 UA Spec Grav [<=1.030]  >1.050    



   *NA*    



   (3/21/17 2:53 AM)    

 

 UA Glucose [Negative]  Negative    



   (3/21/17 2:53 AM)    

 

 UA Blood [Negative]  Small    



   *ABN*    



   (3/21/17 2:53 AM)    

 

 UA Ketones [Negative]  Negative    



   *NA*    



   (3/21/17 2:53 AM)    

 

 UA Protein [Negative]  Trace    



   *ABN*    



   (3/21/17 2:53 AM)    

 

 UA Urobilinogen [0.1-1.0 EU/dL]  0.2 EU/dL    



   (3/21/17 2:53 AM)    

 

 UA Bili [Negative]  Negative    



   *NA*    



   (3/21/17 2:53 AM)    

 

 UA Leuk Est [Negative]  Negative    



   (3/21/17 2:53 AM)    

 

 UA Nitrite [Negative]  Negative    



   (3/21/17 2:53 AM)    

 

 UA WBC [None Seen]  None Seen    



   (3/21/17 2:53 AM)    

 

 UA RBC [0-2]  None Seen    



   (3/21/17 2:53 AM)    

 

 UA Sq Epi [Few]  None Seen    



   (3/21/17 2:53 AM)    



HEMATOLOGY





 Most recent to oldest  1  2  3



 [Reference Range]:      

 

 WBC [3.7-10.4 K/CMM]  12.8 K/CMM  7.3 K/CMM  7.5 K/CMM



   *HI*  (3/22/17 6:09 AM)  (3/20/17 9:23 PM)



   (3/23/17 5:17 AM)    

 

 RBC [4.70-6.10 M/CMM]  3.95 M/CMM  4.04 M/CMM  4.18 M/CMM



   *LOW*  *LOW*  *LOW*



   (3/23/17 5:17 AM)  (3/22/17 6:09 AM)  (3/20/17 9:23 PM)

 

 Hgb [14.0-18.0 g/dL]  12.2 g/dL  12.2 g/dL  12.9 g/dL



   *LOW*  *LOW*  *LOW*



   (3/23/17 5:17 AM)  (3/22/17 6:09 AM)  (3/20/17 9:23 PM)

 

 Hct [42.0-54.0 %]  36.0 %  37.0 %  37.9 %



   *LOW*  *LOW*  *LOW*



   (3/23/17 5:17 AM)  (3/22/17 6:09 AM)  (3/20/17 9:23 PM)

 

 MCV [80.0-94.0 fL]  91.3 fL  91.7 fL  90.6 fL



   (3/23/17 5:17 AM)  (3/22/17 6:09 AM)  (3/20/17 9:23 PM)

 

 MCH [27.0-31.0 pg]  30.8 pg  30.2 pg  30.9 pg



   (3/23/17 5:17 AM)  (3/22/17 6:09 AM)  (3/20/17 9:23 PM)

 

 MCHC [32.0-36.0 g/dL]  33.8 g/dL  33.0 g/dL  34.1 g/dL



   (3/23/17 5:17 AM)  (3/22/17 6:09 AM)  (3/20/17 9:23 PM)

 

 RDW [11.5-14.5 %]  15.2 %  15.4 %  15.2 %



   *HI*  *HI*  *HI*



   (3/23/17 5:17 AM)  (3/22/17 6:09 AM)  (3/20/17 9:23 PM)

 

 Platelet [133-450 K/CMM]  146 K/CMM  129 K/CMM  133 K/CMM



   (3/23/17 5:17 AM)  *LOW*  (3/20/17 9:23 PM)



     (3/22/17 6:09 AM)  

 

 MPV [7.4-10.4 fL]  9.2 fL  8.2 fL  8.1 fL



   (3/23/17 5:17 AM)  (3/22/17 6:09 AM)  (3/20/17 9:23 PM)

 

 Segs [45.0-75.0 %]  81.6 %  83.5 %  77.4 %



   *HI*  *HI*  *HI*



   (3/23/17 5:17 AM)  (3/22/17 6:09 AM)  (3/20/17 9:23 PM)

 

 Lymphocytes [20.0-40.0 %]  7.1 %  10.2 %  12.5 %



   *LOW*  *LOW*  *LOW*



   (3/23/17 5:17 AM)  (3/22/17 6:09 AM)  (3/20/17 9:23 PM)

 

 Monocytes [2.0-12.0 %]  9.5 %  6.1 %  8.2 %



   (3/23/17 5:17 AM)  (3/22/17 6:09 AM)  (3/20/17 9:23 PM)

 

 Eosinophils [0.0-4.0 %]  1.2 %  0.1 %  1.5 %



   (3/23/17 5:17 AM)  (3/22/17 6:09 AM)  (3/20/17 9:23 PM)

 

 Basophils [0.0-1.0 %]  0.6 %  0.1 %  0.4 %



   (3/23/17 5:17 AM)  (3/22/17 6:09 AM)  (3/20/17 9:23 PM)

 

 Segs-Bands # [1.5-8.1 K/CMM]  10.4 K/CMM  6.1 K/CMM  5.8 K/CMM



   *HI*  (3/22/17 6:09 AM)  (3/20/17 9:23 PM)



   (3/23/17 5:17 AM)    

 

 Lymphocytes # [1.0-5.5  0.9 K/CMM  0.7 K/CMM  0.9 K/CMM



 K/CMM]  *LOW*  *LOW*  *LOW*



   (3/23/17 5:17 AM)  (3/22/17 6:09 AM)  (3/20/17 9:23 PM)

 

 Monocytes # [0.0-0.8 K/CMM]  1.2 K/CMM  0.4 K/CMM  0.6 K/CMM



   *HI*  (3/22/17 6:09 AM)  (3/20/17 9:23 PM)



   (3/23/17 5:17 AM)    

 

 Eosinophils # [0.0-0.5  0.1 K/CMM  0.1 K/CMM  



 K/CMM]  (3/23/17 5:17 AM)  (3/20/17 9:23 PM)  

 

 Basophils # [0.0-0.2 K/CMM]  0.1 K/CMM    



   (3/23/17 5:17 AM)    

 

 RBC Morph  Normal    



   (3/23/17 5:17 AM)    

 

 Plt Morph  Normal    



   (3/23/17 5:17 AM)    

 

 PT [12.0-14.7 seconds]  21.7 seconds  21.1 seconds  23.0 seconds



   *HI*  *HI*  *HI*



   (3/22/17 6:09 AM)  (3/21/17 2:29 PM)  (3/20/17 9:23 PM)

 

 INR [0.85-1.17]  1.85  1.79  2.00



   *HI*  *HI*  *HI*



   (3/22/17 6:09 AM)  (3/21/17 2:29 PM)  (3/20/17 9:23 PM)

 

 PTT [22.9-35.8 seconds]  35.0 seconds    



   (3/20/17 9:23 PM)    

 

 ACT (TEG) Rapid [  136 seconds    



 seconds]  *HI*    



   (3/20/17 9:23 PM)    

 

 Split Point Rapid  0.8 minutes    



   *NA*    



   (3/20/17 9:23 PM)    

 

 R-time Rapid [0.4-0.7  0.9 minutes    



 minutes]  *HI*    



   (3/20/17 9:23 PM)    

 

 K-time Rapid [0.6-2.3  1.0 minutes    



 minutes]  (3/20/17 9:23 PM)    

 

 Angle Rapid [64-80 degrees]  76 degrees    



   (3/20/17 9:23 PM)    

 

 Max Amplitude Rapid [52-71  65 mm    



 mm]  (3/20/17 9:23 PM)    

 

 G-value Rapid [5.0-11.6 K  9.4 K d/sc    



 d/sc]  (3/20/17 9:23 PM)    

 

 Estimated % Lysis Rapid  0.0 %    



 [0.0-7.5 %]  (3/20/17 9:23 PM)    







Immunizations

Given and Recorded





 Vaccine  Date  Status  Refusal Reason

 

 diphtheria/pertussis, acel/tetanus adult  3/20/17  Given  

 

 influenza virus vaccine, inactivated  10/15/09  Given  

 

 pneumococcal 23-valent vaccine  12  Given  







Procedures







 Procedure  Date  Related Diagnosis  Body Site

 

 Abdomen endoscopy      

 

 Barium swallow      

 

 CABG - Coronary artery bypass graft1, 2      

 

 Cataract surgery3      

 

 Colonoscopy4      

 

 Hernia repair5, 6      

 

 Knee replacement      

 

 Operation7      



[id number] states had quadraple [id number] eye with [id number] repair (
INGUINAL) x46x'3 in the h9Kpzrmdqpr of Pacemaker and AICD ()



Social History







 Social History Type  Response

 

 Substance Abuse  Use: None.

 

 Alcohol  Past, Type Beer.  Frequency: 1-2 times per year.  Last use: in



   the past.  Previous treatment: None.  Alcohol use interferes with



   work or home: No.  Drinks more than intended: No.  Others hurt by



   drinking: No.  Ready to change: No.  Household alcohol concerns:



   No.

 

 Smoking Status  Never smoker; Exposure to Tobacco Smoke None; Cigarette Smoking



   Last 365 Days No; Reg Smoking Cessation Counseling No







Assessment and Plan

Extracted from:





 Title: Progress Note  Author: Carmen Willoughby MD  Date: 3/22/17









  Assessment/Plan



 



 



 



 Mr. Brewster is a 82 y/o male with a PMH CAD s/p CABG, CHF c EF 35% s/p AICD, 
Afib, HTN, HLD, Hx of throat ca s/p chemoradiation who sustained bilateral UE 
abrasions following a lawnmower accident



 



 1.Degloving injury of right hand, Degloving injury of right hand



    Post-Op Dx: B/L hand abrasions



 



 



 Procedure: I&D of skin and subq to B/L hand, Application of integra 2 x 2 [6]



 



 



 -Cleared by ORS for discharge, PT seen and has cleared the patient for 
discharge



 



 



 -Needs more pain management, currently unable to assess pain as patient has 
received a block early this morning, will transition to p.o. pain meds and
discharge tomorrow



 



 



 -Wife present at bedside in the evening and she was given trainingregarding 
the dressings



 



   3.Atrial fibrillation



     -Currently rate controlled



 



 



 -Amiodarone and digoxin and metoprolol has been on hold due to low BP, 500 cc 
bolus givenecho shows EF of 35-40%



 



   4.CAD, s/p CABG in , AICD/PPM in 



     -at home he is on metoprolol, Lipitor, Lasix, Aldactone, statin: BP meds 
on hold due to low BP



 



   5.Hypertension



     -Currently on the low normal side, will monitor. BP meds on hold at 
this time



 



   6.Hypothyroidism



     -Continue levothyroxine home medication



 



   7.GERD - Gastro-esophageal reflux disease



     -On PPI, continue same



 



   8.Acute pain due to injury



     -Currently onblock and multimodal pain regimen



 



   9.History of laryngeal cancer



     Completed chemoradiation in , not active at this time



 



   10.Osteopenia



     -On vitamin D supplementation



 



   11.Compression fracture of L2



     Chronic, no intervention at this time



 



   12.Anemia



     -Normocytic anemia, no indication for transfusion at this time



 



 



 13. Pulmonary Hypertension



 



 



 Sildenafil as BP allows



 



   Orders:



 



 



 AMIODarone, 100 mg, 1 tab, Route: PO, Drug form: TAB, Daily, Dosing Weight 
68.182, kg, Start date: 17 9:00:00 CDT, Duration: 30 day, Stop date:  9:00:00 CDT



    MD to Nurse Order, Misc



 



 



 



 



 



 



   Prophylaxis



   Warfarinon hold, will resume upon discharge



 



    Disposition



   Possible DC tomorrow to home





Extracted from:





 Title: History and Physical  Author: Leti Miramontes MD  Date: 3/21/17









  Assessment/Plan



 



 



 



 



 1.Degloving injury of right hand



     Surgery in the morning



 



 



 ECHO before clearance



 



   2.Atrial fibrillation



    Rate controlled now



 



 



 Amiodarone, Digoxin and metoprolol



 



 



 AICD/PPM interogation ordered



 



 



 EKG is paced



 



 



 ECHO ordered (noneon file here)



 



   3.CAD, s/p CABG in , AICD/PPM in 



    Metoprolol, lipitor, lasix, aldactone, statin



 



   4.Hypertension



     BP relatively low now



 



 



 Begin IVF



 



   5.Hypothyroidism



    Levoxyl



 



   6.GERD - Gastro-esophageal reflux disease



     PPI



 



   7.Acute pain due to injury



     Norco as needed



 



   8.History of laryngeal cancer



    Chemoradiation completed in 



 



   9.Osteopenia



    Vitamin D



 



   10.Compression fracture of L2



    Chronic



 



 



 



 



 



 Pulmonary Hypertension



 



 



 Sildenafil as BP allows



 



 



 



 



 



   Prophylaxis



   INR therapeutic today



 



 



 Lovenox tomorrow



 



    Disposition



   Pending surgery and clearance



 



 



 [facility] Hospitalist service is primary. Page [phone] with concerns.





Extracted from:





 Title: ORS HAND TRAUMA INITIAL  Author: Libia Garrido  Date: 3/20
/17



 CONSULT H&P    









 ORS Hand Trauma Consult History and Physical



 Reason for Consult: B hand degloving injuries



 Source of Consult: ER



 Date of Service: 3/20/17



 Consulting Physician: Ancelmo Diaz



 Orthopaedic Attending: Iesha



 CC: B hand pain



 HPI: The pt is a 82 y/o M s/p fall down hill and rollover by lawnmower 
sustaining B hand degloving injuries at approximately 7pm. Reports B pain in 
dorsum of hands worse with ROM and palpation and impro



 brigido with rest. Denies other trauma/injury, pain or loss of ROM in other 
extremities. Denies radiation, N/T, or other paresthesias. DREA: 230pm.



 PMH: CHF, Stage 3 Throat CA (chemo/radiation), B hearing loss R > L, A fib, HLD
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



 PSH: CABG X4 (20 yrs ago), B TKA, throat resection, hernia repair x 3, L 
cataract sx



 Medications: Coumadin, Amiodarone, Metoprolol, Digoxin, Spironolactone, 
Levothyroxine, Atorvastatin, Furosemide, Citalopram, Finasteride, Sildenafil, 
Rapaflo, Nitrostat, Entresto



 Allergies: Tetracycline, Morphine



 Review of Systems:



 Gen: Denies fever/chills/night sweats.



 HEENT: Denies vision change, sore throat, ulcers in mouth, epistaxis



 CV: Denies CP, Palpitations



 Resp: Denies SOB, wheezing, cough



 GI: Denies Abd pain, N/V/D/Constipation. Denies incontinence.



 : Denies urinary retention, urgency, burning, discharge.



 Back/Spine: Denies pain.



 Neuro:  Denies N/T, other paresthesias in extremities.



 Endocrine: Denies recent weight changes, heat/cold insensitivity.



 Psych: Denies depression, anxiety, labile mood, suicidal/homicidal ideation.



 Musculoskeletal: Denies all but HPI.



 All other systems negative except HPI.



 Social History:



 Tobacco: Denies



 EtOH: Occasional



 Illicit drugs: none



 RHD. Lives with wife.



 Family History: MI, HTN, DM, CA (types unknown)



 Vitals:



 Systolic Blood Pressure :   145 mmHg (HI)



 Diastolic Blood Pressure :   61 mmHg



 Peripheral Pulse Rate :   85 bpm



 Respiratory Rate :   18 BRMIN



 SpO2 percent :   90 % (LOW)



 ED Temperature Taken :   Yes



 Temperature Oral :   97.8 DegF



 Exam:



 Gen: A&O x 3. in NAD.



 RUE:



 Inspection: + Degloving to dorsum of hand with exposed tendons. + Gross 
contamination with dirt. Neg. pulsatile bleeding.  Neg. crepitis, laxity at 
joints.



 Sensation: sensation intact to light touch m/u/r nerves



 Motor: intact AIN/PIN/Ulnar in hand; 5/5 Wrist flex/ext; Elbow flex/ext; 
Shoulder ABd,Flex; chronic limited elevation and ABduction of shoulder



 FDS/FDP and extensors intact all digits.



 Vascular: radial pulse palpated, BCR all fingers <2 sec.



 



 LUE:



 Inspection: + Degloving to dorsum of hand with exposed tendon. + Skin tears at 
elbow/forearm. + Gross contamination with dirt. Neg. crepitis, laxity.



 Sensation: sensation intact to light touch m/r/u n



 Motor: intact AIN/PIN/Ulnar in hand; 5/5 Wrist flex/ext; Elbow flex/ext; 
Shoulder ABd,Flex



 FDS/FDP and extensors intact all digits.



 Vascular: radial pulse palpated, BCR all fingers <2 sec.



 Imaging:



 R hand x-rays: Soft tissue swelling at the right hand, and left wrist, but no 
other acute abnormality.



 



 L hand x-rays: Soft tissue swelling at the right hand, and left wrist, but no 
other acute abnormality.



 



 A/P:



 82 y/o M c B hand degloving injuries, R > L s/p crush by lawnmower/fall down 
hill



 - Weight bearing status: NWB BUE



 - Antibiotics: Ancef q8hrs, Gentamycin



 - Pain control



 - Dressings: keep c/d/i



 - NPO



 - Pre-op labs, EKG



 - Marked, consented



 - Dispo: Admit to Hospitalist, plan for OR in AM with Dr. Julian for I&D, 
local flap, skin vs. allograft B hands; will continue to follow while in-house

## 2018-07-10 NOTE — XMS REPORT
Summary of Care: 16 - 16

 Created on:2028



Patient:TAMAR BREWSTER

Sex:Male

:1935

External Reference #:[id number]





Demographics







 Address  [address] [place] [address]

 

 Home Phone  [phone]

 

 Preferred Language  English

 

 Marital Status  

 

 Moravian Affiliation  Shinto

 

 Race  White/

 

 Ethnic Group  Not  or 









Author







 Organization  [facility]

 

 Address  [address], [place] [address]-

 

 Phone  [phone]









Encounter

HQ Javed(FIN) [id number] Date(s): 16 - 16

[address] [address]-     [employer] [phone]
[phone]

Discharge Disposition: Home

Attending Physician: Rakesh De Dios MD

Referring Physician: Rakesh De Dios MD





Vital Signs







 Most recent to oldest [Reference Range]:  1

 

 Height  177.8 cm



   (16 12:49 PM)

 

 Blood Pressure [/60-90 mmHg]  117/74 mmHg



   (16 12:49 PM)

 

 Respiratory Rate [14-20 BRMIN]  84 BRMIN



   *HI*



   (16 12:49 PM)

 

 Weight  77.273 kg



   (16 12:49 PM)

 

 Body Mass Index  24.44 m2



   (16 12:49 PM)







Problem List







 Condition  Effective Dates  Status  Health Status  Informant

 

 Atrial fibrillation(Confirmed)    Resolved    

 

 CAD - Coronary artery    Active    



 disease(Confirmed)        

 

 Depression(Confirmed)    Active    

 

 Depression(Confirmed)    Active    

 

 GERD - Gastro-esophageal reflux    Active    



 disease(Confirmed)        

 

 HLD - Hyperlipidemia(Confirmed)    Active    

 

 HTN - Hypertension(Confirmed)    Active    

 

 Hyperlipidemia(Confirmed)    Active    

 

 Hypertension(Confirmed)    Active    

 

 Hypothyroidism(Confirmed)    Active    

 

 Hypothyroidism(Confirmed)    Active    

 

 Methicillin resistant Staphylococcus  10/14/09  Active    



 aureus - nares(Confirmed)1        

 

 Other(Confirmed)2    Resolved    

 

 Tongue carcinoma(Confirmed)    Resolved    



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



Allergies, Adverse Reactions, Alerts







 Substance  Reaction  Severity  Status

 

 morphine      Active

 

 tetracyclines      Active







Medications

No Known Medications



Results

No data available for this section



Immunizations







 Vaccine  Date  Refusal Reason

 

 influenza virus vaccine, inactivated  10/15/09  

 

 pneumococcal 23-valent vaccine  12  







Procedures







 Procedure  Date  Related Diagnosis  Body Site

 

 Abdomen endoscopy      

 

 Barium swallow      

 

 CABG - Coronary artery bypass graft1, 2      

 

 Cataract surgery3      

 

 Colonoscopy4      

 

 Hernia repair5, 6      

 

 Knee replacement      

 

 Operation7      



1patietn states had quadraple [id number] eye with [id number]'3 in the 
d1hdmxpe repair (INGUINAL) l91Evgvwwhsu of Pacemaker and AICD ()



Social History







 Social History Type  Response

 

 Substance Abuse  Use: None.

 

 Alcohol  Current, Type Wine.  Last use: occational.

 

 Smoking Status  Never smoker; Exposure to Tobacco Smoke None; Cigarette Smoking



   Last 365 Days No; Reg Smoking Cessation Counseling No







Assessment and Plan

No data available for this section

## 2018-07-10 NOTE — XMS REPORT
Summary of Care: 16 - 16

 Created on:2095



Patient:TAMAR BREWSTER

Sex:Male

:1935

External Reference #:[id number]





Demographics







 Address  [address] [place] [address]

 

 Home Phone  [phone]

 

 Preferred Language  English

 

 Marital Status  

 

 Yarsani Affiliation  Pentecostal

 

 Race  White/

 

 Ethnic Group  Not  or 









Author







 Organization  [facility]

 

 Address  [address] [place], [place] [address]-

 

 Phone  [phone]









Encounter

HQ Morris_juan(FIN) [id number] Date(s): 16 - 16

[address] [address]-     [employer] [phone]
[phone]

Discharge Disposition: Home

Attending Physician: Rakesh De Dios MD

Referring Physician: Rakesh De Dios MD





Vital Signs

No data available for this section



Problem List







 Condition  Effective Dates  Status  Health Status  Informant

 

 Atrial fibrillation(Confirmed)    Resolved    

 

 CAD - Coronary artery    Active    



 disease(Confirmed)        

 

 Depression(Confirmed)    Active    

 

 Depression(Confirmed)    Active    

 

 GERD - Gastro-esophageal reflux    Active    



 disease(Confirmed)        

 

 HLD - Hyperlipidemia(Confirmed)    Active    

 

 HTN - Hypertension(Confirmed)    Active    

 

 Hyperlipidemia(Confirmed)    Active    

 

 Hypertension(Confirmed)    Active    

 

 Hypothyroidism(Confirmed)    Active    

 

 Hypothyroidism(Confirmed)    Active    

 

 Methicillin resistant Staphylococcus  10/14/09  Active    



 aureus - nares(Confirmed)1        

 

 Other(Confirmed)2    Resolved    

 

 Tongue carcinoma(Confirmed)    Resolved    



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



Allergies, Adverse Reactions, Alerts







 Substance  Reaction  Severity  Status

 

 morphine      Active

 

 tetracyclines      Active







Medications

No data available for this section



Results

No data available for this section



Immunizations







 Vaccine  Date  Refusal Reason

 

 influenza virus vaccine, inactivated  10/15/09  

 

 pneumococcal 23-valent vaccine  12  







Procedures







 Procedure  Date  Related Diagnosis  Body Site

 

 Abdomen endoscopy      

 

 Barium swallow      

 

 CABG - Coronary artery bypass graft1, 2      

 

 Cataract surgery3      

 

 Colonoscopy4      

 

 Hernia repair5, 6      

 

 Knee replacement      

 

 Operation7      



1patietn states had quadraple [id number] eye with [id number]'3 in the 
x3wkycqp repair (INGUINAL) b03Xomakgwbu of Pacemaker and AICD ()



Social History







 Social History Type  Response

 

 Substance Abuse  Use: None.

 

 Alcohol  Current, Type Wine.  Last use: occational.

 

 Smoking Status  Never smoker; Exposure to Tobacco Smoke None; Cigarette Smoking



   Last 365 Days No; Reg Smoking Cessation Counseling No







Assessment and Plan

No data available for this section

## 2018-07-10 NOTE — XMS REPORT
Summary of Care: 10/21/16 - 10/21/16

 Created on:2064



Patient:TAMAR BREWSTER

Sex:Male

:1935

External Reference #:[id number]





Demographics







 Address  [address] [place] [address]-

 

 Home Phone  [phone]

 

 Email Address  [email or web address]

 

 Preferred Language  English

 

 Marital Status  

 

 Methodist Affiliation  Mandaeism

 

 Race  White/

 

 Ethnic Group  Not  or 









Author







 Organization  [facility]

 

 Address  [address] [id number]-

 

 Phone  [phone]









Encounter

HQ Javed(FIN) [id number] Date(s): 10/21/16 - 10/21/16

[facility] [address] [place] [address]-     
 

Discharge Disposition: Home or Self Care

Attending Physician: Rakesh De Dios MD

Referring Physician: Rakesh De Dios MD





Vital Signs







 Most recent to oldest [Reference Range]:  1

 

 Height  180.34 cm



   (10/21/16 9:09 AM)

 

 Blood Pressure [/60-90 mmHg]  105/63 mmHg



   (10/21/16 9:09 AM)

 

 Respiratory Rate [14-20 BRMIN]  18 BRMIN



   (10/21/16 9:09 AM)

 

 Peripheral Pulse Rate [ bpm]  86 bpm



   (10/21/16 9:09 AM)

 

 Weight  62.386 kg



   (10/21/16 9:09 AM)

 

 Body Mass Index  19.18 m2



   (10/21/16 9:09 AM)







Problem List







 Condition  Effective Dates  Status  Health Status  Informant

 

 Atrial fibrillation(Confirmed)    Resolved    

 

 CAD - Coronary artery    Active    



 disease(Confirmed)        

 

 Depression(Confirmed)    Active    

 

 GERD - Gastro-esophageal reflux    Active    



 disease(Confirmed)        

 

 Hyperlipidemia(Confirmed)    Active    

 

 Hypertension(Confirmed)    Active    

 

 Hypothyroidism(Confirmed)    Active    

 

 Hypothyroidism(Confirmed)    Active    

 

 Other(Confirmed)1    Resolved    

 

 Tongue carcinoma(Confirmed)    Resolved    



1VP arrest- with AICD



Allergies, Adverse Reactions, Alerts







 Substance  Reaction  Severity  Status

 

 morphine      Active

 

 tetracyclines      Active







Medications

predniSONE

20 mg, PO, Daily, Quantity sufficient, 0 Refill(s)

Start Date: 10/21/16

Stop Date: 10/21/16

Status: DeletedpredniSONE 50 mg oral tablet

50 mg=1 tab, PO, Daily, 0 Refill(s)

Start Date: 10/21/16

Status: Ordered



Results

No data available for this section



Immunizations

Given and Recorded





 Vaccine  Date  Status  Refusal Reason

 

 influenza virus vaccine, inactivated  10/15/09  Given  

 

 pneumococcal 23-valent vaccine  12  Given  







Procedures







 Procedure  Date  Related Diagnosis  Body Site

 

 Abdomen endoscopy      

 

 Barium swallow      

 

 CABG - Coronary artery bypass graft1, 2      

 

 Cataract surgery3      

 

 Colonoscopy4      

 

 Hernia repair5, 6      

 

 Knee replacement      

 

 Operation7      



[id number] states had quadraple VSKL2mtfy eye with qhut506920eesruc repair (
INGUINAL) x46x'3 in the c2Hnjbuxbic of Pacemaker and AICD ()



Social History







 Social History Type  Response

 

 Substance Abuse  Use: None.

 

 Alcohol  Current, Type Wine.  Last use: occational.

 

 Smoking Status  Never smoker; Exposure to Tobacco Smoke None; Cigarette Smoking



   Last 365 Days No; Reg Smoking Cessation Counseling No







Assessment and Plan

No data available for this section

## 2020-02-12 ENCOUNTER — HOSPITAL ENCOUNTER (EMERGENCY)
Dept: HOSPITAL 97 - ER | Age: 85
Discharge: HOME | End: 2020-02-12
Payer: COMMERCIAL

## 2020-02-12 DIAGNOSIS — I10: ICD-10-CM

## 2020-02-12 DIAGNOSIS — Z95.1: ICD-10-CM

## 2020-02-12 DIAGNOSIS — I48.91: ICD-10-CM

## 2020-02-12 DIAGNOSIS — Z79.01: ICD-10-CM

## 2020-02-12 DIAGNOSIS — Z95.0: ICD-10-CM

## 2020-02-12 DIAGNOSIS — H81.399: Primary | ICD-10-CM

## 2020-02-12 DIAGNOSIS — Z88.5: ICD-10-CM

## 2020-02-12 DIAGNOSIS — Z88.1: ICD-10-CM

## 2020-02-12 PROCEDURE — 99284 EMERGENCY DEPT VISIT MOD MDM: CPT

## 2020-02-12 PROCEDURE — 70450 CT HEAD/BRAIN W/O DYE: CPT

## 2020-02-12 PROCEDURE — 72125 CT NECK SPINE W/O DYE: CPT

## 2020-02-12 NOTE — XMS REPORT
Patient Summary Document

 Created on:2020



Patient:TAMAR BREWSTER

Sex:Male

:1935

External Reference #:[id number]





Demographics







 Address  [address] [place] [address]

 

 Home Phone  [phone]

 

 Work Phone  [phone]

 

 Email Address  [email or web address]

 

 Preferred Language  Unknown

 

 Marital Status  Unknown

 

 Mormonism Affiliation  Unknown

 

 Race  Unknown

 

 Additional Race(s)  Unavailable

 

 Ethnic Group  Unknown









Author







 Organization  [facility]

 

 Address  [address] [place] [address] Dr. Toure [address] [place] [address]

 

 Phone  [phone]









Care Team Providers







 Name  Role  Phone

 

 Unavailable  Unavailable  Unavailable









Problems

This patient has no known problems.



Allergies, Adverse Reactions, Alerts

This patient has no known allergies or adverse reactions.



Medications

This patient has no known medications.



Encounters







 Start  End  Encounter  Admission  Attending  Care  Care  Encounter



 Date/Time  Date/Time  Type  Type  Clinicians  Facility  Department  ID

 

 2019  Outpatient      [facility]  [id number]



 13:48:00  13:48:00

## 2020-02-12 NOTE — EDPHYS
Physician Documentation                                                                           

 [facility]                                                                 

Name: Kennedy Flynn                                                                               

Age: 84 yrs                                                                                       

Sex: Male                                                                                         

: 1935                                                                                   

MRN: [id number]                                                                                   

Arrival Date: 2020                                                                          

Time: 11:29                                                                                       

Account#: [id number]                                                                            

Bed 19                                                                                            

Private MD:                                                                                       

ED Physician Ancelmo Caceres                                                                       

HPI:                                                                                              

                                                                                             

15:42 This 84 yrs old  Male presents to ER via EMS with complaints of Vertigo.       snw 

15:42 The patient presents with sense of spinning, vertigo. Onset: The symptoms/episode       snw 

      began/occurred suddenly, just prior to arrival. Context: occurred while the patient was     

      turning to close a door . just prior to the episode the patient experienced no apparent     

      symptoms. Modifying factors: The symptoms are alleviated by holding head still, the         

      symptoms are aggravated by movement of head. Associated signs and symptoms: Pertinent       

      positives: nausea. Severity of symptoms: At their worst the symptoms were moderate in       

      the emergency department the symptoms are unchanged. Patient's baseline: Neuro: alert       

      and fully oriented, Motor: no deficits, Ambulation: walks without assistance, Speech:       

      normal, The patient has a previous history of vertigo. The patient has experienced a        

      previous episode, many years ago, and the symptoms today are exactly the same. It is        

      unknown whether or not the patient has recently seen a physician.                           

                                                                                                  

Historical:                                                                                       

- Allergies:                                                                                      

11:34 TETRACYCLINES;                                                                          rb1 

11:34 Morphine;                                                                               rb1 

- Home Meds:                                                                                      

11:20 Coumadin 2.5 mg 5 days per week [Active]; digoxin 125 mcg Oral tab 1 tab once daily     rb1 

      [Active]; Lipitor 40 mg Oral tab 1 tab once daily [Active]; Entresto 24-26 mg Oral tab      

      [Active]; levothyroxine 100 mcg tab 1 tab evrey other day [Active];                         

- PMHx:                                                                                           

11:34 vertigo; A-fib; Hypertension; Pacemaker;                                                rb1 

- PSHx:                                                                                           

11:34 Hernia repair; defibrillator/pacemaker; CABG; bilateral knee replacements; pacemaker;   rb1 

                                                                                                  

- Immunization history:: Adult Immunizations up to date.                                          

- Coronavirus screen:: The patient has NOT traveled to [place] in the past 14 days. The             

  patient has NOT had contact with known/suspected case of Coronavirus?.                          

- Social history:: Smoking status: Patient/guardian denies using.                                 

- Ebola Screening: : Patient negative for fever greater than or equal to 101.5 degrees            

  Fahrenheit, and additional compatible Ebola Virus Disease symptoms.                             

                                                                                                  

                                                                                                  

ROS:                                                                                              

15:37 Constitutional: Negative for fever, chills, and weight loss, Eyes: Negative for injury, snw 

      pain, redness, and discharge, ENT: Negative for injury, pain, and discharge, Neck:          

      Negative for injury, pain, and swelling, Cardiovascular: Negative for chest pain,           

      palpitations, and edema, Respiratory: Negative for shortness of breath, cough,              

      wheezing, and pleuritic chest pain, Abdomen/GI: Negative for abdominal pain, nausea,        

      vomiting, diarrhea, and constipation, Back: Negative for injury and pain, : Negative      

      for injury, bleeding, discharge, and swelling, MS/Extremity: Negative for injury and        

      deformity, Skin: Negative for injury, rash, and discoloration, Psych: Negative for          

      depression, anxiety, suicide ideation, homicidal ideation, and hallucinations.              

15:37 Neuro: Positive for dizziness, Negative for gait disturbance, headache, hearing loss,       

      loss of consciousness, seizure activity, speech changes, syncope.                           

                                                                                                  

Exam:                                                                                             

15:37 Constitutional:  This is a well developed, well nourished patient who is awake, alert,  snw 

      and in no acute distress. Head/Face:  Normocephalic, atraumatic. Eyes:  Pupils equal        

      round and reactive to light, extra-ocular motions intact.  Lids and lashes normal.          

      Conjunctiva and sclera are non-icteric and not injected.  Cornea within normal limits.      

      Periorbital areas with no swelling, redness, or edema. ENT:  Nares patent. No nasal         

      discharge, no septal abnormalities noted.  Tympanic membranes are normal and external       

      auditory canals are clear.  Oropharynx with no redness, swelling, or masses, exudates,      

      or evidence of obstruction, uvula midline.  Mucous membranes moist. Neck:  Trachea          

      midline, no thyromegaly or masses palpated, and no cervical lymphadenopathy.  Supple,       

      full range of motion without nuchal rigidity, or vertebral point tenderness.  No            

      Meningismus. Chest/axilla:  Normal chest wall appearance and motion.  Nontender with no     

      deformity.  No lesions are appreciated. Cardiovascular:  Regular rate and rhythm with a     

      normal S1 and S2.  No gallop or rubs. + systolic murmur.  Normal PMI, no JVD.  No pulse     

      deficits. Respiratory:  Lungs have equal breath sounds bilaterally, clear to                

      auscultation and percussion.  No rales, rhonchi or wheezes noted.  No increased work of     

      breathing, no retractions or nasal flaring. Abdomen/GI:  Soft, non-tender, with normal      

      bowel sounds.  No distension or tympany.  No guarding or rebound.  No evidence of           

      tenderness throughout. Back:  No spinal tenderness.  No costovertebral tenderness.          

      Full range of motion. Skin:  Warm, dry with normal turgor.  Normal color with no            

      rashes, no lesions, and no evidence of cellulitis. MS/ Extremity:  Pulses equal, no         

      cyanosis.  Neurovascular intact.  Full, normal range of motion. Neuro:  Awake and           

      alert, GCS 15, oriented to person, place, time, and situation.  Cranial nerves II-XII       

      grossly intact.  Motor strength 5/5 in all extremities.  Sensory grossly intact.            

      Cerebellar exam normal.  Normal gait. Psych:  Awake, alert, with orientation to person,     

      place and time.  Behavior, mood, and affect are within normal limits.                       

                                                                                                  

Vital Signs:                                                                                      

11:20  / 75; Pulse 81; Resp 17; Temp 97.4(TE); Pulse Ox 97% on R/A; Weight 72.57 kg     rb1 

      (R); Height 5 ft. 10 in. (177.80 cm) (R); Pain 8/10;                                        

12:20  / 67; Pulse 80; Resp 20; Pulse Ox 95% on R/A;                                    rb1 

13:20  / 72; Pulse 80; Resp 19; Pulse Ox 96% on R/A; Pain 7/10;                         rb1 

14:20  / 73; Pulse 80; Resp 25 A; Pulse Ox 97% on R/A;                                  rb1 

15:12  / 79; Pulse 80; Resp 23; Pulse Ox 98% on R/A;                                    rb1 

11:20 Body Mass Index 22.96 (72.57 kg, 177.80 cm)                                             rb1 

                                                                                                  

MDM:                                                                                              

13:13 Patient medically screened.                                                             snw 

15:39 Data reviewed: vital signs, nurses notes. Data interpreted: Pulse oximetry: on room air snw 

      is 98 %. Interpretation: normal. Counseling: I had a detailed discussion with the           

      patient and/or guardian regarding: the historical points, exam findings, and any            

      diagnostic results supporting the discharge/admit diagnosis, radiology results, the         

      need for outpatient follow up, to return to the emergency department if symptoms worsen     

      or persist or if there are any questions or concerns that arise at home. Special            

      discussion: Based on the history and exam findings, there is no indication for further      

      emergent testing or inpatient evaluation. I discussed with the patient/guardian the         

      need to see the primary care provider for further evaluation of the symptoms.               

                                                                                                  

                                                                                             

12:56 Order name: CT Head C Spine                                                             snw 

                                                                                             

13:34 Order name: CT; Complete Time: 13:37                                                    EDMS

                                                                                                  

Administered Medications:                                                                         

13:02 Drug: Antivert 50 mg Route: PO;                                                         rb1 

13:32 Follow up: Response: No adverse reaction; Marked relief of symptoms                     rb1 

                                                                                                  

                                                                                                  

Disposition:                                                                                      

17:33 Co-signature as Attending Physician, Ancelmo Caceres MD I agree with the assessment and   kdr 

      plan of care.                                                                               

                                                                                                  

Disposition:                                                                                      

20 15:06 Discharged to Home. Impression: Other peripheral vertigo, unspecified ear.         

- Condition is Stable.                                                                            

- Discharge Instructions: Fall Prevention in the Home, Vertigo.                                   

- Prescriptions for Antivert 25 mg Oral Tablet - take 1 tablet by ORAL route every 8              

  hours As needed; 20 tablet.                                                                     

- Medication Reconciliation Form, Thank You Letter, Antibiotic Education, Prescription            

  Opioid Use form.                                                                                

- Follow up: Private Physician; When: 1 - 2 days; Reason: Recheck today's complaints,             

  Continuance of care, Re-evaluation by your physician. Follow up: Emergency                      

  Department; When: As needed; Reason: Worsening of condition.                                    

                                                                                                  

                                                                                                  

                                                                                                  

Signatures:                                                                                       

Dispatcher [employer]                           EDMS                                                 

Ancelmo Caceres MD MD   [facility]                                                  

Arleen Nina, FNP-C                 FNP-Csnw                                                  

Diane Macdonald, RN                     RN   rb1                                                  

                                                                                                  

Corrections: (The following items were deleted from the chart)                                    

15:46 15:06 2020 15:06 Discharged to Home. Impression: Other peripheral vertigo,        rb1 

      unspecified ear. Condition is Stable. Forms are Medication Reconciliation Form, Thank       

      You Letter, Antibiotic Education, Prescription Opioid Use. Follow up: Private               

      Physician; When: 1 - 2 days; Reason: Recheck today's complaints, Continuance of care,       

      Re-evaluation by your physician. Follow up: Emergency Department; When: As needed;          

      Reason: Worsening of condition. snw                                                         

                                                                                                  

**************************************************************************************************

## 2020-02-12 NOTE — XMS REPORT
Summary of Care

 Created on:2019



Patient:TAMAR BREWSTER

Sex:Male

:1935

External Reference #:[id number]





Demographics







 Address  [address] [place] [address]

 

 Phone  Unavailable

 

 Preferred Language  English

 

 Marital Status  Unknown

 

 Restoration Affiliation  Unknown

 

 Race  White

 

 Ethnic Group  Not  or 









Author







 Name  JASMIN LYNN M.D.

 

 Address  Unavailable



   Unavailable



   ,









Care Team Providers







 Name  Role  Phone

 

 REMINGTON MI, JASMNI  Unavailable  Unavailable

 

 MART MI, AL  Unavailable  Unavailable

 

 SHAHEED MI, HOMERO  Unavailable  Unavailable

 

 JAREN LÓPEZ, FRANCA REYNOLDS  Unavailable  Unavailable

 

 JAYLA PADRON MD, IVAN  Unavailable  Unavailable

 

 Remington LÓPEZ, Jasmin  Unavailable  Unavailable

 

 Unavailable  Unavailable  Unavailable









Functional Status







 Name  Dates  Details

 

 Functional status health issues are not documented    Status:









 Name  Dates  Details

 

 Cognitive status health issues are not documented    Status:







Problems







 Name  Dates  Details

 

 Automatic atrial tachycardia (427.89, I47.1)    Status: Active

 

 Hyperlipidemia (272.4, E78.5)    Status: Active

 

 Acute on chronic combined systolic and diastolic congestive heart failure (
428.43, I50.43)    Status: Active

 

 Abnormal findings on diagnostic imaging of abdomen (793.6, R93.5)    Status: 
Active

 

 Abdominal pain (789.00, R10.9)    Status: Active

 

 Deep vein thrombosis (DVT) (453.40, I82.409)    Status: Active

 

 CAD (coronary artery disease) (414.00, I25.10)    Status: Active

 

 Congestive heart failure (428.0, I50.9)    Status: Active

 

 Status post full thickness skin graft (V42.3, Z94.5)    Status: Active

 

 Complicated laceration of hand, left, subsequent encounter (V58.89, S61.412D) 
   Status: Active

 

 Complicated laceration of hand, right, subsequent encounter (V58.89, S61.411D)
    Status: Active

 

 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

 

 Ventricular fibrillation (427.41, I49.01)    Status: Active

 

 Hypothyroidism (244.9, E03.9)    Status: Active

 

 Ischemic cardiomyopathy (414.8, I25.5)    Status: Active

 

 CABG    Status: Active

 

 Atrial fibrillation (427.31, I48.91)    Status: Active

 

 History of Cardioverter-defibrillator Pulse Generator Insertion With Leads    
Status: Active

 

 Essential (primary) hypertension (401.9, I10)    Status: Active

 

 Coronary arteriosclerosis (414.00, I25.10)    Status: Active

 

 Abnormal weight loss (783.21, R63.4)    Status: Active

 

 Dysphagia, pharyngeal phase (787.23, R13.13)    Status: Active

 

 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

 

 Esophageal dysmotility (530.5, K22.4)    Status: Active

 

 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







Medications







 Name  Dates  Details









 Atorvastatin Calcium 40 MG Oral Tablet



 TAKE 1 TABLET DAILY









    Quantity: 90   Refills: 3







MART MI, KETANActive

Levothyroxine Sodium 100 MCG Oral Tablet

TAKE 1 TABLET DAILY AS DIRECTED.







  Quantity: 90   Refills: 0







HOMERO HUMMEL M.D.





  Start : 3-Oct-2014



Active

Digoxin 125 MCG Oral Tablet







  Refills: 0





Active

Coumadin 5 MG Oral Tablet







  Refills: 0





Active

Coumadin 2.5 MG Oral Tablet







  Refills: 0





Active

Entresto 24-26 MG Oral Tablet







  Refills: 0





Active

Levothyroxine Sodium 88 MCG Oral Tablet







  Refills: 0





Active

Metoprolol Succinate ER 25 MG Oral Tablet Extended Release 24 Hour







  Refills: 0





Active





Allergies and Adverse Reactions







 Name  Dates  Details

 

 Morphine Derivatives (Allergy)    Status: Active

 

 Tetracycline HCl CAPS (Allergy)    Status: Active







Past Medical History







 Name  Dates  Details

 

 Atrial fibrillation (427.31, I48.91)    Status: Active

 

 History of cardiac arrest (V12.53, Z86.74)    Status: Active

 

 Ischemic cardiomyopathy (414.8, I25.5)    Status: Active

 

 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

 

 Ventricular fibrillation (427.41, I49.01)    Status: Active

 

 History of essential hypertension (V12.59, Z86.79)    Status: Resolved

 

 History of hepatitis (V12.09, Z86.19)    Status: Resolved

 

 History of Peptic Ulcer (V12.71)    Status: Resolved

 

 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







Procedures







 Procedure  Dates  Details

 

 CABG    

 

 History of Cardioverter-defibrillator Pulse Generator    Completed 16-Oct-2009



 Insertion With Leads    

 

 History of Knee Surgery    Completed

 

 History of Hernia Repair    Completed

 

 History of Pacemaker Placement    Completed







Immunization







 Name  Dates  Details

 

 Influenza  on: 27-Sep-2013  



 Lot #: [id number]    







Family History







 Name  Dates  Details

 

 Family history of Heart Disease (V17.49)    Comments: Family History



     Status: Active

 

 Family history of Cancer    Comments: Family History



     Status: Active







Social History







 Name  Dates  Details

 

 -    Status:









 Name  Dates  Details

 

 Never smoker    







Vital Signs







 Date  Test  Result  Details

 

       

 

 [id number]  BP Systolic  114 mm[Hg]  Status:









 BP Diastolic  78 mm[Hg]  Status:

 

 Height  70 in  Status:

 

 Weight  154 lb  Status:

 

 Body Mass Index Calculated  22.1 kg/m2  Status:

 

 Body Surface Area Calculated  1.87 m2  Status:

 

 Heart Rate  81 /min  Status:







Results







 Date  Description  Value  Details









 3-Apr-09854:42  GI Esophagus barium swallow 90018  









   Esophagus barium swallow  SEE NOTES  Comments: Study: Barium swallow DX  
Clinical Indication:  - R13.10   Dysphagia, unspecified,J39.2   Other 
diseasesof pharynx, K22.4   Dyskinesia of esophagus,  K22.0   Achalasia of 
cardiaComparison: NoneFluorosco



       py time: 1.23 minutesFINDINGS: The barium swallow examination shows 
indentation along the posteriorpharynx at the level of the lower cervical spine
, suspicious for acricopharyngeal bar. There are no ero



       sions, ulcerations or masses. There isnormal initiation of the 
swallowing. Aspiration of barium was noted followed byprotective cough. Large 
amount of residual contrast was noted in the piriformsinuses.



       There is normal esophageal contour and morphology without erosions,
ulcerations, strictures or masses. Scattered tertiary esophageal 
contractionsare seen. There is no hiatal hernia. There was no gastroes



       ophageal refluxelicited during the exam.IMPRESSION:1. Aspiration of 
contrast noted during the examination, followed by protectivecough. Further 
evaluation with modified barium swallow study performed wi



       [facility] language pathology may be helpful.2. Incidentally noted 
cricopharyngeal bar.3. Scattered tertiary esophageal contractions.SL: [id number]
Read by:  Anand Eldridge MDDictated Date/time:  0



       19 09:14Electronically Signed by:  Anand Eldridge MD      
          1909:18FINAL REPORT







Plan of Care







 Name  Dates  Details









 Planned Observations









 Planned Goals not documented    







Interventions Provided

PlanReviewed options for chemodenervation vs open myotomyHe prefers to attempt 
chemodenervation



Instructions







 Name  Dates  Details

 

 Instructions not documented    







Encounters







 Appointment; MENDY EASTMAN M.D.  On: 4-May-2017 13:00



 Encounter Diagnosis: Problem not documented  

 

 Appointment; JASMIN LYNN M.D.  On: 26-Mar-2019 11:00



 Encounter Diagnosis: Problem not documented  

 

 Appointment; JASMIN LYNN M.D.  On: 2019 14:00



 Encounter Diagnosis: Problem not documented

## 2020-02-12 NOTE — RAD REPORT
EXAM DESCRIPTION:  CT - [id number] - 2/12/2020 1:20 pm

 

CLINICAL HISTORY:  Syncope, fall, head and neck injury

 

COMPARISON:  CT head and cervical February 2015

 

TECHNIQUE:  Axial 5 mm thick images of the head were obtained.  Axial 2 mm thick images of the cervic
al spine were obtained with sagittal and coronal reconstruction images generated and reviewed.

 

All CT scans are performed using dose optimization technique as appropriate and may include automated
 exposure control or mA/KV adjustment according to patient size.

 

FINDINGS:  No intracranial hemorrhage, mass, edema or acute intracranial finding. No suspicion for ac
[place] infarction. No extra-axial fluid collections. Mastoid air cells and paranasal sinuses are clear. 
No globe or orbit abnormality seen. Moderate severity atrophy and chronic ischemic changes are presen
t. Ventricles are in proportion to volume loss. Intracranial findings are similar to comparison.

 

Cervical body height and alignment are normal. C5-6 and C6-7 disc space narrowing present with endpla
te spurring. C4-5, C5-6 and C6-7 bony foraminal encroachment changes are present. Advanced degenerati
ve change present at the dens anterior arch C1 level. No fracture or acute bony abnormality. Central 
canal findings are inherently limited.

 

No paraspinal mass or hematoma.

 

IMPRESSION:  Atrophy and chronic ischemic changes are present similar to 2015. No acute finding.

 

Prominent but stable cervical spine degenerative change. No acute finding.

 

Negative CT cervical spine examination for acute or significant finding.

## 2020-02-12 NOTE — ER
Nurse's Notes                                                                                     

 [facility]                                                                 

Name: Kennedy Flynn                                                                               

Age: 84 yrs                                                                                       

Sex: Male                                                                                         

: 1935                                                                                   

MRN: [id number]                                                                                   

Arrival Date: 2020                                                                          

Time: 11:29                                                                                       

Account#: [id number]                                                                            

Bed 19                                                                                            

Private MD:                                                                                       

Diagnosis: Other peripheral vertigo, unspecified ear                                              

                                                                                                  

Presentation:                                                                                     

                                                                                             

11:20 Presenting complaint: EMS states: Pt. was walking to his [place] when he got an     rb1 

      episode of vertigo. Transition of care: patient was not received from another setting       

      of care. Onset of symptoms was 2020. Risk Assessment: Do you want to hurt      

      yourself or someone else? Patient reports no desire to harm self or others. Initial         

      Sepsis Screen: Does the patient meet any 2 criteria? No. Patient's initial sepsis           

      screen is negative. Does the patient have a suspected source of infection? No.              

      Patient's initial sepsis screen is negative. Care prior to arrival: None.                   

11:20 Method Of Arrival: EMS: [place] [id number] 

11:20 Acuity: JOSE D 3                                                                           rb1 

                                                                                                  

Triage Assessment:                                                                                

11:20 General: Appears in no apparent distress. comfortable, Behavior is calm, cooperative.   rb1 

      Pain: Complains of pain in neck Pain currently is 8 out of 10 on a pain scale. Neuro:       

      Level of Consciousness is awake, alert, obeys commands, Oriented to person, place,          

      time, situation. Cardiovascular: Capillary refill < 3 seconds is brisk in bilateral         

      fingers. Respiratory: Airway is patent Respiratory effort is even, unlabored,               

      Respiratory pattern is regular, symmetrical. GI: Reports nausea. : No signs and/or        

      symptoms were reported regarding the genitourinary system. Derm: Skin is pink, warm \T\     

      dry. Musculoskeletal: Range of motion: intact in all extremities.                           

                                                                                                  

Historical:                                                                                       

- Allergies:                                                                                      

11:34 TETRACYCLINES;                                                                          rb1 

11:34 Morphine;                                                                               rb1 

- Home Meds:                                                                                      

11:20 Coumadin 2.5 mg 5 days per week [Active]; digoxin 125 mcg Oral tab 1 tab once daily     rb1 

      [Active]; Lipitor 40 mg Oral tab 1 tab once daily [Active]; Entresto 24-26 mg Oral tab      

      [Active]; levothyroxine 100 mcg tab 1 tab evrey other day [Active];                         

- PMHx:                                                                                           

11:34 vertigo; A-fib; Hypertension; Pacemaker;                                                rb1 

- PSHx:                                                                                           

11:34 Hernia repair; defibrillator/pacemaker; CABG; bilateral knee replacements; pacemaker;   rb1 

                                                                                                  

- Immunization history:: Adult Immunizations up to date.                                          

- Coronavirus screen:: The patient has NOT traveled to [place] in the past 14 days. The             

  patient has NOT had contact with known/suspected case of Coronavirus?.                          

- Social history:: Smoking status: Patient/guardian denies using.                                 

- Ebola Screening: : Patient negative for fever greater than or equal to 101.5 degrees            

  Fahrenheit, and additional compatible Ebola Virus Disease symptoms.                             

                                                                                                  

                                                                                                  

Screenin:20 Abuse screen: Denies threats or abuse. Nutritional screening: No deficits noted.        rb1 

      Tuberculosis screening: No symptoms or risk factors identified. Fall Risk None              

      identified.                                                                                 

                                                                                                  

Assessment:                                                                                       

11:20 General: See triage assessment.                                                         rb1 

12:20 Reassessment: Patient appears in no apparent distress at this time. No changes from     rb1 

      previously documented assessment.                                                           

13:20 Reassessment: Patient appears in no apparent distress at this time. Patient and/or      rb1 

      family updated on plan of care and expected duration. Pain level reassessed. Patient is     

      alert, oriented x 3, equal unlabored respirations, skin warm/dry/pink.                      

14:20 Reassessment: Patient appears in no apparent distress at this time. No changes from     rb1 

      previously documented assessment. Family at the bedside.                                    

15:09 Reassessment: Patient appears in no apparent distress at this time. Patient and/or      rb1 

      family updated on plan of care and expected duration. Pain level reassessed. Patient is     

      alert, oriented x 3, equal unlabored respirations, skin warm/dry/pink.                      

                                                                                                  

Vital Signs:                                                                                      

11:20  / 75; Pulse 81; Resp 17; Temp 97.4(TE); Pulse Ox 97% on R/A; Weight 72.57 kg     rb1 

      (R); Height 5 ft. 10 in. (177.80 cm) (R); Pain 8/10;                                        

12:20  / 67; Pulse 80; Resp 20; Pulse Ox 95% on R/A;                                    rb1 

13:20  / 72; Pulse 80; Resp 19; Pulse Ox 96% on R/A; Pain 7/10;                         rb1 

14:20  / 73; Pulse 80; Resp 25 A; Pulse Ox 97% on R/A;                                  rb1 

15:12  / 79; Pulse 80; Resp 23; Pulse Ox 98% on R/A;                                    rb1 

11:20 Body Mass Index 22.96 (72.57 kg, 177.80 cm)                                             rb1 

                                                                                                  

ED Course:                                                                                        

11:20 Arm band placed on right wrist.                                                         rb1 

11:20 Patient has correct armband on for positive identification. Bed in low position. Call   rb1 

      light in reach. Side rails up X 1. Pulse ox on. NIBP on.                                    

11:20 Maintain EMS IV. Dressing intact. Good blood return noted. Site clean \T\ dry. Gauge \T\    rb
1

      site: 18 g right AC.                                                                        

11:29 Patient arrived in ED.                                                                  rb1 

11:31 Triage completed.                                                                       rb1 

11:36 Diane Macdonald, RN is Primary Nurse.                                                   rb1 

12:46 [facility]                                                        snw 

12:46 Ancelmo Caceres MD is Attending Physician.                                              snw 

15:30 No provider procedures requiring assistance completed. IV discontinued, intact,         rb1 

      bleeding controlled, No redness/swelling at site. Pressure dressing applied.                

                                                                                                  

Administered Medications:                                                                         

13:02 Drug: Antivert 50 mg Route: PO;                                                         rb1 

13:32 Follow up: Response: No adverse reaction; Marked relief of symptoms                     rb1 

                                                                                                  

                                                                                                  

Outcome:                                                                                          

15:06 Discharge ordered by MD.                                                                snw 

15:30 Patient left the ED.                                                                    rb1 

15:30 Discharged to home via wheelchair, with family.                                         rb1 

15:30 Condition: stable                                                                           

15:30 Discharge instructions given to patient, Instructed on discharge instructions, follow       

      up and referral plans. medication usage, Demonstrated understanding of instructions,        

      follow-up care, medications, Prescriptions given X 1.                                       

                                                                                                  

Signatures:                                                                                       

Arleen Nina FNP-C                 FNP-Csnw                                                  

Diane Macdonald, RN                     RN   rb1                                                  

                                                                                                  

Corrections: (The following items were deleted from the chart)                                    

15:49 15:46 Patient left the ED. rb1                                                          rb1 

                                                                                                  

**************************************************************************************************

## 2020-02-13 VITALS — DIASTOLIC BLOOD PRESSURE: 79 MMHG | SYSTOLIC BLOOD PRESSURE: 130 MMHG | OXYGEN SATURATION: 98 %

## 2020-03-30 ENCOUNTER — HOSPITAL ENCOUNTER (EMERGENCY)
Dept: HOSPITAL 97 - ER | Age: 85
Discharge: HOME | End: 2020-03-30
Payer: COMMERCIAL

## 2020-03-30 VITALS — SYSTOLIC BLOOD PRESSURE: 120 MMHG | DIASTOLIC BLOOD PRESSURE: 87 MMHG

## 2020-03-30 VITALS — TEMPERATURE: 97.9 F

## 2020-03-30 VITALS — OXYGEN SATURATION: 98 %

## 2020-03-30 DIAGNOSIS — S00.93XA: ICD-10-CM

## 2020-03-30 DIAGNOSIS — Z79.01: ICD-10-CM
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
## 2020-03-30 DIAGNOSIS — Z95.1: ICD-10-CM

## 2020-03-30 DIAGNOSIS — E78.5: ICD-10-CM

## 2020-03-30 DIAGNOSIS — E03.9: ICD-10-CM

## 2020-03-30 DIAGNOSIS — I48.91: ICD-10-CM

## 2020-03-30 DIAGNOSIS — I10: ICD-10-CM

## 2020-03-30 DIAGNOSIS — S40.011A: ICD-10-CM

## 2020-03-30 DIAGNOSIS — Z88.5: ICD-10-CM

## 2020-03-30 DIAGNOSIS — Z88.1: ICD-10-CM

## 2020-03-30 DIAGNOSIS — Z95.0: ICD-10-CM
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
## 2020-03-30 DIAGNOSIS — S06.0X0A: Primary | ICD-10-CM

## 2020-03-30 LAB
BUN BLD-MCNC: 23 MG/DL (ref 7–18)
GLUCOSE SERPLBLD-MCNC: 107 MG/DL (ref 74–106)
HCT VFR BLD CALC: 42.3 % (ref 39.6–49)
INR BLD: 1.54
LYMPHOCYTES # SPEC AUTO: 1.9 K/UL (ref 0.7–4.9)
PMV BLD: 8.9 FL (ref 7.6–11.3)
POTASSIUM SERPL-SCNC: 4.7 MMOL/L (ref 3.5–5.1)
RBC # BLD: 4.6 M/UL (ref 4.33–5.43)

## 2020-03-30 PROCEDURE — 85730 THROMBOPLASTIN TIME PARTIAL: CPT

## 2020-03-30 PROCEDURE — 85610 PROTHROMBIN TIME: CPT

## 2020-03-30 PROCEDURE — 72125 CT NECK SPINE W/O DYE: CPT

## 2020-03-30 PROCEDURE — 71045 X-RAY EXAM CHEST 1 VIEW: CPT

## 2020-03-30 PROCEDURE — 76377 3D RENDER W/INTRP POSTPROCES: CPT

## 2020-03-30 PROCEDURE — 36415 COLL VENOUS BLD VENIPUNCTURE: CPT

## 2020-03-30 PROCEDURE — 86900 BLOOD TYPING SEROLOGIC ABO: CPT

## 2020-03-30 PROCEDURE — 99285 EMERGENCY DEPT VISIT HI MDM: CPT

## 2020-03-30 PROCEDURE — 86901 BLOOD TYPING SEROLOGIC RH(D): CPT

## 2020-03-30 PROCEDURE — 85025 COMPLETE CBC W/AUTO DIFF WBC: CPT

## 2020-03-30 PROCEDURE — 86850 RBC ANTIBODY SCREEN: CPT

## 2020-03-30 PROCEDURE — 80048 BASIC METABOLIC PNL TOTAL CA: CPT

## 2020-03-30 PROCEDURE — 71250 CT THORAX DX C-: CPT

## 2020-03-30 PROCEDURE — 70450 CT HEAD/BRAIN W/O DYE: CPT

## 2020-03-30 PROCEDURE — 70486 CT MAXILLOFACIAL W/O DYE: CPT

## 2020-03-30 PROCEDURE — 93005 ELECTROCARDIOGRAM TRACING: CPT

## 2020-03-30 NOTE — RAD REPORT
EXAM DESCRIPTION:  RAD - Chest Single View - 3/30/2020 8:01 pm

 

CLINICAL HISTORY:  fall

Chest pain.

 

COMPARISON:  CHEST SINGLE VIEW dated 3/10/2015; CHEST SINGLE VIEW dated 2/5/2015; CHEST SINGLE VIEW d
ated 12/15/2014; CHEST PA AND LAT 2 VIEW dated 9/25/2014

 

FINDINGS:  Portable technique limits examination quality.

 

Mild pulmonary edema. The heart is enlarged with multilead pacer/defibrillator device. Sternotomy wir
es present.

 

IMPRESSION:  Mild CHF.

## 2020-03-30 NOTE — ER
Nurse's Notes                                                                                     

 [facility]                                                                 

Name: Kennedy Flynn                                                                               

Age: 84 yrs                                                                                       

Sex: Male                                                                                         

: 1935                                                                                   

MRN: [id number]                                                                                   

Arrival Date: 2020                                                                          

Time: 19:39                                                                                       

Account#: [id number]                                                                            

Bed 13                                                                                            

Private MD:                                                                                       

Diagnosis: Concussion without loss of consciousness;Fracture of nasal bones;Contusion of          

  unspecified part of head;Contusion of right shoulder                                            

                                                                                                  

Presentation:                                                                                     

                                                                                             

19:40 Chief complaint: Patient states: I was at home doing yard work and I tripped and fell   jb4 

      on th walk way. I feel forward and landed on my chest and face. It happened around 1700.    

19:40 Care prior to arrival: None. Mechanism of Injury: Fall from standing position. Trauma   jb4 

      event details: Injury occurred in the [facility].                                   

19:40 Acuity: JOSE D 2                                                                           jb4 

19:40 Method Of Arrival: Wheelchair                                                           jb4 

19:40 Coronavirus screen: Patient denies fever greater than 100.4F, cough, shortness of       jb4 

      breath, or difficulty breathing. Ebola Screen: No symptoms or risks identified at this      

      time. Initial Sepsis Screen: Does the patient meet any 2 criteria? No. Patient's            

      initial sepsis screen is negative. Does the patient have a suspected source of              

      infection? No. Patient's initial sepsis screen is negative. Risk Assessment: Do you         

      want to hurt yourself or someone else? Patient reports no desire to harm self or others.    

19:40 Onset of symptoms was 2020 at 17:00.                                          jb4 

                                                                                                  

Trauma Activation: Alert                                                                          

 Physician: ED Physician; Name: Stefanie; Notified At:  19:40; Arrived At:                

   19:40                                                                                

 Physician: General Surgeon; Name: ; Notified At:  19:41; Arrived At:                   

 Physician: Radiology; Name: Sho; Notified At:  19:41; Arrived At:                 

   19:40                                                                                

 Physician: Respiratory; Name: ; Notified At:  19:41; Arrived At:                       

 Physician: Lab; Name: ; Notified At:  19:41; Arrived At:                               

                                                                                                  

Historical:                                                                                       

- Allergies:                                                                                      

19:40 TETRACYCLINES;                                                                          jb4 

19:40 Morphine;                                                                               jb4 

- Home Meds:                                                                                      

19:40 Coumadin 2.5 mg 5 days per week [Active]; digoxin 125 mcg Oral tab 1 tab once daily     jb4 

      [Active]; Entresto 24-26 mg Oral tab [Active]; Lipitor 40 mg Oral tab 1 tab once daily      

      [Active]; levothyroxine 100 mcg tab 1 tab evrey other day [Active];                         

- PMHx:                                                                                           

19:40 a-fib; Hyperlipidemia; Hypertension; Hypothyroidism; Pacemaker; CHF; Vertigo;           jb4 

- PSHx:                                                                                           

19:40 defibrillator/pacemaker; bilateral knee replacements; CABG; Hernia repair; pacemaker;   jb4 

                                                                                                  

- Immunization history: Last tetanus immunization: < 5 years ago.                                 

- Social history:: Smoking status: Patient denies any tobacco usage or history of.                

                                                                                                  

                                                                                                  

Screenin:43 Abuse screen: Denies threats or abuse. Denies injuries from another. Nutritional        mg2 

      screening: No deficits noted. Tuberculosis screening: No symptoms or risk factors           

      identified. Fall risk At risk due to injury, age, prior history of falls.                   

19:43 Fall Risk Fall in past 12 months (25 points). Total Novak Fall Scale indicates Low Risk jb4 

      Score (25-44 pts). Fall prevention measures have been instituted. Side Rails Up X 2         

      Placed close to Nursing Station Frequent Obs/Assesments occuring.                           

                                                                                                  

Primary Survey:                                                                                   

19:45 NO uncontrolled hemorrhage observed. A: The patient is alert. Airway: patent, No        mg2 

      supplemental oxygen in use on arrival. Breathing/Chest: Respiratory pattern: regular.       

      Circulation: Cardiac rhythm: Disability Alert. Exposure/Environment: All clothing and       

      personal items were removed. Forensic evidence collection is not deemed to be indicated     

      at this time. Items placed in patient belonging bag. There is no evidence of                

      uncontrolled external bleeding. Obvious injury(ies) are noted at this time: right eye       

      hematoma A warming method has been applied: A warm blanket has been provided to the         

      patient.                                                                                    

20:30 Reassessment Airway Airway Patent Oxygen No O2 Breathing/Chest Respiratory pattern      jb4 

      Respiratory effort Spontaneous Unlabored Chest inspection Symmetrical Circulation Color     

      Pink Temperature Warm Dry.                                                                  

                                                                                                  

Secondary Survey:                                                                                 

19:48 HEENT: Eyes: Other right eye hematoma. Gastrointestinal: No deficits noted. : No      mg2 

      deficits noted. Musculoskeletal: Circulation, motion, and sensation intact. Capillary       

      refill < 3 seconds. Injury Description: hematoma to right eye.                              

                                                                                                  

Assessment:                                                                                       

19:40 General: Appears in no apparent distress. uncomfortable, Behavior is calm, cooperative, jb4 

      appropriate for age, Left eye is round and reactive to light. unable to assess right        

      eye at this point due to swelling.. Pain: Complains of pain in face, chest and anterior     

      aspect of right shoulder Pain does not radiate. Pain currently is 6 out of 10 on a pain     

      scale. Pain began 3 hours ago. Neuro: Level of Consciousness is awake, alert, obeys         

      commands, Oriented to person, place, time, situation,  are equal bilaterally Moves     

      all extremities. Full function Speech is normal, Facial symmetry appears normal.            

      Cardiovascular: Patient's skin is warm and dry. Respiratory: Airway is patent               

      Respiratory effort is even, unlabored, Respiratory pattern is regular, symmetrical,         

      Breath sounds are clear bilaterally. GI: No signs and/or symptoms were reported             

      involving the gastrointestinal system. : No signs and/or symptoms were reported           

      regarding the genitourinary system. EENT: No signs and/or symptoms were reported            

      regarding the EENT system. Derm: Skin has skin tears on 1/2 cm skin tear noted to the       

      right knee. Skin is pink, warm \T\ dry. Musculoskeletal: Circulation, motion, and           

      sensation intact. Range of motion: intact in all extremities. Injury Description:           

      Abrasion sustained to forehead Bruise sustained to right eye Hematoma to the right eye      

      and forearm.                                                                                

20:20 Reassessment: Patient appears in no apparent distress at this time. Patient and/or      jb4 

      family updated on plan of care and expected duration. Pain level reassessed. Patient is     

      alert, oriented x 3, equal unlabored respirations, skin warm/dry/pink. PT is back from      

      CT. Reports pain is tolerable. Given warm blanket per pt's request.                         

21:30 Reassessment: Patient appears in no apparent distress at this time. Patient and/or      jb4 

      family updated on plan of care and expected duration. Pain level reassessed. Patient is     

      alert, oriented x 3, equal unlabored respirations, skin warm/dry/pink.                      

22:27 Reassessment: Patient appears in no apparent distress at this time. Patient and/or      jb4 

      family updated on plan of care and expected duration. Pain level reassessed. Patient is     

      alert, oriented x 3, equal unlabored respirations, skin warm/dry/pink. PT is able to        

      open both eyes fully. Denies vision deficits. Ambulated to wheelchair with steady gait.     

      Verbalized understanding of d/c and follow up instructions. PT assisted to vehicle via      

      wheelchair.                                                                                 

                                                                                                  

Vital Signs:                                                                                      

19:42  / 96; Pulse 80; Resp 18; Temp 97.9; Pulse Ox 100% on R/A;                        mg2 

20:31  / 77; Pulse 80; Resp 19; Pulse Ox 98% on R/A;                                    jb4 

21:30  / 76; Pulse 80; Resp 18; Pulse Ox 98% on R/A;                                    jb4 

22:15  / 87; Pulse 80; Resp 18; Pulse Ox 98% on R/A;                                    jb4 

                                                                                                  

Anil Coma Score:                                                                               

19:40 Eye Response: spontaneous(4). Verbal Response: oriented(5). Motor Response: obeys       jb4 

      commands(6). Total: 15.                                                                     

20:31 Eye Response: spontaneous(4). Verbal Response: oriented(5). Motor Response: obeys       jb4 

      commands(6). Total: 15.                                                                     

21:30 Eye Response: spontaneous(4). Verbal Response: oriented(5). Motor Response: obeys       jb4 

      commands(6). Total: 15.                                                                     

22:15 Eye Response: spontaneous(4). Verbal Response: oriented(5). Motor Response: obeys       jb4 

      commands(6). Total: 15.                                                                     

                                                                                                  

Trauma Score (Adult):                                                                             

19:40 Eye Response: spontaneous(1); Verbal Response: oriented(1); Motor Response: obeys       jb4 

      commands(2); Systolic BP: > 89 mm Hg(4); Respiratory Rate: 10 to 29 per min(4); Anil     

      Score: 15; Trauma Score: 12                                                                 

20:31 Eye Response: spontaneous(1); Verbal Response: oriented(1); Motor Response: obeys       jb4 

      commands(2); Systolic BP: > 89 mm Hg(4); Respiratory Rate: 10 to 29 per min(4); Anil     

      Score: 15; Trauma Score: 12                                                                 

21:30 Eye Response: spontaneous(1); Verbal Response: oriented(1); Motor Response: obeys       jb4 

      commands(2); Systolic BP: > 89 mm Hg(4); Respiratory Rate: 10 to 29 per min(4); Anil     

      Score: 15; Trauma Score: 12                                                                 

22:15 Eye Response: spontaneous(1); Verbal Response: oriented(1); Motor Response: obeys       jb4 

      commands(2); Systolic BP: > 89 mm Hg(4); Respiratory Rate: 10 to 29 per min(4); Anil     

      Score: 15; Trauma Score: 12                                                                 

                                                                                                  

ED Course:                                                                                        

19:39 Patient arrived in ED.                                                                  ds1 

19:40 Patient maintains SpO2 saturation greater than 95% on room air.                         jb4 

19:40 Thermoregulation: warm blanket given to patient.                                        jb4 

19:41 Rick Watts MD is Attending Physician.                                            tw4 

19:42 Nabeel Parker, RN is Primary Nurse.                                                     jb4 

19:44 Patient has correct armband on for positive identification. Placed in gown. Bed in low  mg2 

      position. Call light in reach. Side rails up X2. Cardiac monitor on. Pulse ox on. NIBP      

      on. Door closed. Warm blanket given.                                                        

19:52 Triage completed.                                                                       jb4 

19:56 CT Head C Spine In Process Unspecified.                                                 EDMS

19:57 Shoulder Right (2 View) XRAY In Process Unspecified.                                    EDMS

20:00 CT Facial Bones W/O Con In Process Unspecified.                                         EDMS

20:00 Thorax Wo Con In Process Unspecified.                                                   EDMS

20:02 XRAY Chest (1 view) In Process Unspecified.                                             EDMS

20:15 Initial lab(s) drawn, by me, sent to lab. Inserted saline lock: 18 gauge in left        jb4 

      forearm, using aseptic technique. Blood collected.                                          

20:24 Type And Screen Sent.                                                                   ds4 

20:24 Creatinine for Radiology Sent.                                                          ds4 

20:24 CBC with Diff Sent.                                                                     ds4 

20:24 Basic Metabolic Panel Sent.                                                             ds4 

20:25 EKG done, by ED staff, reviewed by Rick Watts MD.                                  ds4 

22:25 No provider procedures requiring assistance completed. IV discontinued, intact,         jb4 

      bleeding controlled, No redness/swelling at site. Pressure dressing applied.                

                                                                                                  

Administered Medications:                                                                         

No medications were administered                                                                  

                                                                                                  

                                                                                                  

Intake:                                                                                           

22:25 PO: 0ml; Total: 0ml.                                                                    jb4 

                                                                                                  

Output:                                                                                           

22:25 Urine: 0ml; Total: 0ml.                                                                 jb4 

                                                                                                  

Outcome:                                                                                          

21:54 Discharge ordered by MD.                                                                tw4 

22:25 Discharged to home via wheelchair.                                                      jb4 

22:25 Condition: stable                                                                           

22:25 Discharge instructions given to patient, family, Instructed on discharge instructions,      

      follow up and referral plans. medication usage, Demonstrated understanding of               

      instructions, follow-up care, medications, Prescriptions given X 1.                         

22:25 Patient's length of stay in the Emergency Department was greater than 2 hours. PT       jb4 

      discharged home with family, Waiting for swelling to decrease with use of ice packs to      

      assess right eye.Patient's length of stay extended due to                                   

22:40 Patient left the ED.                                                                    jb4 

                                                                                                  

Signatures:                                                                                       

Dispatcher [employer]                           EDMS                                                 

Saira Kelly                                ds1                                                  

Elias Smith                             ds4                                                  

Nabeel Parker RN                       RN   jb4                                                  

Rick Watts MD MD   tw4                                                  

Victoriano Crooks RN                    RN   mg2                                                  

                                                                                                  

Corrections: (The following items were deleted from the chart)                                    

20:28 19:40 Pain: Complains of pain in face, chest and anterior aspect of right shoulder Pain jb4 

      does not radiate. Pain currently is 8 out of 10 on a pain scale. jb4                        

20:28 19:40 Respiratory: Airway is patent Respiratory effort is even, unlabored, Respiratory  jb4 

      pattern is regular, symmetrical, jb4                                                        

22:42 19:40 General: Appears in no apparent distress. uncomfortable, Behavior is calm,        jb4 

      cooperative, appropriate for age, jb4                                                       

                                                                                                  

**************************************************************************************************

## 2020-03-30 NOTE — RAD REPORT
EXAM DESCRIPTION:  RAD - Shoulder  Right 2 View - 3/30/2020 7:57 pm

 

CLINICAL HISTORY:  DEFORMITY

Pain and swelling

 

COMPARISON:  Shoulder Right 2 View dated 12/4/2013; Thorax Wo Con dated 3/30/2020

 

FINDINGS:  High-riding humeral head is seen likely indicating underlying rotator cuff pathology. No e
vidence of acute fracture or dislocation.

## 2020-03-30 NOTE — XMS REPORT
Patient Summary Document

 Created on:2020



Patient:TAMAR BREWSTER

Sex:Male

:1935

External Reference #:[id number]





Demographics







 Address  [address] [place] [id number]

 

 Home Phone  [phone]

 

 Work Phone  [phone]

 

 Email Address  [email or web address]

 

 Preferred Language  Unknown

 

 Marital Status  Unknown

 

 Hindu Affiliation  Unknown

 

 Race  Unknown

 

 Additional Race(s)  Unavailable

 

 Ethnic Group  Unknown









Author







 Organization  [facility]

 

 Address  [address] [place] [address] Dr. Toure [address] [place] [id number]

 

 Phone  [phone]









Care Team Providers







 Name  Role  Phone

 

 Unavailable  Unavailable  Unavailable









Problems

This patient has no known problems.



Allergies, Adverse Reactions, Alerts

This patient has no known allergies or adverse reactions.



Medications

This patient has no known medications.



Encounters







 Start  End  Encounter  Admission  Attending  Care  Care  Encounter



 Date/Time  Date/Time  Type  Type  Clinicians  Facility  Department  ID

 

 2019  Outpatient      [facility]  [id number]



 13:48:00  13:48:00

## 2020-03-30 NOTE — EDPHYS
Physician Documentation                                                                           

 [facility]                                                                 

Name: Kennedy Flynn                                                                               

Age: 84 yrs                                                                                       

Sex: Male                                                                                         

: 1935                                                                                   

MRN: [id number]                                                                                   

Arrival Date: 2020                                                                          

Time: 19:39                                                                                       

Account#: [id number]                                                                            

Bed 13                                                                                            

Private MD:                                                                                       

ED Physician Rick Watts                                                                     

HPI:                                                                                              

                                                                                             

20:50 This 84 yrs old  Male presents to ER via Wheelchair with complaints of Fall    tw4 

      Injury.                                                                                     

20:50 Details of fall: The patient fell from an upright position. Onset: The symptoms/episode tw4 

      began/occurred today. Associated injuries: The patient sustained no obvious injury.         

      Severity of symptoms: At their worst the symptoms were moderate, in the emergency           

      department the symptoms are unchanged. The patient has not experienced similar symptoms     

      in the past.                                                                                

                                                                                                  

Historical:                                                                                       

- Allergies:                                                                                      

19:40 TETRACYCLINES;                                                                          jb4 

19:40 Morphine;                                                                               jb4 

- Home Meds:                                                                                      

19:40 Coumadin 2.5 mg 5 days per week [Active]; digoxin 125 mcg Oral tab 1 tab once daily     jb4 

      [Active]; Entresto 24-26 mg Oral tab [Active]; Lipitor 40 mg Oral tab 1 tab once daily      

      [Active]; levothyroxine 100 mcg tab 1 tab evrey other day [Active];                         

- PMHx:                                                                                           

19:40 a-fib; Hyperlipidemia; Hypertension; Hypothyroidism; Pacemaker; CHF; Vertigo;           jb4 

- PSHx:                                                                                           

19:40 defibrillator/pacemaker; bilateral knee replacements; CABG; Hernia repair; pacemaker;   jb4 

                                                                                                  

- Immunization history: Last tetanus immunization: < 5 years ago.                                 

- Social history:: Smoking status: Patient denies any tobacco usage or history of.                

                                                                                                  

                                                                                                  

ROS:                                                                                              

20:50 Constitutional: Negative for fever, chills, and weight loss, Eyes: Negative for injury, tw4 

      pain, redness, and discharge, Cardiovascular: Negative for chest pain, palpitations,        

      and edema, Respiratory: Negative for shortness of breath, cough, wheezing, and              

      pleuritic chest pain, Abdomen/GI: Negative for abdominal pain, nausea, vomiting,            

      diarrhea, and constipation, Back: Negative for injury and pain, MS/Extremity: Negative      

      for injury and deformity, Skin: Negative for injury, rash, and discoloration, Neuro:        

      Negative for headache, weakness, numbness, tingling, and seizure.                           

                                                                                                  

Exam:                                                                                             

20:50 Constitutional:  This is a well developed, well nourished patient who is awake, alert,  tw4 

      and in no acute distress. Chest/axilla:  Normal chest wall appearance and motion.           

      Nontender with no deformity.  No lesions are appreciated. Cardiovascular:  Regular rate     

      and rhythm with a normal S1 and S2.  No gallops, murmurs, or rubs.  Normal PMI, no JVD.     

       No pulse deficits. Respiratory:  Lungs have equal breath sounds bilaterally, clear to      

      auscultation and percussion.  No rales, rhonchi or wheezes noted.  No increased work of     

      breathing, no retractions or nasal flaring. Abdomen/GI:  Soft, non-tender, with normal      

      bowel sounds.  No distension or tympany.  No guarding or rebound.  No evidence of           

      tenderness throughout. Back:  No spinal tenderness.  No costovertebral tenderness.          

      Full range of motion. MS/ Extremity:  Pulses equal, no cyanosis.  Neurovascular intact.     

       Full, normal range of motion. Neuro:  Awake and alert, GCS 15, oriented to person,         

      place, time, and situation.  Cranial nerves II-XII grossly intact.  Motor strength 5/5      

      in all extremities.  Sensory grossly intact.  Cerebellar exam normal.  Normal gait.         

20:50 Head/face: Noted is contusion, that is deep, of the  right eye and right cheek.             

                                                                                             

04:45 Head/face: Noted is ecchymosis, that is moderate, of the  right eye, hematoma, that is  tw4 

      severe, of the  right eye.                                                                  

      Eyes: Conjunctiva: subconjunctival hemorrhage(s), seen in the right eye, at  9 o'clock.     

                                                                                                  

Vital Signs:                                                                                      

                                                                                             

19:42  / 96; Pulse 80; Resp 18; Temp 97.9; Pulse Ox 100% on R/A;                        mg2 

20:31  / 77; Pulse 80; Resp 19; Pulse Ox 98% on R/A;                                    jb4 

21:30  / 76; Pulse 80; Resp 18; Pulse Ox 98% on R/A;                                    jb4 

22:15  / 87; Pulse 80; Resp 18; Pulse Ox 98% on R/A;                                    jb4 

                                                                                                  

Anil Coma Score:                                                                               

19:40 Eye Response: spontaneous(4). Verbal Response: oriented(5). Motor Response: obeys       jb4 

      commands(6). Total: 15.                                                                     

20:31 Eye Response: spontaneous(4). Verbal Response: oriented(5). Motor Response: obeys       jb4 

      commands(6). Total: 15.                                                                     

21:30 Eye Response: spontaneous(4). Verbal Response: oriented(5). Motor Response: obeys       jb4 

      commands(6). Total: 15.                                                                     

22:15 Eye Response: spontaneous(4). Verbal Response: oriented(5). Motor Response: obeys       jb4 

      commands(6). Total: 15.                                                                     

                                                                                                  

Trauma Score (Adult):                                                                             

19:40 Eye Response: spontaneous(1); Verbal Response: oriented(1); Motor Response: obeys       jb4 

      commands(2); Systolic BP: > 89 mm Hg(4); Respiratory Rate: 10 to 29 per min(4); [place]     

      Score: 15; Trauma Score: 12                                                                 

20:31 Eye Response: spontaneous(1); Verbal Response: oriented(1); Motor Response: obeys       jb4 

      commands(2); Systolic BP: > 89 mm Hg(4); Respiratory Rate: 10 to 29 per min(4); [place]il     

      Score: 15; Trauma Score: 12                                                                 

21:30 Eye Response: spontaneous(1); Verbal Response: oriented(1); Motor Response: obeys       jb4 

      commands(2); Systolic BP: > 89 mm Hg(4); Respiratory Rate: 10 to 29 per min(4); Anil     

      Score: 15; Trauma Score: 12                                                                 

22:15 Eye Response: spontaneous(1); Verbal Response: oriented(1); Motor Response: obeys       jb4 

      commands(2); Systolic BP: > 89 mm Hg(4); Respiratory Rate: 10 to 29 per min(4); Anil     

      Score: 15; Trauma Score: 12                                                                 

                                                                                                  

MDM:                                                                                              

19:46 Patient medically screened.                                                             tw4 

                                                                                             

04:39 Differential diagnosis: abrasion, closed head injury, contusion, fracture. Data         tw4 

      reviewed: vital signs, nurses notes. Data reviewed: lab test result(s), cardiac             

      enzymes, CBC, hepatic panel, radiologic studies, CT scan. Data interpreted: Pulse           

      oximetry: Interpretation: normal. Counseling: I had a detailed discussion with the          

      patient and/or guardian regarding: the historical points, exam findings, and any            

      diagnostic results supporting the discharge/admit diagnosis. Special discussion: Based      

      on the patient's history, exam and DX evaluation, there is no indication for emergent       

      intervention or inpatient TX. It is understood by the patient/guardian that if the SXs      

      persist or worsen they need to return immediately for re-evaluation. I discussed with       

      the patient/guardian in detail that at this point there is no indication for admission      

      to the hospital. It is understood, however, that if the symptoms persist or worsen the      

      patient needs to return immediately for re-evaluation.                                      

                                                                                                  

                                                                                             

19:45 Order name: Basic Metabolic Panel; Complete Time: 21:49                                                                                                                              

21:49 Interpretation: Normal except: GLUC 107; BUN 23; GFR 68.                                                                                                                             

19:45 Order name: CBC with Diff; Complete Time: 20:48                                                                                                                                      

20:48 Interpretation: Normal except: MCH 29.9; HCT 42.3; .                                                                                                                          

19:45 Order name: Creatinine for Radiology; Complete Time: 21:49                                                                                                                           

21:51 Interpretation: Within normal limits: GFR 68; CRE 1.05.                                                                                                                              

19:45 Order name: Type And Screen                                                                                                                                                          

19:45 Order name: PT-INR; Complete Time: 21:49                                                [facility] 

                                                                                             

21:49 Interpretation: Normal except: PT 18.0.                                                                                                                                              

19:45 Order name: Ptt, Activated; Complete Time: 21:49                                                                                                                                     

19:45 Order name: CT Head C Spine; Complete Time: 20:43                                                                                                                                    

19:45 Order name: XRAY Chest (1 view); Complete Time: 21:49                                                                                                                                

21:50 Interpretation: No acute disease.                                                                                                                                                    

19:45 Order name: Labs collected and sent; Complete Time: 20:24                                                                                                                            

19:45 Order name: CT Facial Bones W/O Con; Complete Time: 20:43                                                                                                                            

21:52 Interpretation: No acute disease.                                                                                                                                                    

19:49 Order name: Shoulder Right (2 View) XRAY; Complete Time: 20:42                                                                                                                       

19:55 Order name: Thorax Wo Con; Complete Time: 20:42                                         EDMS

                                                                                             

21:51 Interpretation: No acute disease.                                                       tw4 

                                                                                                  

EC:48 Rate is 80 beats/min. Rhythm is regular. QRS is negative in leads V3, V4, V5. UT        tw4 

      interval is normal. QRS interval is normal. QT interval is normal. No Q waves. T waves      

      are Normal. No ST changes noted. Clinical impression: ventricular paced rhythm.             

      Interpreted by me. Reviewed by me.                                                          

                                                                                                  

Administered Medications:                                                                         

No medications were administered                                                                  

                                                                                                  

                                                                                                  

Disposition:                                                                                      

20 21:54 Discharged to Home. Impression: Concussion without loss of consciousness,          

  Fracture of nasal bones, Contusion of unspecified part of head, Contusion of                    

  right shoulder.                                                                                 

- Condition is Stable.                                                                            

- Discharge Instructions: Contusion, Head Injury, Pediatric.                                      

- Prescriptions for Tramadol 50 mg Oral Tablet - take 1 tablet by ORAL route every 8              

  hours as needed; 10 tablet.                                                                     

- Medication Reconciliation Form, Thank You Letter, Antibiotic Education, Prescription            

  Opioid Use form.                                                                                

- Follow up: Private Physician; When: Upon discharge from the Emergency Department;               

  Reason: Recheck today's complaints, Continuance of care, Re-evaluation by your                  

  physician.                                                                                      

- Problem is new.                                                                                 

- Symptoms have improved.                                                                         

                                                                                                  

                                                                                                  

                                                                                                  

Signatures:                                                                                       

Dispatcher [employer]                           EDMS                                                 

Nabeel Parker RN RN   jb4                                                  

Rick Watts MD MD   tw4                                                  

                                                                                                  

Corrections: (The following items were deleted from the chart)                                    

                                                                                             

20:52 20:50 Constitutional: This is a well developed, well nourished patient who is awake,    tw4 

      alert, and in no acute distress. Head/Face: Normocephalic, atraumatic. Chest/axilla:        

      Normal chest wall appearance and motion. Nontender with no deformity. No lesions are        

      appreciated. Cardiovascular: Regular rate and rhythm with a normal S1 and S2. No            

      gallops, murmurs, or rubs. Normal PMI, no JVD. No pulse deficits. Respiratory: Lungs        

      have equal breath sounds bilaterally, clear to auscultation and percussion. No rales,       

      rhonchi or wheezes noted. No increased work of breathing, no retractions or nasal           

      flaring. Abdomen/GI: Soft, non-tender, with normal bowel sounds. No distension or           

      tympany. No guarding or rebound. No evidence of tenderness throughout. MS/ Extremity:       

      Pulses equal, no cyanosis. Neurovascular intact. Full, normal range of motion. Neuro:       

      Awake and alert, GCS 15, oriented to person, place, time, and situation. Cranial nerves     

      II-XII grossly intact. Motor strength 5/5 in all extremities. Sensory grossly intact.       

      Cerebellar exam normal. Normal gait. tw4                                                    

21:51 21:50 Within normal limits: GFR 68. tw4                                                 tw4 

22:40 21:54 2020 21:54 Discharged to Home. Impression: Concussion without loss of       jb4 

      consciousness; Fracture of nasal bones; Contusion of unspecified part of head;              

      Contusion of right shoulder. Condition is Stable. Forms are Medication Reconciliation       

      Form, Thank You Letter, Antibiotic Education, Prescription Opioid Use. Follow up:           

      Private Physician; When: Upon discharge from the Emergency Department; Reason: Recheck      

      today's complaints, Continuance of care, Re-evaluation by your physician. Problem is        

      new. Symptoms have improved. tw4                                                            

                                                                                                  

**************************************************************************************************

## 2020-03-30 NOTE — RAD REPORT
EXAM DESCRIPTION:  CT - CTFB

 

CLINICAL HISTORY:  FACIAL PAIN

Fall, trauma, facial injury with pain

 

COMPARISON:  No comparisons

 

TECHNIQUE:  Axial 2 mm thick images of the face were obtained with sagittal and coronal reconstructio
n images.

 

All CT scans are performed using dose optimization technique as appropriate and may include automated
 exposure control or mA/KV adjustment according to patient size.

 

FINDINGS:  Slight deformity of the nasal bones is seen which probably indicates a mild nasal bone fra
cture. Mild adjacent soft tissue swelling is seen. There is significant right periorbital swelling wi
thout acute globe finding.The mandible is intact.

 

The globes and orbital contents are grossly unremarkable.Small amount of fluid is seen in the right m
axillary sinus.

 

 

IMPRESSION:  Mild nasal bone fracture.

## 2020-03-30 NOTE — RAD REPORT
EXAM DESCRIPTION:  CT - Thorax Wo Con

 

CLINICAL HISTORY:  Chest pain

FALL

 

COMPARISON:  THORAX W CONTRAST dated 9/23/2014

 

FINDINGS:  The lungs are clear. Scarring is present in both medial upper lobes. No pleural effusion. 
No pneumothorax. Mild cardiomegaly is seen with pacer device present.

 

No axillary, mediastinal or hilar adenopathy.

 

Diffuse osteopenia. No fracture is evident. Sternotomy wires are present.

 

All CT scans are performed using dose optimization technique as appropriate and may include automated
 exposure control or mA/KV adjustment according to patient size.

 

IMPRESSION:  No acute intrathoracic abnormality detected.

## 2020-03-30 NOTE — RAD REPORT
EXAM DESCRIPTION:  CT - [id number] - 3/30/2020 7:58 pm

 

CLINICAL HISTORY:  Trauma, head and neck injury.

fall

 

COMPARISON:  Head C Spine Mpr Wo Con dated 2/12/2020; CT-SPINE-THORACIC-WO dated 2/5/2015; CT HEAD CS
PINE MPR WO CONTRAST dated 2/5/2015; CT HEAD CSPINE MPR WO CONTRAST dated 12/4/2013; Facial Bones W/ 
Mpr dated 3/30/2020; Thorax Wo Con dated 3/30/2020

 

TECHNIQUE:  Axial 5 mm thick images of the head were obtained.

 

Axial 2 mm thick images of the cervical spine were obtained with sagittal and coronal reconstruction 
images generated and reviewed.

 

All CT scans are performed using dose optimization technique as appropriate and may include automated
 exposure control or mA/KV adjustment according to patient size.

 

FINDINGS:  CT HEAD WITHOUT CONTRAST:

 

No acute hemorrhage, hydrocephalus or extra-axial collection is identified.Prominent brain atrophy pa
ttern is seen. Moderate chronic periventricular and deep white matter microvascular ischemia.No areas
 of brain edema or midline shift.

 

Mild fluid is seen in the right maxillary antrum.Periorbital soft tissue swelling is seen on the righ
t.The calvarium is intact.

 

CT CERVICAL SPINE WITHOUT CONTRAST:

 

No fracture or subluxation.Mild cervical degenerative changes.No prevertebral soft tissues swelling i
s identified.

 

IMPRESSION:  No acute intracranial or cervical spine findings.

## 2020-03-31 NOTE — EKG
Test Date:    2020-03-30               Test Time:    20:17:03

Technician:   ELI                                    

                                                     

MEASUREMENT RESULTS:                                       

Intervals:                                           

Rate:         80                                     

MN:                                                  

QRSD:         188                                    

QT:           452                                    

QTc:          521                                    

Axis:                                                

P:                                                   

MN:                                                  

QRS:          169                                    

T:            46                                     

                                                     

INTERPRETIVE STATEMENTS:                                       

                                                     

Ventricular-paced rhythm

Abnormal ECG

Compared to ECG 03/10/2015 16:41:18

Atrial fibrillation no longer present

Right bundle-branch block no longer present



Electronically Signed On 03-31-20 10:27:49 CDT by Jaya Zavala

## 2020-06-16 ENCOUNTER — HOSPITAL ENCOUNTER (INPATIENT)
Dept: HOSPITAL 97 - ER | Age: 85
LOS: 4 days | Discharge: HOME | DRG: 193 | End: 2020-06-20
Attending: HOSPITALIST | Admitting: HOSPITALIST
Payer: COMMERCIAL

## 2020-06-16 VITALS — BODY MASS INDEX: 20.7 KG/M2

## 2020-06-16 DIAGNOSIS — M11.261: ICD-10-CM

## 2020-06-16 DIAGNOSIS — I11.0: ICD-10-CM

## 2020-06-16 DIAGNOSIS — Z79.899: ICD-10-CM

## 2020-06-16 DIAGNOSIS — E03.9: ICD-10-CM

## 2020-06-16 DIAGNOSIS — Z95.1: ICD-10-CM

## 2020-06-16 DIAGNOSIS — E78.5: ICD-10-CM

## 2020-06-16 DIAGNOSIS — I50.23: ICD-10-CM

## 2020-06-16 DIAGNOSIS — Z87.891: ICD-10-CM

## 2020-06-16 DIAGNOSIS — Z95.810: ICD-10-CM

## 2020-06-16 DIAGNOSIS — Z79.01: ICD-10-CM

## 2020-06-16 DIAGNOSIS — R05: ICD-10-CM

## 2020-06-16 DIAGNOSIS — Z88.1: ICD-10-CM

## 2020-06-16 DIAGNOSIS — B95.1: ICD-10-CM

## 2020-06-16 DIAGNOSIS — J18.9: Primary | ICD-10-CM

## 2020-06-16 DIAGNOSIS — I25.10: ICD-10-CM

## 2020-06-16 DIAGNOSIS — I48.20: ICD-10-CM

## 2020-06-16 DIAGNOSIS — Z88.5: ICD-10-CM

## 2020-06-16 DIAGNOSIS — R06.02: ICD-10-CM

## 2020-06-16 DIAGNOSIS — Z79.890: ICD-10-CM

## 2020-06-16 DIAGNOSIS — Z85.89: ICD-10-CM

## 2020-06-16 DIAGNOSIS — Z20.828: ICD-10-CM

## 2020-06-16 LAB
ALBUMIN SERPL BCP-MCNC: 3.6 G/DL (ref 3.4–5)
ALP SERPL-CCNC: 90 U/L (ref 45–117)
ALT SERPL W P-5'-P-CCNC: 21 U/L (ref 12–78)
AMYLASE SERPL-CCNC: 34 U/L (ref 25–115)
AST SERPL W P-5'-P-CCNC: 20 U/L (ref 15–37)
BUN BLD-MCNC: 21 MG/DL (ref 7–18)
CKMB CREATINE KINASE MB: < 1 NG/ML (ref 0.3–3.6)
GLUCOSE SERPLBLD-MCNC: 136 MG/DL (ref 74–106)
HCT VFR BLD CALC: 39.5 % (ref 39.6–49)
INR BLD: 2.1
LIPASE SERPL-CCNC: 113 U/L (ref 73–393)
LYMPHOCYTES # SPEC AUTO: 1 K/UL (ref 0.7–4.9)
PMV BLD: 8.3 FL (ref 7.6–11.3)
POTASSIUM SERPL-SCNC: 4.3 MMOL/L (ref 3.5–5.1)
RBC # BLD: 4.27 M/UL (ref 4.33–5.43)
TROPONIN (EMERG DEPT USE ONLY): 0.03 NG/ML (ref 0–0.04)
UA COMPLETE W REFLEX CULTURE PNL UR: (no result)

## 2020-06-16 PROCEDURE — 85610 PROTHROMBIN TIME: CPT

## 2020-06-16 PROCEDURE — 99285 EMERGENCY DEPT VISIT HI MDM: CPT

## 2020-06-16 PROCEDURE — 80076 HEPATIC FUNCTION PANEL: CPT

## 2020-06-16 PROCEDURE — 84484 ASSAY OF TROPONIN QUANT: CPT

## 2020-06-16 PROCEDURE — 82553 CREATINE MB FRACTION: CPT

## 2020-06-16 PROCEDURE — 80202 ASSAY OF VANCOMYCIN: CPT

## 2020-06-16 PROCEDURE — 84100 ASSAY OF PHOSPHORUS: CPT

## 2020-06-16 PROCEDURE — 87205 SMEAR GRAM STAIN: CPT

## 2020-06-16 PROCEDURE — 87070 CULTURE OTHR SPECIMN AEROBIC: CPT

## 2020-06-16 PROCEDURE — 97530 THERAPEUTIC ACTIVITIES: CPT

## 2020-06-16 PROCEDURE — 87077 CULTURE AEROBIC IDENTIFY: CPT

## 2020-06-16 PROCEDURE — 80048 BASIC METABOLIC PNL TOTAL CA: CPT

## 2020-06-16 PROCEDURE — 71045 X-RAY EXAM CHEST 1 VIEW: CPT

## 2020-06-16 PROCEDURE — 89050 BODY FLUID CELL COUNT: CPT

## 2020-06-16 PROCEDURE — 36415 COLL VENOUS BLD VENIPUNCTURE: CPT

## 2020-06-16 PROCEDURE — 87186 SC STD MICRODIL/AGAR DIL: CPT

## 2020-06-16 PROCEDURE — 97161 PT EVAL LOW COMPLEX 20 MIN: CPT

## 2020-06-16 PROCEDURE — 87804 INFLUENZA ASSAY W/OPTIC: CPT

## 2020-06-16 PROCEDURE — 81015 MICROSCOPIC EXAM OF URINE: CPT

## 2020-06-16 PROCEDURE — 83605 ASSAY OF LACTIC ACID: CPT

## 2020-06-16 PROCEDURE — 71250 CT THORAX DX C-: CPT

## 2020-06-16 PROCEDURE — 87081 CULTURE SCREEN ONLY: CPT

## 2020-06-16 PROCEDURE — 97116 GAIT TRAINING THERAPY: CPT

## 2020-06-16 PROCEDURE — 93005 ELECTROCARDIOGRAM TRACING: CPT

## 2020-06-16 PROCEDURE — 86140 C-REACTIVE PROTEIN: CPT

## 2020-06-16 PROCEDURE — 94760 N-INVAS EAR/PLS OXIMETRY 1: CPT

## 2020-06-16 PROCEDURE — 82550 ASSAY OF CK (CPK): CPT

## 2020-06-16 PROCEDURE — 83880 ASSAY OF NATRIURETIC PEPTIDE: CPT

## 2020-06-16 PROCEDURE — 89060 EXAM SYNOVIAL FLUID CRYSTALS: CPT

## 2020-06-16 PROCEDURE — 85025 COMPLETE CBC W/AUTO DIFF WBC: CPT

## 2020-06-16 PROCEDURE — 80053 COMPREHEN METABOLIC PANEL: CPT

## 2020-06-16 PROCEDURE — 80061 LIPID PANEL: CPT

## 2020-06-16 PROCEDURE — 82150 ASSAY OF AMYLASE: CPT

## 2020-06-16 PROCEDURE — 81003 URINALYSIS AUTO W/O SCOPE: CPT

## 2020-06-16 PROCEDURE — 85652 RBC SED RATE AUTOMATED: CPT

## 2020-06-16 PROCEDURE — 93306 TTE W/DOPPLER COMPLETE: CPT

## 2020-06-16 PROCEDURE — 85730 THROMBOPLASTIN TIME PARTIAL: CPT

## 2020-06-16 PROCEDURE — 83735 ASSAY OF MAGNESIUM: CPT

## 2020-06-16 PROCEDURE — 84145 PROCALCITONIN (PCT): CPT

## 2020-06-16 PROCEDURE — 83690 ASSAY OF LIPASE: CPT

## 2020-06-16 PROCEDURE — 87040 BLOOD CULTURE FOR BACTERIA: CPT

## 2020-06-16 RX ADMIN — SODIUM CHLORIDE SCH MLS: 0.9 INJECTION, SOLUTION INTRAVENOUS at 21:39

## 2020-06-16 RX ADMIN — Medication SCH ML: at 08:15

## 2020-06-16 RX ADMIN — ACETAMINOPHEN PRN MG: 500 TABLET, FILM COATED ORAL at 21:39

## 2020-06-16 RX ADMIN — Medication SCH ML: at 21:41

## 2020-06-16 RX ADMIN — ENOXAPARIN SODIUM SCH MG: 40 INJECTION SUBCUTANEOUS at 08:14

## 2020-06-16 RX ADMIN — SODIUM CHLORIDE SCH MLS: 9 INJECTION, SOLUTION INTRAVENOUS at 08:14

## 2020-06-16 RX ADMIN — SODIUM CHLORIDE SCH MLS: 0.9 INJECTION, SOLUTION INTRAVENOUS at 08:14

## 2020-06-16 RX ADMIN — CEFTRIAXONE SCH MLS: 1 INJECTION, SOLUTION INTRAVENOUS at 17:21

## 2020-06-16 NOTE — RAD REPORT
EXAM DESCRIPTION:  RAD - Chest Single View - 6/16/2020 4:15 am

 

CLINICAL HISTORY:  CONGESTION, fever, cough

 

COMPARISON:  February 30, 2020 portable study, CT chest February 30

 

TECHNIQUE:  AP portable chest image was obtained 6/16/2020 4:15 am .

 

FINDINGS:  Pacemaker/defibrillator is in place. Sternotomy wires noted. Chronic interstitial opacific
ation present in both lung fields. Slightly increased alveolar opacification noted in the upper right
 lung field and in the mid left lung field. This is a change from prior imaging.

 

 Stable cardiomegaly noted. Vasculature is prominent. No pneumothorax or large pleural effusion. No a
cute bony abnormality seen. No acute aortic findings suspected.

 

IMPRESSION:  Suspected pneumonia in the mid left lung field and upper right lung field.

## 2020-06-16 NOTE — XMS REPORT
Continuity of Care Document

                            Created on:2020



Patient:TAMAR BREWSTER

Sex:Male

:1935

External Reference #:[id number]





Demographics







                          Address                   [address] [place] [address]

 

                          Home Phone                [phone]

 

                          Work Phone                [phone]

 

                          Mobile Phone              [phone]

 

                          Email Address             [email or web address]

 

                          Preferred Language        English

 

                          Marital Status            Unknown

 

                          Rastafari Affiliation     Unknown

 

                          Race                      Unknown

 

                          Additional Race(s)        Unavailable



                                                    White



                                                    White/



                                                    Unavailable

 

                          Ethnic Group              Unknown









Author







                          Organization              [facility]

t

 

                          Address                   [address] [place] [address] Dr. Toure [address] [place] [id number]

 

                          Phone                     [phone]









Support







                Name            Relationship    Address         Phone

 

                Rina          Spouse          Unavailable     [phone]









Care Team Providers







                    Name                Role                Phone

 

                    Asked,  Pcp         Primary Care Physician Unavailable

 

                    Maximiliano BLAKE          Attending Clinician Unavailable

 

                    REBECCA Willoughby MA       Attending Clinician Unavailable

 

                    Michael LÓPEZ             Attending Clinician [phone]

 

                    Toño RAZO            Attending Clinician Unavailable

 

                    Luke CROWE        Attending Clinician Unavailable

 

                    Kip LÓPEZ PhD,  S.    Attending Clinician [phone]

 

                    System MD,  Not In  Attending Clinician Unavailable

 

                    Antonio CROWE          Attending Clinician Unavailable

 

                    Aisha NP-C,  Marielenare Attending Clinician [phone]

 

                    Trell NP           Attending Clinician [phone]

 

                    DRU            Attending Clinician Unavailable

 

                    Loren Wasserman DO Attending Clinician [phone]

 

                    Carolin LÓPEZ         Attending Clinician [phone]

 

                    Jagruti LÓPEZ             Attending Clinician [phone]

 

                    Latrice Das MD   Attending Clinician [phone]

 

                    Rohan CROWE           Attending Clinician Unavailable

 

                    Matt Bright MD Attending Clinician [phone]

 

                    REGGIE England MD      Attending Clinician [phone]

 

                    Renate RN             Attending Clinician Unavailable

 

                    NOHELIA Ortez MD     Attending Clinician [phone]

 

                    POLLY Macedo       Attending Clinician [phone]

 

                    [place]           Attending Clinician Unavailable

 

                    RAIZA             Attending Clinician Unavailable

 

                    Case              Attending Clinician [phone]

 

                    Paul De Dios     Attending Clinician [phone]

 

                    Chente Gonzalez Jr    Attending Clinician [phone]

 

                    Physician,  Associated Attending Clinician Unavailable

 

                    CAROLIN            Admitting Clinician Unavailable

 

                    TOMÁS              Admitting Clinician Unavailable

 

                    Cristhian Lu        Admitting Clinician [phone]

 

                    Chente Gonzalez Jr    Admitting Clinician [phone]









Payers







           Payer Name Policy Type Policy Number Effective Date Expiration Date YOAN quarles

 

           AETNA                 xxxxxxxx   2014            [place]



           MEDICAREAETNA                       00:00:00              [place]



           MEDICARE HMO/PPO                                             



           [facility]                                             



           4-PresentHMO                                             







Problems







       Condition Condition Condition Status Onset  Resolution Last   Treating Co

mments 

Source



       Name   Details Category        Date   Date   Treatment Clinician        



                                                 Date                 

 

       Rhinovirus Rhinovirus Disease Active 2019                             [facility]

[place]



       infection infection               0-16                               [facility]

rome



                                   00:00:                             st



                                   00                                 

 

       Spondylosi Spondylosi Disease Active                              [facility]

[place]



       s of   s of                                                Methodi



       cervical cervical               00:00:                             st



       region region               00                                 



       without without                                                  



       myelopathy myelopathy                                                  



       or     or                                                      



       radiculopa radiculopa                                                  



       thy    thy                                                     

 

       Cervical Cervical Disease Active                              [facility]

on



       arthritis arthritis               5-20                               [facility]

rome



                                   00:00:                             st



                                   00                                 

 

       Neck pain Neck pain Disease Active                       Overview: 

[place]



                                   516                        Patient Methodi



                                   00:00:                      states st



                                   00                          that he 



                                                               was    



                                                               holding 



                                                               something 



                                                               on his 



                                                               head that 



                                                               fell a 



                                                               little 



                                                               bit and 



                                                               applied 



                                                               more   



                                                               sudden 



                                                               pressure 



                                                               a few  



                                                               months 



                                                               back. He 



                                                               didn't 



                                                               think  



                                                               much of 



                                                               it, but 



                                                               has been 



                                                               having 



                                                               neck pain 



                                                               since, 



                                                               and is 



                                                               now    



                                                               complaini 



                                                               ng of  



                                                               pain and 



                                                               stiffness 



                                                               with   



                                                               significa 



                                                               ntly   



                                                               reduced 



                                                               range of 



                                                               motion of 



                                                               the    



                                                               neck.Has 



                                                               tried  



                                                               tylenol 



                                                               for pain 



                                                               which  



                                                               helps him 



                                                               to rest. 



                                                               States he 



                                                               ignores 



                                                               the pain 



                                                               during 



                                                               the day. 



                                                               Patient 



                                                               denies 



                                                               numbness 



                                                               or     



                                                               tingling 



                                                               in the 



                                                               hands. 



                                                               Has some 



                                                               in the L 



                                                               leg, but 



                                                               states it 



                                                               did not 



                                                               start  



                                                               after the 



                                                               accident. 

 

       Complete Complete Disease Active                              [facility]

on



       heart  heart                                               Method[facility]



       block  block                00:00:                             st



       (HCC) s/p (HCC) s/p               00                                 



       AVN    AVN                                                     



       ablation ablation                                                  



       3/29/2019 3/29/2019                                                  

 

       CRICOPHARY        Diagnosis Active 2019              

 [facility]



       LNGEAL                                13:57:00               l



       DYSPHAGIA,                      00:00:                             Damaso

n



       CRICOPHARY CRICOPHARY               00                                 



       NG     LNGEAL                                                  



              DYSPHAGIA,                                                  



              CRICOPHARY                                                  



              NG                                                      



                                                                      



                  Active                                                  



                                                                      



                                                                      



               04/24/201                                                  



              9                                                       



                                                                      



                                                                      



                                                                      



                                                                      



                                                                      



                                                                      



                                                                      



                                                                      



              [facility]                                                  



                                                                      



                                                                      

 

       Rhinorrhea Rhinorrhea Disease Active 2018                      Overview

: [place]



                                   1-15                        Patient Methodi



                                   00:00:                      has some st



                                   00                          clear  



                                                               drainage 



                                                               from the 



                                                               nose Says 



                                                               he's not 



                                                               overly 



                                                               worried 



                                                               about it, 



                                                               but    



                                                               wanted to 



                                                               bring it 



                                                               up.Last 



                                                               Assessmen 



                                                               t & Plan: 



                                                               Can try 



                                                               flonase 



                                                               topically 



                                                               If not 



                                                               helpful, 



                                                               can try 



                                                               atrovent 



                                                               for    



                                                               possible 



                                                               vasomotor 



                                                               rhinitis 

 

       Biventricu Biventricu Disease Active                              H

ton[facility]



       lar    lar                  5-08                               Methodi



       implantabl implantabl               00:00:                             st



       e      e                    00                                 



       cardiovert cardiovert                                                  



       er-defibri er-defibri                                                  



       llator llator                                                  



       (BSci, (BSci,                                                  



       upgraded upgraded                                                  



       2015                                                  



       by Dr Shin by Dr Shin                                                  



       from dual from dual                                                  



       ICD by ICD by                                                  



       Adithya Law)                                                  

 

       Abnormal Abnormal Disease Active 2017                             [facility]

on



       findings findings                                              Method

i



       on     on                   00:00:                             st



       diagnostic diagnostic               00                                 



       imaging of imaging of                                                  



       other  other                                                   



       abdominal abdominal                                                  



       regions, regions,                                                  



       including including                                                  



       retroperit retroperit                                                  



       oneum  oneum                                                   

 

       Acute on Acute on Disease Active 2017                             [facility]

on



       chronic chronic                                              [facility]



       combined combined               00:00:                             st



       systolic systolic               00                                 



       and    and                                                     



       diastolic diastolic                                                  



       congestive congestive                                                  



       heart  heart                                                   



       failure failure                                                  

 

       Atrial Atrial Disease Active 2017                             [place]



       tachycardi tachycardi                                              Me

thodi



       a      a                    00:00:                             st



                                   00                                 

 

       Gynecomast Gynecomast Disease Active 2017                      Overview

: [place]



       ia     ia                   0-25                        Normal Methodi



                                   00:00:                      breast st



                                   00                          tissue on 



                                                               mammogram 



                                                               2017 

 

       OTHER         Diagnosis Active 2017               [facility]

oria



                                   3-20          23:00:00               l



                OTHER               00:00:                             [place]



                                   00                                 



                                                                      



              Active                                                  



                                                                      



                                                                      



              2017                                                  



                                                                      



                                                                      



                                                                      



                                                                      



                                                                      



                                                                      



                                                                      



                                                                      



                     [facility]                                                  



                                                                      



                                                                      

 

       DEGLOVING        Diagnosis Active 2017               

[facility]



       INJURY R                      3-20          21:51:00               l



       HAND                        00:00:                             [place]



              DEGLOVING               00                                 



              INJURY R                                                  



              HAND                                                    



                                                                      



                                                                      



              Active                                                  



                                                                      



                                                                      



              2017                                                  



                                                                      



                                                                      



                                                                      



                                                                      



                                                                      



                                                                      



                                                                      



                                                                      



                     [facility]                                                  



                                                                      



                                                                      

 

       Ischemic Ischemic Disease Active 2016                             [facility]

on



       congestive congestive                                              Me

thodi



       cardiomyop cardiomyop               00:00:                             st



       athy   athy                 00                                 

 

       Mitral and Mitral and Disease Active 2016                             [facility]

[place]



       aortic aortic                                              Methodi



       incompeten incompeten               00:00:                             st



       ce     ce                   00                                 

 

       Ventricula Ventricula Disease Active 2016                             [facility]

[place]



       r      r                                                   Methodi



       tachycardi tachycardi               00:00:                             st



       a      a                    00                                 

 

       FOLLOW UP        Diagnosis Active 2016-0        2016-10-21               

[facility]



                                             09:15:00               l



                FOLLOW               00:00:                             [place]



              UP                   00                                 



                                                                      



                  Active                                                  



                                                                      



                                                                      



                                                                 



              6                                                       



                                                                      



                                                                      



                                                                      



                                                                      



                                                                      



                                                                      



                                                                      



                                                                      



              [facility]                                                  



                                                                      



                                                                      

 

       FOLLOW UP        Diagnosis Active 2016               

[facility]



       ***GTUBE                                15:33:00               l



       REMOVAL***   FOLLOW               00:00:                             Herm

vincent



              UP                   00                                 



              ***GTUBE                                                  



              REMOVAL***                                                  



                                                                      



                                                                      



               Active                                                  



                                                                      



                                                                      



              2016                                                  



                                                                      



                                                                      



                                                                      



                                                                      



                                                                      



                                                                      



                                                                      



                                                                      



                     [facility]                                                  



                                                                      



                                                                      

 

       BDDC - F/U        Diagnosis Active 2016              

 [facility]



                                             09:20:00               l



                BDDC -               00:00:                             [place]



              F/U                  00                                 



                                                                      



                                                                      



              Active                                                  



                                                                      



                                                                      



              2016                                                  



                                                                      



                                                                      



                                                                      



                                                                      



                                                                      



                                                                      



                                                                      



                                                                      



                     [facility]                                                  



                                                                      



                                                                      

 

       CRICOPHARY        Diagnosis Active 2016              

 [facility]



       NGEAL                       -          14:15:00               l



       SPASM                       00:00:                             [place]



              CRICOPHARY               00                                 



              NGEAL                                                   



              SPASM                                                   



                                                                      



                                                                      



              Active                                                  



                                                                      



                                                                      



              2016                                                  



                                                                      



                                                                      



                                                                      



                                                                      



                                                                      



                                                                      



                                                                      



                                                                      



                     [facility]                                                  



                                                                      



                                                                      

 

       Benign Benign Disease Active                       Overview: [place]

n



       non-nodula non-nodula               5-                        Previousl

 Methodi



       r      r                    00:00:                      y had  st



       prostatic prostatic               00                          some   



       hyperplasi hyperplasi                                           urinary 



       a with a with                                           retention 



       lower  lower                                            ; patient 



       urinary urinary                                           says this 



       tract  tract                                            has    



       symptoms symptoms                                           improved. 



                                                               Not using 



                                                               a      



                                                               catheter, 



                                                               no     



                                                               frequent 



                                                               infection 



                                                               s.Saw Dr. Pinzon 



                                                               2017; 



                                                               currently 



                                                               not    



                                                               taking 



                                                               the    



                                                               medicatio 



                                                               ns. In 



                                                               regard to 



                                                               flomax, 



                                                               has had 



                                                               some   



                                                               orthostat 



                                                               ic     



                                                               dizziness 



                                                               in the 



                                                               past, so 



                                                               this may 



                                                               be     



                                                               limiting. 



                                                               Last   



                                                               Assessmen 



                                                               t & Plan: 



                                                               Continue 



                                                               to     



                                                               monitor. 

 

       R10.84 -        Diagnosis Active 2016               M

[facility]



       GENERALIZE                      3-          16:19:00               l



       D        R10.84 -               00:01:                             [facility]

n



       ABDOMINAL GENERALIZE               00                                 



       PAIN R60. D                                                       



              ABDOMINAL                                                  



              PAIN R60.                                                  



                                                                      



                                                                      



              Active                                                  



                                                                      



                                                                      



              2016                                                  



                                                                      



                                                                      



                                                                      



                                                                      



                                                                      



                                                                      



                                                                      



                                                                      



                     [facility]        Diagnosis Active 2016               M

[facility]



       F/U                         -          13:40:00               l



                HOSPITAL               00:00:                             [facility]

n



              F/U                  00                                 



                                                                      



                                                                      



              Active                                                  



                                                                      



                                                                      



              2016                                                  



                                                                      



                                                                      



                                                                      



                                                                      



                                                                      



                                                                      



                                                                      



                                                                      



                     [facility]                                                  



                                                                      



                                                                      

 

       DYSPHAGIA        Diagnosis Active 2016               

[facility]



       WITH                        2-          22:03:00               l



       ASPIRATION                      00:00:                             [facility]

n



              DYSPHAGIA               00                                 



              WITH                                                    



              ASPIRATION                                                  



                                                                      



                                                                      



               Active                                                  



                                                                      



                                                                      



              2016                                                  



                                                                      



                                                                      



                                                                      



                                                                      



                                                                      



                                                                      



                                                                      



                                                                      



                     [facility]                                                  



                                                                      



                                                                      

 

       DYSPHAGIA        Diagnosis Active 2016               

[facility]



                                   2-04          11:28:00               l



                                   00:00:                             [facility]



              DYSPHAGIA               00                                 



                                                                      



                                                                      



              Active                                                  



                                                                      



                                                                      



              2016                                                  



                                                                      



                                                                      



                                                                      



                                                                      



                                                                      



                                                                      



                                                                      



                                                                      



                     [facility]                                                  



                                                                      



                                                                      

 

       DSU-DYSPHA        Diagnosis Active 2016              

 [facility]



       SERENITY                         2-02          12:30:00               l



                                   00:00:                             [facility]



              DSU-DYSPHA               00                                 



              SERENITY                                                     



                                                                      



                                                                      



              Active                                                  



                                                                      



                                                                      



              2016                                                  



                                                                      



                                                                      



                                                                      



                                                                      



                                                                      



                                                                      



                                                                      



                                                                      



                     [facility]                                                  



                                                                      



                                                                      

 

       R13.10 -        Diagnosis Active 2016               M

[facility]



       "DYSPHAGIA                      -          16:05:00               l



       ,        R13.10 -               00:01:                             [facility] tirado



       UNSPECIFIE "DYSPHAGIA               00                                 



       D"     ,                                                       



              UNSPECIFIE                                                  



              D"                                                      



                                                                      



                  Active                                                  



                                                                      



                                                                      



                                                                 



              6                                                       



                                                                      



                                                                      



                                                                      



                                                                      



                                                                      



                                                                      



                                                                      



                                                                      



               OPID                                                  



              [facility]                                                  



                                                                      



                                                                      

 

       BDDC-DYSPH        Diagnosis Active 2016              

 [facility]



       AGIA                        -          15:50:00               l



                                   00:00:                             [facility]



              BDDC-DYSPH               00                                 



              AGIA                                                    



                                                                      



                                                                      



              Active                                                  



                                                                      



                                                                      



              2016                                                  



                                                                      



                                                                      



                                                                      



                                                                      



                                                                      



                                                                      



                                                                      



                                                                      



                     [facility]                                                  



                                                                      



                                                                      

 

       SWALLOWING        Diagnosis Active 2016              

 [facility]



       ISSUE                       1-15          09:03:00               l



                                   00:00:                             [facility]



              SWALLOWING               00                                 



              ISSUE                                                   



                                                                      



                                                                      



              Active                                                  



                                                                      



                                                                      



              01/15/2016                                                  



                                                                      



                                                                      



                                                                      



                                                                      



                                                                      



                                                                      



                                                                      



                                                                      



                     [facility]                                                  



                                                                      



                                                                      

 

       WT. GAIN        Diagnosis Active 2015-1        2015-12-10               M

[facility]



                                             12:17:00               l



                WT. GAIN               00:00:                             [facility]aso tirado



                                   00                                 



                                                                      



               Active                                                  



                                                                      



                                                                      



              2015                                                  



                                                                      



                                                                      



                                                                      



                                                                      



                                                                      



                                                                      



                                                                      



                                                                      



                     [facility]                                                    



                                                                      



                                                                      

 

       783.21 -        Diagnosis Active 2013               M

[facility]



       ABNORMAL                      3-          19:28:00               l



       LOSS O   783.21 -               00:01:                             Damaso tirado



       478.2 - ABNORMAL               00                                 



       DISEASE LOSS O                                                  



              478.2 -                                                  



              DISEASE                                                  



                                                                      



                                                                      



              Active                                                  



                                                                      



                                                                      



              2013                                                  



                                                                      



                                                                      



                                                                      



                                                                      



                                                                      



                                                                      



                                                                      



                                                                      



                                                                       



              OPID Sugar                                                  



              [place]                                                    



                                                                      



                                                                      

 

       ABNORMAL        Diagnosis Active 2013               [facility]

[facility]



       WEIGHT                      3-14          15:16:00               l



       LOSS     ABNORMAL               00:00:                             Damaso tirado



              WEIGHT               00                                 



              LOSS                                                    



                                                                      



                                                                      



              Active                                                  



                                                                      



                                                                      



              2013                                                  



                                                                      



                                                                      



                                                                      



                                                                      



                                                                      



                                                                      



                                                                      



                                                                      



                     [facility]                                                  



                                                                      



                                                                      

 

       478.29 -        Diagnosis Active 2013               [facility]

[facility]



       DISEASE OF                      3-13          18:37:00               l



       PHAR     478.29 -               00:01:                             Damaso tirado



              DISEASE OF               00                                 



              PHAR                                                    



                                                                      



                                                                      



              Active                                                  



                                                                      



                                                                      



              2013                                                  



                                                                      



                                                                      



                                                                      



                                                                      



                                                                      



                                                                      



                                                                      



                                                                      



                                                                       



              OPID                                                    



              [facility]                                                  



                                                                      



                                                                      

 

       13,        Diagnosis Active 2013               

[facility]



       ESCOGSCOPY                      2-          09:14:00               l



                                   00:00:                             [place]



              13,               00                                 



              ESCOGSCOPY                                                  



                                                                      



                                                                      



               Active                                                  



                                                                      



                                                                      



              2013                                                  



                                                                      



                                                                      



                                                                      



                                                                      



                                                                      



                                                                      



                                                                      



                                                                      



                     [facility]                                                  



                                                                      



                                                                      

 

       DYSPHAGIA,        Diagnosis Active 2013              

 [facility]



       ICD.9-787.                      2-19          07:26:00               l



       2                           00:00:                             C[facility]



              DYSPHAGIA,               00                                 



              ICD.9-787.                                                  



              2                                                       



                                                                      



                 Active                                                  



                                                                      



                                                                      



              2013                                                  



                                                                      



                                                                      



                                                                      



                                                                      



                                                                      



                                                                      



                                                                      



                                                                      



                     [facility]                                                  



                                                                      



                                                                      

 

       787.20/478        Diagnosis Active 2013              

 [facility]



       .29                         -          10:53:00               l



                                   00:00:                             [facility]



              787.20/478               00                                 



              .29                                                     



                                                                      



                                                                      



              Active                                                  



                                                                      



                                                                      



              2013                                                  



                                                                      



                                                                      



                                                                      



                                                                      



                                                                      



                                                                      



                                                                      



                                                                      



                     [facility]                                                  



                                                                      



                                                                      

 

       DDC/          Diagnosis Active 2012               [facility]

[facility]



       DYSPHAGIA                      0          10:10:00               l



                DDC/               00:00:                             [facility]



              DYSPHAGIA               00                                 



                                                                      



                                                                      



              Active                                                  



                                                                      



                                                                      



              10/30/2012                                                  



                                                                      



                                                                      



                                                                      



                                                                      



                                                                      



                                                                      



                                                                      



                                                                      



                     [facility]                                                  



                                                                      



                                                                      

 

       RUQ PAIN        Diagnosis Active 2012               [facility]

[facility]



                                             07:00:00               l



                RUQ PAIN               00:00:                             Damaso

n



                                   00                                 



                                                                      



               Active                                                  



                                                                      



                                                                      



              2012                                                  



                                                                      



                                                                      



                                                                      



                                                                      



                                                                      



                                                                      



                                                                      



                                                                      



                     [facility]                                                  



                                                                      



                                                                      

 

       NEW CLINIC        Diagnosis Active 2012              

 [facility]



       VISIT                                 14:37:00               l



                NEW                00:00:                             [facility]



              CLINIC               00                                 



              VISIT                                                   



                                                                      



                                                                      



              Active                                                  



                                                                      



                                                                      



              2012                                                  



                                                                      



                                                                      



                                                                      



                                                                      



                                                                      



                                                                      



                                                                      



                                                                      



                     [facility]                                                  



                                                                      



                                                                      

 

       DDC- NEW        Diagnosis Active 2012               [facility]

[facility]



       CLINIC                                14:43:00               l



       VISIT    DDC- NEW               00:00:                             Damaso

n



              CLINIC               00                                 



              VISIT                                                   



                                                                      



                                                                      



              Active                                                  



                                                                      



                                                                      



              2012                                                  



                                                                      



                                                                      



                                                                      



                                                                      



                                                                      



                                                                      



                                                                      



                                                                      



                     [facility]                                                  



                                                                      



                                                                      

 

       RIGHT         Diagnosis Active 2012               [facility]

[facility]



       UPPER                                 10:26:00               l



       QUADRANT   RIGHT               00:00:                             [facility]



       PAIN   UPPER                00                                 



              QUADRANT                                                  



              PAIN                                                    



                                                                      



                                                                      



              Active                                                  



                                                                      



                                                                      



              2012                                                  



                                                                      



                                                                      



                                                                      



                                                                      



                                                                      



                                                                      



                                                                      



                                                                      



                     [facility]                                                  



                                                                      



                                                                      

 

       Methicilli        Problem Active 2013               [facility]

[facility]



       n                           0-14          21:41:32               l



       resistant                      00:00:                             [facility]



       Staphyloco Methicilli               00                                 



       ccus   n                                                       



       aureus resistant                                                  



              Staphyloco                                                  



              ccus                                                    



              aureus                                                  



                                                                      



                                                                      



              Active                                                  



                                                                      



                                                                      



              10/14/2009                                                  



                                                                      



                                                                      



               Problem                                                  



                                                                      



                                                                      



              2013                                                  



                                                                      



                                                                      



               1Problem                                                  



              added by                                                  



              Discern                                                  



              Expert.                                                  



                                                                      



                                                                      



              [facility],                                                  



              ALEX [facility]                                                  



                                                                      



                                                                      

 

       Methicilli        Problem Active 2009-1        2016               [facility]

[facility]



       n                           0-14          01:44:40               l



       resistant                      00:00:                             [facility]



       Staphyloco Methicilli               00                                 



       ccus   n                                                       



       aureus resistant                                                  



       (organism) Staphyloco                                                  



              ccus                                                    



              aureus                                                  



              (organism)                                                  



                                                                      



                                                                      



               Active                                                  



                                                                      



                                                                      



              10/14/2009                                                  



                                                                      



                                                                      



               Problem                                                  



                                                                      



                                                                      



              2016                                                  



                                                                      



                                                                      



               Problem                                                  



              added by                                                  



              Discern                                                  



              Expert.                                                  



                                                                      



                                                                      



              [facility]                                                    



                                                                      



                                                                      

 

       History of History of Problem Resolve                                    

Univers



       essential essential        d                                         ity 

of



       hypertensi hypertens[place]                                                  [place]



       on     on                                                      Physici



                                                                      ans

 

       History of History of Problem Resolve                                    

Univers



       hepatitis hepatitis        d                                         ity 

of



                                                                      [place]



                                                                      Physici



                                                                      ans

 

       History of History of Problem Resolve                                    

Univers



       Peptic Peptic        d                                         ity of



       Ulcer  Ulcer                                                   [place]



                                                                      [facility]ici



                                                                      ans

 

       History of History of Problem Resolve                                    

Univers



       Tongue Tongue        d                                         ity of



       Neoplasm Neoplasm                                                  [place]



       Of The Of The                                                  Physici



       Base   Base                                                    ans

 

       CABG   CABG   Problem Active                                    [facility]



                                                                      ity of



                                                                      [place]



                                                                      [facility]



                                                                      ans

 

       History of History of Problem Active                                    U

nivers



       Cardiovert Cardiovert                                                  it

y of



       er-defibri er-defibri                                                  [place]

[place]



       llator llator                                                  Physici



       Pulse  Pulse                                                   ans



       Generator Generator                                                  



       Insertion Insertion                                                  



       With Leads With Leads                                                  

 

       Abnormal Abnormal Problem Active                                    Unive

rs



       weight weight                                                  ity of



       loss   loss                                                    [place]



                                                                      [facility]



                                                                      ans

 

       Abdominal Abdominal Problem Active                                    Uni

vers



       pain   pain                                                    ity of



                                                                      [place]



                                                                      [facility]



                                                                      ans

 

       Atrial Atrial Problem Active                                    [facility]



       fibrillati fibrillati                                                  it

y of



       on     on                                                      [place]



                                                                      [facility]



                                                                      ans

 

       Ventricula Ventricula Problem Active                                    U

nivers



       r      r                                                       ity of



       fibrillati fibrillati                                                  [place]

[place]



       on     on                                                      Physici



                                                                      ans

 

       Essential Essential Problem Active                                    Uni

vers



       (primary) (primary)                                                  ity 

of



       hypertensi hypertensi                                                  [place]

[place]



       on     on                                                      Physici



                                                                      ans

 

       Abnormal Abnormal Problem Active                                    Unive

rs



       findings findings                                                  ity of



       on     on                                                      [place]



       diagnostic diagnostic                                                  Ph

ysici



       imaging of imaging of                                                  an

s



       abdomen abdomen                                                  

 

       Malignant Malignant Problem Active                                    Uni

vers



       neoplasm neoplasm                                                  ity of



       of larynx of larynx                                                  [place]

[facility]



                                                                      [facility]



                                                                      ans

 

       Late   Late   Problem Active                                    [facility]



       effect of effect of                                                  ity 

of



       radiation radiation                                                  [place]

[facility]



                                                                      [facility]



                                                                      ans

 

       Esophageal Esophageal Problem Active                                    U

nivers



       dysmotilit dysmotilit                                                  it

y of



       y      y                                                       [place]



                                                                      [facility]



                                                                      ans

 

       Cricophary Cricophary Problem Active                                    U

nivers



       ngeal  ngeal                                                   ity of



       spasm  spasm                                                   [place]



                                                                      [facility]



                                                                      ans

 

       Cricophary Cricophary Problem Active                                    U

nivers



       ngeal  ngeal                                                   ity of



       achalasia achalasia                                                  [place]

[facility]



                                                                      [facility]



                                                                      ans

 

       Status Status Problem Active                                    [facility]



       post full post full                                                  ity 

of



       thickness thickness                                                  [place]

[place]



       skin graft skin graft                                                  Ph

ysici



                                                                      ans

 

       Complicate Complicate Problem Active                                    U

nivers



       d      d                                                       ity of



       laceration laceration                                                  [place]

[place]



       of hand, of hand,                                                  Physic

i



       right, right,                                                  ans



       subsequent subsequent                                                  



       encounter encounter                                                  

 

       Deep vein Deep vein Problem Active                                    Uni

vers



       thrombosis thrombosis                                                  it

y of



       (DVT)  (DVT)                                                   [place]



                                                                      [facility]



                                                                      ans

 

       Ischemic Ischemic Problem Active                                    Unive

rs



       cardiomyop cardiomyop                                                  it

y of



       athy   athy                                                    [place]



                                                                      [facility]



                                                                      ans

 

       History of History of Problem Active                                    U

nivers



       cardiac cardiac                                                  ity of



       arrest arrest                                                  [place]



                                                                      [facility]



                                                                      ans

 

       Dysphagia, Dysphagia, Problem Active                                    U

nivers



       pharyngeal pharyngeal                                                  it

y of



       phase  phase                                                   [place]



                                                                      [facility]



                                                                      ans

 

       Congestive Congestive Disease Active                                    [facility]

[place]



       heart  heart                                                   Methodi



       failure failure                                                  st

 

       Persistent Persistent Disease Active                                    [facility]

[place]



       atrial atrial                                                  Methodi



       fibrillati fibrillati                                                  st



       on     on                                                      

 

       On     On     Disease Active                             Overview: [place]

n



       continuous continuous                                           now on Me

thodi



       oral   oral                                             warfarin; st



       anticoagul anticoagul                                           changed 



       ation  ation                                            from   



                                                               elaquisFo 



                                                               llowed by 



                                                               cardiolog 



                                                               y      

 

       Hearing Hearing Disease Active                             Overview: [place]

[place]



       loss   loss                                             with   Methodi



                                                               hearing st



                                                               aides  

 

       Atrial        Problem Inactiv               2013               [facility]

saroj



       fibrillati               e                    21:41:32               l



       on       Atrial                                                  [place]



              fibrillati                                                  



              on                                                      



                                                                      



                                                                      



              Inactive                                                  



                                                                      



                                                                      



                                                                      



                                                                      



               Problem                                                  



                                                                      



                                                                      



              2013                                                  



                                                                      



                                                                      



                                                                      



                                                                      



                 [facility]                                                  



                                                                      



                                                                      

 

       CAD -         Problem Resolve               2013               [facility]

saroj



       Coronary               d                    21:41:32               l



       artery   CAD -                                                  [place]



       disease Coronary                                                  



              artery                                                  



              disease                                                  



                                                                      



                                                                      



              Resolved                                                  



                                                                      



                                                                      



                                                                      



                                                                      



               Problem                                                  



                                                                      



                                                                      



              2013                                                  



                                                                      



                                                                      



                                                                      



                                                                      



                 [facility]                                                  



                                                                      



                                                                      

 

       Depression        Problem Resolve               2013               

[place]



                            d                    21:41:32               l



                                                                      [place]



              Depression                                                  



                                                                      



                                                                      



               Resolved                                                  



                                                                      



                                                                      



                                                                      



                                                                      



                Problem                                                  



                                                                      



                                                                      



              2013                                                  



                                                                      



                                                                      



                                                                      



                                                                      



                 [facility]                                                  



                                                                      



                                                                      

 

       GERD -        Problem Resolve               2013               [facility]

saroj



       Gastro-eso               d                    21:41:32               l



       phageal   GERD -                                                  [place]



       reflux Gastro-eso                                                  



       disease phageal                                                  



              reflux                                                  



              disease                                                  



                                                                      



                                                                      



              Resolved                                                  



                                                                      



                                                                      



                                                                      



                                                                      



               Problem                                                  



                                                                      



                                                                      



              2013                                                  



                                                                      



                                                                      



                                                                      



                                                                      



                 [facility]                                                  



                                                                      



                                                                      

 

       Hepatitis        Problem Resolve               2013               M

[place]



       C                    d                    21:41:32               l



                                                                      [place]



              Hepatitis                                                  



              C                                                       



                                                                      



                                                                      



              Resolved                                                  



                                                                      



                                                                      



                                                                      



                                                                      



               Problem                                                  



                                                                      



                                                                      



              2013                                                  



                                                                      



                                                                      



                                                                      



                                                                      



                 [facility]                                                  



                                                                      



                                                                      

 

       HLD -         Problem Resolve               2013               [facility]

saroj



       Hyperlipid               d                    21:41:32               l



       emia     HLD -                                                  [place]



              Hyperlipid                                                  



              emia                                                    



                                                                      



                                                                      



              Resolved                                                  



                                                                      



                                                                      



                                                                      



                                                                      



               Problem                                                  



                                                                      



                                                                      



              2013                                                  



                                                                      



                                                                      



                                                                      



                                                                      



                 [facility]                                                  



                                                                      



                                                                      

 

       HTN -         Problem Resolve               2013               [facility]

saroj



       Hypertensi               d                    21:41:32               l



       on       HTN -                                                  [place]



              Hypertensi                                                  



              on                                                      



                                                                      



                                                                      



              Resolved                                                  



                                                                      



                                                                      



                                                                      



                                                                      



               Problem                                                  



                                                                      



                                                                      



              2013                                                  



                                                                      



                                                                      



                                                                      



                                                                      



                 [facility]                                                  



                                                                      



                                                                      

 

       Tongue        Problem Resolve               2013               [facility]

saroj



       carcinoma               d                    21:41:32               l



                Tongue                                                  [place]



              carcinoma                                                  



                                                                      



                                                                      



              Resolved                                                  



                                                                      



                                                                      



                                                                      



                                                                      



               Problem                                                  



                                                                      



                                                                      



              2013                                                  



                                                                      



                                                                      



                                                                      



                                                                      



                 [facility]                                                  



                                                                      



                                                                      

 

       Other         Problem Resolve               2019               Surendra

saroj



       (qualifier               d                    22:05:33               l



       value)   Other                                                  [place]



              (qualifier                                                  



              value)                                                  



                                                                      



                                                                      



              Resolved                                                  



                                                                      



                                                                      



                                                                      



                                                                      



               Problem                                                  



                                                                      



                                                                      



              2019                                                  



                                                                      



                                                                      



                                                                    



              arrest-                                                  



              with AICD                                                  



                                                                      



                                                                      



              [facility],                                                  



              ALEX Bustamante                                                  



                                                                      



                                                                      

 

       Pulmonary        Problem Resolve               2019               M

emoria



       hypertensi               d                    22:05:33               l



       on                                                             Caleb



       (disorder) Pulmonary                                                  



              hypertensi                                                  



              on                                                      



              (disorder)                                                  



                                                                      



                                                                      



               Resolved                                                  



                                                                      



                                                                      



                                                                      



                                                                      



                Problem                                                  



                                                                      



                                                                      



              2019                                                  



                                                                      



                                                                      



               Diagnosed                                                  



              in 2016                                                    



                                                                      



                    [facility]                                                  



              ALEX Bustamante                                                  



                                                                      



                                                                      

 

       Tongue        Problem Resolve               2019               Surendra

saroj



       carcinoma               d                    22:05:33               l



       (disorder)   Tongue                                                  Herm

vincent



              carcinoma                                                  



              (disorder)                                                  



                                                                      



                                                                      



               Resolved                                                  



                                                                      



                                                                      



                                                                      



                                                                      



                Problem                                                  



                                                                      



                                                                      



              2019                                                  



                                                                      



                                                                      



                                                                      



                                                                      



                 [facility]                                                  



              ALEX Bustamante,[facility]                                                    



                                                                      



                                                                      

 

       Viral         Problem Resolve               2016               Surendra

saroj



       hepatitis               d                    02:01:31               l



       C        Viral                                                  [place]



       (disorder) hepatitis                                                  



              C                                                       



              (disorder)                                                  



                                                                      



                                                                      



               Resolved                                                  



                                                                      



                                                                      



                                                                      



                                                                      



                Problem                                                  



                                                                      



                                                                      



              2016                                                  



                                                                      



                                                                      



                                                                      



                                                                      



                 [facility]                                                    



                                                                      



                                                                      

 

       Hyperlipid        Problem Active               2013               M

emoria



       emia                                      21:41:32               l



                                                                      Caleb



              Hyperlipid                                                  



              emia                                                    



                                                                      



                                                                      



              Active                                                  



                                                                      



                                                                      



                                                                      



                                                                      



              Problem                                                  



                                                                      



                                                                      



              2013                                                  



                                                                      



                                                                      



                                                                      



                                                                      



                 [facility]                                                  



                                                                      



                                                                      

 

       Hypertensi        Problem Active               2013               M

emoria



       on                                        21:41:32               l



                                                                      [place]



              Hypertensi                                                  



              on                                                      



                                                                      



                  Active                                                  



                                                                      



                                                                      



                                                                      



                                                                      



                 Problem                                                  



                                                                      



                                                                      



                                                                 



              3                                                       



                                                                      



                                                                      



                                                                      



                   [facility]                                                  



                                                                      



                                                                      

 

       Hypothyroi        Problem Active               2013               M

emoria



       dism                                      21:41:32               l



                                                                      [place]



              Hypothyroi                                                  



              dism                                                    



                                                                      



                                                                      



              Active                                                  



                                                                      



                                                                      



                                                                      



                                                                      



              Problem                                                  



                                                                      



                                                                      



              2013                                                  



                                                                      



                                                                      



                                                                      



                                                                      



                 [facility]                                                  



                                                                      



                                                                      

 

       Coronary        Problem Active               2019               Mem

oria



       arterioscl                                    22:05:33               l



       erosis   Coronary                                                  Damaso

n



       (disorder) arterioscl                                                  



              erosis                                                  



              (disorder)                                                  



                                                                      



                                                                      



               Active                                                  



                                                                      



                                                                      



                                                                      



                                                                      



              Problem                                                  



                                                                      



                                                                      



              2019                                                  



                                                                      



                                                                      



                                                                      



                                                                      



                 [facility]                                                  



              [facility]                                                    



                                                                      



                                                                      

 

       Gastroesop        Problem Active               2019               M

emoria



       hageal                                    22:05:33               l



       reflux                                                         Caleb



       disease Gastroesop                                                  



       (disorder) hageal                                                  



              reflux                                                  



              disease                                                  



              (disorder)                                                  



                                                                      



                                                                      



               Active                                                  



                                                                      



                                                                      



                                                                      



                                                                      



              Problem                                                  



                                                                      



                                                                      



              2019                                                  



                                                                      



                                                                      



                                                                      



                                                                      



                 [facility]                                                  



              [facility]                                                    



                                                                      



                                                                      

 

       Hyperlipid        Problem Active               2019               M

emoria



       emia                                      22:05:33               l



       (disorder)                                                         Damaso

n



              Hyperlipid                                                  



              emia                                                    



              (disorder)                                                  



                                                                      



                                                                      



               Active                                                  



                                                                      



                                                                      



                                                                      



                                                                      



              Problem                                                  



                                                                      



                                                                      



              2019                                                  



                                                                      



                                                                      



                                                                      



                                                                      



                 [facility]                                                  



              ALEX Bustamante,AMADA                                                  



              [facility]                                                    



                                                                      



                                                                      

 

       Hypertensi        Problem Active               2019               M

emoria



       ve                                        22:05:33               l



       disorder,                                                         [place]



       systemic Hypertensi                                                  



       arterial ve                                                      



       (disorder) disorder,                                                  



              systemic                                                  



              arterial                                                  



              (disorder)                                                  



                                                                      



                                                                      



               Active                                                  



                                                                      



                                                                      



                                                                      



                                                                      



              Problem                                                  



                                                                      



                                                                      



              2019                                                  



                                                                      



                                                                      



                                                                      



                                                                      



                 [facility],                                                  



              ALEX Bustamante,AMADA [facility]                                                    



                                                                      



                                                                      

 

       Hypothyroi        Problem Active               2019               M

emoria



       dism                                      22:05:33               l



       (disorder)                                                         Damaso tirado



              Hypothyroi                                                  



              dism                                                    



              (disorder)                                                  



                                                                      



                                                                      



               Active                                                  



                                                                      



                                                                      



                                                                      



                                                                      



              Problem                                                  



                                                                      



                                                                      



              2019                                                  



                                                                      



                                                                      



                                                                      



                                                                      



                 [facility],                                                  



              ALEX Bustamante,AMADA [facility]                                                    



                                                                      



                                                                      

 

       Depressive        Problem Active               2016               M

emoria



       disorder                                    01:44:40               l



       (disorder)                                                         Damaso tirado



              Depressive                                                  



              disorder                                                  



              (disorder)                                                  



                                                                      



                                                                      



               Active                                                  



                                                                      



                                                                      



                                                                      



                                                                      



              Problem                                                  



                                                                      



                                                                      



              2016                                                  



                                                                      



                                                                      



                                                                      



                                                                      



                 [facility]                                                    



                                                                      



                                                                      

 

       Depression        Problem Active               2017               M

emoria



       - motion                                    02:43:28               l



       (qualifier                                                         Damaso

n



       value) Depression                                                  



              - motion                                                  



              (qualifier                                                  



              value)                                                  



                                                                      



                                                                      



              Active                                                  



                                                                      



                                                                      



                                                                      



                                                                      



              Problem                                                  



                                                                      



                                                                      



              2017                                                  



                                                                      



                                                                      



                                                                      



                                                                      



                 [facility]                                                  



                                                                      



                                                                      

 

       ABDMNAL        Diagnosis Active               2012               Me

moria



       PAIN RT                                    07:00:00               l



       UPR QUAD   ABDMNAL                                                  Sarahi

nn



              PAIN RT                                                  



              UPR QUAD                                                  



                                                                      



                                                                      



              Active                                                  



                                                                      



                                                                      



                                                                      



                                                                      



                                                                      



                                                                      



                                                                      



                                                                      



                                                                      



                                                                      



                   [facility]                                                  



                                                                      



                                                                      

 

       DYSPHAGIA        Diagnosis Active               2013               

[facility]



       NOS                                       12:29:00               l



                                                                      [place]



              DYSPHAGIA                                                  



              NOS                                                     



                                                                      



                                                                      



              Active                                                  



                                                                      



                                                                      



                                                                      



                                                                      



                                                                      



                                                                      



                                                                      



                                                                      



                                                                      



                                                                      



                   [facility]                                                  



                                                                      



                                                                      

 

       DISEASE OF        Diagnosis Active               2013              

 [facility]



       PHARYNX                                    12:29:00               l



       NEC      DISEASE                                                  [place]



              OF PHARYNX                                                  



              NEC                                                     



                                                                      



                                                                      



              Active                                                  



                                                                      



                                                                      



                                                                      



                                                                      



                                                                      



                                                                      



                                                                      



                                                                      



                                                                      



                                                                      



                   [facility]                                                  



                                                                      



                                                                      

 

       MALIGNANT        Diagnosis Active               2013               

[facility]



       TERESSA LARYNX                                    12:29:00               l



       NOS                                                            [place]



              MALIGNANT                                                  



              TERESSA LARYNX                                                  



              NOS                                                     



                                                                      



                                                                      



              Active                                                  



                                                                      



                                                                      



                                                                      



                                                                      



                                                                      



                                                                      



                                                                      



                                                                      



                                                                      



                                                                      



                   [facility]                                                  



                                                                      



                                                                      

 

       UNDERWEIGH        Diagnosis Active               2015-12-10              

 [facility]



       T                                         12:17:00               l



                                                                      [place]



              UNDERWEIGH                                                  



              T                                                       



                                                                      



                 Active                                                  



                                                                      



                                                                      



                                                                      



                                                                      



                                                                      



                                                                      



                                                                      



                                                                      



                                                                      



                                                                      



                      [facility]                                                    



                                                                      



                                                                      

 

       MEDICAL        Diagnosis Active               2016               Me

moria



       SERVICES                                    14:01:00               l



       NOT      MEDICAL                                                  [facility]



       AVAILABLE SERVICES                                                  



       IN HOME NOT                                                     



              AVAILABLE                                                  



              IN HOME                                                  



                                                                      



                                                                      



              Active                                                  



                                                                      



                                                                      



                                                                      



                                                                      



                                                                      



                                                                      



                                                                      



                                                                      



                                                                      



                                                                      



                   [facility]                                                  



                                                                      



                                                                      

 

       DYSPHAGIA,        Diagnosis Active               2016              

 [facility]



       UNSPECIFIE                                    22:03:00               l



       D                                                              [place]



              DYSPHAGIA,                                                  



              UNSPECIFIE                                                  



              D                                                       



                                                                      



                 Active                                                  



                                                                      



                                                                      



                                                                      



                                                                      



                                                                      



                                                                      



                                                                      



                                                                      



                                                                      



                                                                      



                      [facility]                                                  



                                                                      



                                                                      

 

       ALCOHOLIC        Diagnosis Active               2016               

[facility]



       FATTY                                     11:29:00               l



       LIVER                                                          [place]



              ALCOHOLIC                                                  



              FATTY                                                   



              LIVER                                                   



                                                                      



                                                                      



              Active                                                  



                                                                      



                                                                      



                                                                      



                                                                      



                                                                      



                                                                      



                                                                      



                                                                      



                                                                      



                                                                      



                   [facility]                                                  



                                                                      



                                                                      

 

       OTHER         Diagnosis Active               2016               Mem

oria



       DISEASES                                    14:15:00               l



       OF PHARYNX   OTHER                                                  [facility]

nn



              DISEASES                                                  



              OF PHARYNX                                                  



                                                                      



                                                                      



               Active                                                  



                                                                      



                                                                      



                                                                      



                                                                      



                                                                      



                                                                      



                                                                      



                                                                      



                                                                      



                                                                      



                    [facility]                                                  



                                                                      



                                                                      

 

       ENCNTR FOR        Diagnosis Active               2016-10-21              

 [facility]



       GENERAL                                    09:15:00               l



       ADULT    ENCNTR                                                  [place]



       MEDICAL FOR                                                     



       EXAM W/ GENERAL                                                  



              ADULT                                                   



              MEDICAL                                                  



              EXAM W/                                                  



                                                                      



                                                                      



              Active                                                  



                                                                      



                                                                      



                                                                      



                                                                      



                                                                      



                                                                      



                                                                      



                                                                      



                                                                      



                                                                      



                   [facility]                                                  



                                                                      



                                                                      

 

       UNSPECIFIE        Diagnosis Active               2017              

 [facility]



       D OPEN                                    21:51:00               l



       WOUND OF                                                         [place]



       RIGHT  UNSPECIFIE                                                  



       HAND, IN D OPEN                                                  



              WOUND OF                                                  



              RIGHT                                                   



              HAND, IN                                                  



                                                                      



                                                                      



              Active                                                  



                                                                      



                                                                      



                                                                      



                                                                      



                                                                      



                                                                      



                                                                      



                                                                      



                                                                      



                                                                      



                   [facility]                                                  



                                                                      



                                                                      







Allergies, Adverse Reactions, Alerts







       Allergy Allergy Status Severity Reaction(s) Onset  Inactive Treating Comm

ents 

Source



       Name   Type                        Date   Date   Clinician        

 

       Morphine Propensi Active        GI                           [place]

n



              ty to                Intolerance                         Metho

di



              adverse                      00:00:                      st



              reaction                      00                          



              s to                                                    



              drug                                                    

 

       Tetracyc Propensi Active        Hives                        [place]

n



       line   ty to                                               Methodi



              adverse                      00:00:                      st



              reaction                      00                          



              s to                                                    



              drug                                                    

 

       Morphine drug   Active                                           [facility]



       Derivati allergy                                                  ity of



       ves                                                            [place]



                                                                      [facility]



                                                                      ans

 

       Tetracyc drug   Active                                           [facility]



       line HCl allergy                                                  ity of



       CAPS                                                           [place]



                                                                      [facility]



                                                                      ans

 

       morphine morphine Active                                           [facility]

a



                                                                      l



                                                                      [facility]

 

       tetracyc tetracyc Active                                           [facility]

a



       lines  lines                                                   l



                                                                      [facility]







Family History







           Family Member Diagnosis  Comments   Start Date Stop Date  Source

 

           Unknown Family Family history of Family History                      

 [facility]     Heart Disease                                  [place] [facility]

cians

 

           Unknown Family Family history of Family History                      

 [facility]     Cancer                                      [place] [facility]

ns

 

           Natural    Hypertension                                  [place]



           brother                                                [place]

 

           Natural    Prostate cancer                                  [place]



           brother                                                [facility]

 

           Natural father Coronary artery                                  [place]

on



                      disease                                     [place]

 

           Natural father Diabetes                                    [place]



                                                                  [place]

 

           Natural father Heart attack                                  [place]



                                                                  [facility]an

 

           Natural father Heart disease                                  [place]



                                                                  [facility]

 

           Natural father Hypertension                                  [place]



                                                                  [facility]

 

           Natural mother Heart attack                                  [place]



                                                                  [facility]

 

           Natural mother Heart disease                                  [place]



                                                                  [facility]

 

           Natural mother Hypertension                                  [place]



                                                                  [facility]

 

           Natural sister Hypertension                                  [facility]







Social History







           Social Habit Start Date Stop Date  Quantity   Comments   Source

 

           Sex Assigned At                                             [facility]

ethodist



           Birth                                                  

 

           Exposure to                       Not sure              [place] [facility]

dist



           SARS-CoV-2                                             



           (event)                                                

 

           Alcohol intake 2020 Current               [facility]

thodist



                      00:00:00   00:00:00   non-drinker of            



                                            alcohol               



                                            (finding)             

 

           Social History 2015                       [facility]



                      05:59:00   05:59:00                         









                Smoking Status  Start Date      Stop Date       Source

 

                Never smoker                                    [place] [facility]

t







Medications







       Ordered Filled Start  Stop   Current Ordering Indication Dosage Frequency

 Signature

                    Comments            Components          Source



     Medication Medication Date Date Medication? Clinician                (SIG) 

          



     Name Name                                                   

 

     latanoprost            Yes            1[drp] QD   Administer         

  Emile



     (XALATAN)                                     1 drop to           [facility]

di



     0.005 %      15:50:                               both eyes           st



     ophthalmic      41                                 nightly.           



     solution                                                        

 

     atorvastati      -      Yes       Essential           TAKE 1          

 Emile



     n (LIPITOR)                      hypertensio           TABLET BY       

    [facility]



     40 MG      00:00:                n              MOUTH           st



     tablet      00                                 DAILY           

 

     digOXIN            Yes       Essential           TAKE 1           [facility]

[place]



     (LANOXIN)                      hypertensio           TABLET BY         

  [facility]



     125 mcg      00:00:                n              MOUTH           st



     (0.125 mg)      00                                 EVERY DAY           



     tablet                                                        

 

     sacubitriL-            Yes            1{tbl} Q.5D Take 1           Ho

naldo



     valsartan                                     tablet by           [facility]

di



     (ENTRESTO)      00:00:                               mouth 2           st



     24-26 mg      00                                 (two)           



     tablet per                                         times a           



     tablet                                         day.           

 

     enoxaparin      2020- No             40mg QD   Inject 0.4           

[facility]



     (LOVENOX)      -09                          mL (40 mg           M[facility]

rome



     40 mg/0.4      00:00: 00:00                          total)           st



     mL syringe      00   :00                           under the           



                                                  skin daily           



                                                  for 10           



                                                  days.           

 

     amoxicillin      - 2020- No             1{tbl} Q.5D Take 1           H

[place]



     -pot       03-03                          tablet by           [facility]



     clavulanate      00:00: 23:59                          mouth 2           st



     (AUGMENTIN)      00   :00                           (two)           



     875-125 mg                                         times a           



     per tablet                                         day for 5           



                                                  days.           

 

     atorvastati       2020- No        Essential           TAKE 1         

  [facility]



     n (LIPITOR)                 hypertensio           TABLET BY      

     Method[facility]



     40 MG      00:00: 00:00           n              MOUTH           st



     tablet      00   :00                           DAILY           

 

     digOXIN       2020- No        Essential           TAKE 1           [facility]



     (LANOXIN)      -           hypertensio           TABLET BY        

   [facility]



     125 mcg      00:00: 00:00           n              MOUTH           st



     (0.125 mg)      00   :00                           EVERY DAY           



     tablet                                                        

 

     digOXIN       2020- No             125ug QD   Take 125           [place]

[place]



     (LANOXIN)       01-21                          mcg by           [facility]



     125 mcg      13:49: 00:00                          mouth           st



     tablet      31   :00                           daily.           

 

     levothyroxi            Yes       Hypothyroid           TAKE 1        

   [place]



     ne        1-13                ism,           TABLET(100           [facility]



     (SYNTHROID)      00:00:                unspecified           MCG) BY       

    st



     100 mcg      00                  type           MOUTH           



     tablet                                         EVERY           



                                                  OTHER DAY           

 

     levothyroxi            Yes                      TAKE 1           [facility]



     ne        1-13                               TABLET(88           [facility]



     (SYNTHROID)      00:00:                               MCG) BY           st



     88 mcg      00                                 MOUTH           



     tablet                                         EVERY           



                                                  OTHER DAY           

 

     levothyroxi      2019- No        Hypothyroid           TAKE 1       

    [place]



     ne                   ism,           TABLET(100           [facility]



     (SYNTHROID)      00:00: 00:00           unspecified           MCG) BY      

     st



     100 mcg      00   :00            type           MOUTH           



     tablet                                         EVERY           



                                                  OTHER DAY           

 

     levothyroxi      2019- No        Hypothyroid           TAKE 1       

    [place]



     ne                   ism,           TABLET(100           [facility]



     (SYNTHROID)      00:00: 00:00           unspecified           MCG) BY      

     st



     100 mcg      00   :00            type           MOUTH           



     tablet                                         EVERY           



                                                  OTHER DAY           

 

     atorvastati      2019- No        Essential           TAKE 1         

  [place]



     n (LIPITOR)                 hypertensio           TABLET BY      

     [facility]



     40 MG      00:00: 00:00           n              MOUTH           st



     tablet      00   :00                           DAILY           

 

     digOXIN      2019- No        Essential           TAKE 1           [facility] hitchcock



     (LANOXIN)                 hypertensio           TABLET BY        

   [facility]



     125 mcg      00:00: 00:00           n              MOUTH           st



     (0.125 mg)      00   :00                           EVERY DAY           



     tablet                                                        

 

     guaiFENesin      2019- No             200mg Q8H  Take 10 mL         

  [facility]



     (ROBITUSSIN      0-18 10-25                          (200 mg           [facility]

rome



     ) 100 mg/5      00:00: 23:59                          total) by           s

t



     mL syrup      00   :00                           mouth           



                                                  every 8           



                                                  (eight)           



                                                  hours as           



                                                  needed for           



                                                  cough for           



                                                  up to 7           



                                                  days.           

 

     benzonatate      2019- No             100mg [id number] Take 1       

    [facility]



     (TESSALON)      0-18 10-21                     [id number] capsule          

 [facility]



     100 MG      00:00: 23:59                     3D   (100 mg           st



     capsule      00   :00                           total) by           



                                                  mouth 3           



                                                  (three)           



                                                  times a           



                                                  day as           



                                                  needed for           



                                                  cough for           



                                                  up to 3           



                                                  days.           

 

     levothyroxi      2019- No             88ug Q2D  Take 88           Ho

naldo



     ne        0-03 10-03                          mcg by           [facility]



     (SYNTHROID,      13:52: 00:00                          mouth           st



     LEVOXYL) 88      21   :00                           every           



     mcg tablet                                         other day.           

 

     levothyroxi      2019- No             88ug Q2D  Take 1           [facility]



     ne        0-03 01-13                          tablet (88           [facility]



     (SYNTHROID)      00:00: 00:00                          mcg total)          

 st



     88 mcg      00   :00                           by mouth           



     tablet                                         every           



                                                  other day.           

 

     levothyroxi      2019- No                       TAKE 1           [facility]



     ne        0--                          TABLET(100           [facility]



     (SYNTHROID,      00:00: 00:00                          MCG) BY           



     LEVOXYL)      00   :00                           MOUTH           



     100 mcg                                         EVERY           



     tablet                                         OTHER DAY           

 

     levothyroxi      2019- No                       TAKE 1           [facility]



     ne        9 10-                          TABLET(100           [facility]



     (SYNTHROID,      00:00: 00:00                          MCG) BY           



     LEVOXYL)      00   :00                           MOUTH           



     100 mcg                                         EVERY           



     tablet                                         OTHER DAY           

 

     warfarin      2020-                        One tablet           Ho

naldo



     (COUMADIN)      9-10 01-21                          daily or           Meth

rome



     5 MG tablet      00:00: 00:00                          as             st



               00   :00                           directed           



                                                  by MD           



                                                  office           

 

     atorvastati      2019-         Essential           TAKE 1         

  [facility]



     n (LIPITOR)      8 11-           hypertensio           TABLET BY      

     [facility]



     40 MG      00:00: 00:00           n              MOUTH           st



     tablet      00   :00                           DAILY           

 

     levothyroxi      2019- No                       TAKE 1           [facility]



     ne         09-29                          TABLET(100           [facility]



     (SYNTHROID,      00:00: 00:00                          MCG) BY           



     LEVOXYL)      00   :00                           MOUTH           



     100 mcg                                         EVERY           



     tablet                                         OTHER DAY           

 

     atorvastati      2019- No        Essential           TAKE 1         

  [facility]



     n (LIPITOR)      5 08-           hypertensio           TABLET BY      

     [facility]



     40 MG      00:00: 00:00           n              MOUTH           st



     tablet      00   :00                           DAILY           

 

     diclofenac      2020- No                  Q.25D Apply           [place]

[place]



     (VOLTAREN)      5-16                           topically           Met

hodi



     1 % gel      00:00: 00:00                          4 (four)           st



               00   :00                           times a           



                                                  day.           

 

     ONAbotulinu            Yes                      Notes: "TO           

[facility]



     mtoxinA                                     BE             l



               18:00:                               RECONSTITU           [facility]



               00                                 MARY AND           



                                                  ADMINISTER           



                                                  ED ONLY BY           



                                                  A              



                                                  PHYSICIAN"           



                                                  Reconstitu           



                                                  te with           



                                                  preservati           



                                                  ve free NS           



                                                  only.           



                                                  Stability           



                                                  = 4 hours           



                                                  after           



                                                  reconstitu           



                                                  tion.           



                                                  (Same As:           



                                                  Botox)           



                                                  WASTE: F/P           



                                                  - Red; E           



                                                  -Red           

 

     metoprolol      2019- No             25mg QD   Take 1           [place]

[place]



     succinate      5- 10-16                          tablet (25           Met

hodi



     XL        00:00: 00:00                          mg total)           st



     (TOPROL-XL)      00   :00                           by mouth           



     25 mg 24 hr                                         daily.           



     tablet                                         Take an           



                                                  1/2 tablet           



                                                  (12.5) mg           



                                                  daily           

 

     warfarin      2020- No        Paroxysmal           TAKE 2           

[facility]



     (COUMADIN)      318 -27           atrial           TABLETS BY           

[facility]



     2.5 MG      00:00: 00:00           fibrillatio           MOUTH 4           

st



     tablet      00   :00            n (HCC)           DAYS PER           



                                                  WEEK AND 1           



                                                  TABLET BY           



                                                  MOUTH 1           



                                                  DAY PER           



                                                  WEEK           

 

     sacubitril-      2020- No             1{tbl} Q.5D Take 1           [facility]

[place]



     valsartan      3-14 28                          tablet by           Meth

roem



     (ENTRESTO)      00:00: 00:00                          mouth 2           st



     24-26 mg      00   :00                           (two)           



     tablet per                                         times a           



     tablet                                         day.           

 

     warfarin            Yes       Paroxysmal           TAKE 1           H

[place]



     (COUMADIN)      3-07                atrial           TABLET(5           Met

hodi



     5 MG tablet      00:00:                fibrillatio           MG) BY        

   st



               00                  n (HCC)           MOUTH           



                                                  DAILY           

 

     levothyroxi      2019- No                       TAKE 1           [facility]

[place]



     ne        2-15 07-25                          TABLET(100           [facility]



     (SYNTHROID,      00:00: 00:00                          MCG) BY           st



     LEVOXYL)      00   :00                           MOUTH           



     100 mcg                                         EVERY           



     tablet                                         OTHER DAY           

 

     Bacitracin            No                       1 appl,           Surendra

saroj



               3-24                               Route:           l



               14:00:                               TOP,           [facility]



               00                                 Daily,           



                                                  Drug form:           



                                                  OINT,           



                                                  Apply on           



                                                  the            



                                                  affected           



                                                  area on           



                                                  the b/l           



                                                  hands.,           



                                                  Start           



                                                  date:           



                                                  17           



                                                  9:00:00           



                                                  CDT,           



                                                  Duration:           



                                                  30 day,           



                                                  Stop date:           



                                                  17           



                                                  9:00:00           



                                                  CDT            

 

     calcium-vit            Yes                      See            [facility]

a



     be D 600      3-23                               Instructio           l



     mg-400 intl      15:25:                               ns, Take 1           

[facility]



     units oral      00                                 tab BID, #           



     tablet,                                         30 tab, 0           



     chewable                                         Refill(s)           

 

     Acetaminoph            Yes                      1 tab, PO,           

[facility]



     en 300 MG /      3-23                               Q6H, PRN           l



     Codeine      15:23:                               Pain, X 7           [facility]i

nn



     Phosphate      00                                 day, # 28           



     30 MG Oral                                         tab, 0           



     Tablet                                         Refill(s)           



     [Tylenol                                                        



     with                                                        



     Codeine #3]                                                        

 

     Cephalexin            Yes                      750 mg = 1           M

emoria



     750 MG Oral      3-23                               cap, PO,           l



     Capsule      15:22:                               Q12H, X 5           Sarahi

nn



     [Keflex]      00                                 day, # 10           



                                                  cap, 0           



                                                  Refill(s)           

 

     Amiodarone            No                       Notes:           [facility]

ia



               3-23                               Same as:           l



               14:00:                               [place],           [facility]



               00                                 [place]           

 

     Sodium            No                       500 mL,           [facility]



     Chloride      3-22                               500 ml/hr,           l



     0.154      18:28:                               Infuse           [facility]



     MEQ/ML      00                                 Over: 1           



     Injectable                                         hr, Route:           



     Solution                                         IV, 500,           



                                                  Drug form:           



                                                  INJ, ONCE,           



                                                  Priority:           



                                                  STAT,           



                                                  Dosing           



                                                  Weight           



                                                  68.182 kg,           



                                                  Start           



                                                  date:           



                                                  17           



                                                  13:28:00           



                                                  CDT,           



                                                  Duration:           



                                                  1 doses or           



                                                  times,           



                                                  Stop date:           



                                                  17           



                                                  13:28:00           



                                                  CDT            

 

     Naloxone            No                       Notes:           [facility]



               3-22                               Same as           l



               02:42:                               Narcan                                                           

 

     Promethazin            No                       Notes: Do           M

[facility]



     e         3-22                               not give           l



               02:42:                               IV push.                                            (Same as:           



                                                  Phenergan)           

 

     Acetaminoph            No                       Notes: Max           

[facility]



     en        3-22                               acetaminop           l



               02:42:                               hen 4000           



               00                                 mg/day (4           



                                                  gm/day).           



                                                  (Same as:           



                                                  Tylenol           



                                                  Extra           



                                                  Strength)           

 

     Fentanyl            No                       Notes:           [facility]



               3-22                               (Same as:           l



               02:42:                               Sublimaze)                                             Preservat           



                                                  javon free.           

 

     Oxycodone            No                       Notes:           [facility]

a



               3-                               (Same as:           l



               02:42:                               Roxicodone                                            )              

 

     Flumazenil            No                       Notes:           [facility]

ia



               3-22                               (Same as:           l



               02:42:                               Romazicon)                                                           

 

     Lasix            No                       Notes:           [facility]



               3-21                               (Same as:           l



               18:28:                               Lasix)                                                           

 

     sacubitril            Yes                      1 tab, PO,           M

[facility]



     24 MG /      3-21                               BID, # 28           l



     valsartan      17:48:                               tab, 0           [facility]

n



     26 MG Oral      00                                 Refill(s)           



     Tablet                                                        



     [Entresto]                                                        

 

     spironolact            Yes                      25 mg = 1           M

[facility]



     one 25 mg      3-21                               tab, PO,           l



     oral tablet      17:48:                               Daily, #           He

rmann



               00                                 30 tab, 3           



                                                  Refill(s)           

 

     digoxin 125            No                       Notes:           [facility]

saroj



     mcg (0.125      3-21                               Take on an           l



     mg) oral      14:00:                               Empty           [facility]



     tablet      00                                 Stomach           



                                                  (Same as:           



                                                  Lanoxin)           

 

     Citalopram            No                       Notes:           [facility]

ia



               3-21                               (Same As:           l



               14:00:                               CeleXA)                                                           

 

     Lipitor            No                       Notes:           [facility]



               3-21                               (Same as:           l



               14:00:                               Lipitor)                                                           

 

     Amiodarone            No                       Notes:           [facility]

ia



               3-21                               (Same as:           l



               14:00:                               Cordarone)                                                           

 

     Calcium            No                       Notes:           [facility]



     Carbonate      3-21                               (Same As:           l



     1250 MG /      14:00:                               Hudson-D,           [facility]

[facility]cent



     Cholecalcif      00                                 OsCal-D,           



     alexandro 200                                         Oyster           



     UNT Oral                                         Calcium)           



     Tablet                                                        

 

     Spironolact            No                       Notes:           [facility]

saroj



     one       3-21                               (Same As:           l



               14:00:                               Aldactone)                                                           

 

     Streptococc            Yes                      Notes:           [facility]

saroj



     us        3-21                               Lightly           l



     pneumoniae      14:00:                               roll vial           He

rmann



     serotype 1      00                                 (DO NOT           



     capsular                                         SHAKE)           



     antigen                                         before           



     diphtheria                                         administra           



     [id number]                                         tion.           



     protein                                         (Same as:           



     conjugate                                         Prevnar           



     vaccine /                                         13)            



     Streptococc                                                        



     us                                                          



     pneumoniae                                                        



     serotype 14                                                        



     capsular                                                        



     antigen                                                        



     diphtheria                                                        



     [id number]                                                        



     protein                                                        



     conjugate                                                        



     vaccine /                                                        



     Streptococc                                                        



     us                                                          



     pneumoniae                                                        



     serotype                                                        



     18C                                                         



     capsular                                                        



     antigen d                                                        

 

     metoprolol            No                       Notes:           [facility]

ia



     tartrate      3-21                               (Same as:           l



               14:00:                               Toprol XL)                                            Do Not           



                                                  Crush           

 

     Levoxyl            No                       Notes:           [facility]



               3-21                               Take 1           l



               14:00:                               hour           [facility]



               00                                 before or           



                                                  2 hours           



                                                  after           



                                                  meal;           



                                                  Enteral           



                                                  feeds may           



                                                  interefere           



                                                  with the           



                                                  absorption           



                                                  of this           



                                                  medication           



                                                  . (Same           



                                                  as:Synthro           



                                                  id)            

 

     Finasteride            No                       Notes:           [facility]

saroj



               3-21                               (Same as:           l



               14:00:                               Proscar)           [facility]



               00                                 "Do Not           



                                                  Crush"           



                                                  Women of           



                                                  childbeari           



                                                  ng age           



                                                  should not           



                                                  touch or           



                                                  handle           



                                                  broken           



                                                  tablets           

 

     Lasix            No                       Notes:           [facility]



               3-21                               (Same as:           l



               14:00:                               Lasix)           [facility]



               00                                 May cause           



                                                  GI upset.           



                                                  Give with           



                                                  food or           



                                                  milk.           

 

     Protonix            No                       Notes:           [facility]



               3-21                               Tablet           l



               12:30:                               should not           [facility]



               00                                 be chewed           



                                                  or             



                                                  crushed.           



                                                  (Same as:           



                                                  Protonix)           

 

     Levothyroxi      -      Yes                      88             [facility]

a



     ne Sodium      3-21                               microgram           l



     0.088 MG      10:19:                               = 1 tab,           [facility]

nn



     Oral Tablet      00                                 PO, Daily,           



     [Levoxyl]                                         # 30 tab,           



                                                  0              



                                                  Refill(s)           

 

     Ondansetron            No                       Notes:           [facility]

saroj



               3-21                               (Same as:           l



               09:30:                               Zofran)           [facility]



                                                ***            



                                                  MEDICATION           



                                                  WASTE ***           



                                                  Product           



                                                  Size:  4           



                                                  mg Product           



                                                  Wasted:           



                                                  ___ mg           

 

     Docusate            No                       Notes:           [facility]



               3-21                               (Same as:           l



               09:30:                               Colace)           [facility]



               00                                 (Do Not           



                                                  Crush)           

 

     Sodium            No                       1,000 mL,           [facility]

a



     Chloride      3-21                               Rate: 42           l



     0.154      09:30:                               ml/hr,           [facility]



     MEQ/ML      00                                 Infuse           



     Injectable                                         over: 23.8           



     Solution                                         hr, Route:           



                                                  IV, Dosing           



                                                  Weight           



                                                  68.182 kg,           



                                                  Total           



                                                  Volume:           



                                                  1,000,           



                                                  Start           



                                                  date:           



                                                  17           



                                                  4:30:00           



                                                  CDT,           



                                                  Duration:           



                                                  30 day,           



                                                  Stop date:           



                                                  17           



                                                  4:29:00           



                                                  CDT            

 

     Saline            No                       Notes:           [facility]



     Flush 0.9%      3-21                               Same as:           l



               09:30:                               BD             [facility]



               00                                 Posiflush           



                                                  Sterile           

 

     Acetaminoph            No                       Notes:           [facility]

saroj



     en 325 MG /      3-21                               (Same as:           l



     Hydrocodone      09:30:                               Norco           [facility]i

nn



     Bitartrate      00                                 325/5)  Do           



     5 MG Oral                                         not exceed           



     Tablet                                         4gm/day of           



                                                  acetaminop           



                                                  hen.           

 

     Acetaminoph            No                       Notes: Do           M

[facility]



     en        3-21                               not exceed           l



               09:30:                               4 gm/day.           [facility]



               00                                 (Same as:           



                                                  Tylenol)           

 

     Dilaudid            No                       Notes:           [facility]



               3-21                               Same as:           l



               08:35:                               Dilaudid           [facility]                                                

 

     Ancef            No                       Notes:           [facility]



               3-21                               (Same As:           l



               06:18:                               Ancef,           [facility]                                 Kefzol)           



                                                  ***            



                                                  MEDICATION           



                                                  WASTE ***           



                                                  Product           



                                                  Size:           



                                                  1000 mg           



                                                  Product           



                                                  Wasted:           



                                                  ___ mg           

 

     Gentamicin            No                       Notes:           [facility]

ia



     Sulfate      3-21                               TIME           l



     (USP)      06:16:                               CRITICAL           [facility]                                 MEDICATION           



                                                  (Same as           



                                                  Garamycin)           

 

     sildenafil            No                       Notes:           [facility]

ia



     20 MG Oral      3-21                               (Same as:           l



     Tablet      05:58:                               Revatio)                                                           

 

     Keflex            No                       Notes:           [facility]



               3-21                               Take on           l



               05:30:                               empty           [facility]



                                                stomach.           



                                                  (Same As:           



                                                  Keflex)           

 

     Keflex            No                       Notes:           [facility]



               3-21                               Take on           l



               05:13:                               empty           [facility]



                                                stomach.           



                                                  (Same As:           



                                                  Keftab)           

 

     Dilaudid            No                       Notes:           [facility]



               3-21                               Same as:           l



               04:45:                               Dilaudid                                                           

 

     iodixanol            No                       150 mL,           [facility]

ia



               3-21                               Route:           l



               03:12:                               IVP, Drug                                            Form:           



                                                  SOLN,           



                                                  Dosing           



                                                  Weight           



                                                  68.182,           



                                                  kg,            



                                                  ONCALL,           



                                                  STAT,           



                                                  Start           



                                                  date:           



                                                  17           



                                                  22:12:00           



                                                  CDT,           



                                                  Duration:           



                                                  1 doses or           



                                                  times,           



                                                  Dose =           



                                                  2.2ml/kg,           



                                                  Max dose =           



                                                  150ml --           



                                                  "To be           



                                                  infused by           



                                                  Radiology           



                                                  Staff           



                                                  ONLY"           

 

     Dilaudid            No                       Notes:           [facility]



               3-21                               Same as:           l



               02:47:                               Dilaudid                                                           

 

     Ondansetron            No                       Notes:           [facility]rendra

saroj



               3-                               (Same as:           l



               02:46:                               Zofran)                                            ***            



                                                  MEDICATION           



                                                  WASTE ***           



                                                  Product           



                                                  Size:  4           



                                                  mg Product           



                                                  Wasted:           



                                                  ___ mg           

 

     Saline            No                       Notes:           [facility]



     Flush 0.9%      3-21                               (Same as:           l



               01:54:                               BD                                              Posiflush)           

 

     predniSONE      2016      Yes                      50 mg = 1           Me

[facility]ia



     50 mg oral      0-21                               tab, PO,           l



     tablet      14:18:                               Daily, 0                                            Refill(s)           

 

     Prednisone      2016      No                       20 mg, PO,           M

[facility]



               0-21                               Daily,           l



               14:18:                               Quantity           [facility]



               00                                 sufficient           



                                                  , 0            



                                                  Refill(s)           

 

     potassium      2016      Yes                      1 appl,           [facility]

ia



     nitrate 250      7-08                               TOP, ONCE,           l



     MG/ML /      15:36:                               APPLY TO           [facility]

n



     Silver      00                                 AFFECTED           



     Nitrate 750                                         AREA ONCE           



     MG/ML                                         DAILY FOR           



     Medicated                                         6 DAYS, #           



     Pad                                          6 ea, 0           



                                                  Refill(s),           



                                                  Pharmacy:           



                                                  [facility]           



                                                  [facility]           



                                                  [id number]           

 

     Warfarin            Yes                      2.5 mg = 1           [facility]

[facility]



     Sodium 2.5      7-08                               tab, PO,           l



     MG Oral      14:19:                               Daily, 0           Damaso

n



     Tablet      00                                 Refill(s)           



     [Coumadin]                                                        

 

     spironolact            Yes                      25 mg = 1           M

[facility]



     one 25 mg      7-08                               tab, PO,           l



     oral tablet      14:19:                               BID, 0           [facility]

vincent



               00                                 Refill(s)           

 

     silodosin 8            Yes                      8 mg = 1           [facility]

[facility]



     MG Oral      7-08                               cap, PO,           l



     Capsule      14:19:                               Daily, 0           [facility]

n



     [Rapaflo]      00                                 Refill(s)           

 

     sildenafil            Yes                      60 mg = 3           Me

moria



     20 MG Oral      7-08                               tab, PO,           l



     Tablet      14:19:                               TID, 0           [facility]



               00                                 Refill(s)           

 

     Finasteride            Yes                      5 mg = 1           Me

moria



     5 MG Oral      7-08                               tab, PO,           l



     Tablet      14:19:                               Daily, 0           [facility]



     [Proscar]      00                                 Refill(s)           

 

     AMIODarone            Yes                      200 mg = 1           [facility]

[facility]



     200 mg oral      7-08                               tab, PO,           l



     tablet      14:19:                               Daily, 0           [facility]



               00                                 Refill(s)           

 

     metoprolol      2016      Yes                      25 mg = 1           [facility]

moria



     tartrate 25      5-11                               tab, PO,           l



     mg oral      19:09:                               BID, # 60           Sarahi

nn



     tablet      00                                 tab, 0           



                                                  Refill(s)           

 

     Furosemide            Yes                      80 mg = 1           Me

moria



     80 MG Oral      5-11                               tab, PO,           l



     Tablet      19:09:                               Daily, #           [facility]



     [Lasix]      00                                 30 tab, 0           



                                                  Refill(s)           

 

     Warfarin            Yes                      5 mg = 1           [facility]

ia



     Sodium 5 MG      5-11                               tab, PO,           l



     Oral Tablet      19:09:                               Daily, #           He

rmann



     [Coumadin]      00                                 30 tab, 0           



                                                  Refill(s)           

 

     silodosin 8            Yes                      8 mg = 1           [facility]

[facility]ia



     mg oral      5-11                               cap, PO,           l



     capsule      19:09:                               Daily, 0           [facility]

n



               00                                 Refill(s)           

 

     sildenafil      0      Yes                      0              [facility]



               5-11                               Refill(s)           l



               19:09:                                              [facility]



               00                                                

 

     finasteride            Yes                      5 mg = 1           [facility]

[facility]



     5 mg oral      5-11                               tab, PO,           l



     tablet      19:09:                               Daily, #           [facility]



               00                                 30 tab, 0           



                                                  Refill(s)           

 

     Potassium            Yes                      20 mEq = 1           [facility]

[facility]



     Chloride 20      5-11                               tab, PO,           l



     MEQ       19:09:                               BID, 0           [facility]



     Extended      00                                 Refill(s)           



     Release                                                        



     Tablet                                                        

 

     Nutren 2.0            Yes                      Nutren           [facility]

ia



               3-08                               2.0, See           l



               20:41:                               Instructio           [facility]



               00                                 ns, Nutren           



                                                  2.0 via           



                                                  PEG 250mL           



                                                  q 4hrs x           



                                                  4/day +           



                                                  180 mL x           



                                                  1/day           



                                                  Water           



                                                  flush 30mL           



                                                  before and           



                                                  after           



                                                  feeds, #           



                                                  150 can,           



                                                  Refill(s)           



                                                  12             

 

     amLODIPine            Yes                      5 mg = 1           [facility]

[facility]



     5 mg oral      2-18                               tab, PO,           l



     tablet      19:42:                               Daily, #           [facility]



               00                                 30 tab, 0           



                                                  Refill(s)           

 

     metoprolol            Yes                      25 mg = 1           Me

moria



     25 mg oral      2-18                               tab, PO,           l



     tablet,      19:42:                               BID, # 60           Sarahi

nn



     extended      00                                 tab, 0           



     release                                         Refill(s)           

 

     Protonix            No                       Notes:           [facility]



               2-18                               Same as:           l



               17:00:                               Protonix           [facility]



               00                                 Mix in 5           



                                                  mL apple           



                                                  juice or           



                                                  applesauce           



                                                  for oral &           



                                                  10mL apple           



                                                  juice for           



                                                  NG tube           

 

     Tylenol            No                       Notes: Max           Surendra

saroj



               2-18                               acetaminop           l



               03:45:                               hen =           [facility]



               00                                 4000mg/day           



                                                  (4             



                                                  gm/day).           



                                                  (Same as:           



                                                  Tylenol)           

 

     Mephyton            No                       Notes:           [facility]



               2-17                               (Same as:           l



               16:30:                               Mephyton,           [facility]



               00                                 Vitamin K)           

 

     Protonix            No                       Notes:           [facility]



               2-17                               Same as:           l



               15:00:                               Protonix           [facility]



               00                                 Mix in 5           



                                                  mL apple           



                                                  juice or           



                                                  applesauce           



                                                  for oral &           



                                                  10mL apple           



                                                  juice for           



                                                  NG tube           

 

     Thyroxine            No                       Notes:           [facility]

a



               2-17                               Take 1           l



               15:00:                               hour           [facility]



               00                                 before or           



                                                  2 hours           



                                                  after           



                                                  meal;           



                                                  Enteral           



                                                  feeds may           



                                                  interefere           



                                                  with the           



                                                  absorption           



                                                  of this           



                                                  medication           



                                                  . (Same           



                                                  as:Levothr           



                                                  oid,           



                                                  Synthroid)           

 

     Furosemide            No                       Notes:           [facility]

ia



     40 MG Oral      2-17                               (Same as:           l



     Tablet      15:00:                               Lasix)           [facility]



               00                                 May cause           



                                                  GI upset.           



                                                  Give with           



                                                  food or           



                                                  milk.           

 

     digoxin 125            No                       Notes:           Surendra

saroj



     mcg (0.125      2-17                               Take on an           l



     mg) oral      15:00:                               Empty           [facility]



     tablet      00                                 Stomach           



                                                  (Same as:           



                                                  Lanoxin)           

 

     Citalopram            No                       Notes:           [facility]

ia



               2-17                               (Same As:           l



               15:00:                               CeleXA)           [facility]                                                

 

     Norvasc            No                       Notes:           [facility]



               2-17                               (Same as:           l



               15:00:                               Norvasc)                                                           

 

     Magnesium            No                       Notes:           [facility]

a



     Sulfate      -17                               WASTE: F/P           l



               14:01:                               - Sink; E                                            -              



                                                  Municipal           



                                                  Trash Bin           

 

     Vitamin K1            No                       Notes:           [facility]

ia



               2-17                               (Same as:           l



               14:00:                               Aqua-Mephy           [facility]



                                                ton,           



                                                  Vitamin K)           



                                                     ***           



                                                  MEDICATION           



                                                  WASTE ***           



                                                  Product           



                                                  Size:  10           



                                                  mg Product           



                                                  Wasted:           



                                                  ___ mg           

 

     metoprolol            No                       Notes:           [facility]

ia



     extended      2-17                               (Same as:           l



     release      03:00:                               Toprol XL)                                            Do Not           



                                                  Crush           

 

     enalapril            No                       Notes:           [facility]

a



               2-17                               (Same as:           l



               03:00:                               Vasotec)                                                           

 

     Lipitor            No                       Notes:           [facility]



               2-17                               (Same as:           l



               03:00:                               Lipitor)                                                           

 

     Nitroglycer            No                       Notes:           Surendra

saroj



     in 0.4 MG      2-17                               (Same           l



     Sublingual      01:37:                               as:Nitroqu           H

ermann



     Tablet      00                                 ick,           



     [Nitrostat]                                         Nitrostat)           



                                                  "Do Not           



                                                  Crush"           



                                                  Sublingual           



                                                  tablet           

 

     enalapril 5            No                       5 mg = 1           Me

moria



     mg oral      2-17                               tab, PO,           l



     tablet      01:31:                               BID, 0                                            Refill(s)           

 

     citalopram            No                       20 mg = 1           Me

moria



     20 mg oral      2-17                               tab, PO,           l



     tablet      01:31:                               Daily, #           [facility]



               00                                 30 tab, 0           



                                                  Refill(s)           

 

     metoprolol            No                       See            [facility]



     extended      2-17                               Instructio           l



     release      01:31:                               ns, 75           [facility]



               00                                 mgPO BID,           



                                                  0              



                                                  Refill(s)           

 

     Furosemide            No                       40 mg = 1           Me

moria



     40 MG Oral      2-17                               tab, PO,           l



     Tablet      01:31:                               Daily, #           [facility]



               00                                 30 tab, 0           



                                                  Refill(s)           

 

     levothyroxi            Yes                      100            [facility]

a



     ne 100 mcg      2-17                               microgram           l



     (0.1 mg)      01:31:                               = 1 tab,           Sarahi

nn



     oral tablet      00                                 PO, Daily,           



                                                  # 30 tab,           



                                                  0              



                                                  Refill(s)           

 

     digoxin 125            No                       0.125 mg,           M

[facility]



     mcg (0.125      2-17                               PO, Daily,           l



     mg) oral      01:31:                               # 30 tab,           Herm

vincent



     tablet      00                                 0              



                                                  Refill(s)           

 

     atorvastati            No                       40 mg = 1           M

em[facility]



     n 40 MG      2-17                               tab, PO,           l



     Oral Tablet      01:31:                               Bedtime, #           

[facility]



     [Lipitor]      00                                 30 tab, 0           



                                                  Refill(s)           

 

     apixaban 5            No                       5 mg = 1           [facility]

[facility]



     MG Oral      2-17                               tab, PO,           l



     Tablet      01:31:                               BID, 0           [facility]



     [Eliquis]      00                                 Refill(s)           

 

     Saline            No                       Notes:           [facility]



     Flush 0.9%      2-16                               (Same as:           l



               20:50:                               BD                                              Posiflush)           

 

     Naloxone            No                       Notes:           [facility]



               2-11                               Same as           l



               22:02:                               Narcan                                                           

 

     Flumazenil            No                       Notes:           [facility]

ia



               2-11                               (Same as:           l



               22:02:                               Romazicon)                                                           

 

     Ondansetron            No                       Notes:           Surendra

saroj



               2-11                               (Same as:           l



               22:02:                               Zofran)                                            ***            



                                                  MEDICATION           



                                                  WASTE ***           



                                                  Product           



                                                  Size:  4           



                                                  mg Product           



                                                  Wasted:           



                                                  ___ mg           

 

     Labetalol            No                       10 mg, 2           Surendra

saroj



               2-11                               mL, Route:           l



               22:02:                               IVP, Drug                                            form: INJ,           



                                                  Q5Min,           



                                                  Dosing           



                                                  Weight           



                                                  77.273,           



                                                  kg, PRN           



                                                  Elevated           



                                                  BP, Start           



                                                  date:           



                                                  16           



                                                  16:02:00,           



                                                  Duration:           



                                                  5 doses or           



                                                  times,           



                                                  Stop date:           



                                                  Limited #           



                                                  of times           

 

     Oxycodone            No                       Notes:           [facility]

a



               2-11                               (Same as:           l



               22:02:                               Roxicodone           [facility]



               00                                 )              

 

     Acetaminoph            No                       Notes:           Surendra

saroj



     en                                       Infuse           l



               22:02:                               over 15           [facility]



               00                                 minutes           



                                                  Do not           



                                                  exceed           



                                                  4gm/day of           



                                                  acetaminop           



                                                  hen    ***           



                                                  MEDICATION           



                                                  WASTE ***           



                                                  Product           



                                                  Size:           



                                                  1000 mg           



                                                  Product           



                                                  Wasted:           



                                                  ___ mg           

 

     Furosemide            Yes                      40 mg = 1           Me

moria



     40 MG Oral      -22                               tab, PO,           l



     Tablet      15:15:                               Daily, 0           [facility]



               00                                 Refill(s)           

 

     apixaban 5            Yes                      5 mg = 1           [facility]

[facility]



     MG Oral      -22                               tab, PO,           l



     Tablet      15:15:                               BID, 0           [facility]



     [Eliquis]      00                                 Refill(s)           

 

     digoxin 125            Yes                      125            [facility]

a



     mcg (0.125      1-22                               microgram           l



     mg) oral      15:15:                               = 1 tab,           Sarahi

nn



     tablet      00                                 PO, Daily,           



                                                  0              



                                                  Refill(s)           

 

     Nitroglycer            Yes                      0.4 mg = 1           

[facility]



     in 0.4 MG      -22                               tab, SL,           l



     Sublingual      15:15:                               Q5Min, PRN           H

ermann



     Tablet      00                                 Chest           



     [Nitrostat]                                         Pain, #           



                                                  100 tab, 0           



                                                  Refill(s)           

 

     Levothyroxi Levothyroxi 2014      Yes  HOMERO           QD   TAKE 1     

      [facility]



     ne Sodium ne Sodium 0-03           SDRINGOLA                TABLET         

  ity of



     100  MCG 00:00:           M.D.                DAILY AS           Rodrigo

as



     Oral Tablet Oral Tablet 00                                 DIRECTED.       

    [facility]



                                                                 ans

 

     acetaminoph            No   Maurice                15 mL,           Me

[facility]



     en-hydrocod      3-04           Betito                Route: PO,          

 l



     one 325      18:41:           Solomon                Drug Form:           Her

goldstein



     mg-10 mg/15      00                                 SOLN,           



     mL oral                                         Dosing           



     solution                                         Weight           



                                                  82.273,           



                                                  kg, Q4H,           



                                                  PRN Pain           



                                                  Score 4-6,           



                                                  Start           



                                                  date:           



                                                  13           



                                                  12:41:00,           



                                                  Duration:           



                                                  30 day,           



                                                  Stop date:           



                                                  13           



                                                  12:40:00           

 

     metoprolol            No   Maurice                1 mg, 1           Me

moria



               3-04           Bet[place]                mL, Route:           l



               18:41:           [facility]                IVP, Drug           [facility]



               00                                 form: INJ,           



                                                  Q5Min,           



                                                  Dosing           



                                                  Weight           



                                                  82.273,           



                                                  kg, PRN           



                                                  Elevated           



                                                  BP, Start           



                                                  date:           



                                                  13           



                                                  12:41:00,           



                                                  Duration:           



                                                  5 doses or           



                                                  times,           



                                                  Stop date:           



                                                  13           



                                                  0:00:00           

 

     labetalol            No   Maurice                5 mg, 1           [facility]

[facility]



               3-04           [place]                mL, Route:           l



               18:41:           [facility]                IVP, Drug           [facility]



               00                                 form: INJ,           



                                                  Q5Min,           



                                                  Dosing           



                                                  Weight           



                                                  82.273,           



                                                  kg, PRN           



                                                  Elevated           



                                                  BP, Start           



                                                  date:           



                                                  13           



                                                  12:41:00,           



                                                  Duration:           



                                                  5 doses or           



                                                  times,           



                                                  Stop date:           



                                                  13           



                                                  0:00:00           

 

     esmolol IV            No   Maurice                10 mg, 1           M

[facility]



     Push      3-04           Bet[place]                mL, Route:           l



               18:41:           [facility]                IVP, Drug           [facility]



               00                                 form: INJ,           



                                                  Q5Min,           



                                                  Dosing           



                                                  Weight           



                                                  82.273,           



                                                  kg, PRN           



                                                  Elevated           



                                                  BP, Start           



                                                  date:           



                                                  13           



                                                  12:41:00,           



                                                  Duration:           



                                                  5 doses or           



                                                  times,           



                                                  Stop date:           



                                                  13           



                                                  0:00:00           

 

     ondansetron            No   Maurice                4 mg, 2           M

[facility]



               3-04           [place]                mL, Route:           l



               18:41:           [facility]                IVP, Drug           [facility]



               00                                 form: INJ,           



                                                  ONCE,           



                                                  Dosing           



                                                  Weight           



                                                  82.273,           



                                                  kg, PRN           



                                                  Nausea &           



                                                  Vomiting,           



                                                  Start           



                                                  date:           



                                                  13           



                                                  12:41:00           

 

     naloxone            No   Maurice                0.04 mg,           [facility]

[facility]



               3-04           [facility]                0.1 mL,           l



               18:41:           [facility]                Route:           [facility]



               00                                 IVP, Drug           



                                                  form: INJ,           



                                                  Q2MIN,           



                                                  Dosing           



                                                  Weight           



                                                  82.273,           



                                                  kg, PRN           



                                                  Narcotic           



                                                  Reversal,           



                                                  Start           



                                                  date:           



                                                  13           



                                                  12:41:00,           



                                                  Duration:           



                                                  8 doses or           



                                                  times,           



                                                  Stop date:           



                                                  13           



                                                  0:00:00           

 

     flumazenil            No   Maurice                0.2 mg, 2           

[facility]



               3-04           [place]                mL, Route:           l



               18:41:           [facility]                IVP, Drug           [facility]



               00                                 form: INJ,           



                                                  PRN,           



                                                  Dosing           



                                                  Weight           



                                                  82.273,           



                                                  kg, PRN           



                                                  Benzodiaze           



                                                  pine           



                                                  Reversal,           



                                                  Initial           



                                                  dose,           



                                                  Start           



                                                  date:           



                                                  13           



                                                  12:41:00,           



                                                  Duration:           



                                                  30 day,           



                                                  Stop date:           



                                                  13           



                                                  13:40:00           

 

     Cleocin HCl            No   Gentry E                600 mg, 4         

  [facility]



               3-04           [place]                mL, Route:           l



               12:00:                               IVPB, Drug                                            form: INJ,           



                                                  PRE OP,           



                                                  Start           



                                                  date:           



                                                  13           



                                                  6:00:00,           



                                                  Duration:           



                                                  1 day,           



                                                  Stop date:           



                                                  13           



                                                  5:59:00           

 

     rivaroxaban      2012      No   Ju-un                20 mg,           [facility]

[facility]



                               Route: PO,           l



               23:00:           Park                QPM,                                            Dosing           



                                                  Weight           



                                                  79.545,           



                                                  kg, Start           



                                                  date:           



                                                  12           



                                                  17:00:00,           



                                                  Duration:           



                                                  30 day,           



                                                  Stop date:           



                                                  12           



                                                  17:00:00           

 

     Protonix      2012      No   Ju-un                40 mg, 1           Surendra

saroj



               ian                tab,           l



               22:30:           Park                Route: PO,                                            Drug form:           



                                                  ECTAB,           



                                                  Before           



                                                  Dinner,           



                                                  Dosing           



                                                  Weight           



                                                  79.545,           



                                                  kg, Start           



                                                  date:           



                                                  12           



                                                  16:30:00,           



                                                  Duration:           



                                                  30 day,           



                                                  Stop date:           



                                                  12           



                                                  16:30:00           

 

     pneumococca      2012      No   SYSTEM                0.5 ml,           M

[facility]



     l 23-valent                 SYSTEM                Route: IM,           

l



     vaccine      15:00:                               Drug Form:           

vincent



               00                                 INJ, Start           



                                                  date:           



                                                  12           



                                                  9:00:00,           



                                                  Stop date:           



                                                  12           



                                                  9:00:00           

 

     citalopram      2012      No   Ju-un                20 mg, 1           Me

[facility]



               ian                tab,           l



               15:00:           Park                Route: PO,                                            Drug form:           



                                                  TAB,           



                                                  Daily,           



                                                  Dosing           



                                                  Weight           



                                                  79.545,           



                                                  kg, Start           



                                                  date:           



                                                  12           



                                                  9:00:00,           



                                                  Duration:           



                                                  30 day,           



                                                  Stop date:           



                                                  12           



                                                  9:00:00           

 

     Toprol-XL      2012      No   Ju-un                50 mg, 1           [facility]

[facility]



     50 mg oral      ian                tab,           l



     tablet,      15:00:           Park                Route: PO,           [facility]

vincent



     extended      00                                 Drug form:           



     release                                         ERTAB,           



                                                  Daily,           



                                                  Start           



                                                  date:           



                                                  12           



                                                  9:00:00,           



                                                  Duration:           



                                                  30 day,           



                                                  Stop date:           



                                                  12           



                                                  9:00:00           

 

     enalapril      2012      No   Ju-un                10 mg, 1           [facility]

[facility]



                          James                tab,           l



               15:00:           Park                Route: PO,           [facility]                                 Drug form:           



                                                  TAB,           



                                                  Daily,           



                                                  Dosing           



                                                  Weight           



                                                  79.545,           



                                                  kg, Start           



                                                  date:           



                                                  12           



                                                  9:00:00,           



                                                  Duration:           



                                                  30 day,           



                                                  Stop date:           



                                                  12           



                                                  9:00:00           

 

     dabigatran      2012      No   Ju-un                150 mg, 1           M

[facility]



                          James                cap,           l



               15:00:           Park                Route: PO,           C[facility]                                 Drug form:           



                                                  CAP, BID,           



                                                  Dosing           



                                                  Weight           



                                                  79.545,           



                                                  kg, Start           



                                                  date:           



                                                  12           



                                                  9:00:00,           



                                                  Duration:           



                                                  30 day,           



                                                  Stop date:           



                                                  12           



                                                  17:00:00           

 

     Norvasc      2012      No   Ju-un                10 mg, 1           [facility]

ia



                          James                tab,           l



               15:00:           Park                Route: PO,           [facility]                                 Drug form:           



                                                  TAB,           



                                                  Daily,           



                                                  Dosing           



                                                  Weight           



                                                  79.545,           



                                                  kg, Start           



                                                  date:           



                                                  12           



                                                  9:00:00,           



                                                  Duration:           



                                                  30 day,           



                                                  Stop date:           



                                                  12           



                                                  9:00:00           

 

     Lipitor      2012      No   Ju-un                40 mg, 1           [facility]

ia



                          James                tab,           l



               15:00:           Park                Route: PO,                                            Drug form:           



                                                  TAB,           



                                                  Daily,           



                                                  Dosing           



                                                  Weight           



                                                  79.545,           



                                                  kg, Start           



                                                  date:           



                                                  12           



                                                  9:00:00,           



                                                  Duration:           



                                                  30 day,           



                                                  Stop date:           



                                                  12           



                                                  9:00:00           

 

     levothyroxi      2012      No   Ju-un                125            [facility]

ia



     ne                   James                microgram,           l



               12:30:           Park                1 tab,           [facility]                                 Route: PO,           



                                                  Drug form:           



                                                  TAB,           



                                                  Q630AM,           



                                                  Dosing           



                                                  Weight           



                                                  79.545,           



                                                  kg, Start           



                                                  date:           



                                                  12           



                                                  6:30:00,           



                                                  Duration:           



                                                  30 day,           



                                                  Stop date:           



                                                  12           



                                                  6:30:00           

 

     clindamycin      2012      No   Ju-un                600 mg, 4           

[facility]



                          James                mL, Route:           l



               11:47:           Park                IVPB, Drug           [facility]



                                                form: INJ,           



                                                  [id number],           



                                                  Dosing           



                                                  Weight           



                                                  79.545,           



                                                  kg,            



                                                  Priority:           



                                                  STAT,           



                                                  Start           



                                                  date:           



                                                  12           



                                                  5:47:00,           



                                                  Duration:           



                                                  30 day,           



                                                  Stop date:           



                                                  12           



                                                  21:47:00           

 

     magnesium      2012      No   Tyrone Buck                2 gm, 50          

 [facility]



     sulfate                 Dwairy                mL, Route:           l



               06:30:                               IVPB, Drug           [facility]                                 form: INJ,           



                                                  ONCE,           



                                                  Dosing           



                                                  Weight           



                                                  79.545,           



                                                  kg, Total           



                                                  dose = 2           



                                                  gm, Start           



                                                  date:           



                                                  12           



                                                  0:30:00,           



                                                  Duration:           



                                                  1 doses or           



                                                  times,           



                                                  Stop date:           



                                                  12           



                                                  0:30:00           

 

     Omnipaque      2012      No   Ju-un                80 mL,           [facility]

ia



     350mg/ml                      Route:           l



               03:07:           Karlie                IVP, Drug           [facility]



                                                Form:           



                                                  SOLN,           



                                                  Dosing           



                                                  Weight           



                                                  79.545,           



                                                  kg,            



                                                  ONCALL,           



                                                  STAT,           



                                                  Start           



                                                  date:           



                                                  12           



                                                  21:07:00,           



                                                  Duration:           



                                                  1 doses or           



                                                  times,           



                                                  Weight =           



                                                  75 -           



                                                  94kgWeight           



                                                  =  75 -           



                                                  94kg           

 

     docusate      2012      No   Ju-un                100 mg, 1           [facility]

[facility]



                          James                cap,           l



               00:00:           Karlie                Route: PO,           [facility]                                 Drug form:           



                                                  CAP, BID,           



                                                  Dosing           



                                                  Weight           



                                                  79.545,           



                                                  kg, PRN           



                                                  Constipati           



                                                  on, Start           



                                                  date:           



                                                  12           



                                                  18:00:00,           



                                                  Duration:           



                                                  30 day,           



                                                  Stop date:           



                                                  12           



                                                  17:59:00           

 

     Trandate      2012      No   Selam                5 mg, 1           [facility]

[facility]



                          Kimberly                mL, Route:           l



               20:50:           Tyson                IVP, Drug           [facility]

[facility]



                                                form: INJ,           



                                                  Q5Min, PRN           



                                                  Elevated           



                                                  BP, Start           



                                                  date:           



                                                  12           



                                                  14:50:00,           



                                                  Duration:           



                                                  30 day,           



                                                  Stop date:           



                                                  12           



                                                  14:49:00           

 

     flumazenil      2012      No   Selam                0.2 mg, 2          

 [facility]



                          Kimberly                mL, Route:           l



               19:57:           Tyson                IVP, Drug           [facility]

[facility]



                                                form: INJ,           



                                                  PRN,           



                                                  Dosing           



                                                  Weight           



                                                  79.545,           



                                                  kg, PRN           



                                                  Benzodiaze           



                                                  pine           



                                                  Reversal,           



                                                  Initial           



                                                  dose,           



                                                  Start           



                                                  date:           



                                                  12           



                                                  13:57:00,           



                                                  Duration:           



                                                  5 doses or           



                                                  times,           



                                                  Stop date:           



                                                  12           



                                                  0:00:00           

 

     naloxone      2012      No   Selam                0.04 mg,           [facility]

[facility]



               -           Kimberly                0.1 mL,           l



               19:57:           Tyson                Route:           [facility]



               00                                 IVP, Drug           



                                                  form: INJ,           



                                                  Q2MIN,           



                                                  Dosing           



                                                  Weight           



                                                  79.545,           



                                                  kg, PRN           



                                                  Narcotic           



                                                  Reversal,           



                                                  Start           



                                                  date:           



                                                  12           



                                                  13:57:00,           



                                                  Duration:           



                                                  8 doses or           



                                                  times,           



                                                  Stop date:           



                                                  12           



                                                  0:00:00           

 

     ondansetron      2012      No   Selam                4 mg, 2           

[facility]



               -           Kimberly                mL, Route:           l



               19:57:           Tyson                IVP, Drug           [facility]

[facility]



               00                                 form: INJ,           



                                                  ONCE,           



                                                  Dosing           



                                                  Weight           



                                                  79.545,           



                                                  kg, PRN           



                                                  Nausea &           



                                                  Vomiting,           



                                                  Start           



                                                  date:           



                                                  12           



                                                  13:57:00           

 

     hydrALAZINE      2012      No   Selam                5 mg, 0.25        

   [facility]



                          Kimberly                mL, Route:           l



               19:57:           Tyson                IVP, Drug           [facility]

vincent



               00                                 form: INJ,           



                                                  Q5Min,           



                                                  Dosing           



                                                  Weight           



                                                  79.545,           



                                                  kg, PRN           



                                                  Elevated           



                                                  BP, Start           



                                                  date:           



                                                  12           



                                                  13:57:00,           



                                                  Duration:           



                                                  4 doses or           



                                                  times,           



                                                  Stop date:           



                                                  12           



                                                  0:00:00           

 

     niCARdipine      2012      No   Selam                0.25 mg,          

 [facility]



                          Kimberly                0.1 mL,           l



               19:57:           Tyson                Route:           [facility]



               00                                 IVP, Drug           



                                                  form: INJ,           



                                                  Q5Min,           



                                                  Dosing           



                                                  Weight           



                                                  79.545,           



                                                  kg, PRN           



                                                  Elevated           



                                                  BP, Start           



                                                  date:           



                                                  12           



                                                  13:57:00,           



                                                  Duration:           



                                                  4 doses or           



                                                  times,           



                                                  Stop date:           



                                                  12           



                                                  0:00:00           

 

     labetalol      2012      No   Selam                5 mg,           Surendra

saroj



               -28           Kimberly                Route:           l



               19:57:           Tyson                IVP,           [facility]



               00                                 Q5Min,           



                                                  Dosing           



                                                  Weight           



                                                  79.545,           



                                                  kg, PRN           



                                                  Elevated           



                                                  BP, Start           



                                                  date:           



                                                  12           



                                                  13:57:00,           



                                                  Duration:           



                                                  5 doses or           



                                                  times,           



                                                  Stop date:           



                                                  Limited #           



                                                  of times           

 

     hydromorpho      2012      No   Selam                0.2 mg,           

[facility]



     ne                   Kimberly                0.1 mL,           l



               19:57:           Tyson                Route:           [facility]                                 IVP, Drug           



                                                  form: INJ,           



                                                  Q5Min,           



                                                  Dosing           



                                                  Weight           



                                                  79.545,           



                                                  kg, PRN           



                                                  Pain Score           



                                                  4-6, Start           



                                                  date:           



                                                  12           



                                                  13:57:00,           



                                                  Duration:           



                                                  5 doses or           



                                                  times,           



                                                  Stop date:           



                                                  12           



                                                  0:00:00           

 

     Xarelto 20      2012      Yes                      Substituti           M

[facility]



     mg oral                                     on Allowed           l



     tablet      16:29:                                              [facility]



               02                                                

 

     citalopram      2012      Yes  Ju-un                20 mg, 1           Me

[facility]



     20 mg oral      ian                tab, PO,           l



     tablet      17:35:           Park                Daily, 30           [facility]

n



               47                                 tab,           



                                                  Substituti           



                                                  on             



                                                  Allowed,           



                                                  TAB            

 

     Protonix 40      2012      Yes  Ju-un                40 mg, 1           [facility]

[facility]



     mg oral      ian                tab, PO,           l



     enteric      17:35:           Park                Daily, 30           Sarahi

nn



     coated      00                                 tab,           



     tablet                                         Substituti           



                                                  on             



                                                  Allowed,           



                                                  ECTAB           

 

     Pradaxa 150      2012      No   Ju-un                PO, BID,           [facility]

[facility]



     mg oral      ian                Substituti           l



     capsule      17:33:           Park                on Allowed           [facility]

vincent



               53                                                

 

     enalapril      2012      Yes  Ju-un                10 mg, 1           [facility]

[facility]



     10 mg oral      ian                tab, PO,           l



     tablet      17:33:           Park                Daily, 30           [facility]

n



               41                                 tab,           



                                                  Substituti           



                                                  on             



                                                  Allowed,           



                                                  TAB            

 

     metoprolol      2012      Yes  Ju-un                50 mg, PO,           

[facility]



     50 mg oral      ian                Daily, 30           l



     tablet,      17:33:           Park                tab,           [facility]



     extended      20                                 Substituti           



     release                                         on Allowed           

 

     Norvasc 10      2012      Yes  Ju-un                10 mg, 1           Me

[facility]



     mg oral      ian                tab, PO,           l



     tablet      17:31:           Park                Daily, 30           [facility]

n



               50                                 tab,           



                                                  Substituti           



                                                  on             



                                                  Allowed,           



                                                  TAB            

 

     Norvasc 10      2012      No                       10 mg, 1           [facility]

[facility]



     mg oral                                     tab, PO,           l



     tablet      17:31:                               Daily, 30           [facility]

n



               13                                 tab,           



                                                  Substituti           



                                                  on             



                                                  Allowed,           



                                                  TAB            

 

     Lipitor 40      2012      Yes  Ju-un                40 mg, 1           Me

[facility]



     mg oral      ian                tab, PO,           l



     tablet      17:30:           Park                Daily, 30           [facility]

n



               24                                 tab,           



                                                  Substituti           



                                                  on             



                                                  Allowed,           



                                                  TAB            

 

     levothyroxi      2012      Yes                      Substituti           

[facility]



     ne 125 mcg      1-07                               on Allowed           l



     (0.125 mg)      17:30:                                              [facility]



     oral      13                                                



     capsule                                                        

 

     influenza      -      No   SYSTEM                0.5 ml,           [facility]

[facility]



     virus      0-15           SYSTEM                Route: IM,           l



     vaccine,      14:00:                               Drug Form:           Her

[facility]stein



     inactivated      00                                 INJ, ONCE,           



                                                  Start           



                                                  date:           



                                                  10/15/09           



                                                  9:00:00,           



                                                  Stop date:           



                                                  10/15/09           



                                                  9:00:00           

 

     Atorvastati Atorvastati           Yes  AL      1    QD   TAKE 1         

  [facility]



     n Calcium n Calcium                KORANNE                TABLET           

ity of



     40 MG Oral 40 MG Oral                M.D.                DAILY           [facility]

[place]



     Tablet Tablet                                                   [facility]



                                                                 ans

 

     Digoxin 125 Digoxin 125           Yes                                     [facility]

[facility]



     MCG Oral MCG Oral                                                   ity of



     Tablet Tablet                                                   [place]



                                                                 [facility]



                                                                 ans

 

     Coumadin 5 Coumadin 5           Yes                                     [facility]

vers



     MG Oral MG Oral                                                   ity of



     Tablet Tablet                                                   [place]



                                                                 [facility]



                                                                 ans

 

     Coumadin Coumadin           Yes                                     [facility]



     2.5 MG Oral 2.5 MG Oral                                                   i

ty of



     Tablet Tablet                                                   [place]



                                                                 [facility]



                                                                 ans

 

     Entresto Entresto           Yes                                     [facility]



     24-26 MG 24-26 MG                                                   ity of



     Oral Tablet Oral Tablet                                                   T

ex[facility]



                                                                 [facility]ici



                                                                 ans

 

     Levothyroxi Levothyroxi           Yes                                     U

nivers



     ne Sodium ne Sodium                                                   ity o

f



     88 MCG Oral 88 MCG Oral                                                   T

exas



     Tablet Tablet                                                   Physici



                                                                 ans

 

     Metoprolol Metoprolol           Yes                                     [facility]i

vers



     Succinate Succinate                                                   ity o

f



     ER 25 MG ER 25 MG                                                   [place]



     Oral Tablet Oral Tablet                                                   P

hysici



     Extended Extended                                                   ans



     Release 24 Release 24                                                   



     Hour Hour                                                   







Immunizations







           Ordered Immunization Filled Immunization Date       Status     Commen

ts   Source



           Name       Name                                        

 

           FLUZONE HIGH-DOSE PF            2017 Completed             [facility]

[facility]



                                 00:00:00                         [facility]

 

           Pneumococcal            2017 Completed             [place]



           Conjugate 13-Valent            00:00:00                         [facility]

dist

 

           Tdap                  2017 Completed             [place]



                                 00:00:00                         [facility]

 

           Influenza             2013 Completed             [facility]



                                 00:00:00                         [place] [facility]

ns

 

           Influenza (IM)            2013 Completed             [place]



           Preservative Free            00:00:00                         [facility] smith

 

           Influenza Trivalent            2013 Completed             [place]

on



                                 00:00:00                         [facility]

 

           Pneumococcal            2012 Completed             [facility]



           Polysaccharide            00:00:00                         [facility]

 

           Influenza Trivalent            2009-10-15 Completed             [place]

on



                                 00:00:00                         [facility]







Vital Signs







             Vital Name   Observation Time Observation Value Comments     Source

 

             Systolic blood 2020 15:50:00 122 mm[Hg]                [facility]

n [facility]



             pressure                                            

 

             Diastolic blood 2020 15:50:00 69 mm[Hg]                 [facility]

[facility]



             pressure                                            

 

             Heart rate   2020 15:50:00 85 /min                   [place] 

[facility]

 

             Body height  2020 15:50:00 177.8 cm                  [place] 

[facility]

 

             Body weight  2020 15:50:00 69.4 kg                   [place] 

[facility]

 

             BMI          2020 15:50:00 21.95 kg/m2               [place] 

[facility]

 

             Body temperature 2020 10:29:00 35.89 Shanique                 [place]

[facility]

 

             Respiratory rate 2020 11:05:00 17 /min                   [facility]

[facility]

 

             Oxygen saturation in 2020 11:05:00 100 /min                  

[place] [facility]



             Arterial blood by                                        



             Pulse oximetry                                        

 

             Heart Rate   2019 20:13:00                           [facility]

 

             Respitory Rate 2019 20:13:00                           [facility]

[facility] [place]

 

             Systolic (mm Hg) 2019 20:13:00                           [facility]

[facility] [place]

 

             Diastolic (mm Hg) 2019 20:13:00                           [facility]

[facility] [place]

 

             BP Systolic  2019 13:51:00 114 mm[Hg]                [facility]

ty of



                                                                 [place] [facility]

s

 

             BP Diastolic 2019 13:51:00 78 mm[Hg]                 [facility]

ty of



                                                                 [place] [facility]

s

 

             Height       2019 13:51:00 70 [in_us]                [facility]

ty of



                                                                 [place] [facility]

s

 

             Weight       2019 13:51:00 154 [lb_av]               [facility]

ty of



                                                                 [place] [facility]

s

 

             Body Mass Index 2019 13:51:00 22.1 kg/m2                [facility]

rsity of



             Calculated                                          [place] [facility]

s

 

             Heart Rate   2019 13:51:00 81 /min                   [facility]

ty of



                                                                 [place] [facility]

s

 

             Respitory Rate 2017 13:44:00                           [facility]

[facility] [place]

 

             Systolic (mm Hg) 2017 13:44:00                           [facility]

[facility] [place]

 

             Diastolic (mm Hg) 2017 13:44:00                           [facility]

[facility] [place]

 

             Heart Rate   2017 13:44:00                           [facility]

 [place]

 

             Temperature Oral (F) 2017 13:44:00 97.9 F                    

[facility]

 

             Temperature Oral (F) 2017 08:45:00 97.6 F                    

[facility]

 

             Heart Rate   2017 08:45:00                           [facility]

 [facility]

 

             Respitory Rate 2017 08:45:00                           [facility]

[facility] [place]

 

             Systolic (mm Hg) 2017 08:45:00                           [facility]

[facility] [place]

 

             Diastolic (mm Hg) 2017 08:45:00                           [facility]

[facility] [place]

 

             Temperature Oral (F) 2017 04:50:00 98.0 F                    

[facility]

 

             Systolic (mm Hg) 2017 04:50:00                           [facility]

[facility] [place]

 

             Diastolic (mm Hg) 2017 04:50:00                           [facility]

[facility] [place]

 

             Heart Rate   2017 04:50:00                           [facility]

 [place]

 

             Respitory Rate 2017 00:50:00                           [facility]

[facility] [place]

 

             Height       2017 09:25:00 180.34 cm                 [facility]

 [place]

 

             Weight       2017 09:25:00                           [facility]

 [facility]

 

             BMI Calculated 2017 09:25:00                           [facility]

[facility] [place]

 

             BMI Calculated 2017 01:34:00                           [facility]

[facility] [place]

 

             Weight       2017 01:34:00                           [facility]

 [place]

 

             Height       2017 01:34:00 180.34 cm                 [facility]

 

             Height       2016-10-21 14:09:00 180.34 cm                 [facility]

 [place]

 

             Respitory Rate 2016-10-21 14:09:00                           [facility]

[facility] [place]

 

             Heart Rate   2016-10-21 14:09:00                           [facility]

 

             BMI Calculated 2016-10-21 14:09:00                           [facility]

[facility] [place]

 

             Weight       2016-10-21 14:09:00                           [facility]

 

             Systolic (mm Hg) 2016-10-21 14:09:00                           [facility]

[facility] [place]

 

             Diastolic (mm Hg) 2016-10-21 14:09:00                           [facility]

[facility] [place]

 

             Weight       2016 14:14:00                           [facility]

 [facility]

 

             Height       2016 14:14:00 177.8 cm                  [facility]

 

             BMI Calculated 2016 14:14:00                           [facility]

[facility] [place]

 

             Systolic (mm Hg) 2016 14:14:00                           [facility]

[facility] [place]

 

             Diastolic (mm Hg) 2016 14:14:00                           [facility]

[facility] [place]

 

             Heart Rate   2016 14:14:00                           [facility]

 [place]

 

             Temperature Oral (F) 2016 14:14:00 96.9 F                    

[facility]

 

             Weight       2016 19:06:00                           [facility]

 [facility]

 

             BMI Calculated 2016 19:06:00                           [facility]

[facility] [place]

 

             Height       2016 19:06:00 177.8 cm                  [facility]

 

             Heart Rate   2016 19:06:00                           [facility]

 [place]

 

             Systolic (mm Hg) 2016 19:06:00                           [facility]

[facility] [place]

 

             Diastolic (mm Hg) 2016 19:06:00                           [facility]

[facility] [place]

 

             BMI Calculated 2016 19:43:00                           [facility]

[facility] [place]

 

             Weight       2016 19:43:00                           [facility]

 [facility]

 

             Height       2016 19:43:00 160.02 cm                 [facility]

 

             Systolic (mm Hg) 2016 19:43:00                           [facility]

[facility] [place]

 

             Diastolic (mm Hg) 2016 19:43:00                           [facility]

[facility] [place]

 

             Temperature Oral (F) 2016 19:43:00 97.2 F                    

[facility]

 

             Heart Rate   2016 19:43:00                           [facility]

 [place]

 

             Systolic (mm Hg) 2016 17:06:00                           [facility]

[facility] [place]

 

             Diastolic (mm Hg) 2016 17:06:00                           [facility]

[facility] [place]

 

             Respitory Rate 2016 17:06:00                           [facility]

[facility] [place]

 

             Heart Rate   2016 16:00:00                           [facility]

 [place]

 

             Systolic (mm Hg) 2016 16:00:00                           [facility]

[facility] [place]

 

             Diastolic (mm Hg) 2016 16:00:00                           [facility]

[facility] [place]

 

             Respitory Rate 2016 14:02:00                           [facility]

[facility] [place]

 

             Systolic (mm Hg) 2016 14:02:00                           [facility]

[facility] [place]

 

             Diastolic (mm Hg) 2016 14:02:00                           [facility]

[facility] [place]

 

             Heart Rate   2016 10:36:00                           [facility]

 [place]

 

             Respitory Rate 2016 10:36:00                           [facility]

[facility] [place]

 

             Heart Rate   2016 05:37:00                           [facility]

 [facility]

 

             Temperature Oral (F) 2016 09:56:00 97.1 F                    

[facility]

 

             Temperature Oral (F) 2016 05:43:00 96.9 F                    

[facility]

 

             BMI Calculated 2016 00:59:00                           [facility]

[facility] [place]

 

             Weight       2016 00:59:00                           [facility]

 [place]

 

             Height       2016 00:59:00 177.8 cm                  [facility]

 [place]

 

             Systolic (mm Hg) 2016 18:49:00                           [facility]

[facility] [place]

 

             Diastolic (mm Hg) 2016 18:49:00                           [facility]

[facility] [place]

 

             Respitory Rate 2016 18:49:00                           [facility]

[facility] [place]

 

             BMI Calculated 2016 18:49:00                           [facility]

[facility] [place]

 

             Height       2016 18:49:00 177.8 cm                  [facility]

 [place]

 

             Weight       2016 18:49:00                           [facility]

 [place]

 

             Respitory Rate 2016 22:52:00                           [facility]

[facility] [place]

 

             Systolic (mm Hg) 2016 22:52:00                           [facility]

[facility] [place]

 

             Diastolic (mm Hg) 2016 22:52:00                           [facility]

[facility] [place]

 

             Respitory Rate 2016 22:30:00                           [facility]

[facility] [place]

 

             Systolic (mm Hg) 2016 22:30:00                           [facility]

[facility] [place]

 

             Diastolic (mm Hg) 2016 22:30:00                           [facility]

[facility] [place]

 

             Respitory Rate 2016 22:15:00                           [facility]

[facility] [place]

 

             Systolic (mm Hg) 2016 22:15:00                           [facility]

[facility] [place]

 

             Diastolic (mm Hg) 2016 22:15:00                           [facility]

[facility] [place]

 

             Heart Rate   2016 20:17:00                           [facility]

 [place]

 

             Height       2016 19:49:00 177.8 cm                  [facility]

 [place]

 

             BMI Calculated 2016 19:49:00                           [facility]

[facility] [place]

 

             Weight       2016 19:49:00                           [facility]

 [facility]

 

             Temperature Oral (F) 2016 15:10:00 97.8 F                    

[facility]

 

             BMI Calculated 2016 15:10:00                           [facility]

[facility] [place]

 

             Weight       2016 15:10:00                           [facility]

 [place]

 

             Height       2016 15:10:00 177.8 cm                  [facility]

 [facility]

 

             Diastolic (mm Hg) 2013 20:15:00                           [facility]

[facility] [place]

 

             Systolic (mm Hg) 2013 20:15:00                           [facility]

[facility] [place]

 

             Respitory Rate 2013 20:15:00                           [facility]

[facility] [place]

 

             Diastolic (mm Hg) 2013 19:45:00                           [facility]

[facility] [place]

 

             Systolic (mm Hg) 2013 19:45:00                           [facility]

[facility] [place]

 

             Respitory Rate 2013 19:45:00                           [facility]

[facility] [place]

 

             Systolic (mm Hg) 2013 19:30:00                           [facility]

[facility] [place]

 

             Diastolic (mm Hg) 2013 19:30:00                           [facility]

[facility] [place]

 

             Respitory Rate 2013 19:30:00                           [facility]

[facility] [place]

 

             Height       2013 14:29:00 180.34 cm                 [facility]

 [facility]

 

             Weight       2013 14:29:00                           [facility]

 [place]

 

             Heart Rate   2013 14:20:00                           [facility]

 [place]

 

             Weight       2013 21:54:00                           [facility]

 [place]

 

             Height       2013 21:54:00 180.34 cm                 [facility]

 [place]

 

             Diastolic (mm Hg) 2012 17:42:00                           [facility]

[facility] [place]

 

             Respitory Rate 2012 17:42:00                           [facility]

[facility] [place]

 

             Systolic (mm Hg) 2012 17:42:00                           [facility]

[facility] [place]

 

             Temperature Oral (F) 2012 17:42:00 96.0 F                    

[facility]

 

             Heart Rate   2012 17:42:00                           [facility]

 [place]

 

             Systolic (mm Hg) 2012 16:04:00                           [facility]

[facility] [place]

 

             Diastolic (mm Hg) 2012 16:04:00                           [facility]

[facility] [place]

 

             Heart Rate   2012 16:04:00                           [facility]

 [place]

 

             Systolic (mm Hg) 2012 13:31:00                           [facility]

[facility] [place]

 

             Respitory Rate 2012 13:31:00                           [facility]

[facility] [place]

 

             Diastolic (mm Hg) 2012 13:31:00                           [facility]

[facility] [place]

 

             Heart Rate   2012 13:31:00                           [facility]

 [place]

 

             Temperature Oral (F) 2012 13:31:00 97.1 F                    

[facility]

 

             Respitory Rate 2012 11:07:00                           [facility]

[facility] [place]

 

             Temperature Oral (F) 2012 11:07:00 97.0 F                    

[facility]

 

             Weight       2012 16:33:00                           [facility]

 [facility]

 

             Height       2012 16:33:00 180.34 cm                 [facility]

 [facility]

 

             Height       2012 15:43:00 180.34 cm                 [facility]

 [facility]

 

             Weight       2012 15:43:00                           [facility]

 [place]

 

             Weight       2012 20:21:00                           [facility]

 [place]

 

             Height       2012 20:21:00 180.34 cm                 [facility]

 [facility]

 

             Systolic (mm Hg) 2012 20:21:00                           [facility]

[facility] [place]

 

             Diastolic (mm Hg) 2012 20:21:00                           [facility]

[facility] [place]

 

             Heart Rate   2012 20:21:00                           [facility]

 [facility]







Procedures







                Procedure       Date / Time     Performing Clinician Source



                                Performed                       

 

                PROTHROMBIN TIME WITH INR 2020-06-15 08:07:00 Destiny Shin

 

                PROTHROMBIN TIME WITH INR 2020 08:17:00 Destiny [facility]

 

                PROTHROMBIN TIME WITH INR 2020 11:04:00 Destiny Shin

 

                COMPREHENSIVE METABOLIC 2020 10:30:00 Jaydon Leonard



                [facility]ANEL                           [place]       

 

                B NATRIURETIC PEPTIDE 2020 10:30:00 Jaydon Leonard



                                                [facility]       

 

                MAGNESIUM LEVEL 2020 10:30:00 Jaydon Leonard



                                                [facility]       

 

                PROTHROMBIN TIME WITH INR 2020 14:32:00 Jaydon Leonard



                                                [facility]       

 

                POC PT/INR      2020 10:30:00 Dagoberto Rodriguez

odjulianne

 

                COMPREHENSIVE METABOLIC 2020 12:05:00 Jaydon Leonard



                PANEL                           [place]       

 

                B NATRIURETIC PEPTIDE 2020 12:05:00 Jaydon Leonard



                                                [facility]       

 

                MAGNESIUM LEVEL 2020 12:05:00 PudloJaydon  Em[facility] Treviño

[facility]



                                                [facility]       

 

                ESTIMATED GFR   2020 12:05:00 PudloJaydon Hudson

[facility]



                                                [facility]       

 

                POC PT/INR      2020 10:47:00 Destiny Shin  [facility]

[facility]

 

                BASIC METABOLIC PANEL 2020 03:33:00 Semones, Shama tirado [facility]

 

                MAGNESIUM LEVEL 2020 03:33:00 SemonesShama

[facility]ist

 

                PROTHROMBIN TIME WITH INR 2020 03:33:00 Hong, Alisia Baptiste 

[facility]

 

                ESTIMATED GFR   2020 03:33:00 Hong, Alisia Suarez

 

                BASIC METABOLIC PANEL 2020 04:00:00 Semones, Shama tirado [facility]

 

                MAGNESIUM LEVEL 2020 04:00:00 Semones, Shama Treviño

[facility]

 

                ESTIMATED GFR   2020 04:00:00 Hong, Alisia Suarez

 

                B NATRIURETIC PEPTIDE 2020 03:30:00 Hong, Alisia Suarez

 

                PROTHROMBIN TIME WITH INR 2020 03:30:00 Hong, Alisia Suarez

 

                TTE COMPLETE, WO 2020 09:31:00 SemonesShama Met

[facility]



                CONTRAST, W DOPPLER                                 



                ([id number])                                         

 

                PROTHROMBIN TIME WITH INR 2020 05:45:00 Hong, Alisia Suarez

 

                BASIC METABOLIC PANEL 2020 04:00:00 SemonesShama [facility]

 

                MAGNESIUM LEVEL 2020 04:00:00 Semones Shamaananya Treviño

[facility]

 

                THYROID STIMULATING 2020 04:00:00 SemonesShama



                HORMONE                                         

 

                ESTIMATED GFR   2020 04:00:00 Hong, Alisia Suarez

 

                HC COMPLETE BLD COUNT 2020 05:30:00 Hong, Alisia Suarez



                W/AUTO DIFF                                     

 

                COMPREHENSIVE METABOLIC 2020 05:30:00 Hong, Alisia Suarez



                [facility]AN[facility]                                           

 

                B NATRIURETIC PEPTIDE 2020 05:30:00 Alisia Pitts [facility]

 

                PROTHROMBIN TIME WITH INR 2020 05:30:00 Alisia Pitts 

[facility]

 

                ESTIMATED GFR   2020 05:30:00 Hong Alisia Baptiste

 [facility]

 

                TROPONIN        2020 05:30:00 Hong Alisia Baptiste

 [facility]

 

                TROPONIN        2020 22:50:00 Toya Wasserman

[facility]



                                                [place]          

 

                LACTIC ACID LEVEL 2020 22:50:00 Toya Wasserman M

[facility]



                                                [place]          

 

                LACTIC ACID LEVEL 2020 19:50:00 Toya Wasserman

[facility]



                                                [place]          

 

                BLOOD CULTURE, AEROBIC & 2020 19:25:00 Toya Wasserman [facility]



                ANAEROBIC                       [place]          

 

                BLOOD CULTURE, AEROBIC & 2020 19:08:00 Toya Wasserman [facility]



                ANAEROBIC                       [place]          

 

                GROUP A STREP, RAPID 2020 18:17:00 Toya Wasserman [facility]



                ANTIGEN                         [place]          

 

                STREP SCREEN CULTURE 2020 18:17:00 Toya Wasserman [facility]



                                                [place]          

 

                XR CHEST 1 VW PORTABLE 2020 17:56:28 Toya Wasserman [facility]



                                                [place]          

 

                ECG ED PRELIMINARY 2020 17:07:40 Toya Wasserman



                INTERPRETATION                  [facility]          

 

                INFLUENZA ANTIGEN TEST, 2020 17:03:00 Toya Wasserman [facility]



                REFLEX NEGATIVE TO RPP                 [place]          

 

                COMPREHENSIVE METABOLIC 2020 16:59:00 Toya Wasserman [facility]



                PANEL                           [place]          

 

                LACTIC ACID, I-STAT 2020 16:59:00 Toya Wasserman



                                                [place]          

 

                CREATINE KINASE, TOTAL 2020 16:59:00 Toya Wasserman [facility]



                (CPK)                           [place]          

 

                TROPONIN, I-STAT 2020 16:59:00 Toya Wasserman Me

[facility]ist



                                                [place]          

 

                B NATRIURETIC PEP, I-STAT 2020 16:59:00 Toya Wasserman

[facility]



                                                [place]          

 

                HC COMPLETE BLD COUNT 2020 16:59:00 Toya Wasserman

on [facility]



                W/AUTO DIFF                     [place]          

 

                ESTIMATED GFR   2020 16:59:00 Toya Wasserman Met

h[facility]ist



                                                [place]          

 

                ECG 12-LEAD     2020 16:58:05 Toya Wasserman Met

h[facility]ist



                                                [place]          

 

                POC PT/INR      2020 10:31:00 Dagoberto Rodriguez [facility]

[facility]ist

 

                POC PT/INR      2020 10:32:00 KipDestiny turner Meth

odist

 

                POC PT/INR      2020 14:42:00 Dagoberto Rodriguez [facility]

odist

 

                POC PT/INR      2020 10:40:00 Kip Destiny Baptiste Meth

odist

 

                POC PT/INR      2019 10:54:00 Destiny Shin Meth

odist

 

                POC PT/INR      2019 10:09:00 Destiny Shin Meth

odist

 

                THYROID STIMULATING 2019 09:51:00 Pippa Chantal  Emile 

[facility]



                HORMONE                         [place]         

 

                T4, FREE        2019 09:51:00 Chantal Das Meth

odist



                                                [place]         

 

                POC PT/INR      2019-10-31 14:23:00 Destiny Shin Meth

odist

 

                NT-PROBNP       2019-10-28 11:25:00 Jaydon Leonard Meth

odist



                                                [facility]       

 

                HC COMPLETE BLD COUNT 2019-10-18 04:15:00 Ashley Ortiz

n [facility]



                W/AUTO DIFF                     [facility]   

 

                BASIC METABOLIC PANEL 2019-10-18 04:15:00 Ashley Ortiz

n [facility]



                                                [facility]   

 

                MAGNESIUM LEVEL 2019-10-18 04:15:00 Ashley Ortiz Meth

odist



                                                [facility]   

 

                PHOSPHORUS LEVEL 2019-10-18 04:15:00 Ashley Ortiz Met

hodist



                                                [facility]   

 

                PROTHROMBIN TIME WITH INR 2019-10-18 04:15:00 Fidelina England

 

                ESTIMATED GFR   2019-10-18 04:15:00 Fidelina England Met

hodist

 

                NM MYOCARDIAL PERFUSION 2019-10-17 13:51:28 Mina Garcia



                STRESS REST 1 DAY                                 

 

                ECG 12-LEAD     2019-10-17 11:05:54 Deepak Ibanisabella YBARRA

ethodist

 

                CV STRESS TEST  2019-10-17 11:05:43 Mina Garcia Meth

odist

 

                TROPONIN        2019-10-17 08:17:00 Lobo Rachel Vo 

[facility]



                                                Priti           

 

                HC COMPLETE BLD COUNT 2019-10-17 04:16:00 Ashley Ortiz



                W/AUTO DIFF                     [facility] Ullah   

 

                PROTHROMBIN TIME WITH INR 2019-10-17 04:16:00 Pascale King 

[facility]

 

                BASIC METABOLIC PANEL 2019-10-17 04:00:00 Ashley Ortiz [facility]



                                                [facility]   

 

                MAGNESIUM LEVEL 2019-10-17 04:00:00 Ashley Ortiz Meth

odist



                                                [facility]   

 

                PHOSPHORUS LEVEL 2019-10-17 04:00:00 Ashley Ortiz Met

hodist



                                                [facility]   

 

                ESTIMATED GFR   2019-10-17 04:00:00 Fidelina England Met

hodist

 

                INFLUENZA ANTIGEN TEST, 2019-10-16 04:45:00 Pascale King [facility]



                REFLEX NEGATIVE TO RPP                                 

 

                RESPIRATORY PATHOGEN 2019-10-16 04:45:00 Fidelina England [facility]



                PANEL                                           

 

                BASIC METABOLIC PANEL 2019-10-16 04:32:00 Blessing Snyder

 

                MAGNESIUM LEVEL 2019-10-16 04:32:00 Blessing Snyder

 

                PHOSPHORUS LEVEL 2019-10-16 04:32:00 Blessing Snyder

 

                HC COMPLETE BLD COUNT 2019-10-16 04:32:00 Gillian Bright [facility]



                W/AUTO DIFF                     Jaydon Gutierrez   

 

                PROTHROMBIN TIME WITH INR 2019-10-16 04:32:00 Pascale King 

[facility]

 

                ESTIMATED GFR   2019-10-16 04:32:00 Fidelina England Met

isidro

 

                CBC WITH PLATELET AND 2019-10-16 04:32:00 Fidelina England

on [facility]



                DIFFERENTIAL                                    

 

                DIGOXIN LEVEL   2019-10-16 04:32:00 Fidelina England Met

isidro

 

                TROPONIN        2019-10-16 04:02:00 Emile Bright   

 

                ECG 12-LEAD     2019-10-16 03:54:30 Blessing Snyder

 

                TROPONIN, I-STAT 2019-10-15 22:00:00 Emile Bright Met

isidro Gutierrez   

 

                CT HEAD WO CONTRAST 2019-10-15 20:20:45 Emile Bright   

 

                CT ABDOMEN PELVIS WO 2019-10-15 20:12:33 mEile Bright



                CONTRAST                        Jaydon Gutierrez   

 

                XR CHEST 1 VW PORTABLE 2019-10-15 19:57:46 Daryl Bright

on Erick Gutierrez   

 

                URINALYSIS      2019-10-15 19:48:00 Emile Bright   

 

                BLOOD CULTURE, AEROBIC & 2019-10-15 19:40:00 John Bright



                ANAEROBIC                       Jaydon Gutierrez   

 

                BLOOD CULTURE, AEROBIC & 2019-10-15 19:28:00 John Bright



                ANAEROBIC                       Jaydon Gutierrez   

 

                HC COMPLETE BLD COUNT 2019-10-15 19:28:00 Gillian Bright



                W/AUTO DIFF                     Jaydon Gutierrez   

 

                PROTHROMBIN TIME WITH 2019-10-15 19:28:00 Gillian Bright



                INR, I-STAT                     Jaydon Gutierrez   

 

                COMPREHENSIVE METABOLIC 2019-10-15 19:28:00 Kendrick Bright



                PANEL                           Jaydon Gutierrez   

 

                LACTIC ACID, I-STAT 2019-10-15 19:28:00 Emile Bright   

 

                TROPONIN, I-STAT 2019-10-15 19:28:00 Emile Bright Met

isidro Gutierrez   

 

                B NATRIURETIC PEP, I-STAT 2019-10-15 19:28:00 Josep Bright   

 

                ESTIMATED GFR   2019-10-15 19:28:00 Emile Bright   

 

                INFLUENZA ANTIGEN 2019-10-15 19:25:00 Delco Emile Rosales   

 

                ECG 12-LEAD     2019-10-15 19:18:23 Emile Bright

odist



                                                Jaydon Gutierrez   

 

                ECG ED PRELIMINARY 2019-10-15 19:16:49 Emile Bright

ethodi



                INTERPRETATION                  Jaydon Gutierrez   

 

                POC PT/INR      2019-10-03 10:53:00 Destiny Shin Meth

odist

 

                POC PT/INR      2019 10:45:00 Dagoberto Rodriguez Meth

odist

 

                POC PT/INR      2019 10:35:00 Destiny Shin Meth

odist

 

                POC PT/INR      2019 14:46:00 Dagoberto Rodriguez   [place]

odist

 

                GI Esophagus barium 2019 00:00:00                 [facility] [place]



                swallow [id number]                                   Physicians

 

                History of      2009-10-16 00:00:00                 [facility]

[facility] [place]



                Cardioverter-defibrillato                                 Physic

ians



                r Pulse Generator                                 



                Insertion With Leads                                 

 

                CABG                                            [facility]



                                                                Physicians

 

                History of Knee Surgery                                 [facility]

[facility] [place]



                                                                Physicians

 

                History of Hernia Repair                                 [facility]



                                                                Physicians

 

                History of Pacemaker                                 [facility] 

[facility] [place]



                Placement                                       Physicians

 

                CABG - Coronary artery                                 [facility] 

[place]



                bypass graft <sup>1</sup>                                 

 

                Hernia repair                                   [facility]

 

                Knee replacement                                 [facility]

n

 

                Operation <sup>2</sup>                                 [facility]

 

                Abdomen endoscopy                                 [facility]

nn

 

                Barium swallow                                  [facility]

 

                CABG - Coronary artery                                 [facility] 

[place]



                bypass graft<sup>1,                                 



                2</sup>                                         

 

                Cataract                                        [facility]



                surgery<sup>3</sup>                                 

 

                Colonoscopy<sup>4</sup>                                 [facility]

 

                Hernia repair<sup>5,                                 [facility]

[facility]



                6</sup>                                         

 

                Knee replacement                                 [facility]

n

 

                Operation<sup>7</sup>                                 [facility]







Plan of Care







             Planned Activity Planned Date Details      Comments     Source

 

             Future Scheduled 2020   INFLUENZA VACCINE              [place]

n [facility]



             Test         00:00:00     [code = INFLUENZA              



                                       VACCINE]                  

 

             Diagnostic Test 2019   GI Esophagus barium              [facility]



             Pending      00:00:00     swallow 84967 [code              Physicia

ns



                                       = 62829]                  

 

             Future Scheduled 1987   SHINGLES VACCINES              [place]

n [facility]



             Test         00:00:00     (#1) [code =              



                                       SHINGLES VACCINES              



                                       (#1)]                     







Encounters







        Start   End     Encounter Admission Attending Care    Care    Encounter 

Source



        Date/Time Date/Time Type    Type    Clinicians Facility Department ID   

   

 

        2020 Outpatient         MICHAEL,   [facility]     [id number]

[address] [place]



        00:00:00 00:00:00                 ASHRITH                 [address]     [facility]

i



                                                                        st

 

        2020 Outpatient                 [facility]     [id number]

[address] [place]



        00:00:00 00:00:00                                         337     [facility]

i



                                                                        st

 

        2020 Outpatient                 [facility]     [id number]

[address] [place]



        00:00:00 00:00:00                                         899     [facility]

i



                                                                        st

 

        2020 Outpatient         BHIMARACARLO, [facility]     [id number]

[id number] [place]



        00:00:00 00:00:00                 EFREN                  994     [facility]

i



                                                                        st

 

        2020 Outpatient         BIONAT, [facility]     [id number]

[address] [place]



        00:00:00 00:00:00                 KIKE                   995     [facility]

i



                                                                        st

 

        2020 Outpatient                 [facility]     [id number]

[address] [place]



        00:00:00 00:00:00                                         998     [facility]

i



                                                                        st

 

        2020 Outpatient         DESTINY SHIN [facility]     [id number]

[id number] [place]



        00:00:00 00:00:00                                         079     Method

i



                                                                        st

 

        2020 Inpatient         MIRNA SIMMONS [facility]     [id number]

[address] [place]



        00:00:00 00:00:00                                         310     [facility]

i



                                                                        st

 

        2019 Outpatient                 [facility]     [id number]

[address] [place]



        00:00:00 00:00:00                                         762     [facility]

i



                                                                        st

 

        2019-10-15 2019-10-18 Inpatient         TOMÁS, [facility]     089     [id number]

[address] [place]



        00:00:00 00:00:00                 FIDELINA                   053     Method

i



                                                                        st

 

        2019-10-16 2019-10-16 Outpatient                 [facility]     [id number]

[address] [place]



        00:00:00 00:00:00                                         711     [facility]

i



                                                                        st

 

        2019 Outpatient         [facility]   [id number]

[id number] 



        13:48:00 23:59:00                 Gentry POLLY                 15      

 

        2019 Outpatient                 [facility]    7515   

 [facility]



        13:48:00 13:48:00                                                 

 

        2019 Appointmen         [facility]     [facility] 

[id number] [facility]



        14:00:00 14:00:00 t;              SHMUEL RINALDIology Carrie        

 itluis felipe of



                        [place],                         [place]



                        GENTRY,                          Medical         Physicjessica [place]                            [facility]

 

        2019 Outpatient         SAMI MacedoMARICELP   [facility]   [id number]

[id number] 



        08:27:00 23:59:00                 Gentry E                 04      

 

        2019 Appointmen         [facility]     [facility] 

[id number] [facility]



        11:00:00 11:00:00 t;              SHMUEL RINALDI of



                        [place],                         [place]



                        GENTRY,                          Medical         Harjinder MI                            [facility]

 

        [facility]     [id number]

98 Univers



        13:00:00 13:00:00 t;              SHMUEL BROOKE

 [place], [place]



                        SHMUEL BROOKE                                         Physi

ci



                                                                        ans

 

        [facility]     [id number]

50 Univers



        14:45:00 14:45:00 t;              SHMUEL BROOKE

 [place], [place]



                        SHMUEL BROOKE                                         Physi

ci



                                                                        ans

 

        [facility]     [id number]

93 Univers



        16:15:00 16:15:00 t;              SHMUEL BROOKE

 [place], [place]



                        SHMUEL BROOKE                                         Physi

ci



                                                                        ans

 

        2017 Outpatient         [facility]   [id number]

[id number] 



        20:33:00 12:15:00                                         14      

 

        2016-10-21 2016-10-21 Outpatient         [facility]   [id number]

875 



        09:01:00 23:59:00                 Rakesh Miller                          

 

        2016 Outpatient         [facility]   [id number]

875 



        09:12:00 23:59:00                 Rakesh Miller                          

 

        2016 Outpatient         [facility]   8511

[id number] 



        13:33:00 23:59:00                 Gentry E                 10      

 

        2016 2016 Outpatient         [place], [facility]   [id number]

875 



        13:58:00 23:59:00                 Rakesh                  09      



                                        Paul                          

 

        2016 Outpatient         [place], [facility]   [id number]

875 



        13:37:00 23:59:00                 Rakesh                  06      



                                        Paul                          

 

        2016 Outpatient         [facility]   [id number]

[id number] 



        18:33:00 15:39:00                 Breezy Singh      

 

        2016 Outpatient         [place], [facility]   [id number]

[id number] 



        12:31:00 23:59:00                 Rakesh                  05      



                                        Paul                          

 

        2016 Outpatient         [place], [facility]   [id number]

875 



        09:30:00 23:59:00                 Rakesh                  04      



                                        Paul                          

 

        2016 Outpatient         [place], [facility]   [id number]

[id number] 



        13:36:00 23:59:00                 Rakesh                  33      



                                        Paul                          

 

        2016 Outpatient         [place], [facility]   [id number]

875 



        13:04:00 15:00:00                 Rakesh                  03      



                                        Paul                          

 

        2016 Outpatient         [place], [facility]   [id number]

875 



        09:00:00 23:59:00                 Rakesh                  02      



                                        Paul                          

 

        2015-12-10 2015-12-10 Outpatient         Physician, [facility]    [id number]

[id number] 



        12:08:00 23:59:00                 Non                     01      



                                        Associated                         







Results







           Test Description Test Time  Test Comments Results    Result Comments 

Source









                    Prothrombin time with INR 2020 00:34:00 









                      Test Item  Value      Reference Range Interpretation Comme

nts









             INR (test code = 1.9                       H            Reference R

sonia



             6301-6)                                                0.9-1.1Moder

ate-intensity



                                                                 Warfarin Therap

y



                                                                 2.0-3.0Higher-i

ntensity Warfarin



                                                                 Therapy   3.0-4

.0

 

             Prothrombin time (test 19.4         9.0- 11.5    H             For 

more information on this



             code = 5902-2)              sec                       test, go



                                                                 to:http://educa

[email or web address]/faq/FA

Q104

 

             EMMA (test code = EMMA) FASTING:YESFASTING:                          

 



                          YES                                    

 

             RAC (test code = RAC) Performing                             



                          Organization                           



                          Information:                           



                          Site ID: [facility]                           



                          [facility]                           



                          Lab    Address:                           



                          [address] [place], [place]                            



                          [id number]                             



                          Director: Alexis Anderson                           

 

             Lab Interpretation Abnormal                               



             (test code = 82517-5)                                        



[place] [id number] 10:30:00





             Test Item    Value        Reference    Interpretation Comments



                                       Range                     

 

             Glucose (test 76 mg/dL     65-99                                 Fa

sting



             code = 2345-7)                                        reference int

erval

 

             BUN (test code = 21 mg/dL     7-                      



             3094-0)                                             

 

             Creatinine (test 1.10 mg/dL   0.7-1.11                  For patient

s >49



             code = 2160-0)                                        years of age,

 the



                                                                 reference limit

for



                                                                 Creatinine is



                                                                 approximately 1

3%



                                                                 higher for



                                                                 peopleidentifie

d as



                                                                 -Naomie

n.

 

             EGFR Non-Afr. 62           > OR = 60                 



             American (test              mL/min/1.73m2              



             code = 2775)                                        

 

             EGFR  72           > OR = 60                 



             American (test              mL/min/1.73m2              



             code = 08227-8)                                        

 

             BUN/creatinine NOT APPLICABLE 6-  (calc)              



             ratio (test code                                        



             = 3097-3)                                           

 

             Sodium (test code 140 mmol/L   135-146                   



             = 2951-2)                                           

 

             Potassium (test 4.5 mmol/L   3.5-5.3                   



             code = 2823-3)                                        

 

             Chloride (test 103 mmol/L                       



             code = 2075-0)                                        

 

             CO2 (test code = 31 mmol/L    20-32                     



             -9)                                             

 

             Calcium (test 9.3 mg/dL    8.6-10.3                  



             code = 03153-6)                                        

 

             Protein (test 6.3 g/dL     6.1-8.1                   



             code = 2885-2)                                        

 

             Albumin, S (test 4.2 g/dL     3.6-5.1                   



             code = 1751-7)                                        

 

             Globulin, total 2.1          1.9- 3.7 g/dL              



             (test code =              (calc)                    



             19174-0)                                            

 

             Albumin/globulin 2.0          1.0- 2.5                  



             ratio (test code              (calc)                    



             = 1759-0)                                           

 

             Total bilirubin 0.8 mg/dL    0.2-1.2                   



             (test code =                                        



             1975-2)                                             

 

             Alkaline     80 U/L                           



             phosphatase (test                                        



             code = 6768-6)                                        

 

             AST (test code = 30 U/L       10-35                     



             1920-8)                                             

 

             ALT (test code = 22 U/L       9-46                      



             1742-6)                                             

 

             RAC (test code = Performing                             



             RAC)         Organization                           



                          Information:                           



                          Site ID: [facility]                           



                          Name: [facility]    Address:                           



                          [address] [place] [address]                           



                          [id number]                             



                          Director: Alexis Anderson                           



[facility]Magn[facility] [id number] 10:30:00





             Test Item    Value        Reference Range Interpretation Comments

 

             Magnesium (test code 2.0 mg/dL    1.5-2.5                   



             = 99693-8)                                          

 

             RAC (test code = RAC) Performing Organization                      

     



                          Information:    Site ID:                           



                          [facility]    [facility]                           



                            Address: [address] [place] [id number]                           



                          Director: Alexis Anderson                           



[facility] pkefvrj5141-92-30 10:30:00





             Test Item    Value        Reference    Interpretation Comments



                                       Range                     

 

             BNP (test code = 314 pg/mL    <100         H             BNP levels

 increase



             63527-9)                                            with age in the



                                                                 generalpopulati

on



                                                                 with the highes

t



                                                                 values seen



                                                                 inindividuals g

reater



                                                                 than 75 years o

f



                                                                 age.Reference: 

J. Am.



                                                                 Ed. Cardiol. 

2002;



                                                                 40:[phone]. NO



                                                                 COLLECTION DATE



                                                                 RECEIVED. WE GRAY

VE



                                                                 USEDTHE DATE TH

E



                                                                 SPECIMEN WAS RE

CEIVED



                                                                 BY OVIDIO NIELSEN AS



                                                                 THE COLLECTION 

DATE.



                                                                 IF THISIS INCOR

RECT,



                                                                 PLEASE CONTACT 

CLIENT



                                                                 SERVICES.PHONE



                                                                 NUMBER: [phone]

 

             RAC (test code = Performing                             



             RAC)         Organization                           



                          Information:                           



                          Site ID: [facility]                           



                          Name: [facility]                           



                          on Lab                                 



                          Address: [address] [place] [address]                            



                          [phone]                             



                          Director: Alexis Adnerson                           

 

             Lab Interpretation Abnormal                               



             (test code =                                        



             92685-0)                                            



[facility] PT/[id number] 10:30:00





             Test Item    Value        Reference Range Interpretation Comments

 

             POC prothrombin time (test code = 24.8                             

      



             0445595)                                            

 

             POC INR (test code = 3946554) 2.1                                  

  



[facility]Blood culture, aerobic &amp; wxnejieyx5886-15-84 00:03:02





             Test Item    Value        Reference Range Interpretation Comments

 

             Blood culture No growth                              Specimen



             isolate (test after 5 days                           InformationSpe

cimen



             code = 600-7) of                                     Source: BloodS

pecimen



                          incubation.                            Site: Forearm, 

left



[facility] metabolic hzhaf3339-48-02 05:41:36





             Test Item    Value        Reference Range Interpretation Comments

 

             Sodium (test code = 2951-2) 143          135- 148 mEq/L            

  

 

             Potassium (test code = 2823-3) 4.2          3.5- 5.0 mEq/L         

     

 

             Chloride (test code = 2075-0) 106          98- 112 mEq/L           

   

 

             CO2 (test code = 8-9) 21           24- 31 mEq/L L            

 

             Anion gap (test code = 33037-3) 16@ANIO      7- 15 mEq/L  H        

    

 

             BUN (test code = 3094-0) 18 mg/dL     8-23                      

 

             Creatinine (test code = 2160-0) 1.08 mg/dL   0.7-1.2               

    

 

             Glucose (test code = 2345-7) 87 mg/dL     65-99                    

 

 

             Calcium (test code = 99926-6) 9.1 mg/dL    8.8-10.2                

  

 

             Lab Interpretation (test code = Abnormal                           

    



             38728-8)                                            



[facility] MethodistStrep screen gxhxiwi4725-97-69 11:19:04





             Test Item    Value        Reference Range Interpretation Comments

 

             Strep screen No beta hemolytic                           Specimen



             culture      Streptococci                           InformationSpec

imen



             isolate (test isolated                               Source: Throat

Specimen



             code = 2246)                                        Site: Not other

wise



                                                                 specified



[facility] MethodistGroup A strep, rapid abuqevt2432-96-29 11:19:04





             Test Item    Value        Reference    Interpretation Comments



                                       Range                     

 

             Group A      Negative for Group                           Specimen



             strep, rapid A Streptococcus                           InformationS

pecimen



             antigen      antigen.                               Source: ThroatS

pecimen



             result (test                                        Site: Not other

wise



             code =                                              specified



             8139973)                                            



[facility] MethodistEchocardiogram complete w contrast and 3D if hkyfep0665-07-78 
14:59:00Interface, Radiology Results In - 2020  3:00 PM CST               
Echocardiography Report           [address] [address], [place], [place] 
[address].Name:  TAMAR BREWSTER        Pat.ID:  
 [id number].Date:   2020               Refer.MD:  SHAMA MCCULLOUGH NP      
Exam Time: 8:48:00 AM    Study Type:Routine Echo            Height:    70in     
              Weight:    154lb        BSA:       1.87 m2                   
Age:  1935,84Y          Sex:       MALE            BP:        98/57       
           HR:        86 bpm                  Sonogrphr: Hetal Lopez, [facility]      
Pat. Stat.:Inpatient               Room:      M431                    Study 
Status:Final                 Echo Event ID:[id number]            Order ID:  
[id number]              Reason forStudy:CHF               History / 
Clinical:Congestive Heart Failure, Pulmonary HypertensionProcedures: 2D Echo, 
Colorflow Doppler, Strain, PortableRace:                     
--------------------
----------------SUMMARY:------------------------------------LV EF is mild to 
moderately depressed.Basal Inferior, Basal Inferolateral, Mid Inferior, Mid 
Inferolateralwalls are akinetic.  Basal Anterolateral, Mid Anterolateral walls 
aremildly hypokinetic.   Normal wall motion in all other walls.RV systolic 
function is lower limits of normal.Mild aortic valve stenosis.LV filling 
pressure is elevated.---
---------------------------------FINDINGS:------------------------------------LV

:       LV size is upper limits of normal. There is moderate          concentric
 LV hypertrophy. Reduced average LV global          longitudinal  strain at -
12.5%. LV EF is mild to moderately          depressed.  Estimated EF is 40-44%. 
Basal Inferior, BasalInferolateral, Mid Inferior, Mid Inferolateral walls are 
akinetic. Basal Anterolateral, Mid Anterolateral walls are mildly hypokinetic.  
Normal wall motion in all other walls.RV:       RV size is moderately enlarged. 
A pacemaker wire is seen in          the  RV. RV systolic function is lower 
limits of normal.LA:       LA volume is severely enlarged.RA:       RA volume is
severely enlarged. A pacemaker wire is seen.AO:       Aortic root diameter is 
normal.VALERIE:No pericardial effusion.AV:       Mild thickening and calcification 
of AV leaflets. A trace of   aortic  regurgitation. Mild aortic valve stenosis. 
Estimated          mean  aortic valve eibxpldb02 mmHg with a valve area of 1.1  
       cm2. MV:       Mild thickening and calcification of mitralleaflets.      
   Moderate  mitral annular calcification. Mild mitral          regurgitation. 
PV:    No structural PV abnormalities noted.TV:       No structural TV 
abnormalities noted. A trace of tricuspid          regurgitation [place]:     LV 
relaxation is impaired. LV filling pressure is elevated.Other:    Estimated PA 
systolic pressure is 33 mmHg, assuming a mean          RAP  of 10 mmHg.-------
-----------------------------MEASUREMENTS:------------------------------------  
        2DParasternal Long Axis Ao An            1.9 cm            LVPWd        
   1.2 cm   Ao Rtd           3.2 cm            Index        1.7 cm/m2           
 LA Ds            4.9 cm   IVSd       1.3 cm            RWT             0.45    
 LVIDd            5.3 cm            Index        2.8 cm/m2             LV Mass  
       267 g    (122-174) LVIDs            2.1 cm            LVM Index       143
g/m2 LV%fs             61 %            LA Sng Plane LA Area           40 cm2  
(8.8-23.4) LA Vol           187 ml   Index        100 ml/m2 LA LngAx         7.3
cm           RA Sng Plane RA Vol           102 ml            Index        54 
ml/m2              RA LngAx         6.2 cm   RA Area       28 cm2  (8.3-
19.5)LVOT For Flow LVOT             1.9 cm            LVOT Area        2.8 cm2 
EF Biplane EDV              130 ml            SV                54 ml   ESV     
         76 ml    EF                42 %                                   
DOPPLERAV For Flow/ARIANA AV hpXms077 cm/s (100-170) AV TVI            48 cm   AV 
mnVel         200 cm/s          AVpkAcRt        5352cm/s2 AV pkPG           28 
mmHg          AV DeRt         1104 cm/s2 AV Mean G         18 mmHg  AV Area     
    1.1 cm2  (3-5) AV ET            239 msec          AV AC             79 msec 
() AV AC/ET        0.33              Aortic Valve   AV DI            0.4  
           LVOT For Flow LVOT TVI          19 cm            LVOT CI          2.3
l/m/m2 LVOT SV           54 ml            LVOTpkPG         5.1 mmHg LVOTpkVel   
    113 cm/s          LVOTmnPG         2.8 mmHg LVOT CO          4.3 l/min      
  HR                79 bpm LVOT   SVi               29 ml/m2          
------------------------------------WALL 
MOTION:------------------------------------RESTING WALL MOTION:Basal Inferior, 
Basal Inferolateral, Mid Inferior, Mid Inferolateralwalls are akinetic.  Basal 
Anterolateral, MidAnterolateral walls aremildly hypokinetic.   Normal wall 
motion in all other walls.Wall Index = 1.5Signed 2020 02:59 PMNicole Julian M.D.[facility] MethodistThyroid stimulating idfppzx6315-43-91 07:11:11





             Test Item    Value        Reference Range Interpretation Comments

 

             TSH (test code = 3016-3) 0.65         0.27- 4.20 uIU/mL            

  



[facility] MethodistECG 12 ueud1926-99-60 16:19:53





             Test Item    Value        Reference Range Interpretation Comments

 

             Ventricular rate (test 79                                     



             code = 253)                                         

 

             QRSD interval (test 178                                    



             code = 260)                                         

 

             QT interval (test code 414                                    



             = 264)                                              

 

             QTC interval (test code 474                                    



             = 265)                                              

 

             QRS axis 1 (test code = 205                                    



             268)                                                

 

             T wave axis (test code 68                                     



             = 270)                                              

 

             EKG impression (test Ventricular-paced                           



             code = 273)  rhythm-Biventricular                           



                          pacemaker                              



                          detected-Atrial                           



                          fibrillation-Abnormal                           



                          ECG-In automated                           



                          comparison with ECG of                           



                          17-OCT-2019                            



                          11:05,-Vent. rate has                           



                          decreased BY   6                           



                          BPM-Electronically                           



                          Signed By Destiny Shin MD ([id number]) on                           



                          2020 4:19:49 PM                           



[facility] JxteemfvhTpdepyby8110-03-59 06:53:04





             Test Item    Value        Reference Range Interpretation Comments

 

             Troponin (test code = 0.041 ng/mL  0-0.04       H            In pat

ients



             21108-6)                                            suspected of ha

ving



                                                                 a myocardial



                                                                 infarction, verona

ng



                                                                 with all other



                                                                 appropriate cli

nical



                                                                 measures and ac

tions



                                                                 including ECG a

nd



                                                                 other diagnosti

cs as



                                                                 appropriate, me

asure



                                                                 Ultra TnI at 0 

hrs



                                                                 and at 3



                                                                 hrs.Myocardial



                                                                 infarction VERY



                                                                 LIKELYThe 0 hr 

TnI



                                                                 level is > 0.10



                                                                 ng/mL----------

-----



                                                                 ---------------

-----



                                                                 ---------------

-----



                                                                 ---------------

--Hermes



                                                                 cardial infarct

ion



                                                                 LIKELYThe 0 hr 

TnI



                                                                 level is > 0.04



                                                                 ng/mL and 3 hr 

level



                                                                 is increased or



                                                                 decreased by at



                                                                 least 0.020 ng/

mL



                                                                 ---------------

-----



                                                                 ---------------

-----



                                                                 ---------------

-----



                                                                 ------------Hermes

cardi



                                                                 al infarction V

BALDEMAR



                                                                 UNLIKELYBoth th

e 0



                                                                 hr and 3 hr TnI



                                                                 levels <= 0.04



                                                                 ng/mL(within no

rmal



                                                                 limits) OR 0 hr

 is >



                                                                 0.04 ng/mL and 

3 hr



                                                                 is increased OR



                                                                 decreased by le

ss



                                                                 than 0.020 ng/m

L

 

             Lab Interpretation Abnormal                               



             (test code = 33554-5)                                        



[facility] with platelet and eybdprotoihs3526-88-33 06:31:24





             Test Item    Value        Reference Range Interpretation Comments

 

             WBC (test code = 09598-5) 4.50         4.50- 11.00 k/uL            

  

 

             RBC (test code = 90414-9) 4.12 m/uL    4.4-6        L            

 

             HGB (test code = 718-7) 12.6 g/dL    14-18        L            

 

             HCT (test code = 4544-3) 39.6 %       41-51        L            

 

             MCV (test code = 787-2) 96.1 fL                          

 

             MCH (test code = 785-6) 30.6 pg      27-34                     

 

             MCHC (test code = 786-4) 31.8 g/dL    31-37                     

 

             RDW - SD (test code = 51.3 fL      37-55                     



             95229-3)                                            

 

             MPV (test code = 36231-0) 10.5 fL      8.8-13.2                  

 

             Platelet count (test code 89           150- 400 k/uL L            



             = 92187-7)                                          

 

             Nucleated RBC (test code 0.00         /100 WBC                  



             = 77010-7)                                          

 

             Neutrophils (test code = 49.0 %       39-69                     



             51413-2)                                            

 

             Lymphocytes (test code = 35.3 %       25-45                     



             58432-7)                                            

 

             Monocytes (test code = 13.3 %       0-10         H            



             26485-3)                                            

 

             Eosinophils (test code = 2.0 %        0-5                       



             47454-9)                                            

 

             Basophils (test code = 0.2 %        0-1                       



             11117-2)                                            

 

             Immature granulocytes 0.2 %        0-1                       "Immat

ure



             (test code = 28096-8)                                        granul

ocytes"



                                                                 (promyelocytes,



                                                                 myelocytes,



                                                                 metamyelocytes)

 

             Lab Interpretation (test Abnormal                               



             code = 44030-9)                                        



[facility] MethodistLactic acid oprpy9058-26-23 01:19:08





             Test Item    Value        Reference Range Interpretation Comments

 

             Lactic acid (test code = 01043-2) 1.3 mmol/L   0.5-2.2             

      



[facility] MethodistXR Chest 1 Vw Gxciiqir2822-69-21 18:04:51 Interface, 
Radiology Results 2020  6:08 PM CSTEXAMINATION:  XR CHEST 1 VW 
PORTABLECLINICAL HISTORY:  chfCOMPARISON:  10/15/2019IMPRESSION:Heart and 
mediastinum stable. Left-sided AICD is again noted.Central pulmonary vascular 
enlargement again noted. Mild interstitial edema may be present and should be 
correlated. Left basilar patchy opacities are noted. [facility]-[id number] 
MethodistInfluenza antigen test, reflex negative to [id number] 17:29:04





             Test Item    Value        Reference    Interpretation Comments



                                       Range                     

 

             Influenza antigen Positive for              A            Specimen



             (test code = Influenza A                            InformationSpec

imen



             90622-3)     antigen.                               Source: [facility]



                                                                 Site: Left

 

             Lab Interpretation Abnormal                               



             (test code =                                        



             92318-8)                                            



[facility] MethodistCreatine kinase, total (CPK)2020 17:20:37





             Test Item    Value        Reference Range Interpretation Comments

 

             Creatine kinase (test code = 2157-6) 84 U/L                  

         



[facility] MethodistB natriuretic pep, N-Usjq1026-56Hmyy0460-23-64 17:20:37





             Test Item    Value        Reference Range Interpretation Comments

 

             BNP, I-Stat (test code = 59442-0) 796 pg/mL    0-100        H      

      

 

             Lab Interpretation (test code = Abnormal                           

    



             04252-1)                                            



[facility] MethodistTroponin, T-[id number] 17:20:37





             Test Item    Value        Reference Range Interpretation Comments

 

             Troponin, I-Stat 0.01 ng/mL   0-0.08                    0.09 - 1.49

 ng/ml



             (test code = 2359)                                        May indic

ate increased



                                                                 risk of acute



                                                                 



                                                                 coronary syndro

me.



                                                                                

   >=1.5



                                                                 ng/ml



                                                                 Consistent with

 acute



                                                                 myocardial



                                                                           infar

ction.



                                                                 



                                                                 



                                                                             The



                                                                 diagnostic valu

e of a



                                                                 single normal o

r



                                                                 non-diagnostic 

result is



                                                                 questionable.  

Serial



                                                                 samples at 2-6 

hour



                                                                 intervalsare re

quired to



                                                                 rule out acute 

myocardial



                                                                 injury.



[facility] MethodistLactic acid, [id number] 17:13:53





             Test Item    Value        Reference Range Interpretation Comments

 

             Lactic acid, I-Stat (test code = 1.0 mmol/L   0.5-2.2              

     



             77250-9)                                            



[facility] ED Preliminary Interpretation - Not an [id number] 
17:07:40





             Test Item    Value        Reference Range Interpretation Comments

 

             EMMA (test code = EMMA) Toya Wasserman DO                             



                          2020  9:54 [facility]                           



                          ED Preliminary                           



                          Interpretation - Not an                           



                          OrderPerformed by:                           



                          Toya Wasserman DOAuthorized                           



                          by: Toya Wasserman DO  ECG                           



                          reviewed by ED                           



                          Physician in the                           



                          absence of a                           



                          cardiologist: yes                           



                          Interpretation:                           



                          Interpretation:                           



                          abnormal  Rhythm:                           



                          Rhythm: paced  Pacing:                           



                           Capture:  Complete                           



                          Type of pacing:                           



                          VentricularEctopy:                           



                          Ectopy: none  QRS:                           



                          QRS axis:                              



                          IndeterminateST                           



                          segments:   ST                           



                          segments:  Normal                           

 

             Lab Interpretation Abnormal                               



             (test code = 45247-7)                                        



Emile SuarezTAlejandro, free2019-11-15 06:47:00





             Test Item    Value        Reference Range Interpretation Comments

 

             T4, free (test code 1.3 ng/dL    0.8-1.8                   



             = 3024-7)                                           

 

             RAC (test code = Performing Organization                           



             RAC)         Information:    Site ID:                           



                          RGA    Name[facility]                           



                          Address: [address] [place] [address]                           



                          [id number]    Director:                           



                          Alexis SuarezPhosphorus [id number] 05:42:02





             Test Item    Value        Reference Range Interpretation Comments

 

             Phosphorus (test code = 2777-1) 2.9 mg/dL    2.4-4.5               

    



[facility] MethodistNm myocardial perfusion2019-10-17 16:23:00Interface, Radiology 
Results In - 10/17/2019  4:24 PM CDT         Nuclear Cardiology and Cardiac CT  
        [address] [id number] [phone]                                                     Myocardial 
Perfusion Imaging Report      Stress ECG tracings are available in MUSE, EPIC 
and CV Web All ECG interpretations are included in this reportPat.Name:  TAMAR BREWSTER.ID:    [id number]               .Date:   10/17/2019        
     Refer.MD:  FIDELINA ENGLAND MD       Exam Time: 11:35:00 AM             Study
Type:Myocardial Perfusion ImagingHeight:    67in                    BSA:       
1.79 m2                   Age:  1935,83Y          Sex:       MALE  BP: 
      133/65                  HR:        80 bpm                  Nuclear 
Tech:Jaida COBBMT,[facility] CenterSHIRLEY/REZA CabreraMT, [facility] CenterGregoria Event 
ID:[id number]         Order ID:  [id number]   Reason for Study:Elevated troponin 
History / Clinical:A.FIB, CAD, Congestive HF, Fam. Hx of CAD, HTN,HDL, 
DVTProcedures: Single Day Stress / RestRisk Factors:Cardiovascular Disease, 
Hyperlipidemia, Hypertension,Family history of cardiovascular diseaseClinical 
Symptoms:Regadenoson      Physical Exam:S1, S2               
------------------------------------SUMMARY:------------------------------------
SCINTIGRAPHIC RESULTSPerfusion Defect Size (% LV) 25% Total 5% Ischemia  20% 
ScarLeft Ventricular Perfusion ResultsThere is a severe lateral (anterolateral, 
mid-lateral) perfusiondefect during stress which minimally improves with rest 
imaging.  Gated SPECT ResultsThe post stress left ventricular ejection fraction 
is 52% withakinesis of all hypoperfused segments.  Left ventricular end-
diastolicvolume is 213 ml; end-systolic volume is  103 ml.  The left ventricleis
moderately enlarged at stress.  ConclusionAbnormal regadenoson Tc-99m 
tetrofosmin myocardial perfusion studycompatible with scar in the circumflex 
coronary artery vascularterritory. The LVEF is low-normal. CT Findings:Coronary 
calcification is present.  The ascending and descending aortaare normal in size.
There is no significant pericardial effusion.Limited lung fields show no 
significant abnormalities.CommentsThe study results indicate a high (&gt;2%) 
annual risk for a cardiacdeath or non-fatal myocardial infarction. Study 
Quality/ArtifactsThe study quality is good. Comparison to Previous StudyNone 
available.    ---------------------
---------------FINDINGS:--------------------------------------------------------

----------------STRESS:------------------------------------Baseline Vital Signs:
             Intervention  Regadenoson 0.4mg/5mlIV over 10 seconds followed by 
radiotracer injection and 5ml salineflushBaseline EKG Ventricular pacemaker 
Heart Rate: 80                     Atropine Administered: 0Rest BP:   133/65    
 Stress Test Results:Target HR: 118                     Symptoms and 
Complications:Stress-induced Arrhythmias Ventricular premature 
depolarizationsReason for Stopping Test: As per Regadenoson protocolSymptoms 
During Test GI discomfort, Shortness of breath,LightheadednessComplications 
NoneOther:  Normal heart rate response to pharmacological stress,Normal blood 
pressure response to pharmacological stressECG / Stress Interpretation: 
Pacemaker rhythm precludes ST-segmentanalysis for diagnosing myocardial 
ischemia.Signed 10/17/2019 04:23 Michelle Sharp MethodistCv 
stress test2019-10-17 13:59:39





             Test Item    Value        Reference Range Interpretation Comments

 

             Resting HR (test code 84                                     



             = 0442244045)                                        

 

             Resting BP (test code 133                                    



             = 5185855229)                                        

 

             Peak MET Achieved 1                                      



             (test code =                                        



             5095666035)                                         

 

             Protocol Name (test REGADENO                               



             code = 8342322537)                                        

 

             Time in Exercise 00:01:00                               



             Phase (test code =                                        



             0542866332)                                         

 

             Max Systolic BP (test 133                                    



             code = 3583375842)                                        

 

             Max Diastolic BP 65                                     



             (test code =                                        



             1990499611)                                         

 

             Max Heart Rate (test 85                                     



             code = 8673207633)                                        

 

             Max Predicted Heart 139                                    



             Rate (test code =                                        



             7127589768)                                         

 

             Target HR Formula (220 - Age)*100%                           



             (test code =                                        



             9078606047)                                         

 

             Test Indication (test                                        



             code = 5627993397)                                        

 

             Arrhy During Ex (test                                        



             code = 8577688159)                                        

 

             ECG Interp Before EX                                        



             (test code =                                        



             0205511252)                                         

 

             ECG Interp During Ex                                        



             (test code =                                        



             4192944245)                                         

 

             Ex Summary Comment                                        



             (test code =                                        



             3383385680)                                         

 

             Overall HR Response                                        



             to Exercise (test                                        



             code = 5979423992)                                        

 

             Overall BP Response                                        



             To Exercise (test                                        



             code = 1426762970)                                        

 

             Reason for                                          



             Termination (test                                        



             code = 3424801012)                                        

 

             Stress Test  -Waveform interpreted in                           



             Impression (test code report associated with                       

    



             = 1518685292) image study.  No                           



                          interpretation is                           



                          provided as part of this                           



                          Stress ECG                             



                          report.-Electronically                           



                          Signed By Lorna LÓPEZ,                           



                          Vince MATOS ([id number]),                            



                          Brook Rodriguez                           



                          ([id number]) on 10/17/2019                           



                          1:59:34 PM                             



[facility] MethodistRespiratory pathogen panel2019-10-16 11:32:30





             Test Item    Value        Reference    Interpretation Comments



                                       Range                     

 

             Respiratory  Positive for              A            Specimen



             pathogen panel Rhinovirus/Enterovirus--                           I

nformationSpecimen



             (test code = ---------------------Neg                           Emmie

rce:



             2148)        ative for all other                           [facility]:



                          pathogens                              Left



                          tested:Negative for                           



                          AdenovirusNegative for                           



                          Coronavirus AYO8Jmglnlkc                           



                          for Coronavirus                           



                          YI69Zwugbsvd for                           



                          Coronavirus 229ENegative                           



                          for Coronavirus                           



                          VG29Egvhvwix for Human                           



                          MetapneumovirusNegative                           



                          for Influenza ANegative                           



                          for Influenza                           



                          A/V9Jdahmrmd for                           



                          Influenza A/U0Ggrhomdk                           



                          for Influenza                           



                          A/H1-2009Negative for                           



                          Influenza BNegative for                           



                          Parainfluenza Virus                           



                          1Negative for                           



                          Parainfluenza Virus                           



                          2Negative for                           



                          Parainfluenza Virus                           



                          3Negative for                           



                          Parainfluenza Virus                           



                          4Negative for                           



                          Respiratory Syncytial                           



                          VirusNegative for                           



                          Bordetella                             



                          pertussisNegative for                           



                          Chlamydophila                           



                          pneumoniaeNegative for                           



                          Mycoplasma                             



                          pneumoniaeThis real-time                           



                          PCR assay detects the                           



                          presence of nucleic                           



                          acids (RNA or DNA) for                           



                          the respiratory                           



                          pathogens listed.  A                           



                          result of "Not-detected"                           



                          does not exclude the                           



                          possibility of the                           



                          presence of one or more                           



                          pathogens at                           



                          concentrations less than                           



                          the detectable limits of                           



                          the assa                               

 

             Lab          Abnormal                               



             Interpretation                                        



             (test code =                                        



             91804-8)                                            



[place] MethodistDigoxin level2019-10-16 08:30:59





             Test Item    Value        Reference Range Interpretation Comments

 

             Digoxin (test code = 0.5 ng/mL    0.8-2        L            For zonia

id Digoxin



             10535-3)                                            results, at [place]

st 6



                                                                 hours should el

apse



                                                                 between time of

 last



                                                                 dose and collec

tion



                                                                 of blood.Otherw

ise,



                                                                 result may be f

alse



                                                                 high.Therapeuti

c



                                                                 Range:0.8 - 2.0



                                                                 ng/mL

 

             Lab Interpretation (test Abnormal                               



             code = 67962-0)                                        



[place] MethodistCT Abdomen Pelvis Wo Contrast2019-10-15 20:32:31Hm Interface, 
Radiology Results Incoming - 10/15/2019  8:35 PM CDTEXAMINATION:  CT ABDOMEN 
PELVIS WOCONTRASTCLINICAL HISTORY: 83 yearsMale abd pain  vomitingTECHNIQUE: 
Multidetector computed axial tomography of the abdomen and pelvis was performed 
without contrast utilizing automated exposure controland/or iterative 
reconstruction techniques to  radiation dose. Coronal and sagittal 
reformatted images created at the CT scanner CT technologist were also submitted
for interpretation.COMPARISON:  2016IMPRESSION:LUNG 
BASES:Cardiomegaly with mildly thickened interlobular septal thickening 
suggestive of pulmonary edema cardiogenic pulmonary edema. Pacing leads are 
partially visualized. Median sternotomy wires are present. There is no pleural 
or pericardial effusion.ABDOMEN:Liver: The liver is normal. No focal 
mass.Gallbladder/Biliary: The gallbladder is contracted and contains a small 
gallstone. There is no evidence of intra or extrahepatic biliary ductal 
dilatation.Spleen: The spleen is not enlarged.Pancreas: The pancreas is 
unremarkable.Adrenal Glands: The adrenal glands are unremarkable.Kidneys: The 
kidneys are atrophic. There is no mass or hydronephrosis. Bilateral hypodensit
ies arise from the kidney measuring up to 2 cm on the right and 2 cm on the left
both of which demonstrate attenuation values suggestive of simple cysts. There 
is mild nonspecific perinephric fat stranding more so on the left. Within the 
left upper pole there is a punctate renal stone measuring 4 mm. A subcentimeter 
hypodensity arising from the lower pole of the right kidney displays attenuation
values which may represent a proteinaceous or hemorrhagic cyst.Vascular: Dense 
atherosclerotic calcification of the abdominal aorta and its branches. The aorta
is nonaneurysmal.Nodes: No enlarged retroperitoneal or mesenteric 
lymphadenopathy.Bowel: Small hiatal hernia. No bowel obstruction or inflammatory
changes.The appendix is normal. A moderate stool burden is present within 
colon.Ascites/fluid collections: No ascites or fluid collections.PELVIS:No mass,
fluid collection or significant adenopathy. Theprostate is enlarged and indents 
the posterior bladder wall. Prominence of the bladder wall is likely secondary 
to chronic bladder outlet obstruction. If concern for cystitis is present 
urinalysis is recommended. There are coarse calcifications within the prostate. 
Scattered pelvic phleboliths are noted. A small hypodensity arises anterior wall
the bladder is suspected to be a small diverticula.MUSCULOSKELETAL: Chronic L2 
body compression fracture with approximately 50% height loss. There is no aggre
ssive height loss compared to the prior exam.SUMMARY:1. Cardiomegaly with 
findings suggestive of cardiogenic pulmonary edema. 2. Nonobstructive left renal
stone and stable bilateral renal cysts.3. Enlarged prostate with findings of 
chronic bladder outlet obstruction, clinical correlation recommended.4. Chronic 
L2 vertebral body compression fracture with approximately 50% height loss.5. 
Cholelithiasiswithout evidence of acute cholecystitis.6. Moderate stool burden 
query constipation. 7. Small hiatalhernia.[facility]-[id number] MethodistCT 
Head Wo Contrast2019-10-15 20:27:08Hm Interface, Radiology Results  - 
10/15/2019  8:30 PM CDTEXAMINATION:  CT HEAD WO CONTRASTCT IMAGING WAS PERFORMED
WITH ITERATIVE RECONSTRUCTION TECHNIQUE AND/OR AUTOMATED EXPOSURE CONTROL TO R
EDUCE RADIATION DOSE.CLINICAL HISTORY:    dizzy  vomiting  on 
coumadinCOMPARISON:  CT brain 2018FINDINGS: There is no acute 
intracranial abnormality. Specifically there is no intracranial hemorrhage, mass
effect or acute infarction.There are mild nonspecific cerebral white matter 
microvascularchanges. This chronic lacunar infarction versus perivascular space 
in the lateral basal ganglia region bilaterally.There is mild-to-moderate 
cerebral cortical volume loss and mild cerebellar volume loss.There is minimal 
basal ganglia calcification. Atherosclerotic calcifications noted in the distal 
internal carotid and to a lesser degree vertebral arteries.There is amild 
mucosal thickening/mucus in the ethmoid sinus, maxillary sinuses and to a lesser
degree sphenoid sinus.IMPRESSION:No acute abnormality and no significant change 
from the prior study.[id number] MethodistUrinalysis2019-10-15 
20:04:22





             Test Item    Value        Reference Range Interpretation Comments

 

             Glucose, UA (test code = 13783-8) Negative     Negative            

      

 

             Bilirubin, UA (test code = 5770-3) Negative     Negative           

       

 

             Ketones, UA (test code = 2514-8) Negative     Negative             

     

 

             Specific gravity, UA (test code = 1.020        1.001-1.035         

      



             5811-5)                                             

 

             Blood, UA (test code = 5794-3) Negative     Negative               

   

 

             pH, UA (test code = 5803-2) 5.0          5.0-8.5                   

 

             Protein, UA (test code = 42476-9) Negative     Negative            

      

 

             Urobilinogen, UA (test code = <2.0         <2.0                    

  



             85941-3)                                            

 

             Nitrite, UA (test code = 5802-4) Negative     Negative             

     

 

             Leukocyte esterase, UA (test code = Negative     Negative          

        



             5799-2)                                             

 

             Color, UA (test code = 5778-6) Yellow                              

   

 

             Appearance, UA (test code = 5767-9) Clear                          

        



[place] MethodistInfluenza antigen2019-10-15 19:52:44





             Test Item    Value        Reference Range Interpretation Comments

 

             Influenza    Negative for                           Specimen



             antigen (test Influenza A/B                           [facility]

ecimen



             code = 1604) antigen.                               Source: [facility]



                                                                 Site: Left



[place] MethodistProthrombin time with INR, I-Stat2019-10-15 19:48:29





             Test Item    Value        Reference Range Interpretation Comments

 

             POC prothrombin time 21.0         11.0- 14.5 sec H            



             (test code = 5964-2)                                        

 

             POC INR (test code = 1.8                                    The Int

ernational



             33099-2)                                            Normalized Rati

o (INR)



                                                                 is a therapeuti

c



                                                                 monitoring tool

 for



                                                                 patients who ar

e



                                                                 stable on oral 

vitamin



                                                                 K antagonist th

erapy.



                                                                 An INR of 2.0-3

.0 is



                                                                 suggested for d

eep



                                                                 vein



                                                                 thrombosis/pulm

onary



                                                                 embolism. An IN

R of



                                                                 2.5-3.5 (high d

ose) is



                                                                 suggested for s

ome



                                                                 patients with



                                                                 mechanical hear

t



                                                                 valves)

 

             Lab Interpretation Abnormal                               



             (test code = 84326-5)                                        



Emile SuarezGI Esophagus barium swallow [id number] 08:42:00Study: 
Barium swallow DX  Clinical Indication:  - R13.10   Dysphagia, unspecified,J39.2
  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 
of cardiaComparison: NoneFluoroscopy time: 1.23 minutesFINDINGS: The barium 
swallow examination shows indentation along the posteriorpharynx at the level of
the lower cervical spine, suspicious for acricopharyngeal bar. There are no ero
sions, ulcerations or masses. There isnormal initiation of the swallowing. 
Aspiration of barium was noted followed byprotective cough. Large amount of 
residual contrast was noted in the piriformsinuses.There is normal esophageal 
contour and morphology without erosions,ulcerations, strictures or masses. 
Scattered tertiary esophageal contractionsare seen. There is no hiatal hernia. 
There was no gastroesophageal refluxelicited during the exam.IMPRESSION:1. 
Aspiration of contrast noted during the examination, followed by 
protectivecough. Further evaluation [facility] be helpful.2. Incidentally noted 
cricopharyngeal bar.3. Scattered tertiary esophageal contractions.SL: 
[id number]--Mdiz by:  Anand Eldridgeictated Date/time:  19 
09:14Electronically Signed by:  Anand Eldridge MD                
1909:18FINALREPORTUnSan [facility] IwvszlsqkoQQCFJWZMIE2870-08-78 
10:17:001.2[facility] McacimwGALCWHCFIW9453-41-49 10:17:000.9[facility]
PXCFYHJNXD4770-93-34 10:17:000.1[facility] RngctlpGXKPOJOWRO7583-63-89 10:17:000.1
[facility] CtzdjikHFXKBMAQFB8317-65-74 10:17:000.6[facility] HermannHEMATOLOGY
2017 10:17:001.2[facility] VeweuqsOLFIIOAUNO2023-05-12 10:17:0010.4[facility] 
SkagrxpXWNCPQRJWS6192-47-94 10:17:009.5[facility] AfdhnksBSPFMZMTDI4198-71-32 
10:17:007.1[facility] GayzmijOVDCHVNPGG9405-39-58 10:17:00Normal (3/23/17 5:17 AM)
[facility] SymzkdiPJREUKCRFC9026-78-10 10:17:0081.6[facility] HermannHEMATOLOGY
2017 10:17:00Normal (3/23/17 5:17 AM)[facility] XliliksDWSQDURCFA2634-82-73 
10:17:0012.8[facility] TpcvdprJAXGKLNSAY7744-65-62 10:17:003.95[facility]
GHLCLPOEAI7507-34-16 10:17:56188Oibxeoqn FgmnwltLDNCKPGQTK8134-49-83 10:17:00
91.3[facility] TlkecorSQGRXVDLOF2789-69-97 10:17:0012.2[facility] HermannHEMATOLOGY
2017 10:17:00





             Test Item    Value        Reference Range Interpretation Comments

 

             MCH (test code = MCH) 30.8 pg      27.0-31.0                 



[facility] LulactoDJXJLGCCTH9408-35-75 10:17:0036.0[facility] HermannHEMATOLOGY
2017 10:17:0033.8[facility] PzgttiySIRLRTQGUX4569-53-45 10:17:0015.2[facility]
TwzmhrgHZHBSUHNDD6066-20-74 10:17:009.2[facility] HermannCHEM PXPCC9327-16-65 
11:09:0061[facility] HermannCHEM LBSBO4053-46-46 11:09:0068[facility] HermannCHEM 
QSSIH2277-20-44 11:09:001.2[facility] HermannCHEM AZSYT8356-51-40 11:09:004.4
[facility] HermannCHEM FULCT9478-35-75 11:09:09601Ywztkjme HermannCHEM PANEL
2017 11:09:0027[facility] HermannCHEM IQOTK4628-84-09 11:09:008.9[facility] 
HermannCHEM NAQQK0282-30-75 11:09:006.0[facility] HermannCHEM PQJOV5231-82-34 
11:09:49692Bbovtnva HermannCHEM CRBVD1641-53-73 11:09:0027[facility] HermannCHEM 
MYGOV4021-32-70 11:09:001.14[facility] HermannCHEM YILZP9498-99-87 11:09:60962
[facility] HermannCHEM EKTVA0025-88-39 11:09:002.8[facility] HermannCHEM PANEL
2017 11:09:0025[facility] HermannCHEM IMKDD1186-61-68 11:09:0023[facility] 
HermannCHEM OLEOM9779-97-36 11:09:000.9[facility] HermannCHEM JAEWL3875-38-02 
11:09:003.2[facility] HermannCHEM HLHAY9382-89-63 11:09:0012.4[facility] HermannCHEM
RRYID4063-79-17 11:09:0024[facility] HermannCHEM OWAZW0398-47-47 11:09:002.8
[facility] HermannCHEM HNUKK7255-37-58 11:09:002.3[facility] HermannELECTROLYTES
2017 11:09:0012.4[facility] UieaniaLPRBQIUFPYVB7998-76-18 11:09:0061[facility]
RtnndfsPGDCKGOQPSKS3411-21-92 11:09:0027[facility] PrjjujxVJSFCXOVTIDP4541-64-13 
11:09:008.9[facility] VoxxyilSLTEGBJPTGDP5920-67-67 11:09:45466Muyp[facility]
KUPEXVZEEXZP9584-36-24 11:09:004.4[facility] HemnkmwIGSUESTPPCOI7657-14-86 
11:09:49991Adpeqjct RouuuiyPOTQUSDYIFQP7387-29-60 11:09:001.14[facility]
NQWSNOJXJRZE0648-78-12 11:09:0027[facility] VvcewpqYRZTKYUAVNSB3991-46-98 11:09:00
105[facility] SqvlfyoBLAIZTBLLK8316-62-13 11:09:001.85[facility] HermannHEMATOLOGY
2017 11:09:00





             Test Item    Value        Reference Range Interpretation Comments

 

             PT (test code = PT) 21.7 s       12.0-14.7                 



[facility] PrckogqTMDUDQCNQN9801-27-01 11:09:0083.5[facility] HermannHEMATOLOGY
2017 11:09:000.1[facility] VgkpzebIXMLAKMQAG0900-68-07 11:09:000.1[facility] 
MmiaenjLGCIZUBRPD0877-26-78 11:09:0010.2[facility] KaccfsoNBIOGBRXTN1292-34-68 
11:09:006.1[facility] IvjyksjTHOXBLQHFP2976-40-48 11:09:006.1[facility]
COJBFNXEVF9045-61-65 11:09:000.7[facility] JsniwkmNOYDROYIJI0645-15-04 11:09:000.4
[facility] OluhvsgTWAEYVQMMS5221-07-89 11:09:008.2[facility] HermannHEMATOLOGY
2017 11:09:72188Hrvuswfp SyvtyopVDTDUHSZPP1549-67-88 11:09:007.3[facility] 
DxtkbwoBFUHXHUOAZ3640-14-59 11:09:004.04[facility] PmcaanbSHHGVWWAQY5239-36-92 
11:09:0012.2[facility] TkqfxfwMUKABBDHWU4670-55-79 11:09:0037.0[facility]
VUFGWVLZCW9546-79-41 11:09:0091.7[facility] TgdyulfBQEUWYFQYN5365-11-08 11:09:00
33.0[facility] OzxpbjgTBZKXSWLDT0423-83-64 11:09:00





             Test Item    Value        Reference Range Interpretation Comments

 

             MCH (test code = MCH) 30.2 pg      27.0-31.0                 



[facility] WpaillrWVHBSFSTAL4186-32-90 11:09:0015.4[facility] HermannHEMATOLOGY
2017 19:29:001.79[facility] GraxwyaZDGHOZXGGK2359-49-96 19:29:00





             Test Item    Value        Reference Range Interpretation Comments

 

             PT (test code = PT) 21.1 s       12.0-14.7                 



[facility] HermannDRUG GMQTXT8698-05-24 07:53:00See Note (3/21/17 2:53 AM)[facility]
HermannDRUG ZPFDYW4358-51-87 07:53:00Negative *NA*(3/21/17 2:53 AM)[facility] 
HermannDRUG PZPABM0429-82-15 07:53:00Positive *ABN*(3/21/17 2:53 AM)[facility] 
HermannDRUG FOVHVS3168-35-39 07:53:00Negative *NA*(3/21/17 2:53 AM)[facility] 
HermannDRUG MHADUV3836-03-15 07:53:00Negative *NA*(3/21/17 2:53 AM)[facility] 
HermannDRUG VKVTIX6656-69-03 07:53:00Negative *NA*(3/21/17 2:53 AM)[facility] 
HermannDRUG ICJSVL9990-01-87 07:53:00Negative *NA*(3/21/17 2:53 AM)[facility] 
HermannDRUG WFOCUE9303-63-79 07:53:00Negative *NA*(3/21/17 2:53 AM)[facility] 
HermannURINE AND MAIKM6928-12-23 07:53:00Negative (3/21/17 2:53 AM)[facility] 
HermannURINE AND EKXWF3056-50-15 07:53:000.2[facility] HermannURINE AND STOOL
2017 07:53:00Small *ABN*(3/21/17 2:53 AM)[facility]annURINE AND STOOL
2017 07:53:00None Seen (3/21/17 2:53 AM)[facility]annUR[facility] AND STOOL
2017 07:53:00&gt;1.050[facility]annURINE AND QNQPB6803-16-56 07:53:00
Yellow *NA*(3/21/17 2:53 AM)[facility]ann[facility] AND USXWQ7228-94-55 07:53:00
Negative *NA*(3/21/17 2:53 AM)[facility]ann[facility] AND ZJVGV9919-41-88 07:53:00
Negative *NA*(3/21/17 2:53 AM)[facility]ann[facility] DBQWM4939-69-93 07:53:00
Negative (3/21/17 2:53 AM)[facility]ann[facility] AND TJCBM0094-82-72 07:53:00
Trace *ABN*(3/21/17 2:53 AM)[facility]ann[facility] AND HRGGY4986-82-26 07:53:00





             Test Item    Value        Reference Range Interpretation Comments

 

             UA pH (test code = UA pH) 5.0 1        5.0-8.0                   



[facility]annUR[facility] AND EXJGT8272-28-14 07:53:00Negative (3/21/17 2:53 AM)
[facility]annUR[facility] AND KWEBQ6483-60-63 07:53:00None Seen (3/21/17 2:53 AM)
[facility]annUR[facility] AND RANGQ0832-69-90 07:53:00Clear (3/21/17 2:53 AM)
[facility]annUR[facility] AND SWGBE5937-65-76 07:53:00None Seen (3/21/17 2:53 AM)
[facility] HermannCARDIAC PGNDORJ4000-15-40 02:23:0092[facility] HermannCHEM PANEL
2017 02:23:000.9[facility] BqemdpxSNCSZHXHVSBH0716-93-62 02:23:0015.3
[facility] SvyxarsCTSIUMLIRMIK7972-58-16 02:23:0051[facility] HermannELECTROLYTES
2017 02:23:009.0[facility] KchkuihWUYDVQIITLHN7456-61-85 02:23:004.3[facility]
GksumyeVITNNSTQPUKG2510-65-77 02:23:91946Wldovdbh KrdfvwxKTZHKOJYPRQN3602-18-14 
02:23:0026[facility] SylemliLCYMLNEPYPFT2573-53-19 02:23:001.32[facility]
BBBOKOJBJQGI9448-28-56 02:23:86564Zpjyeadp ZmrwtdpFHPWFOUGGVPO4154-87-79 
02:23:97756Pelcqoyv EjbjmcrXQVQUNRYUZWG4543-46-39 02:23:0026[facility]
WMCMTMASSQ4934-02-27 02:23:002.00[facility] PrbtdwnMFOQOBCTPV9857-77-65 02:23:00





             Test Item    Value        Reference Range Interpretation Comments

 

             PT (test code = PT) 23.0 s       12.0-14.7                 



[facility] JmvphntYTQIBMZPMT5152-28-00 02:23:00





             Test Item    Value        Reference Range Interpretation Comments

 

             PTT (test code = PTT) 35.0 s       22.9-35.8                 



[facility] NhfhsawEEQRMFYJCS1835-21-16 02:23:008.1[facility] HermannHEMATOLOGY
2017 02:23:0034.1[facility] MzkslipULSRDJFHAK6470-47-67 02:23:22902Ssklitty 
UpznapeNVLKCRTMHA4099-36-28 02:23:0015.2[facility] CoecffaIWIMAFRGMP2081-93-85 
02:23:0012.9[facility] LjocklyFYCFHCYSJU6419-95-58 02:23:004.18[facility]
HQKHKXUDVW6362-93-32 02:23:00





             Test Item    Value        Reference Range Interpretation Comments

 

             MCH (test code = MCH) 30.9 pg      27.0-31.0                 



[facility]DgfdwdeSEXSDDZGJT7252-32-00 02:23:0090.6[facility] HermannHEMATOLOGY
2017 02:23:0037.9[facility] GfejwowYUQPZRMGSY7849-60-38 02:23:007.5[facility] 
CgkcmlwJVCBNSOVTV5159-13-86 02:23:009.4[facility] GwblmsoLXRWKXFITE6604-92-53 
02:23:00





             Test Item    Value        Reference Range Interpretation Comments

 

             Max Amplitude Rapid (test code = Max 65 mm        52-71            

         



             Amplitude Rapid)                                        



[facility] RegionXggikabQQFXTMBGFL4243-14-[facility] 02:23:00





             Test Item    Value        Reference Range Interpretation Comments

 

             R-time Rapid (test code = R-time 0.9 min      0.4-0.7              

     



             Rapid)                                              



[facility] CenterXwltjgcJIHPKLUAFV4888-90-57 02:23:00





             Test Item    Value        Reference Range Interpretation Comments

 

             ACT (TEG) Rapid (test code = ACT (TEG) 136 s                 

           



             Rapid)                                              



[facility] RegionWystuivUZPQGXYNOV0640-45-84 02:23:00





             Test Item    Value        Reference Range Interpretation Comments

 

             Angle Rapid (test code = Angle 76 degrees   64-80                  

   



             Rapid)                                              



[facility] CenterXyvmnjpYPAXRPKGWI1059-93-18 02:23:00





             Test Item    Value        Reference Range Interpretation Comments

 

             K-time Rapid (test code = K-time 1.0 min      0.6-2.3              

     



             Rapid)                                              



[facility] CenterEiytrbpFMXODHJEEU6391-88-73 02:23:00





             Test Item    Value        Reference Range Interpretation Comments

 

             Split Point Rapid (test code = Split 0.8 min                       

         



             Point Rapid)                                        



[facility] CenterTocjvnvGWYXEWGJLT6636-86-46 02:23:000.0[facility]annHEMATOLOGY
2017 02:23:008.2[facility] QpcpxygYZGXJXRITK3149-40-79 02:23:001.5[facility] 
WiaacfgUJFYKPQSIG4277-21-03 02:23:005.8[facility] LhntxahVUHRJOVHUZ6435-84-58 
02:23:000.9[facility] DrrbrqrHQWDAIYYGL9879-05-47 02:23:000.4[facility]
DFCRYILEDA5695-42-50 02:23:000.6[facility] TcpgxkuVGPGJJVHHT5753-87-36 02:23:000.1
[facility] DfbnjrjMSWKHYEISG9694-77-11 02:23:0012.5[facility] HermannHEMATOLOGY
2017 02:23:0077.4[facility] MptpfnjXMBZLNLWVK8232-82-36 02:23:00&lt;0.003
[facility] YmwvssiQTSKLKQDKZ2182-95-30 02:23:00&lt;3[facility]annBLOOD BANK 
FAZGRLM0371-14-34 02:13:00Negative (3/20/17 9:13 PM)[facility]annHEMATOLOGY
2016 15:24:002.00[facility] XpodhlmPATMXWAHSH7508-18-39 15:24:00





             Test Item    Value        Reference Range Interpretation Comments

 

             PT (test code = PT) 23.0 s       12.0-14.7                 



[facility] RwnhpgkQETZCCTMAT6322-72-70 09:53:00





             Test Item    Value        Reference Range Interpretation Comments

 

             PT (test code = PT) 29.4 s       12.0-14.7                 



[facility] PazdzjpMLSQUHSOWH4301-10-81 09:53:002.75[facility] HermannCHEM PANEL
2016 00:56:001.7[facility] HermannCHEM UGXQJ4111-71-68 00:56:002.9[facility] 
RkwknxiBPHMLPDRUYOT2236-79-50 00:56:0012.1[facility] PkqubzvQMYLJSMOMIXC3268-72-60
00:56:0050[facility] ChejexuKVRXTFJTOECV1431-35-92 00:56:0028[facility]
AZOPELSPJODP0000-77-31 00:56:008.9[facility] OwqeyhbZLUNHKBSLIZT7383-06-06 
00:56:004.1[facility] GawdromYCDCEHJZHFDW6855-87-35 00:56:76011Ykjnhrom Lenora
UTQFLBKOVTGI2871-72-46 00:56:05014Erboagps HilmdijWDHGSJJSCGAX9231-09-70 
00:56:001.35[facility] YwybwubJCOXBUDPAVDZ1822-74-82 00:56:0034[facility]
MIRLEEWBDUIO7446-13-96 00:56:0072[facility] TgmtfbvACIPGUQFSN3445-85-39 00:56:001+
*ABN*(16 6:56 PM)[facility] UjivxofTDEXKIYEQF2942-04-56 00:56:002.4[facility] 
IdmbgdbIPPNSGQICY5923-23-84 00:56:000.8[facility] FcluniqZVHSXFOYQK9735-60-87 
00:56:000.1[facility] MynccgnMMCFDACXLK0498-52-81 00:56:0053.5[facility]
KIWCSIAJDB9977-16-23 00:56:0010.9[facility] CnmeineTAVSHBPUFB5015-25-58 00:56:00
1.5[facility] VgjrkpyDQQILOHSVM6290-97-23 00:56:000.3[facility] HermannHEMATOLOGY
2016 00:56:0033.8[facility] WffyfpnYEBQCTVCRB1347-25-68 00:56:003.7[facility] 
WdztdlsCXERPUMZOM4494-17-61 00:56:008.5[facility] BuakyvrUCFNMWEZBE5225-03-58 
00:56:007.0[facility] HikouemUKVOGZZMYX5585-37-94 00:56:005.41[facility]
BJIAOGLJKW1348-17-55 00:56:0013.6[facility] YtyzrprSULMSKUHYR8965-24-13 00:56:00
42.4[facility] KibamxtZAVRWPHOVX7373-26-22 00:56:0078.3[facility] HermannHEMATOLOGY
2016 00:56:00





             Test Item    Value        Reference Range Interpretation Comments

 

             MCH (test code = MCH) 25.2 pg      27.0-31.0                 



[facility] DrptpusZBNAHAAZVR2820-06-33 00:56:0032.1[facility] HermannHEMATOLOGY
2016 00:56:0018.5[facility] WpvvkxpQPSBGGZRWY9281-95-59 00:56:45853Lwqwzjvo 
HwhxdcpYKDXLVAEKI0561-94-80 00:56:00





             Test Item    Value        Reference Range Interpretation Comments

 

             PTT (test code = PTT) 37.6 s       22.9-35.8                 



[facility] EfkxlslRHYMXNTCYM1324-84-05 00:56:00





             Test Item    Value        Reference Range Interpretation Comments

 

             PT (test code = PT) 28.6 s       12.0-14.7                 



[facility] HpwhdynSRSBIXASKD1813-08-98 00:56:002.65[facility] HermannELECTROLYTES
2016 20:16:005.1[facility] ByoqvfvJPWVNCMXGKTQ3964-88-28 19:45:0012.2
[facility] RmgrrffPSDQPGOSSHWS6467-72-64 19:45:0063[facility] HermannELECTROLYTES
2016 19:45:0030[facility] OxyciafHIECOEWODVPD6390-35-36 19:45:008.6[facility] 
KepqxgnFZMSIPJOUETX3081-36-49 19:45:91217Nadmmyae AaorezjKTZLIVNTBJNV6527-51-81 
19:45:005.2[facility] AgdddwvAEXDCKYFYTZP0351-75-35 19:45:11090Vltbgaqc Lenora
HXKDYLTRXEMW4416-11-67 19:45:001.12[facility] UogofxaCZTBEHHNDKCQ9871-00-35 
19:45:0027[facility] EfoickyMCVSFUUZIOFW6496-88-67 19:45:0084[facility]
LIOBCPZRDL5435-40-89 19:45:000.1[facility] NxssugbHIIEZOIWPL9347-17-67 19:45:000.7
[facility] ThryjilRWEHZOWKKJ4349-00-85 19:45:002.0[facility] HermannHEMATOLOGY
2016 19:45:003.3[facility] MszdgqmEOVQITIBBE1224-79-79 19:45:0032.2[facility] 
PdzlnkgFPAJGEVCOO8542-58-08 19:45:0053.9[facility] AdlrwmhNGNTHGKIJW7877-10-67 
19:45:000.6[facility] CadqfpeEQKNTCYBOH1502-90-92 19:45:001.7[facility]
HQNRBVPIVM4615-71-11 19:45:0011.6[facility] CzcxhocDSWWLXOOWZ5214-36-69 19:45:00
5.3[facility] WbokoakXJNOGSNPNF0143-06-34 19:45:00





             Test Item    Value        Reference Range Interpretation Comments

 

             Angle (test code = Angle) 66.9 degrees 53.0-72.0                 



[facility] MrysmyrNMGNVUXVQB6873-12-71 19:45:00





             Test Item    Value        Reference Range Interpretation Comments

 

             K-time (test code = K-time) 1.8 min      1.0-3.0                   



[facility] OpreuimEBAGGGGDQR8783-90-30 19:45:00





             Test Item    Value        Reference Range Interpretation Comments

 

             Max Amp (test code = Max Amp) 51.5 mm      50.0-70.0               

  



[facility] OwfbpkwBCOKGIYQZL4415-77-22 19:45:003.7[facility] HermannHEMATOLOGY
2016 19:45:000.0[facility] RqdilupFYXKVIZFRY4623-53-23 19:45:00





             Test Item    Value        Reference Range Interpretation Comments

 

             R-time (test code = R-time) 4.5 min      5.0-10.0                  



[facility] PtwijydWKADIYZHCT6284-57-10 19:45:00See Note (16 1:45 PM)[facility] 
SxtzqlnKTCZCTYAQO4873-37-58 19:45:00





             Test Item    Value        Reference Range Interpretation Comments

 

             PT (test code = PT) 20.2 s       12.0-14.7                 



[facility] EfutppuWHJRKEXXXB4252-50-05 19:45:001.69[facility] HermannHEMATOLOGY
2016 19:45:00





             Test Item    Value        Reference Range Interpretation Comments

 

             PTT (test code = PTT) 34.9 s       22.9-35.8                 



[facility] HpwnrcuDXFBOJMHFS2164-19-54 19:45:0031.3[facility] HermannHEMATOLOGY
2016 19:45:00





             Test Item    Value        Reference Range Interpretation Comments

 

             MCH (test code = MCH) 25.1 pg      27.0-31.0                 



[facility]viewUxhnbhuQODKPEWYCF2031-69-04 19:45:05834Grzrnscp HermannHEMATOLOGY
2016 19:45:0018.7[facility] BrlevzyEOFTKPBBFA6163-14-25 19:45:009.0[facility] 
AjupdzpBZJYZQPZVA6470-97-75 19:45:005.12[facility] PrqeyydSLGTYFVRVC4440-03-66 
19:45:006.1[facility] AfdxotdMQUPSOQIBY5135-75-70 19:45:0012.9[facility]
EDRCKCMIAK4258-75-97 19:45:0041.0[facility] OkcakkjDYONVLQJNW2171-52-40 19:45:00
80.0[facility] KyjrpjkOHNPHMZHLV3043-62-73 14:35:001.26[facility] HermannHEMATOLOGY
2013 14:35:00





             Test Item    Value        Reference Range Interpretation Comments

 

             PT (test code = PT) 16.0 s       12.0-14.7    H            



[facility] QthjbqmWNYFCPNXMI0302-83-03 14:35:00





             Test Item    Value        Reference Range Interpretation Comments

 

             PTT (test code = PTT) 33.4 s       22.9-35.8    N            



[facility] OwmxfpqDYPIWTZGH6099-26-80 21:10:0012.8[facility] HermannCHEMISTRY
2013 21:10:0073[facility] TbyhcjiWRCYEUSLZ9171-71-57 21:10:004.8[facility] 
PtiavwqYXDNYJTZN6245-15-51 21:10:0085[facility] UlyiyhqGBGOIBLRY3662-73-20 
21:10:0032[facility] CijhnypHCLRZRDYM9476-35-16 21:10:04931Yrympsqb Lenora
CEVDUEXAR5075-77-09 21:10:14183Agmvxioi VkwfxouXAILONPGK8542-21-92 21:10:001.0
[facility] XapqsnkXOGPOHTZG2732-46-84 21:10:0019[facility] HermannCHEMISTRY
2013 21:10:008.9[facility] KauwbcaANFEXSPGCE0438-28-24 21:10:0058.9[facility] 
PiycofjNRUDULVFUZ9779-46-11 21:10:000.8[facility] FbogzxfZOLACDTWET6202-67-34 
21:10:000.2[facility] ObtctevTLRDZRCXOU2852-06-94 21:10:0026.3[facility]
WAXIKBDGRW6851-89-66 21:10:0011.1[facility] BkzhxzlNVOSUSAEQA4245-75-76 21:10:00
3.2[facility] JoatimqVTIKMREVHR5562-60-29 21:10:000.5[facility]annHEMATOLOGY
2013 21:10:004.4[facility] HztnmfbRDCNOEDQDJ8592-26-50 21:10:002.0[facility] 
FijqulrCSXCOEGEJI9667-88-55 21:10:00





             Test Item    Value        Reference Range Interpretation Comments

 

             PTT (test code = PTT) 48.3 s       22.9-35.8    H            



[facility] HmjwhdjUBYIZTQRIM6551-25-22 21:10:003.03[facility]annHEMATOLOGY
2013 21:10:00





             Test Item    Value        Reference Range Interpretation Comments

 

             PT (test code = PT) 31.2 s       12.0-14.7    H            



[facility] JnyxyvpMQMEPSMIIG4591-30-78 21:10:004.66[facility]annHEMATOLOGY
2013 21:10:72300Edoispxs KzflsfxPOLFYESGNA6172-72-79 21:10:0015.5[facility] 
VzvxpsoUELQXTFLXD7122-03-96 21:10:008.7[facility] AbfchfjRWFAXAWKOK6919-79-67 
21:10:0041.4[facility] RvqgxqxZMJPFHHURJ8216-58-35 21:10:0013.5[facility]
YTOCKQYLMK9658-45-49 21:10:0032.6[facility] GqsmnysMDLCIAIPTO7214-94-50 21:10:00
88.9[facility] YxnfjowANSSJPBWWG8628-39-89 21:10:00





             Test Item    Value        Reference Range Interpretation Comments

 

             MCH (test code = MCH) 29.0 pg      27.0-31.0    N            



[facility] RelilldZYXISQHPDD0762-60-55 21:10:007.5[facility]annHEMATOLOGY
2013 21:10:00





             Test Item    Value        Reference Range Interpretation Comments

 

             Angle (test code = Angle) 63.7 degrees 53.0-72.0    N            



[facility]89 21:10:00





             Test Item    Value        Reference Range Interpretation Comments

 

             K-time (test code = K-time) 1.9 min      1.0-3.0      N            



[facility] DjndzmwAYGAVJEAUN5745-22-66 21:10:00





             Test Item    Value        Reference Range Interpretation Comments

 

             R-time (test code = R-time) 7.2 min      5.0-10.0     N            



[facility] ZurqtigWAFLUDWUMP8856-06-41 21:10:00See Note 7(2013 15:10:00)
[facility] MuasaooFHHPVLQTVE8109-88-94 21:10:00-1.3[facility]annHEMATOLOGY
2013 21:10:000.6[facility] JkdqeomCEVYETTMRZ6160-90-04 21:10:006.8[facility] 
EqttnbqEBVXNQJDAQ8609-28-38 21:10:00





             Test Item    Value        Reference Range Interpretation Comments

 

             Max Amp (test code = Max Amp) 57.7 mm      50.0-70.0    N          

  



[facility] MjebodwZEWZGLOFQ8817-90-39 17:18:00&lt;0.010[facility]annCHEMISTRY
2012 17:18:00&lt;0.02[facility] DzelnvsIBEEKXVNF0436-82-71 17:18:0043
[facility] YnieqqzSZSLISEOQ7625-46-83 12:20:0087[facility]annCHEMISTRY
2012 12:20:008.4[facility] QrohhbmJYCRJOASL2988-47-14 12:20:0024[facility] 
SwvcecmEGJGOANRG9225-37-42 12:20:31046Tqqixvdc RabuyvsRAKCRQMOC7027-51-43 
12:20:004.3[facility] UmqjnhdSEETUEFCH0896-33-64 12:20:49160Ujtplsed Lenora
GHLIVRUMQ5457-90-95 12:20:000.8[facility] KdzylzrUKFYQMGYD6903-88-18 12:20:0020
[facility] ZqsjdntKIRUNRQQI5888-89-63 12:20:93195Ojfwvudd HermannCHEMISTRY
2012 12:20:0017.3[facility] OjocnemEHDKZIBAX0609-61-82 12:20:001.5[facility] 
FhqnjqwDQSFBVBTA7621-11-71 12:20:002.5[facility] LhvsgzgGONVYJSEY4954-57-57 
12:20:001.1[facility] ViikuezZIRIHUKOW5381-26-27 12:20:0022[facility]
BAPLKFGVU6002-15-08 12:20:006.2[facility] WzaummcTNOBCEORM4684-18-13 12:20:001.7
[facility] ScntjxiTFFLVMBGM6535-71-04 12:20:000.6[facility] HermannCHEMISTRY
2012 12:20:58542Cepfljge MtwfmfaNPFJNWGUV9328-82-68 12:20:003.7[facility] 
UrcwxdeZOMIEIZQT8889-62-72 12:20:0023[facility] TrojtwjOZYQIBKFP2860-55-24 
12:20:004.3[facility] KxcjcozOTBVGEFWA3238-99-18 12:20:002.3[facility]
ZEGRKLIVW7265-98-02 12:20:004.64[facility] XmwrcbvCWDQJBBBR1411-83-64 12:20:004.64
[facility] WutrgsqZHQHQDMJK4582-49-45 12:20:001.16[facility] HermannCHEMISTRY
2012 12:20:001.16[facility] GcaxavuIGECZVGXT4147-97-99 12:20:00&lt;0.010
[facility] FsixiujYRNSANLZM5921-52-86 12:20:00&lt;0.02[facility] HermannCHEMISTRY
2012 12:20:0038[facility] JuhnfoyQPIXXQWYD9436-62-26 11:25:0091.0[facility] 
JftmzcmHFZTUBQVZ8540-05-57 11:25:0025[facility] XkpworqUILIKMCDY0082-62-28 
11:25:93417Mmljfgtc UdyvcdqAXYLNKLKF5055-30-01 11:25:004.0[facility]
CUFCWXNIR4035-90-81 11:25:001[facility] GdeyhftEYOEPCZBK7637-80-23 11:25:001.8
[facility] RwmklatHQQJLYXZF3998-77-56 11:25:44380Rcwzkrkz HermannCHEMISTRY
2012 11:25:001.16[facility] ImaubtnZFOJIMLBK7714-52-38 11:25:0037.0[facility] 
NdabohdCCCRJBQKC4913-12-32 11:25:0040[facility] TpscihaUWJPMEESN0107-90-28 
11:25:007.41[facility] WamvsrvLWTNWTYUK3148-81-07 11:25:0060[facility]
GMKTUBBQNJ3493-72-02 10:40:0084.9[facility] OiaacmsPSBAYPVHJY4191-74-35 10:40:00
10.4[facility] LwnfpdpVLFYDEXINA5108-70-45 10:40:003.4[facility] HermannHEMATOLOGY
2012 10:40:000.3[facility] BgvowpzQRRDIISUDO2579-12-84 10:40:000.1[facility] 
RfgqwnsYYBDILECWF1516-10-81 10:40:000.1[facility] AgmyojyDBAOSQQTXL9811-37-12 
10:40:001.2[facility] LwzwveeWIAUGPTFMC4748-47-04 10:40:007.7[facility]
RRIPRBFYQJ8030-29-97 10:40:001.0[facility] DjhnkzcKCYXBVQFTT6653-04-97 10:40:00





             Test Item    Value        Reference Range Interpretation Comments

 

             PTT (test code = PTT) 31.2 s       22.9-35.8    N            



[facility] LatsnzlBQADCUQQFX8890-34-83 10:40:001.24[facility] HermannHEMATOLOGY
2012 10:40:00





             Test Item    Value        Reference Range Interpretation Comments

 

             PT (test code = PT) 15.8 s       12.0-14.7    H            



[facility] ZmprafnXIYTEXMADA5160-74-47 10:40:62423Axmhngnq HermannHEMATOLOGY
2012 10:40:00





             Test Item    Value        Reference Range Interpretation Comments

 

             MCH (test code = MCH) 30.0 pg      27.0-31.0    N            



[facility] LmyrmpeZHDQWRJIDM4543-82-74 10:40:0033.5[facility] HermannHEMATOLOGY
2012 10:40:0089.5[facility] BdixdqtMQOQUZBLJK0416-66-84 10:40:0016.2[facility]
GyzyngrPXCBMMYEGB7223-39-84 10:40:009.1[facility] ZboyajeYGSACXVYXQ3605-22-03 
10:40:004.22[facility] FfbwmfeJQMAJDSOCN0846-98-35 10:40:0012.7[facility]
UUEWHOBYGJ6205-92-76 10:40:0037.8[facility] GpyvppaLJLMDGXEXM9401-78-91 10:40:00
8.8[facility] WvzlrqiSFFSMBSXXE1666-45-16 04:50:001[facility] HermannURINALYSIS
2012 04:50:00Occasional /HPF *NA*(2012 22:50:00)[facility]
XNRQGMNYVB1741-46-97 04:50:00&lt;1[facility] UqhxwezWWULWNDPCC8074-63-30 04:50:002
[facility] DtmyuwkMMCGCMELZK8594-43-29 04:50:00Trace *ABN*(2012 22:50:00)
[facility] TnmggtxPQTVNFCGDH8873-94-80 04:50:00Negative (2012 22:50:00)
[facility] MltmvvpUCFSDYDAJJ4084-43-98 04:50:00Negative (2012 22:50:00)
[facility] NsbljivTLBKKYBNQG7668-49-99 04:50:00Negative mg/dL *NA*(2012 
22:50:00)[facility] PpbysgtEWFXJXKCWO9974-40-95 04:50:00Few /LPF *NA*(2012 
22:50:00)[facility] HnezxphELBMNAIJVU0630-45-66 04:50:00Negative *NA*(2012 
22:50:00)[facility] RenanvcLEVUXEKHCP1845-03-89 04:50:00Negative mg/dL 
*NA*(2012 22:50:00)[facility] UovvsaqXRLWOWFLIN6079-08-77 04:50:001.011
[facility] ZrncfoqWPXBDVJRIC8319-84-26 04:50:00Clear (2012 22:50:00)[facility]
BkgwblhWTGVCXIDEO5707-72-91 04:50:00Yellow *NA*(2012 22:50:00)[facility] 
OeqnnfiGDGFEFNWQU6351-83-08 04:50:00Negative mg/dL (2012 22:50:00)[facility]
MqcdrnzOKNSRPFWKA7007-58-64 04:50:005.5[facility] KnxorirDKTXTPSBM1521-31-47 
04:45:0038[facility] VuejmijCWQPACEBM6993-30-13 04:45:00&lt;0.010[facility]
SBMENUTLS5667-54-67 04:45:00&lt;0.02[facility] ZcjgricKJVNQDCXL8037-28-57 04:45:00
1.45[facility] HdfiguxYUAMAIGBM3140-74-66 00:20:001.7[facility] HermannCHEMISTRY
2012 00:20:004.5[facility] NfcogalBGBHIMTYI7422-72-86 00:20:000.291[facility] 
YdzuroaWUOAAUTRN7837-51-87 00:20:0083[facility] YmiqlqkHEGMZYWPP2340-28-04 
00:20:0018[facility] TkpusghSQAMNLVIF8287-34-53 00:20:21347Lfruquuj [facility]
JZUXBVFDK3777-59-75 00:20:20401Iyzaigvy JduacnqABIXEULCC5491-19-17 00:20:000.9
[facility] UuyekqgPCGZURKXR2398-80-19 00:20:16924Vebidstj HermannCHEMISTRY
2012 00:20:0024[facility] VsgmwyzNDTVFQLMV0491-41-86 00:20:004.4[facility] 
KgfsvatMFHCREALQ1651-58-26 00:20:007.2[facility] RdcojobUGSSPEFGP4243-48-31 
00:20:0018[facility] SfqgjiaZHKMIEDTJ3075-00-12 00:20:008.8[facility]
APCRUNOSL1273-85-70 00:20:004.5[facility] PsovfouZIMHJWBHC7517-16-00 00:20:0025
[facility] KquyybiSXPGFREHX8583-78-11 00:20:41621Idqtvpvw HermannCHEMISTRY
2012 00:20:001.9[facility] RofpyerYOYGUWRXV6423-21-89 00:20:001.6[facility] 
ZgwyxvlQXGXCUAMQ0790-81-79 00:20:002.8[facility] XttrblaQBSUHXNSZ9144-39-65 
00:20:0020[facility] VhtsjywJSXPTVIFU3461-22-09 00:20:0015.5[facility]
YOBIXHNGDK9076-55-03 00:20:0016.0[facility] WcaogecFXYHFADNGO2168-20-44 00:20:00
32.8[facility] GjcmeatCKXRLPSEXO3928-24-12 00:20:63677Mlhtufeg HermannHEMATOLOGY
2012 00:20:008.9[facility] WfcijzwDNZFWOWZWJ4150-70-72 00:20:008.3[facility] 
LnyxaqwWXKYDXSUEE9053-11-91 00:20:004.57[facility] XhdxwwiQOTBHXFLZS0649-32-02 
00:20:0013.5[facility] EqhnedyRPKTGCTNCC6822-44-34 00:20:0090.0[facility]
WLOWELDVFG3768-70-79 00:20:0041.1[facility] AddwwevJFYURQBGWC5390-68-75 00:20:00





             Test Item    Value        Reference Range Interpretation Comments

 

             MCH (test code = MCH) 29.6 pg      27.0-31.0    N            



[facility] ZnbhrwlFQLDTZEZFR1135-84-86 00:20:000.2[facility] HermannHEMATOLOGY
2012 00:20:000.1[facility] ZehfzkrNNJNDEOJBD4067-42-32 00:20:000.9[facility] 
ZtoxjacKFUEYGUSQR6528-34-97 00:20:007.2[facility] RsmcewdOTPYXMMXKS6366-99-70 
00:20:0086.6[facility] RfmjfwiIBRQOGNNKJ7458-81-22 00:20:000.3[facility]
TSCDFMFEVZ3821-84-68 00:20:001.5[facility] FzlxbkoQEGFAHDZXY8260-46-36 00:20:00
11.4[facility] MziuwdcIITMLICYG4113-77-87 21:56:001.15[facility] HermannCHEMISTRY
2012 21:56:000.7[facility] YrlhjdaJGVIIXIWR9594-41-14 21:56:0081.0[facility] 
RuejwggBYBDHGUGX0345-56-09 21:56:001[facility] JoggftrTUTHIEJAN5255-83-92 21:56:00
137[facility] IowseniKUVEHPWYZ8133-75-00 21:56:004.3[facility] HermannCHEMISTRY
2012 21:56:0041.0[facility] BwnzzkqDBIRGCHJW1859-35-18 21:56:32605Mgdlgrwx 
EfbptqtNSDSAIJCR2334-29-39 21:56:0046[facility] BhtqfehYVXJFVYVS4165-71-41 
21:56:007.39[facility] OuvbendXGVNESEMU6279-69-73 21:56:0026[facility]
MOSYHEAFW8269-18-47 21:56:0043[facility] DsqmzreVMTUAPXHE6103-88-29 21:56:0037.0
[facility] WpikasnFSNNCMWVHE4374-56-04 17:00:00





             Test Item    Value        Reference Range Interpretation Comments

 

             PT (test code = PT) 15.4 s       12.0-14.7    H            



[facility] SursiagLFIWHULGAG7697-97-33 17:00:001.20[facility] HermannHEMATOLOGY
2012 17:00:00





             Test Item    Value        Reference Range Interpretation Comments

 

             PTT (test code = PTT) 32.9 s       22.9-35.8    N            



[facility] BeyxltrOWDXTTYIH5651-57-18 18:15:002.8[facility] HermannCHEMISTRY
2012 18:15:001.5[facility] AoknmdaLCZUYICRK2672-67-92 18:15:0013.0[facility] 
RgxkphfZASVTADSM0204-66-52 18:15:0020[facility] PummgwlKGVBSLOMY2843-52-63 
18:15:0084[facility] ExarhlfNURXUZLBY0290-32-23 18:15:0028[facility]
TEDBVPULL0781-69-20 18:15:50902Bnjdtevr DzgbgmcDKTVZSBII5299-83-39 18:15:007.0
[facility] LfyfjazELAVMDEVE4458-41-41 18:15:95691Lxwcaimw HermannCHEMISTRY
2012 18:15:005.0[facility] ZukevocKQKBLELQD0339-16-23 18:15:0029[facility] 
CbyyogcZIBKTFPCB9293-88-55 18:15:001.3[facility] RqiijzjEIUOXWBZA3709-49-62 
18:15:008.8[facility] NkywrwsZQEYMILKR8521-50-65 18:15:000.9[facility]
RCNSXOVUX8291-47-86 18:15:0018[facility] HmuqqkbWOXEFQBTQ3173-24-27 18:15:004.2
[facility] RdsmyboLEWQFUMRI5726-37-69 18:15:0093[facility] HermannCHEMISTRY
2012 18:15:43974Fqekcikw AihbzddCOWELUDPF3416-75-89 18:15:0027[facility] 
YpltmrrGCKZNZDAHK5733-16-21 18:15:000.1[facility] AjaraaoGXQCINUFYU9087-64-66 
18:15:002.0[facility] IqrrvhdRGKCLMEDZR4895-96-46 18:15:002.9[facility]
DUQNZLDDWW2772-53-87 18:15:004.4[facility] ExqqrfbWHETBKIZIY6769-68-54 18:15:001.8
[facility] NfrnjjoBGVLUVWIDS7255-09-66 18:15:000.7[facility] HermannHEMATOLOGY
2012 18:15:000.2[facility] QsabnogDATHFLVZVU8492-63-10 18:15:009.7[facility] 
XjgjqayOCSEQVDYVI6608-73-24 18:15:0061.0[facility] HqlazoyQAFJPAKMXR6751-42-85 
18:15:0024.4[facility] QlbnseoJGQXOUOMEC3408-92-10 18:15:004.44[facility]
QOVPIWYRUH6113-06-61 18:15:0013.0[facility] AbfukswYEURUBDQFV3064-51-57 18:15:00
40.2[facility] PpnjvaeWHDPFBHLGN5121-14-68 18:15:007.3[facility] HermannHEMATOLOGY
2012 18:15:00





             Test Item    Value        Reference Range Interpretation Comments

 

             MCH (test code = MCH) 29.4 pg      27.0-31.0    N            



[facility] VikiwgdVRZOBGMYHO2714-14-70 18:15:0032.4[facility] HermannHEMATOLOGY
2012 18:15:0016.0[facility] MjjvufcJAUCKIOVUE3846-27-34 18:15:0090.6[facility]
FsddmgxAOSGZKZKGE2583-66-57 18:15:88870Xzysdizp TabyzsaIQOUHSRMZL5844-88-11 
18:15:008.9[facility] BpqtdlzDJLOYBMZFI3590-75-53 18:15:00





             Test Item    Value        Reference Range Interpretation Comments

 

             PT (test code = PT) 20.1 s       12.0-14.7    H            



[facility]30[facility] 18:15:00





             Test Item    Value        Reference Range Interpretation Comments

 

             PTT (test code = PTT) 60.9 s       22.9-35.8    H            



[facility]viewYmzxfbcGSRSTJWOJT1814-55-53 18:15:001.70[facility]

## 2020-06-16 NOTE — P.HP
Certification for Inpatient


Patient admitted to: Inpatient


With expected LOS: >2 Midnights


Patient will require the following post-hospital care: None


Practitioner: I am a practitioner with admitting privileges, knowledge of 

patient current condition, hospital course, and medical plan of care.


Services: Services provided to patient in accordance with Admission requirements

found in Title 42 Section 412.3 of the Code of Federal Regulations





Patient History


Date of Service: 06/16/20


Reason for admission: Fever and shortness of breath


History of Present Illness: 


Patient is an 84-year-old gentleman who was admitted to the hospital because he 

had been complaining of fever.  Patient also has some generalized weakness and 

decreased appetite.  There is concern he has been having some difficulty 

swallowing.  The nurse gave patient some medication in the emergency room and 

patient has some difficulty with swallowing.  Patient denied any chest pain. 

Patient has had old slight cough but no significant shortness of breath.  

Patient also been having some soreness in the leg.  Decision was made to admit 

the patient to the hospital for further evaluation.


Allergies





morphine Allergy (Verified 02/09/18 11:25)


   Nausea/Vomiting


Tetracyclines Allergy (Verified 02/09/18 11:25)


   Itching/Hives/Rash





Home Medications: 








Citalopram Hydrobromide [Celexa] 20 mg PO DAILY 09/23/14 


Atorvastatin Calcium [Lipitor*] 40 mg PO BEDTIME 03/11/15 


Digoxin [Lanoxin*] 0.125 mg PO DAILY 03/11/15 


Amiodarone HCl [Cordarone*] 200 mg PO NPOXH6HC 02/09/18 


Finasteride [Proscar*] 5 mg PO DAILY 02/09/18 


Furosemide [Lasix*] 20 mg PO EVERY 3RD DAY 02/09/18 


Levothyroxine [Synthroid*] 88 mcg PO SEECOM 02/09/18 


Levothyroxine [Synthroid*] 100 mcg PO EVERY 3RD DAY 02/09/18 


Sacubitril/Valsartan [Entresto 24 mg-26 mg Tablet] 1 tab PO BID 02/09/18 


Spironolactone [Aldactone*] 25 mg PO EVERY 3RD DAY 02/09/18 


Warfarin Sodium [Coumadin*] 2.5 mg PO SEECOM 02/09/18 


Warfarin Sodium [Coumadin*] 5 mg PO SEECOM 02/09/18 








- Past Medical/Surgical History


Diabetic: No


-: History of Throat cancer, Post radiation


-: HTN


-: Hyperlipidemia


-: Atrial fibrillation


-: Chronic anticoagulation


-: CAD with history of CABG


-: Depression with anxiety


-: Hypothyroidism


-: Pacemaker/Defibrillator


-: Osteoarthritis


-: difibulator


-: pacemaker


-: bilateral knee sx


-: bypass sx


-: hernia repair x3


Psychosocial/ Personal History: , Several children, Retired [employer] 

.





- Family History


  ** Mother


Medical History: Heart disease





  ** Father


Medical History: Heart disease





- Social History


Smoking Status: Former smoker


Alcohol use: No


CD- Drugs: No


Caffeine use: Yes





Review of Systems


10-point ROS is otherwise unremarkable





Physical Examination





- Vital Signs


Temperature: 100.5 F


Blood Pressure: 110/70


Pulse: 105


Respirations: 18


Pulse Ox (%): 94





- Physical Exam


General: Alert, In no apparent distress, Oriented x3, Other (Difficulty hearing;

 difficulty with swallowing)


HEENT: Atraumatic, PERRLA, Mucous membr. moist/pink, EOMI, Sclerae nonicteric


Neck: Supple, 2+ carotid pulse no bruit, No LAD, Without JVD or thyroid 

abnormality


Respiratory: Diminished, Crackles/rales


Cardiovascular: Regular rate/rhythm, Normal S1 S2, Systolic murmur


Gastrointestinal: Normal bowel sounds, Soft and benign, Non-distended, No 

tenderness


Musculoskeletal: No clubbing, No tenderness, Swelling


Integumentary: No rashes


Neurological: Normal speech, Normal strength at 5/5 x4 extr, Normal tone, 

Sensation intact, Cranial nerves 3-12 intact, Normal affect


Lymphatics: No axilla or inguinal lymphadenopathy





- Studies


Laboratory Data (last 24 hrs)





06/16/20 04:15: PT 24.4 H, INR 2.10, APTT 34.1


06/16/20 04:15: WBC 9.8, Hgb 13.2 L, Hct 39.5 L, Plt Count 163


06/16/20 04:15: Sodium 137, Potassium 4.3, BUN 21 H, Creatinine 1.11, Glucose 

136 H, Total Bilirubin 1.3 H, AST 20, ALT 21, Alkaline Phosphatase 90, Amylase 

34, Lipase 113





Microbiology Data (last 24 hrs): 








06/16/20 04:10   Nasopharnyx   Coronavirus COVID-19 PCR - Final


06/16/20 04:10   Nasopharnyx   Influenza Type A Antigen Screen - Final


06/16/20 04:10   Nasopharnyx   Influenza Type B Antigen Screen - Final


06/16/20 04:10   Throat   Group A Streptococcus Rapid Screen - Final








Assessment & Plan





- Problems (Diagnosis)


(1) Left lower lobe pneumonia


Current Visit: Yes   Status: Acute   





(2) Hyperlipidemia


Current Visit: No   Status: Acute   





(3) Hypothyroidism


Current Visit: No   Status: Acute   





(4) Systolic CHF, acute on chronic


Current Visit: No   Status: Acute   





(5) Atrial fibrillation


Current Visit: No   Status: Chronic   





(6) CAD (coronary artery disease)


Current Visit: No   Status: Chronic   





(7) HTN (hypertension)


Current Visit: No   Status: Chronic   





(8) History of cardiac pacemaker


Current Visit: No   Status: Chronic   





(9) History of placement of internal cardiac defibrillator


Current Visit: No   Status: Chronic   





(10) COVID-19 virus test result unknown


Current Visit: Yes   Status: Acute   





- Plan





1. Continue with IV antibiotics


2. Awaiting sputum and blood culture


3. Repeat chest x-ray


4. Will proceed with CT scan of the chest if pneumonia is not improved to 

evaluate for postobstructive pneumonia


5. Pulmonary consultation


6. Continue with nebs as needed


7. O2 per protocol


8. Continue with gentle hydration


9. Repeat labs including CBC and renal function in a.m.


10. Continue with cardiac medications; echocardiogram; 


11. GI and DVT prophylaxis


Discharge Plan: Home


Plan to discharge in: Greater than 2 days





- Advance Directives


Does patient have a Living Will: No


Does patient have a Durable POA for Healthcare: No





- Code Status/Comfort Care


Code Status Assessed: Yes


Code Status: Full Code


Critical Care: No


Time Spent Managing PTS Care (In Minutes): 45

## 2020-06-16 NOTE — XMS REPORT
Continuity of Care Document

                            Created on:2020



Patient:TAMAR BREWSTER

Sex:Male

:1935

External Reference #:[id number]





Demographics







                          Address                   [address] [place] [id number]

 

                          Home Phone                [phone]

 

                          Preferred Language        en-US

 

                          Marital Status            Unknown

 

                          Voodoo Affiliation     Unknown

 

                          Race                      Unknown

 

                          Ethnic Group              Unknown









Author







                          Organization              [employer] [facility]

 [email or web address]









Care Team Providers







                    Name                Role                Phone

 

                    docTrackr Unavailable         Un

available









Problems







          Problem   Status    Onset     Classification Date      Comments  Sourc

e



                              Date                Reported            



                                                                      



                                                                      



                                                                      

 

          CRICOPHARYLNGEAL Active    20                                [facility]



          DYSPHAGIA,           19                                      Medical



          CRICOPHARYNG                                                   Center



                                                                      



                                                                      

 

          OTHER     Active    20                                [address]



                                                                      



                                                                      

 

          DEGLOVING INJURY R Active    20                                [facility]



          HAND                [address] [place] [address]



                                                                      



                                                                      

 

          FOLLOW UP Active    20                                [address]



                                                                      



                                                                      

 

          FOLLOW UP ***GTUBE Active    20                                [facility]



          REMOVAL***           [address] [place] [address]



                                                                      



                                                                      

 

          BDDC - F/U Active    20                                [address]



                                                                      



                                                                      

 

          CRICOPHARYNGEAL Active    20                                 [facility]

[place]



          SPASM               [address] [place] [address]



                                                                      



                                                                      

 

          R10.84 -  Active    20                                 [facility]



          GENERALIZED           [address] [place] [address]



          ABDOMINAL PAIN R60.                                                   



                                                                      



                                                                      

 

          HOSPITAL F/U Active    20                                [facility]

s



                              [address] [place] [address]



                                                                      



                                                                      

 

          DYSPHAGIA WITH Active    20                                [facility]



          ASPIRATION           [address] [place] [address]



                                                                      



                                                                      

 

          DYSPHAGIA Active    20                                [address]



                                                                      



                                                                      

 

          DSU-DYSPHAGIA Active    20                                 [facility]

[address]



                                                                      



                                                                      

 

          R13.10 -  Active    20                                 [facility]



          "DYSPHAGIA,           16                                      [place]



          UNSPECIFIED"                                                   



                                                                      

 

          BDDC-DYSPHAGIA Active    20                                [address]



                                                                      



                                                                      

 

          SWALLOWING ISSUE Active    01/15/20                                [address]



                                                                      



                                                                      

 

          WT. GAIN  Active    20                                [address]



                                                                      



                                                                      

 

          783.21 - ABNORMAL Active    20                                

 [facility]



          LOSS O 478.2 -           13                                      [place]



          DISEASE                                                     



                                                                      

 

          ABNORMAL WEIGHT Active    20                                [facility]

[place]



          LOSS                [address] [place] [address]



                                                                      



                                                                      

 

          478.29 - DISEASE OF Active    20                                

 [facility]



          PHAR                13                                      [place]



                                                                      



                                                                      

 

          13, Active    20                                [facility]



          ESCOGSCOPY           [address] [place] [address]



                                                                      



                                                                      

 

          DYSPHAGIA,ICD.9-787 Active    20                                

[facility]



          .2                  [address] [place] [address]



                                                                      



                                                                      

 

          787.20/478.29 Active    20                                 [facility]

as



                              13                                      Medical



                                                                      Center



                                                                      



                                                                      

 

          DDC/ DYSPHAGIA Active    10/30/20                                [facility]

[place]



                              [address] [place] [address]



                                                                      



                                                                      

 

          RUQ PAIN  Active    20                                [address]



                                                                      



                                                                      

 

          NEW CLINIC VISIT Active    20                                [address]



                                                                      



                                                                      

 

          DDC- NEW CLINIC Active    20                                [facility]

[place]



          VISIT               [address] [place] [address]



                                                                      



                                                                      

 

          RIGHT UPPER Active    20                                [facility] [place]



          QUADRANT PAIN           12                                      [facility]



                                                                      



                                                                      

 

          Methicillin Active    10/14/20  Problem   2013 1Problem   [place]

s



          resistant           09                            added by  Medical



          Staphylococcus                                         End[facility]



          aureus                                            Expert.   [facility]



                                                                      



                                                                      

 

          Methicillin Active    10/14/20  Problem   2016 Problem    [place]

s



          resistant           09                            added by  Medical



          Staphylococcus                                         Endy mtz



          aureus (organism)                                         Expert.   [facility]



                                                                      



                                                                      

 

          Atrial fibrillation Inactive            Problem   2013          

 [facility]



                                                                      



                                                                      

 

          Hyperlipidemia Active              Problem   2013           [facility]



                                                                      



                                                                      

 

          Hypertension Active              Problem   2013           [facility]



                                                                      



                                                                      

 

          Hypothyroidism Active              Problem   2013           [facility]



                                                                      



                                                                      

 

          CAD - Coronary Resolved            Problem   2013           [facility]



          artery disease                                                   Medic

al



                                                                      Center



                                                                      



                                                                      

 

          Depression Resolved            Problem   2013           [facility]



                                                                      



                                                                      

 

          GERD -    Resolved            Problem   2013           [facility]



          Gastro-esophageal                                                   Me

dical



          reflux disease                                                   [facility]



                                                                      



                                                                      

 

          Hepatitis C Resolved            Problem   2013           [facility]



                                                                      



                                                                      

 

          HLD -     Resolved            Problem   2013           [facility]



          Hyperlipidemia                                                   Medic

al



                                                                      Center



                                                                      



                                                                      

 

          HTN - Hypertension Resolved            Problem   2013           

[facility]



                                                                      



                                                                      

 

          Tongue carcinoma Resolved            Problem   2013           [facility]



                                                                      



                                                                      

 

          Atrial fibrillation Resolved            Problem   2019          

 [facility]



          (disorder)                                                   [facility]



                                                                      [facility]



                                                                      



                                                                      

 

          Coronary  Active              Problem   2019           [facility]



          arteriosclerosis                                                   Med

ical



          (disorder)                                                   Center,



                                                                      [facility]



                                                                      



                                                                      

 

          Gastroesophageal Active              Problem   2019           [facility]



          reflux disease                                                   Medic

al



          (disorder)                                                   Center,



                                                                      [facility]



                                                                      



                                                                      

 

          Hyperlipidemia Active              Problem   2019           [facility]



          (disorder)                                                   Medical



                                                                      Center,



                                                                      [facility]



                                                                      



                                                                      

 

          Hypertensive Active              Problem   2019            [facility]

as



          disorder, systemic                                                   M

edical



          arterial (disorder)                                                   

Center,



                                                                      [facility]



                                                                      



                                                                      

 

          Hypothyroidism Active              Problem   2019           [facility]



          (disorder)                                                   Medical



                                                                      Center,



                                                                      [facility]



                                                                      



                                                                      

 

          Other (qualifier Resolved            Problem   2019  arrest- M

[facility] [place]



          value)                                            with [employer]



                                                                      Center,



                                                                      [facility]



                                                                      



                                                                      

 

          Pulmonary Resolved            Problem   2019 Diagnosed [facility]



          hypertension                                         in March  Medical



          (disorder)                                         2016      Center,



                                                                      [facility]



                                                                      



                                                                      

 

          Tongue carcinoma Resolved            Problem   2019           [facility]



          (disorder)                                                   [facility],



                                                                      [facility]



                                                                      



                                                                      

 

          Depressive disorder Active              Problem   2016          

 [facility]



          (disorder)                                                   [facility]



                                                                      



                                                                      

 

          Viral hepatitis C Resolved            Problem   2016           [facility]



          (disorder)                                                   [facility]



                                                                      



                                                                      

 

          Depression - motion Active              Problem   2017          

 [facility]



          (qualifier value)                                                   Me

dical



                                                                      Center



                                                                      



                                                                      

 

          ABDMNAL PAIN RT UPR Active                                            

[facility]



          QUAD                                                        [facility]



                                                                      



                                                                      

 

          DYSPHAGIA NOS Active                                             [facility]

as



                                                                      Medical



                                                                      Center



                                                                      



                                                                      

 

          DISEASE OF PHARYNX Active                                            M

[facility] [place]



          NEC                                                         [facility]



                                                                      



                                                                      

 

          MALIGNANT TERESSA Active                                            [facility]

as



          LARYNX NOS                                                   Medical



                                                                      Center



                                                                      



                                                                      

 

          UNDERWEIGHT Active                                            [facility]



                                                                      



                                                                      

 

          MEDICAL SERVICES Active                                            [facility] 

[place]



          NOT AVAILABLE IN                                                   Med

ical



          HOME                                                        Center



                                                                      



                                                                      

 

          DYSPHAGIA, Active                                            [facility] [place]



          UNSPECIFIED                                                   Medical



                                                                      [facility]



                                                                      



                                                                      

 

          ALCOHOLIC FATTY Active                                            [facility]

[place]



          LIVER                                                       Medical



                                                                      Center



                                                                      



                                                                      

 

          OTHER DISEASES OF Active                                            [facility]



          PHARYNX                                                     [facility]



                                                                      



                                                                      

 

          ENCNTR FOR GENERAL Active                                            [facility]



          ADULT MEDICAL EXAM                                                   M

edical



          W/                                                          Center



                                                                      



                                                                      

 

          UNSPECIFIED OPEN Active                                            [facility]



          WOUND OF RIGHT                                                   Medic

al



          HAND, IN                                                    Center



                                                                      



                                                                      







Medications







        Medication Details Route   Status  Patient Ordering Order   Source



                                        Instructions Provider Date    



                                                                



                                                                



                                                                

 

        ONAbotulinumtoxinA Notes: "TO BE         Active                  / 

 [facility]



                RECONSTITUTED                                 2019    Medical



                AND                                             Center



                ADMINISTERED                                         



                ONLY BY A                                         



                PHYSICIAN"                                         



                Reconstitute                                         



                with                                            



                preservative                                         



                free NS only.                                         



                Stability = 4                                         



                hours after                                         



                reconstitution                                         



                . (Same As:                                         



                Botox)  WASTE:                                         



                F/P - Red; E                                         



                -Red                                            



                                                                

 

        Bacitracin 1 appl, Route:         No Longer                 [facility]



                TOP, Daily,         Active                      Medical



                Drug form:                                         Center



                OINT, Apply on                                         



                the affected                                         



                area on the                                         



                b/l hands.,                                         



                Start date:                                         



                17                                         



                9:00:00 CDT,                                         



                Duration: 30                                         



                day, Stop                                         



                date: 17                                         



                9:00:00 CDT                                         



                                                                



                                                                

 

        calcium-vitamin D See             Active                  [facility] [place]

as



        600 mg-400 intl Instructions,                                 2017    Me

dical



        units oral tablet, Take 1 tab                                         Ce

nter



        chewable BID, # 30 tab,                                         



                0 Refill(s)                                         



                                                                



                                                                

 

        Acetaminophen 300 1 tab, PO,         Active                  [facility]



        MG / Codeine Q6H, PRN Pain,                                 2017    Medi

[place]



        Phosphate 30 MG X 7 day, # 28                                         Ce

nter



        Oral Tablet tab, 0                                          



        [Tylenol with Refill(s)                                         



        Codeine #3]                                                 



                                                                

 

        Cephalexin 750  mg = 1         Active                    [facility]



        Oral Capsule cap, PO, Q12H,                                 2017    Medi

[place]



        [Keflex] X 5 day, # 10                                         Center



                cap, 0                                          



                Refill(s)                                         



                                                                



                                                                

 

        Amiodarone Notes: Same         Inactive                   [facility]



                as: Cordaron,                                 2017    Medical



                [place]                                         [place]



                                                                



                                                                

 

        Sodium Chloride 500 mL, 500         Inactive                   [facility]



        0.154 MEQ/ML ml/hr, Infuse                                 2017    Medic

al



        Injectable Over: 1 hr,                                         [place]



        Solution Route: IV,                                         



                500, Drug                                         



                form: INJ,                                         



                ONCE,                                           



                Priority:                                         



                STAT, Dosing                                         



                Weight 68.182                                         



                kg, Start                                         



                date: 17                                         



                13:28:00 CDT,                                         



                Duration: 1                                         



                doses or                                         



                times, Stop                                         



                date: 17                                         



                13:28:00 CDT                                         



                                                                



                                                                

 

        Naloxone Notes: Same as         Inactive                 [facility] [place]

s



                Narcan                                  [address] [place] [address]



                                                                



                                                                

 

        Promethazine Notes: Do not         Inactive                    [place]

[place]



                give IV push.                                 2017    Medical



                (Same as:                                         [place]



                Phenergan)                                         



                                                                



                                                                

 

        Acetaminophen Notes: Max         Inactive                 [facility] [place]

as



                acetaminophen                                 2017    Medical



                4000 mg/day (4                                         Center



                gm/day).                                         



                (Same as:                                         



                Tylenol Extra                                         



                Strength)                                         



                                                                



                                                                

 

        Fentanyl Notes: (Same         Inactive                   [facility]



                as: Sublimaze)                                 2017    Medical



                 Preservative                                         [place]



                free.                                           



                                                                

 

        Oxycodone Notes: (Same         Inactive                 [facility] [place]



                as:                                     2017    Medical



                Roxicodone)                                         [place]



                                                                



                                                                

 

        Flumazenil Notes: (Same         Inactive                 [facility] [place]

s



                as: Romazicon)                                 [address] [place] [address]



                                                                



                                                                

 

        Lasix   Notes: (Same         Inactive                 [facility] [place]



                as: Lasix)                                 2017    [facility]



                                                                



                                                                

 

        sacubitril 24 MG / 1 tab, PO,         Active                    [facility]



        valsartan 26 MG BID, # 28 tab,                                 2017    M

edical



        Oral Tablet 0 Refill(s)                                         [place]



        [Entresto]                                                 



                                                                

 

        spironolactone 25 25 mg = 1 tab,         Active                   

 [facility]



        mg oral tablet PO, Daily, #                                 2017    Medi

[place]



                30 tab, 3                                         Center



                Refill(s)                                         



                                                                



                                                                

 

        digoxin 125 mcg Notes: Take on         No Longer                  [place]



        (0.125 mg) oral an Empty         Active                  2017    Medical



        tablet  Stomach  (Same                                         Center



                as: Lanoxin)                                         



                                                                



                                                                

 

        Citalopram Notes: (Same         No Longer                  [place]

as



                As: CeleXA)         Active                  2017    Medical



                                                                Center



                                                                



                                                                

 

        Lipitor Notes: (Same         No Longer                  [place]



                as: Lipitor)         Active                  2017    Medical



                                                                Center



                                                                



                                                                

 

        Amiodarone Notes: (Same         No Longer                  [place]

as



                as: Cordarone)         Active                  2017    Medical



                                                                Center



                                                                



                                                                

 

        Calcium Carbonate Notes: (Same         No Longer                  [place]



        1250 MG / As: Hudson-D,         Active                  2017    Medical



        Cholecalciferol OsCal-D,                                         Center



        200 UNT Oral Oyster                                          



        Tablet  Calcium)                                         



                                                                



                                                                

 

        Spironolactone Notes: (Same         No Longer                 

 [place]



                As: Aldactone)         Active                  2017    Medical



                                                                Center



                                                                



                                                                

 

        Streptococcus Notes: Lightly         Inactive                 

 [place]



        pneumoniae roll vial (DO                                 2017    Medical



        serotype 1 NOT SHAKE)                                         Center



        capsular antigen before                                          



        diphtheria [id number] administration                                        

 



        protein conjugate .  (Same as:                                         



        vaccine / Prevnar 13)                                         



        Streptococcus                                                 



        pneumoniae                                                 



        serotype 14                                                 



        capsular antigen                                                 



        diphtheria [id number]                                                 



        protein conjugate                                                 



        vaccine /                                                 



        Streptococcus                                                 



        pneumoniae                                                 



        serotype 18C                                                 



        capsular antigen d                                                 



                                                                



                                                                

 

        metoprolol Notes: (Same         No Longer                  [place]

as



        tartrate as: Toprol XL)         Active                  2017    Medical



                Do Not Crush                                         Center



                                                                



                                                                

 

        Levoxyl Notes: Take 1         No Longer                  [place]



                hour before or         Active                  2017    Medical



                2 hours after                                         Center



                meal; Enteral                                         



                feeds may                                         



                interefere                                         



                with the                                         



                absorption of                                         



                this                                            



                medication.                                         



                (Same                                           



                as:Synthroid)                                         



                                                                



                                                                

 

        Finasteride Notes: (Same         No Longer                  [place]



                as: Proscar)         Active                  2017    Medical



                "Do Not Crush"                                         Center



                 Women of                                         



                childbearing                                         



                age should not                                         



                touch or                                         



                handle broken                                         



                tablets                                         



                                                                



                                                                

 

        Lasix   Notes: (Same         No Longer                  [place]



                as: Lasix)         Active                  2017    Medical



                May cause GI                                         Center



                upset.  Give                                         



                with food or                                         



                milk.                                           



                                                                

 

        Protonix Notes: Tablet         No Longer                  [place]

[place]



                should not be         Active                  2017    Medical



                chewed or                                         Center



                crushed. (Same                                         



                as: Protonix)                                         



                                                                



                                                                

 

        Levothyroxine 88 microgram =         Active                   

[place]



        Sodium 0.088 MG 1 tab, PO,                                 2017    Medic

al



        Oral Tablet Daily, # 30                                         Center



        [Levoxyl] tab, 0                                          



                Refill(s)                                         



                                                                



                                                                

 

        Ondansetron Notes: (Same         Inactive                  [place]

as



                as: Zofran)                                 2017    Medical



                *** MEDICATION                                         Center



                WASTE ***                                         



                Product Size:                                         



                4 mg Product                                         



                Wasted:  ___                                         



                mg                                              



                                                                

 

        Docusate Notes: (Same         No Longer                  [place]



                as: Colace)         Active                  2017    Medical



                (Do Not Crush)                                         Center



                                                                



                                                                

 

        Sodium Chloride 1,000 mL,         No Longer                  [place]

[place]



        0.154 MEQ/ML Rate: 42         Active                  2017    Medical



        Injectable ml/hr, Infuse                                         Center



        Solution over: 23.8 hr,                                         



                Route: IV,                                         



                Dosing Weight                                         



                68.182 kg,                                         



                Total Volume:                                         



                1,000, Start                                         



                date: 17                                         



                4:30:00 CDT,                                         



                Duration: 30                                         



                day, Stop                                         



                date: 17                                         



                4:29:00 CDT                                         



                                                                



                                                                

 

        Saline Flush 0.9% Notes: Same         No Longer                  [place]



                as: BD          Active                  2017    Medical



                Posiflush                                         Center



                Sterile                                         



                                                                



                                                                

 

        Acetaminophen 325 Notes: (Same         No Longer                  

 [facility]



        MG / Hydrocodone as: Norco         Active                      Medic

al



        Bitartrate 5 /5)  Do not                                         C

enter



        Oral Tablet exceed 4gm/day                                         



                of                                              



                acetaminophen.                                         



                                                                



                                                                

 

        Acetaminophen Notes: Do not         No Longer                   [facility]



                exceed 4         Active                  2017    Medical



                gm/day.  (Same                                         Center



                as: Tylenol)                                         



                                                                



                                                                

 

        Dilaudid Notes: Same         Inactive                  [place]



                as: Dilaudid                                 2017    Medical



                                                                [place]



                                                                



                                                                

 

        Ancef   Notes: (Same         No Longer                  [place]



                As: Ancef,         Active                  2017    Medical



                Kefzol)    ***                                         Center



                MEDICATION                                         



                WASTE ***                                         



                Product Size:                                         



                1000 mg                                         



                Product                                         



                Wasted:  ___                                         



                mg                                              



                                                                

 

        Gentamicin Sulfate Notes: TIME         Inactive                   

[facility]



        (USP)   CRITICAL                                 2017    Medical



                MEDICATION                                         Center



                (Same as                                         



                Garamycin)                                         



                                                                



                                                                

 

        sildenafil 20 MG Notes: (Same         No Longer                   

[facility]



        Oral Tablet as: Revatio)         Active                  2017    Medical



                                                                Center



                                                                



                                                                

 

        Keflex  Notes: Take on         Inactive                  [place]



                empty stomach.                                 2017    Medical



                (Same As:                                         Center



                Keflex)                                         



                                                                



                                                                

 

        Keflex  Notes: Take on         Inactive                   [facility]



                empty stomach.                                 2017    Medical



                (Same As:                                         Center



                Keftab)                                         



                                                                



                                                                

 

        Dilaudid Notes: Same         Inactive                   [facility]



                as: Dilaudid                                 2017    [facility]



                                                                



                                                                

 

        iodixanol 150 mL, Route:         Inactive                    [place]

as



                IVP, Drug                                 2017    Medical



                Form: SOLN,                                         Center



                Dosing Weight                                         



                68.182, kg,                                         



                ONCALL, STAT,                                         



                Start date:                                         



                17                                         



                22:12:00 CDT,                                         



                Duration: 1                                         



                doses or                                         



                times, Dose =                                         



                2.2ml/kg,  Max                                         



                dose = 150ml                                         



                -- "To be                                         



                infused by                                         



                Radiology                                         



                Staff ONLY"                                         



                                                                



                                                                

 

        Dilaudid Notes: Same         Inactive                   [facility]



                as: Dilaudid                                 2017    Medical



                                                                Center



                                                                



                                                                

 

        Ondansetron Notes: (Same         Inactive                    [facility]

[facility]



                as: Zofran)                                 2017    Medical



                *** MEDICATION                                         Center



                WASTE ***                                         



                Product Size:                                         



                4 mg Product                                         



                Wasted:  ___                                         



                mg                                              



                                                                

 

        Saline Flush 0.9% Notes: (Same         No Longer                  [place]



                as: BD          Active                  2017    Medical



                Posiflush)                                         Center



                                                                



                                                                

 

        predniSONE 50 mg 50 mg = 1 tab,         Active                  10/21/  

[facility]



        oral tablet PO, Daily, 0                                     Medical



                Refill(s)                                         Center



                                                                



                                                                

 

        Prednisone 20 mg, PO,         Inactive                 10/21/  [facility]



                Daily,                                  2016    Medical



                Quantity                                         Center



                sufficient, 0                                         



                Refill(s)                                         



                                                                



                                                                

 

        potassium nitrate 1 appl, TOP,         Active                  

[facility] [place]



        250 MG/ML / Silver ONCE, APPLY TO                                 2016  

  Medical



        Nitrate 750 MG/ML AFFECTED AREA                                         

Center



        Medicated Pad ONCE DAILY FOR                                         



                6 DAYS, # 6                                         



                ea, 0                                           



                Refill(s),                                         



                Pharmacy:                                         



                [facility]                                         



                Store [id number]                                         



                                                                



                                                                

 

        Warfarin Sodium 2.5 mg = 1         Active                     [facility]

[place]



        2.5 MG Oral Tablet tab, PO,                                 2016    Medi

[facility]



        [Coumadin] Daily, 0                                         Center



                Refill(s)                                         



                                                                



                                                                

 

        spironolactone 25 25 mg = 1 tab,         Active                   

 [facility]



        mg oral tablet PO, BID, 0                                     Medica

l



                Refill(s)                                         Center



                                                                



                                                                

 

        silodosin 8 MG 8 mg = 1 cap,         Active                    [facility]



        Oral Capsule PO, Daily, 0                                 2016    Medica

l



        [Rapaflo] Refill(s)                                         Center



                                                                



                                                                

 

        sildenafil 20 MG 60 mg = 3 tab,         Active                    

[facility]



        Oral Tablet PO, TID, 0                                 2016    Medical



                Refill(s)                                         Center



                                                                



                                                                

 

        Finasteride 5 MG 5 mg = 1 tab,         Active                  /  M

[facility] [place]



        Oral Tablet PO, Daily, 0                                 2016    Medical



        [Proscar] Refill(s)                                         Center



                                                                



                                                                

 

        AMIODarone 200 mg 200 mg = 1         Active                    [facility]



        oral tablet tab, PO,                                 2016    Medical



                Daily, 0                                         Center



                Refill(s)                                         



                                                                



                                                                

 

        metoprolol 25 mg = 1 tab,         Active                     [facility]

[facility]



        tartrate 25 mg PO, BID, # 60                                 2016    Med

ical



        oral tablet tab, 0                                          Center



                Refill(s)                                         



                                                                



                                                                

 

        Furosemide 80 MG 80 mg = 1 tab,         Active                    

[facility]



        Oral Tablet PO, Daily, #                                 2016    Medical



        [Lasix] 30 tab, 0                                         Center



                Refill(s)                                         



                                                                



                                                                

 

        Warfarin Sodium 5 5 mg = 1 tab,         Active                    

[facility]



        MG Oral Tablet PO, Daily, #                                 2016    Medi

[facility]



        [Coumadin] 30 tab, 0                                         Center



                Refill(s)                                         



                                                                



                                                                

 

        silodosin 8 mg 8 mg = 1 cap,         Active                    [facility]



        oral capsule PO, Daily, 0                                 2016    Medica

l



                Refill(s)                                         [place]



                                                                



                                                                

 

        sildenafil 0 Refill(s)         Active                    [facility]



                                                        2016    Medical



                                                                Center



                                                                



                                                                

 

        finasteride 5 mg 5 mg = 1 tab,         Active                  

[facility] [place]



        oral tablet PO, Daily, #                                 2016    Medical



                30 tab, 0                                         Center



                Refill(s)                                         



                                                                



                                                                

 

        Potassium Chloride 20 mEq = 1         Active                    [facility]



        20 MEQ Extended tab, PO, BID,                                 2016    Me

dical



        Release Tablet 0 Refill(s)                                         [facility]

r



                                                                



                                                                

 

        Nutren 2.0 Nutren 2.0,         Active                    [facility]



                See                                     2016    Medical



                Instructions,                                         [place]



                Nutren 2.0 via                                         



                PEG 250mL q                                         



                4hrs x 4/day +                                         



                180 mL x 1/day                                         



                 Water flush                                         



                30mL before                                         



                and after                                         



                feeds, # 150                                         



                can, Refill(s)                                         



                12                                              



                                                                

 

        amLODIPine 5 mg 5 mg = 1 tab,         Active                    [facility]



        oral tablet PO, Daily, #                                 2016    Medical



                30 tab, 0                                         Center



                Refill(s)                                         



                                                                



                                                                

 

        metoprolol 25 mg 25 mg = 1 tab,         Active                    

[facility]



        oral tablet, PO, BID, # 60                                 2016    Medic

al



        extended release tab, 0                                          Center



                Refill(s)                                         



                                                                



                                                                

 

        Protonix Notes: Same         Inactive                   [facility]



                as: Protonix                                 2016    Medical



                Mix in 5 mL                                         Center



                apple juice or                                         



                applesauce for                                         



                oral & 10mL                                         



                apple juice                                         



                for NG tube                                         



                                                                



                                                                

 

        Tylenol Notes: Max         No Longer                   [facility]



                acetaminophen         Active                      Medical



                = 4000mg/day                                         Center



                (4 gm/day).                                         



                (Same as:                                         



                Tylenol)                                         



                                                                



                                                                

 

        Mephyton Notes: (Same         Inactive                   [facility]



                as: Mephyton,                                 2016    Medical



                Vitamin K)                                         [place]



                                                                



                                                                

 

        Protonix Notes: Same         No Longer                   [facility] [place]



                as: Protonix         Active                      Medical



                Mix in 5 mL                                         Center



                apple juice or                                         



                applesauce for                                         



                oral & 10mL                                         



                apple juice                                         



                for NG tube                                         



                                                                



                                                                

 

        Thyroxine Notes: Take 1         No Longer                    [facility]

[facility]



                hour before or         Active                      Medical



                2 hours after                                         Center



                meal; Enteral                                         



                feeds may                                         



                interefere                                         



                with the                                         



                absorption of                                         



                this                                            



                medication.                                         



                (Same                                           



                as:Levothroid,                                         



                Synthroid)                                         



                                                                



                                                                

 

        Furosemide 40 MG Notes: (Same         No Longer                   

 [place]



        Oral Tablet as: Lasix)         Active                      Medical



                May cause GI                                         Center



                upset.  Give                                         



                with food or                                         



                milk.                                           



                                                                

 

        digoxin 125 mcg Notes: Take on         No Longer                  

  [place]



        (0.125 mg) oral an Empty         Active                      Medical



        tablet  Stomach  (Same                                         Center



                as: Lanoxin)                                         



                                                                



                                                                

 

        Citalopram Notes: (Same         No Longer                    [facility]

[facility]



                As: CeleXA)         Active                  2016    [facility]



                                                                



                                                                

 

        Norvasc Notes: (Same         No Longer                    [place]



                as: Norvasc)         Active                      [facility]



                                                                



                                                                

 

        Magnesium Sulfate Notes: WASTE:         Inactive                  

  [place]



                F/P - Sink; E                                 2016    [facility]



                Trash Bin                                         



                                                                



                                                                

 

        Vitamin K1 Notes: (Same         Inactive                    [place]

[facility]



                as:                                     2016    [facility]



                Vitamin K)                                         



                *** MEDICATION                                         



                WASTE ***                                         



                Product Size:                                         



                10 mg Product                                         



                Wasted:  ___                                         



                mg                                              



                                                                

 

        metoprolol Notes: (Same         No Longer                    [facility]

[facility]



        extended release as: Toprol XL)         Active                      

Medical



                Do Not Crush                                         Center



                                                                



                                                                

 

        enalapril Notes: (Same         No Longer                    [place]

[facility]



                as: Vasotec)         Active                      [facility]



                                                                



                                                                

 

        Lipitor Notes: (Same         No Longer                    [place]



                as: Lipitor)         Active                  2016    [facility]



                                                                



                                                                

 

        Nitroglycerin 0.4 Notes: (Same         No Longer                  

  [place]



        MG Sublingual as:Nitroquick,         Active                      Med

ical



        Tablet [Nitrostat] Nitrostat) "Do                                       

  Center



                Not Crush"                                         



                Sublingual                                         



                tablet                                          



                                                                

 

        enalapril 5 mg 5 mg = 1 tab,         No Longer                 /  

[facility] [place]



        oral tablet PO, BID, 0         Active                      Medical



                Refill(s)                                         Center



                                                                



                                                                

 

        citalopram 20 mg 20 mg = 1 tab,         No Longer                 

   [place]



        oral tablet PO, Daily, #         Active                      Medical



                30 tab, 0                                         Center



                Refill(s)                                         



                                                                



                                                                

 

        metoprolol See             No Longer                  [place]



        extended release Instructions,         Active                      M

edical



                75 mgPO BID, 0                                         Center



                Refill(s)                                         



                                                                



                                                                

 

        Furosemide 40 MG 40 mg = 1 tab,         No Longer                  [place]



        Oral Tablet PO, Daily, #         Active                      Medical



                30 tab, 0                                         Center



                Refill(s)                                         



                                                                



                                                                

 

        levothyroxine 100 100 microgram         Active                   [place]



        mcg (0.1 mg) oral = 1 tab, PO,                                     M

edical



        tablet  Daily, # 30                                         Center



                tab, 0                                          



                Refill(s)                                         



                                                                



                                                                

 

        digoxin 125 mcg 0.125 mg, PO,         No Longer                  [place]



        (0.125 mg) oral Daily, # 30         Active                      Medi

[facility]



        tablet  tab, 0                                          Center



                Refill(s)                                         



                                                                



                                                                

 

        atorvastatin 40 MG 40 mg = 1 tab,         No Longer                   [facility]



        Oral Tablet PO, Bedtime, #         Active                      Medic

al



        [Lipitor] 30 tab, 0                                         Center



                Refill(s)                                         



                                                                



                                                                

 

        apixaban 5 MG Oral 5 mg = 1 tab,         No Longer                  [place]



        Tablet [Eliquis] PO, BID, 0         Active                      Medi

[facility]



                Refill(s)                                         Center



                                                                



                                                                

 

        Saline Flush 0.9% Notes: (Same         No Longer                  [place]



                as: BD          Active                      Medical



                Posiflush)                                         Center



                                                                



                                                                

 

        Naloxone Notes: Same as         No Longer                  [facility]

[facility]



                Narcan          Active                      Medical



                                                                Center



                                                                



                                                                

 

        Flumazenil Notes: (Same         No Longer                  [facility]

[facility]



                as: Romazicon)         Active                  2016    Medical



                                                                Center



                                                                



                                                                

 

        Ondansetron Notes: (Same         No Longer                  [facility]

[place]



                as: Zofran)         Active                      Medical



                *** MEDICATION                                         Center



                WASTE ***                                         



                Product Size:                                         



                4 mg Product                                         



                Wasted:  ___                                         



                mg                                              



                                                                

 

        Labetalol 10 mg, 2 mL,         No Longer                  [facility]

[facility]



                Route: IVP,         Active                      Medical



                Drug form:                                         Center



                INJ, Q5Min,                                         



                Dosing Weight                                         



                77.273, kg,                                         



                PRN Elevated                                         



                BP, Start                                         



                date: 16                                         



                16:02:00,                                         



                Duration: 5                                         



                doses or                                         



                times, Stop                                         



                date: Limited                                         



                # of times                                         



                                                                



                                                                

 

        Oxycodone Notes: (Same         No Longer                    [facility]

s



                as:             Active                      Medical



                Roxicodone)                                         Center



                                                                



                                                                

 

        Acetaminophen Notes: Infuse         No Longer                   

 [place]



                over 15         Active                      Medical



                minutes  Do                                         Center



                not exceed                                         



                4gm/day of                                         



                acetaminophen                                         



                  ***                                           



                MEDICATION                                         



                WASTE ***                                         



                Product Size:                                         



                1000 mg                                         



                Product                                         



                Wasted:  ___                                         



                mg                                              



                                                                

 

        Furosemide 40 MG 40 mg = 1 tab,         Active                   [place]



        Oral Tablet PO, Daily, 0                                     Medical



                Refill(s)                                         [facility]



                                                                



                                                                

 

        apixaban 5 MG Oral 5 mg = 1 tab,         Active                   [place]



        Tablet [Eliquis] PO, BID, 0                                     Medi

[facility]



                Refill(s)                                         [facility]



                                                                



                                                                

 

        digoxin 125 mcg 125 microgram         Active                  

 [place]



        (0.125 mg) oral = 1 tab, PO,                                 2016    Med

ical



        tablet  Daily, 0                                         [facility]



                Refill(s)                                         



                                                                



                                                                

 

        Nitroglycerin 0.4 0.4 mg = 1         Active                    [facility]



        MG Sublingual tab, SL,                                     Medical



        Tablet [Nitrostat] Q5Min, PRN                                         Ce

nter



                Chest Pain, #                                         



                100 tab, 0                                         



                Refill(s)                                         



                                                                



                                                                

 

        acetaminophen-hydr 15 mL, Route: PO      No Longer         [facility]    [place]



        ocodone 325 mg-10 PO, Drug Form:         Active                     

 Medical



        mg/15 mL oral SOLN, Dosing                                         [facility]

r



        solution Weight 82.273,                                         



                kg, Q4H, PRN                                         



                Pain Score                                         



                4-6, Start                                         



                date: 13                                         



                12:41:00,                                         



                Duration: 30                                         



                day, Stop                                         



                date: 13                                         



                12:40:00                                         



                                                                



                                                                

 

        metoprolol 1 mg, 1 mL, IVP     No Longer         [facility]     [facility]

s



                Route: IVP,         Active                      Medical



                Drug form:                                         [place]



                INJ, Q5Min,                                         



                Dosing Weight                                         



                82.273, kg,                                         



                PRN Elevated                                         



                BP, Start                                         



                date: 13                                         



                12:41:00,                                         



                Duration: 5                                         



                doses or                                         



                times, Stop                                         



                date: 13                                         



                0:00:00                                         



                                                                



                                                                

 

        labetalol 5 mg, 1 mL, IVP     No Longer         [facility]     [facility]



                Route: IVP,         Active                      Medical



                Drug form:                                         Center



                INJ, Q5Min,                                         



                Dosing Weight                                         



                82.273, kg,                                         



                PRN Elevated                                         



                BP, Start                                         



                date: 13                                         



                12:41:00,                                         



                Duration: 5                                         



                doses or                                         



                times, Stop                                         



                date: 13                                         



                0:00:00                                         



                                                                



                                                                

 

        esmolol IV Push 10 mg, 1 mL, IVP     No Longer         [facility]     [facility]



                Route: IVP,         Active                      Medical



                Drug form:                                         Center



                INJ, Q5Min,                                         



                Dosing Weight                                         



                82.273, kg,                                         



                PRN Elevated                                         



                BP, Start                                         



                date: 13                                         



                12:41:00,                                         



                Duration: 5                                         



                doses or                                         



                times, Stop                                         



                date: 13                                         



                0:00:00                                         



                                                                



                                                                

 

        ondansetron 4 mg, 2 mL, IVP     No Longer         [facility]      [facility]

[facility]



                Route: IVP,         Active                      Medical



                Drug form:                                         [place]



                INJ, ONCE,                                         



                Dosing Weight                                         



                82.273, kg,                                         



                PRN Nausea &                                         



                Vomiting,                                         



                Start date:                                         



                13                                         



                12:41:00                                         



                                                                



                                                                

 

        naloxone 0.04 mg, 0.1 IVP     No Longer         [facility]      [place]



                mL, Route:         Active                      Medical



                IVP, Drug                                         Center



                form: INJ,                                         



                Q2MIN, Dosing                                         



                Weight 82.273,                                         



                kg, PRN                                         



                Narcotic                                         



                Reversal,                                         



                Start date:                                         



                13                                         



                12:41:00,                                         



                Duration: 8                                         



                doses or                                         



                times, Stop                                         



                date: 13                                         



                0:00:00                                         



                                                                



                                                                

 

        flumazenil 0.2 mg, 2 mL, IVP     No Longer         [facility]      [facility]

[place]



                Route: IVP,         Active                      Medical



                Drug form:                                         [place]



                INJ, PRN,                                         



                Dosing Weight                                         



                82.273, kg,                                         



                PRN                                             



                Benzodiazepine                                         



                Reversal,                                         



                Initial dose,                                         



                Start date:                                         



                13                                         



                12:41:00,                                         



                Duration: 30                                         



                day, Stop                                         



                date: 13                                         



                13:40:00                                         



                                                                



                                                                

 

        Cleocin HCl 600 mg, 4 mL, IVPB    No Longer         [facility]   [facility]



                Route: IVPB,         Active                      Medical



                Drug form:                                         [place]



                INJ, PRE OP,                                         



                Start date:                                         



                13                                         



                6:00:00,                                         



                Duration: 1                                         



                day, Stop                                         



                date: 13                                         



                5:59:00                                         



                                                                



                                                                

 

        rivaroxaban 20 mg, Route: PO      No Longer         [facility]

[place]



                PO, QPM,         Active                      Medical



                Dosing Weight                                         [place]



                79.545, kg,                                         



                Start date:                                         



                12                                         



                17:00:00,                                         



                Duration: 30                                         



                day, Stop                                         



                date: 12                                         



                17:00:00                                         



                                                                



                                                                

 

        Protonix 40 mg, 1 tab, PO      No Longer         [facility]       [place]

[facility]



                Route: PO,         Active                      Medical



                Drug form:                                         [place]



                ECTAB, Before                                         



                Dinner, Dosing                                         



                Weight 79.545,                                         



                kg, Start                                         



                date: 12                                         



                16:30:00,                                         



                Duration: 30                                         



                day, Stop                                         



                date: 12                                         



                16:30:00                                         



                                                                



                                                                

 

        pneumococcal 0.5 ml, Route: IM      No Longer         [facility]    [facility]



        23-valent vaccine IM, Drug Form:         Active                     

 Medical



                INJ, Start                                         Center



                date: 12                                         



                9:00:00, Stop                                         



                date: 12                                         



                9:00:00                                         



                                                                



                                                                

 

        citalopram 20 mg, 1 tab, PO      No Longer         [facility]       [facility]

[place]



                Route: PO,         Active                      Medical



                Drug form:                                         Center



                TAB, Daily,                                         



                Dosing Weight                                         



                79.545, kg,                                         



                Start date:                                         



                12                                         



                9:00:00,                                         



                Duration: 30                                         



                day, Stop                                         



                date: 12                                         



                9:00:00                                         



                                                                



                                                                

 

        Toprol-XL 50 mg 50 mg, 1 tab, PO      No Longer         [facility]      

[facility]



        oral tablet, Route: PO,         Active                      Medical



        extended release Drug form:                                         Cent

er



                ERTAB, Daily,                                         



                Start date:                                         



                12                                         



                9:00:00,                                         



                Duration: 30                                         



                day, Stop                                         



                date: 12                                         



                9:00:00                                         



                                                                



                                                                

 

        enalapril 10 mg, 1 tab, PO      No Longer         [facility]       [facility]

[facility]



                Route: PO,         Active                      Medical



                Drug form:                                         Center



                TAB, Daily,                                         



                Dosing Weight                                         



                79.545, kg,                                         



                Start date:                                         



                12                                         



                9:00:00,                                         



                Duration: 30                                         



                day, Stop                                         



                date: 12                                         



                9:00:00                                         



                                                                



                                                                

 

        dabigatran 150 mg, 1 cap, PO      No Longer         [facility]

[place]



                Route: PO,         Active                      Medical



                Drug form:                                         [place]



                CAP, BID,                                         



                Dosing Weight                                         



                79.545, kg,                                         



                Start date:                                         



                12                                         



                9:00:00,                                         



                Duration: 30                                         



                day, Stop                                         



                date: 12                                         



                17:00:00                                         



                                                                



                                                                

 

        Norvasc 10 mg, 1 tab, PO      No Longer         [facility]      [facility]



                Route: PO,         Active                      Medical



                Drug form:                                         Center



                TAB, Daily,                                         



                Dosing Weight                                         



                79.545, kg,                                         



                Start date:                                         



                12                                         



                9:00:00,                                         



                Duration: 30                                         



                day, Stop                                         



                date: 12                                         



                9:00:00                                         



                                                                



                                                                

 

        Lipitor 40 mg, 1 tab, PO      No Longer         [facility]      [facility]



                Route: PO,         Active                      Medical



                Drug form:                                         Center



                TAB, Daily,                                         



                Dosing Weight                                         



                79.545, kg,                                         



                Start date:                                         



                12                                         



                9:00:00,                                         



                Duration: 30                                         



                day, Stop                                         



                date: 12                                         



                9:00:00                                         



                                                                



                                                                

 

        levothyroxine 125 microgram, PO      No Longer         [facility]



                1 tab, Route:         Active                      Medical



                PO, Drug form:                                         [place]



                TAB, [id number],                                         



                Dosing Weight                                         



                79.545, kg,                                         



                Start date:                                         



                12                                         



                6:30:00,                                         



                Duration: 30                                         



                day, Stop                                         



                date: 12                                         



                6:30:00                                         



                                                                



                                                                

 

        clindamycin 600 mg, 4 mL, IVPB    No Longer         [facility]

[place]



                Route: IVPB,         Active                      Medical



                Drug form:                                         [place]



                INJ, [id number],                                         



                Dosing Weight                                         



                79.545, kg,                                         



                Priority:                                         



                STAT, Start                                         



                date: 12                                         



                5:47:00,                                         



                Duration: 30                                         



                day, Stop                                         



                date: 12                                         



                21:47:00                                         



                                                                



                                                                

 

        magnesium sulfate 2 gm, 50 mL, IVPB    No Longer         [facility]   

 [facility]



                Route: IVPB,         Active                      Medical



                Drug form:                                         [place]



                INJ, ONCE,                                         



                Dosing Weight                                         



                79.545, kg,                                         



                Total dose = 2                                         



                gm, Start                                         



                date: 12                                         



                0:30:00,                                         



                Duration: 1                                         



                doses or                                         



                times, Stop                                         



                date: 12                                         



                0:30:00                                         



                                                                



                                                                

 

        Omnipaque 350mg/ml 80 mL, Route: IVP     No Longer         [facility]      [facility]



                IVP, Drug         Active                      Medical



                Form: BECKY,                                         [place]



                Dosing Weight                                         



                79.545, kg,                                         



                ONCALL, STAT,                                         



                Start date:                                         



                12                                         



                21:07:00,                                         



                Duration: 1                                         



                doses or                                         



                times, Weight                                         



                =  75 -                                         



                94kgWeight =                                         



                75 - 94kg                                         



                                                                



                                                                

 

        docusate 100 mg, 1 cap, PO      No Longer         [facility]       [facility]

[facility]



                Route: PO,         Active                      Medical



                Drug form:                                         [place]



                CAP, BID,                                         



                Dosing Weight                                         



                79.545, kg,                                         



                PRN                                             



                Constipation,                                         



                Start date:                                         



                12                                         



                18:00:00,                                         



                Duration: 30                                         



                day, Stop                                         



                date: 12                                         



                17:59:00                                         



                                                                



                                                                

 

        Trandate 5 mg, 1 mL, IVP     No Longer         [facility]   [facility]



                Route: IVP,         Active                      Medical



                Drug form:                                         [place]



                INJ, Q5Min,                                         



                PRN Elevated                                         



                BP, Start                                         



                date: 12                                         



                14:50:00,                                         



                Duration: 30                                         



                day, Stop                                         



                date: 12                                         



                14:49:00                                         



                                                                



                                                                

 

        flumazenil 0.2 mg, 2 mL, IVP     No Longer         [facility]

[place]



                Route: IVP,         Active                      Medical



                Drug form:                                         Center



                INJ, PRN,                                         



                Dosing Weight                                         



                79.545, kg,                                         



                PRN                                             



                Benzodiazepine                                         



                Reversal,                                         



                Initial dose,                                         



                Start date:                                         



                12                                         



                13:57:00,                                         



                Duration: 5                                         



                doses or                                         



                times, Stop                                         



                date: 12                                         



                0:00:00                                         



                                                                



                                                                

 

        naloxone 0.04 mg, 0.1 IVP     No Longer         [facility] [place]

[place]



                mL, Route:         Active                      Medical



                IVP, Drug                                         Center



                form: INJ,                                         



                Q2MIN, Dosing                                         



                Weight 79.545,                                         



                kg, PRN                                         



                Narcotic                                         



                Reversal,                                         



                Start date:                                         



                12                                         



                13:57:00,                                         



                Duration: 8                                         



                doses or                                         



                times, Stop                                         



                date: 12                                         



                0:00:00                                         



                                                                



                                                                

 

        ondansetron 4 mg, 2 mL, IVP     No Longer         [facility] [place]

[place]



                Route: IVP,         Active                      Medical



                Drug form:                                         Center



                INJ, ONCE,                                         



                Dosing Weight                                         



                79.545, kg,                                         



                PRN Nausea &                                         



                Vomiting,                                         



                Start date:                                         



                12                                         



                13:57:00                                         



                                                                



                                                                

 

        hydrALAZINE 5 mg, 0.25 mL, IVP     No Longer         [facility]

 [place]



                Route: IVP,         Active                      Medical



                Drug form:                                         Center



                INJ, Q5Min,                                         



                Dosing Weight                                         



                79.545, kg,                                         



                PRN Elevated                                         



                BP, Start                                         



                date: 12                                         



                13:57:00,                                         



                Duration: 4                                         



                doses or                                         



                times, Stop                                         



                date: 12                                         



                0:00:00                                         



                                                                



                                                                

 

        niCARdipine 0.25 mg, 0.1 IVP     No Longer         [facility]

[place]



                mL, Route:         Active                      Medical



                IVP, Drug                                         Center



                form: INJ,                                         



                Q5Min, Dosing                                         



                Weight 79.545,                                         



                kg, PRN                                         



                Elevated BP,                                         



                Start date:                                         



                12                                         



                13:57:00,                                         



                Duration: 4                                         



                doses or                                         



                times, Stop                                         



                date: 12                                         



                0:00:00                                         



                                                                



                                                                

 

        labetalol 5 mg, Route: IVP     No Longer         [facility]

[place]



                IVP, Q5Min,         Active                      Medical



                Dosing Weight                                         Center



                79.545, kg,                                         



                PRN Elevated                                         



                BP, Start                                         



                date: 12                                         



                13:57:00,                                         



                Duration: 5                                         



                doses or                                         



                times, Stop                                         



                date: Limited                                         



                # of times                                         



                                                                



                                                                

 

        hydromorphone 0.2 mg, 0.1 IVP     No Longer         [facility]  

[place]



                mL, Route:         Active                      Medical



                IVP, Drug                                         Center



                form: INJ,                                         



                Q5Min, Dosing                                         



                Weight 79.545,                                         



                kg, PRN Pain                                         



                Score 4-6,                                         



                Start date:                                         



                12                                         



                13:57:00,                                         



                Duration: 5                                         



                doses or                                         



                times, Stop                                         



                date: 12                                         



                0:00:00                                         



                                                                



                                                                

 

        Xarelto 20 mg oral Substitution         Active                    

[facility]



        tablet  Allowed                                     [facility]



                                                                



                                                                

 

        citalopram 20 mg 20 mg, 1 tab, PO      Active          [facility]    

[facility]



        oral tablet PO, Daily, 2012    [facility]

l



                tab,                                            Center



                Substitution                                         



                Allowed, TAB                                         



                                                                



                                                                

 

        Protonix 40 mg 40 mg, 1 tab, PO      Active          [facility]      [facility]



        oral enteric PO, Daily, 2012    [facility]

al



        coated tablet tab,                                            Center



                Substitution                                         



                Allowed, ECTAB                                         



                                                                



                                                                

 

        Pradaxa 150 mg PO, BID, PO      No Longer         [facility]       [facility]

[facility]



        oral capsule Substitution         Active                      [facility]

l



                Allowed                                         [place]



                                                                



                                                                

 

        enalapril 10 mg 10 mg, 1 tab, PO      Active          [facility]      [facility]



        oral tablet PO, Daily, 30                                     [facility]

l



                tab,                                            Center



                Substitution                                         



                Allowed, TAB                                         



                                                                



                                                                

 

        metoprolol 50 mg 50 mg, PO, PO      Active          [facility]       [facility]

[place]



        oral tablet, Daily, 30 tab,                                     [facility]

[facility]



        extended release Substitution                                         Ce

nter



                Allowed                                         



                                                                



                                                                

 

        Norvasc 10 mg oral 10 mg, 1 tab, PO      Active          [facility]     

  [place]



        tablet  PO, Daily, 30                                     Medical



                tab,                                            Center



                Substitution                                         



                Allowed, TAB                                         



                                                                



                                                                

 

        Norvasc 10 mg oral 10 mg, 1 tab, PO      No Longer                    [place]



        tablet  PO, Daily, 30         Active                      Medical



                tab,                                            Center



                Substitution                                         



                Allowed, TAB                                         



                                                                



                                                                

 

        Lipitor 40 mg oral 40 mg, 1 tab, PO      Active          [facility]     

  [place]



        tablet  PO, Daily, 30                                     Medical



                tab,                                            Center



                Substitution                                         



                Allowed, TAB                                         



                                                                



                                                                

 

        levothyroxine 125 Substitution         Active                  

[facility] [place]



        mcg (0.125 mg) Allowed                                     Medical



        oral capsule                                                 [place]



                                                                



                                                                

 

        influenza virus 0.5 ml, Route: IM      No Longer         [facility]  10/15/ 

 [facility] [place]



        vaccine, IM, Drug Form:         Active                      Medical



        inactivated INJ, ONCE,                                         Center,



                Start date:                                         [facility]



                10/15/09                                         [place]



                9:00:00, Stop                                         



                date: 10/15/09                                         



                9:00:00                                         



                                                                



                                                                







Allergies, Adverse Reactions, Alerts







        Substance Category Reaction Severity Reaction Status  Date    Comments S

ource



                                        type            Reported         



                                                                        



                                                                        



                                                                        

 

        morphine Assertion                 Drug    Active                  [facility]



                                                                        



                                                                        

 

        tetracyclines Assertion                 Drug    Active                  

[facility]



                                                                        



                                                                        







Immunizations







          Immunization Date      Site      Status    Last      Comments  Source



                    Given                         Updated             



                                                                      



                                                                      

 

          diphtheria/pertus  Left      completed [facility]               [facility]



          sis, acel/tetanus 7         Deltoid                                 Me

dical



          adult                                                       Center,[facility]



                                                                      



                                                                      

 

          pneumococcal            completed Jose Antonio               [facility]



          23-valent vaccine 2                                                 [facility]



                                                                      

 

          pneumococcal  Left      completed [facility]               [facility]

s



          23-valent vaccine 2         deltoid                                 [facility]

[facility]



                                                                      

 

          influenza virus 10/15/200           completed Daryn           [facility]



          vaccine,  9                             z                   Medical



          inactivated                                                   Center,[facility]



                                                                      



                                                                      

 

          influenza virus 10/15/200 Right     completed [place]              [facility]

[place]



          vaccine,  9         Deltoid                                 [facility]

[facility]



                                                                      







Results







        Order Name Results Value   Reference Date    Interpretation Comments Emmie

rce



                                Range                           



                                                                



                                                                



                                                                

 

        HEMATOLOGY Monocytes # 1.2     0.0 - 0.8                    [facility]



                                                           [facility]



                                                                



                                                                



                                                                

 

        HEMATOLOGY Lymphocytes # 0.9     1.0 - 5.5                    [facility]



                                        [address] [place] [address]



                                                                



                                                                



                                                                

 

        HEMATOLOGY Basophils # 0.1     0.0 - 0.2                    [facility]

                   [facility]



                                                                



                                                                



                                                                

 

        HEMATOLOGY Eosinophils # 0.1     0.0 - 0.5                    [facility]                   [facility]



                                                                



                                                                



                                                                

 

        HEMATOLOGY Basophils 0.6     0.0 - 1.0                    [address]



                                                                



                                                                



                                                                

 

        HEMATOLOGY Eosinophils 1.2     0.0 - 4.0                    [facility]

                   [facility]



                                                                



                                                                



                                                                

 

        HEMATOLOGY Segs-Bands # 10.4    1.5 - 8.1                    [facility]

as



                                        /2017                   [facility]



                                                                



                                                                



                                                                

 

        HEMATOLOGY Monocytes 9.5     2.0 - 12.0                    [address]



                                                                



                                                                



                                                                

 

        HEMATOLOGY Lymphocytes 7.1     20.0 -                      [place]



                                40.0                       [facility]



                                                                



                                                                



                                                                

 

        HEMATOLOGY RBC Morph Normal                             [facility] [place]



                        (3/23/17 5:17 AM)                            [facility]



                                                                



                                                                



                                                                

 

        HEMATOLOGY Segs    81.6    45.0 -                      [place]



                                75.0                       [facility]



                                                                



                                                                



                                                                

 

        HEMATOLOGY Plt Morph Normal                             [facility] [place]



                        (3/23/17 5:17 AM)                            [facility]



                                                                



                                                                



                                                                

 

        HEMATOLOGY WBC     12.8    3.7 - 10.4                     [place]



                                        /2017                   [facility]



                                                                



                                                                



                                                                

 

        HEMATOLOGY RBC     3.95    4.70 -                      [place]



                                6.10                       [facility]



                                                                



                                                                



                                                                

 

        HEMATOLOGY Platelet 146     133 - 450                     [place]



                                        /2017                   [facility]



                                                                



                                                                



                                                                

 

        HEMATOLOGY MCV     91.3    80.0 -                     [facility] [place]



                                94.0                       [facility]



                                                                



                                                                



                                                                

 

        HEMATOLOGY Hgb     12.2    14.0 -                     [facility] [place]



                                18.0                       [facility]



                                                                



                                                                



                                                                

 

        HEMATOLOGY MCH     30.8    27.0 -                     [facility] [place]



                                31.0                       [facility]



                                                                



                                                                



                                                                

 

        HEMATOLOGY Hct     36.0    42.0 -                     [facility] [place]



                                54.0                       [facility]



                                                                



                                                                



                                                                

 

        HEMATOLOGY MCHC    33.8    32.0 -                     [facility] [place]



                                36.0                       [facility]



                                                                



                                                                



                                                                

 

        HEMATOLOGY RDW     15.2    11.5 -                     [facility] [place]



                                14.5                       [facility]



                                                                



                                                                



                                                                

 

        HEMATOLOGY MPV     9.2     7.4 - 10.4                     [place]



                                        /2017                   [facility]



                                                                



                                                                



                                                                

 

        CHEM PANEL eGFR    61                         [facility] [place]



                                        /2017           Comment: [facility]



                                                        eGFR is Center



                                                        calculated 



                                                        using the 



                                                        CKD-EPI 



                                                        formula. In 



                                                        most young, 



                                                        healthy 



                                                        individuals 



                                                        the eGFR will 



                                                        be >90  



                                                        mL/min/1.73m2 



                                                        . The eGFR 



                                                        declines with 



                                                        age. An eGFR 



                                                        of 60-89 may 



                                                        be normal in 



                                                        some    



                                                        populations, 



                                                        particularly 



                                                        the elderly, 



                                                        for whom the 



                                                        CKD-EPI 



                                                        formula has 



                                                        not been 



                                                        extensively 



                                                        validated. 



                                                        Use of the 



                                                        eGFR is not 



                                                        recommended 



                                                        in the  



                                                        following 



                                                        populations:< 



                                                        br/><br/>Bessie 



                                                        viduals with 



                                                        unstable 



                                                        creatinine 



                                                        concentration 



                                                        s, including 



                                                        pregnant 



                                                        patients and 



                                                        those with 



                                                        serious 



                                                        co-morbid 



                                                        conditions.<b 



                                                        r/><br/>Patie 



                                                        nts with 



                                                        extremes in 



                                                        muscle mass 



                                                        or diet. 



                                                        <br/><br/>The 



                                                        data above 



                                                        are obtained 



                                                        from the 



                                                        [employer] 



                                                        [place]  



                                                        Disease 



                                                        Education 



                                                        Program 



                                                        (NKDEP) which 



                                                        additionally 



                                                        recommends 



                                                        that when the 



                                                        eGFR is used 



                                                        in patients 



                                                        with extremes 



                                                        of body mass 



                                                        index for 



                                                        purposes of 



                                                        drug dosing, 



                                                        the eGFR 



                                                        should be 



                                                        multiplied by 



                                                        the estimated 



                                                        BMI.    



                                                                

 

        CHEM PANEL Alk Phos 68      39 - 136                     [place]



                                        /2017                   [facility]



                                                                



                                                                



                                                                

 

        CHEM PANEL Bili Total 1.2     0.2 - 1.3                    [facility] [place]



                                        [address]



                                                                



                                                                



                                                                

 

        CHEM PANEL Potassium Lvl 4.4     3.5 - 5.1                    [facility]                   [facility]



                                                                



                                                                



                                                                

 

        CHEM PANEL Chloride Lvl 105     95 - 109                    [facility]

s



                                                           [facility]



                                                                



                                                                



                                                                

 

        CHEM PANEL CO2     27      24 - 32                    [address]



                                                                



                                                                



                                                                

 

        CHEM PANEL Calcium Lvl 8.9     8.5 - 10.5                    [facility]

as



                                        /2017                   [facility]



                                                                



                                                                



                                                                

 

        CHEM PANEL Total Protein 6.0     6.4 - 8.4                    [facility]



                                                           [facility]



                                                                



                                                                



                                                                

 

        CHEM PANEL Glucose Lvl 114     70 - 99                    [address]



                                                                



                                                                



                                                                

 

        CHEM PANEL BUN     27      7 - 22                     [facility] [place]



                                        /                   [facility]



                                                                



                                                                



                                                                

 

        CHEM PANEL Creatinine 1.14    0.50 -                     [facility] [place]



                Lvl             1.40                       [facility]



                                                                



                                                                



                                                                

 

        CHEM PANEL Sodium Lvl 140     135 - 145                    [address]



                                                                



                                                                



                                                                

 

        CHEM PANEL Albumin Lvl 2.8     3.5 - 5.0                    [facility]

                   [facility]



                                                                



                                                                



                                                                

 

        CHEM PANEL ALT     25      0 - 65                     [facility] [place]



                                        /                   [facility]



                                                                



                                                                



                                                                

 

        CHEM PANEL AST     23      0 - 37                     [address]



                                                                



                                                                



                                                                

 

        CHEM PANEL A/G Ratio 0.9     0.7 - 1.6                    [facility] [place]



                                        /                   [facility]



                                                                



                                                                



                                                                

 

        CHEM PANEL Globulin 3.2     2.7 - 4.2                    [address]



                                                                



                                                                



                                                                

 

        CHEM PANEL AGAP    12.4    10.0 -                     [facility] [place]



                                .                   [facility]



                                                                



                                                                



                                                                

 

        CHEM PANEL B/C Ratio 24      6 - 25                     [address]



                                                                



                                                                



                                                                

 

        CHEM PANEL Phosphorus 2.8     2.5 - 4.5                    [facility] [place]



                                        /                   [facility]



                                                                



                                                                



                                                                

 

        CHEM PANEL Magnesium Lvl 2.3     1.8 - 2.4                    [facility]                   [facility]



                                                                



                                                                



                                                                

 

        ELECTROLYTE AGAP    12.4    10.0 -                      [place]



        S                       .                   [facility]



                                                                



                                                                



                                                                

 

        ELECTROLYTE eGFR    61                         [facility]



                   Comment: [facility]



                                                        eGFR is Center



                                                        calculated 



                                                        using the 



                                                        CKD-EPI 



                                                        formula. In 



                                                        most young, 



                                                        healthy 



                                                        individuals 



                                                        the eGFR will 



                                                        be >90  



                                                        mL/min/1.73m2 



                                                        . The eGFR 



                                                        declines with 



                                                        age. An eGFR 



                                                        of 60-89 may 



                                                        be normal in 



                                                        some    



                                                        populations, 



                                                        particularly 



                                                        the elderly, 



                                                        for whom the 



                                                        CKD-EPI 



                                                        formula has 



                                                        not been 



                                                        extensively 



                                                        validated. 



                                                        Use of the 



                                                        eGFR is not 



                                                        recommended 



                                                        in the  



                                                        following 



                                                        populations:< 



                                                        br/><br/>Bessie 



                                                        viduals with 



                                                        unstable 



                                                        creatinine 



                                                        concentration 



                                                        s, including 



                                                        pregnant 



                                                        patients and 



                                                        those with 



                                                        serious 



                                                        co-morbid 



                                                        conditions.<b 



                                                        r/><br/>Patie 



                                                        nts with 



                                                        extremes in 



                                                        muscle mass 



                                                        or diet. 



                                                        <br/><br/>The 



                                                        data above 



                                                        are obtained 



                                                        from the 



                                                        [employer] 



                                                        [place]  



                                                        Disease 



                                                        Education 



                                                        Program 



                                                        (NKDEP) which 



                                                        additionally 



                                                        recommends 



                                                        that when the 



                                                        eGFR is used 



                                                        in patients 



                                                        with extremes 



                                                        of body mass 



                                                        index for 



                                                        purposes of 



                                                        drug dosing, 



                                                        the eGFR 



                                                        should be 



                                                        multiplied by 



                                                        the estimated 



                                                        BMI.    



                                                                

 

        ELECTROLYTE CO2     27      24 - 32                    [facility] [place]



                           [facility]



                                                                



                                                                



                                                                

 

        ELECTROLYTE Calcium Lvl 8.9     8.5 - 10.5                    [facility]



                           [facility]



                                                                



                                                                



                                                                

 

        ELECTROLYTE Glucose Lvl 114     70 - 99                    [facility] [place]



        S                                                  [facility]



                                                                



                                                                



                                                                

 

        ELECTROLYTE Potassium Lvl 4.4     3.5 - 5.1                     [facility]

[place]



        [facility]                                                  [facility]



                                                                



                                                                



                                                                

 

        ELECTROLYTE Sodium Lvl 140     135 - 145                    [facility]



                           [facility]



                                                                



                                                                



                                                                

 

        ELECTROLYTE Creatinine 1.14    0.50 -                     [facility] [place]



        S       Lvl             1.40                       [facility]



                                                                



                                                                



                                                                

 

        ELECTROLYTE BUN     27      7 - 22                     [facility] [place]



        S                                                  [facility]



                                                                



                                                                



                                                                

 

        ELECTROLYTE Chloride Lvl 105     95 - 109                    [facility]

[facility]



                           [facility]



                                                                



                                                                



                                                                

 

        HEMATOLOGY INR     1.85    0.85 -                      [place]



                                1.17                       [facility]



                                                                



                                                                



                                                                

 

        HEMATOLOGY PT      21.7    12.0 -                     [facility] [place]



                                14.7                       [facility]



                                                                



                                                                



                                                                

 

        HEMATOLOGY Segs    83.5    45.0 -                      [place]



                                75.0                       [facility]



                                                                



                                                                



                                                                

 

        HEMATOLOGY Eosinophils 0.1     0.0 - 4.0                    [facility]

                   [facility]



                                                                



                                                                



                                                                

 

        HEMATOLOGY Basophils 0.1     0.0 - 1.0                     [place]



                                                           [facility]



                                                                



                                                                



                                                                

 

        HEMATOLOGY Lymphocytes 10.2    20.0 -                      [place]



                                40.0                       [facility]



                                                                



                                                                



                                                                

 

        HEMATOLOGY Monocytes 6.1     2.0 - 12.0                    [address]



                                                                



                                                                



                                                                

 

        HEMATOLOGY Segs-Bands # 6.1     1.5 - 8.1                    [facility]

as



                                        /2017                   [facility]



                                                                



                                                                



                                                                

 

        HEMATOLOGY Lymphocytes # 0.7     1.0 - 5.5                    [facility]



                                                           [facility]



                                                                



                                                                



                                                                

 

        HEMATOLOGY Monocytes # 0.4     0.0 - 0.8                    [facility]

s



                                                           [facility]



                                                                



                                                                



                                                                

 

        HEMATOLOGY MPV     8.2     7.4 - 10.4                     [place]



                                        /2017                   [facility]



                                                                



                                                                



                                                                

 

        HEMATOLOGY Platelet 129     133 - 450                     [place]



                                        /2017                   [facility]



                                                                



                                                                



                                                                

 

        HEMATOLOGY WBC     7.3     3.7 - 10.4                     [place]



                                        /2017                   [facility]



                                                                



                                                                



                                                                

 

        HEMATOLOGY RBC     4.04    4.70 -                      [place]



                                6.10    /                   [facility]



                                                                



                                                                



                                                                

 

        HEMATOLOGY Hgb     12.2    14.0 -                      [place]



                                18.0    /                   [facility]



                                                                



                                                                



                                                                

 

        HEMATOLOGY Hct     37.0    42.0 -                      [place]



                                54.0    /                   [facility]



                                                                



                                                                



                                                                

 

        HEMATOLOGY MCV     91.7    80.0 -                      [place]



                                94.0    /                   [facility]



                                                                



                                                                



                                                                

 

        HEMATOLOGY MCHC    33.0    32.0 -                      [place]



                                36.0    /                   [facility]



                                                                



                                                                



                                                                

 

        HEMATOLOGY MCH     30.2    27.0 -                      [place]



                                31.0                       [facility]



                                                                



                                                                



                                                                

 

        HEMATOLOGY RDW     15.4    11.5 -                      [place]



                                14.5                       [facility]



                                                                



                                                                



                                                                

 

        HEMATOLOGY INR     1.79    0.85 -                      [place]



                                1.17                       [facility]



                                                                



                                                                



                                                                

 

        HEMATOLOGY PT      21.1    12.0 -                     [facility] [place]



                                14.7                       [facility]



                                                                



                                                                



                                                                

 

        DRUG SCREEN UDS Note See Note                            [facility] [place]



                        (3/21/17 2:53 AM)         /                   [facility]

l



                                                                Center



                                                                



                                                                



                                                                

 

        DRUG SCREEN U Phencyc Scr Negative Negative                     [facility]

[place]



                        *NA*                               [facility]



                        (3/21/17 2:53 AM)                                 Center



                                                                



                                                                



                                                                

 

        DRUG SCREEN U Opiate Scr Positive Negative                     [facility]

[place]



                        *ABN*                              [facility]



                        (3/21/17 2:53 AM)                                 Center



                                                                



                                                                



                                                                

 

        DRUG SCREEN U Cocaine Scr Negative Negative                     [facility]

[place]



                        *NA*                               [facility]



                        (3/21/17 2:53 AM)                                 Center



                                                                



                                                                



                                                                

 

        DRUG SCREEN U Benzodia Negative Negative                     [place]

[facility]



                Scr     *NA*                               Medical



                        (3/21/17 2:53 AM)                                 Center



                                                                



                                                                



                                                                

 

        DRUG SCREEN U Mirna Scr Negative Negative                     [place]

[facility]



                        *NA*                               Medical



                        (3/21/17 2:53 AM)                                 Center



                                                                



                                                                



                                                                

 

        DRUG SCREEN U Amph Scr Negative Negative                     [place]

[facility]



                        *NA*                               Medical



                        (3/21/17 2:53 AM)                                 Center



                                                                



                                                                



                                                                

 

        DRUG SCREEN U Cannab Scr Negative Negative                     [facility]

[place]



                        *NA*            /                   [facility]



                        (3/21/17 2:53 AM)                                 Center



                                                                



                                                                



                                                                

 

        URINE AND UA Nitrite Negative Negative                    [facility]           (3/21/17 2:53 AM)         /                   [facility]

[facility]



                                                                



                                                                



                                                                

 

        URINE AND UA      0.2     0.1 - 1.0                    [facility] [place]



        STOOL   Urobilinogen                 /                   [facility]



                                                                



                                                                



                                                                

 

        URINE AND UA Blood Small   Negative                    [facility]



        STOOL           *ABN*           /                   Medical



                        (3/21/17 2:53 AM)                                 Center



                                                                



                                                                



                                                                

 

        URINE AND UA Sq Epi None Seen Few                        [facility]           (3/21/17 2:53 AM)                            [facility]

[facility]



                                                                



                                                                



                                                                

 

        URINE AND UA Spec Grav >1.050  <=1.030                    [facility]                           /                   [facility]



                                                                



                                                                



                                                                

 

        URINE AND UA Color Yellow  Yellow                     [facility]           *NA*            /                   Medical



                        (3/21/17 2:53 AM)                                 [place]



                                                                



                                                                



                                                                

 

        URINE AND UA Bili Negative Negative                    [facility]           *NA*            /                   Medical



                        (3/21/17 2:53 AM)                                 [place]



                                                                



                                                                



                                                                

 

        URINE AND UA Ketones Negative Negative                    [facility]           *NA*            /                   Medical



                        (3/21/17 2:53 AM)                                 [place]



                                                                



                                                                



                                                                

 

        URINE AND UA Glucose Negative Negative                    [facility]           (3/21/17 2:53 AM)         /                   [facility]

[facility]



                                                                



                                                                



                                                                

 

        URINE AND UA Protein Trace   Negative                    [facility]           *ABN*           /                   Medical



                        (3/21/17 2:53 AM)                                 Center



                                                                



                                                                



                                                                

 

        URINE AND UA pH   5.0     5.0 - 8.0                    [facility]                           /2017                   [facility]



                                                                



                                                                



                                                                

 

        URINE AND UA Leuk Est Negative Negative                    [facility]           (3/21/17 2:53 AM)                            [facility]

[facility]



                                                                



                                                                



                                                                

 

        URINE AND UA WBC  None Seen None Seen                    [facility]           (3/21/17 2:53 AM)                            [facility]

[facility]



                                                                



                                                                



                                                                

 

        URINE AND UA Turbidity Clear   Clear                      [facility]           (3/21/17 2:53 AM)                            [facility]

[facility]



                                                                



                                                                



                                                                

 

        URINE AND UA RBC  None Seen 0 - 2                      [facility]           (3/21/17 2:53 AM)                            [facility]

[facility]



                                                                



                                                                



                                                                

 

        CARDIAC Total CK 92      12 - 191                     [place]



        ENZYMES                         /                   Medical



                                                                [place]



                                                                



                                                                



                                                                

 

        CHEM PANEL Lactic Acid 0.9     0.5 - 2.2                     [place]

[facility]



                Lvl                     /                   [facility]



                                                                



                                                                



                                                                

 

        ELECTROLYTE AGAP    15.3    10.0 -                     [facility] [place]



        S                       20.0                       [facility]



                                                                



                                                                



                                                                

 

        ELECTROLYTE eGFR    51                         Result  [facility]



                   Comment: The Medical



                                                        eGFR is Center



                                                        calculated 



                                                        using the 



                                                        CKD-EPI 



                                                        formula. In 



                                                        most young, 



                                                        healthy 



                                                        individuals 



                                                        the eGFR will 



                                                        be >90  



                                                        mL/min/1.73m2 



                                                        . The eGFR 



                                                        declines with 



                                                        age. An eGFR 



                                                        of 60-89 may 



                                                        be normal in 



                                                        some    



                                                        populations, 



                                                        particularly 



                                                        the elderly, 



                                                        for whom the 



                                                        CKD-EPI 



                                                        formula has 



                                                        not been 



                                                        extensively 



                                                        validated. 



                                                        Use of the 



                                                        eGFR is not 



                                                        recommended 



                                                        in the  



                                                        following 



                                                        populations:< 



                                                        br/><br/>[facility] 



                                                        viduals with 



                                                        unstable 



                                                        creatinine 



                                                        concentration 



                                                        s, including 



                                                        pregnant 



                                                        patients and 



                                                        those with 



                                                        serious 



                                                        co-morbid 



                                                        conditions.<b 



                                                        r/><br/>Patie 



                                                        nts with 



                                                        extremes in 



                                                        muscle mass 



                                                        or diet. 



                                                        <br/><br/>The 



                                                        data above 



                                                        are obtained 



                                                        from the 



                                                        [employer] 



                                                        [place]  



                                                        Disease 



                                                        Education 



                                                        Program 



                                                        (NKDEP) which 



                                                        additionally 



                                                        recommends 



                                                        that when the 



                                                        eGFR is used 



                                                        in patients 



                                                        with extremes 



                                                        of body mass 



                                                        index for 



                                                        purposes of 



                                                        drug dosing, 



                                                        the eGFR 



                                                        should be 



                                                        multiplied by 



                                                        the estimated 



                                                        BMI.    



                                                                

 

        ELECTROLYTE Calcium Lvl 9.0     8.5 - 10.5                     [place]

[place]



                           [facility]



                                                                



                                                                



                                                                

 

        ELECTROLYTE Potassium Lvl 4.3     3.5 - 5.1                     [place]

[place]



                           [facility]



                                                                



                                                                



                                                                

 

        ELECTROLYTE Chloride Lvl 101     95 - 109                    [facility]

[place]



                           [facility]



                                                                



                                                                



                                                                

 

        ELECTROLYTE CO2     26      24 - 32                    [facility] [place]



        S                                                  [facility]



                                                                



                                                                



                                                                

 

        ELECTROLYTE Creatinine 1.32    0.50 -                     [facility]       Lvl             1.40                       [facility]



                                                                



                                                                



                                                                

 

        ELECTROLYTE Sodium Lvl 138     135 - 145                    [facility]



                           [facility]



                                                                



                                                                



                                                                

 

        ELECTROLYTE Glucose Lvl 133     70 - 99                    [facility] [place]



                           [facility]



                                                                



                                                                



                                                                

 

        ELECTROLYTE BUN     26      7 - 22                     [facility] [place]



                           [facility]



                                                                



                                                                



                                                                

 

        HEMATOLOGY INR     2.00    0.85 -                     [facility] [place]



                                1.17                       [facility]



                                                                



                                                                



                                                                

 

        HEMATOLOGY PT      23.0    12.0 -                     [facility] [place]



                                14.7                       [facility]



                                                                



                                                                



                                                                

 

        HEMATOLOGY PTT     35.0    22.9 -                     [facility] [place]



                                35.8                       [facility]



                                                                



                                                                



                                                                

 

        HEMATOLOGY MPV     8.1     7.4 - 10.4                    [facility] [place]



                                        /                   [facility]



                                                                



                                                                



                                                                

 

        HEMATOLOGY MCHC    34.1    32.0 -                     [facility] [place]



                                36.0                       [facility]



                                                                



                                                                



                                                                

 

        HEMATOLOGY Platelet 133     133 - 450 03/21                    [place]



                                        /2017                   [facility]



                                                                



                                                                



                                                                

 

        HEMATOLOGY RDW     15.2    11.5 -                      [place]



                                14.5                       [facility]



                                                                [place]



                                                                



                                                                



                                                                

 

        HEMATOLOGY Hgb     12.9    14.0 -                      [place]



                                18.0                       [facility]



                                                                



                                                                



                                                                

 

        HEMATOLOGY RBC     4.18    4.70 -                      [place]



                                6.10                       [facility]



                                                                



                                                                



                                                                

 

        HEMATOLOGY MCH     30.9    27.0 -                      [place]



                                31.0                       [facility]



                                                                [place]



                                                                



                                                                



                                                                

 

        HEMATOLOGY MCV     90.6    80.0 -                      [place]



                                94.0                       [facility]



                                                                



                                                                



                                                                

 

        HEMATOLOGY Hct     37.9    42.0 -                      [place]



                                54.0                       [facility]



                                                                



                                                                



                                                                

 

        HEMATOLOGY WBC     7.5     3.7 - 10.4                     [place]



                                        /2017                   [facility]



                                                                



                                                                



                                                                

 

        HEMATOLOGY G-value Rapid 9.4     5.0 - 11.6                     T

                   [facility]



                                                                



                                                                



                                                                

 

        HEMATOLOGY Max Amplitude 65      52 - 71                     

s



                Rapid                   /2017                   [facility]



                                                                



                                                                



                                                                

 

        HEMATOLOGY R-time Rapid 0.9     0.4 - 0.7                     [place]

[place]



                                        /2017                   [facility]



                                                                



                                                                



                                                                

 

        HEMATOLOGY ACT (TEG) 136     86 - 118                     [place]



                Rapid                   /2017                   [facility]



                                                                



                                                                



                                                                

 

        HEMATOLOGY Angle Rapid 76      64 - 80                     [place]



                                        /2017                   [facility]



                                                                



                                                                



                                                                

 

        HEMATOLOGY K-time Rapid 1.0     0.6 - 2.3                     [place]

[place]



                                        /2017                   [facility]



                                                                



                                                                



                                                                

 

        HEMATOLOGY Split Point 0.8                                 [place]



                Rapid                   /2017                   [facility]



                                                                



                                                                



                                                                

 

        HEMATOLOGY Estimated % 0.0     0.0 - 7.5                     [place]

[place]



                Lysis Rapid                 /2017                   [facility]



                                                                



                                                                



                                                                

 

        HEMATOLOGY Monocytes 8.2     2.0 - 12.0                     [place]



                                        /2017                   [facility]



                                                                



                                                                



                                                                

 

        HEMATOLOGY Eosinophils 1.5     0.0 - 4.0                                        [facility]



                                                                



                                                                



                                                                

 

        HEMATOLOGY Segs-Bands # 5.8     1.5 - 8.1                     [place]

[place]



                                        /2017                   [facility]



                                                                



                                                                



                                                                

 

        HEMATOLOGY Lymphocytes # 0.9     1.0 - 5.5                                        [facility]



                                                                



                                                                



                                                                

 

        HEMATOLOGY Basophils 0.4     0.0 - 1.0                     [place]



                                        /2017                   [facility]



                                                                



                                                                



                                                                

 

        HEMATOLOGY Monocytes # 0.6     0.0 - 0.8                                        [facility]



                                                                



                                                                



                                                                

 

        HEMATOLOGY Eosinophils # 0.1     0.0 - 0.5                                        [facility]



                                                                



                                                                



                                                                

 

        HEMATOLOGY Lymphocytes 12.5    20.0 -                      [place]



                                40.0                       [facility]



                                                                



                                                                



                                                                

 

        HEMATOLOGY Segs    77.4    45.0 -                      [place]



                                75.0                       [facility]



                                                                



                                                                



                                                                

 

        TOXICOLOGY Etoh (%) <0.003                              [place]



                                        /2017                   [facility]



                                                                



                                                                



                                                                

 

        TOXICOLOGY Ethanol Lvl <3                                  [place]



                                        /2017                   [facility]



                                                                



                                                                



                                                                

 

        BLOOD BANK Antibody Scrn Negative                             [place]

[place]



        RESULTS         (3/20/17 9:13 PM)                            [facility]



                                                                



                                                                



                                                                

 

        BLOOD BANK ABO/Rh  O POS                               [place]



                           [facility]



                                                                



                                                                



                                                                

 

        HEMATOLOGY INR     2.00    0.85 -                      [place]



                                1.                   [facility]



                                                                



                                                                



                                                                

 

        HEMATOLOGY PT      23.0    12.0 -                      [place]



                                14.                   [facility]



                                                                



                                                                



                                                                

 

        HEMATOLOGY PT      29.4    12.0 -                      [place]



                                .                   [facility]



                                                                



                                                                



                                                                

 

        HEMATOLOGY INR     2.75    0.85 -                      [place]



                                1.                   [facility]



                                                                



                                                                



                                                                

 

        CHEM PANEL Magnesium Lvl 1.7     1.8 - 2.4                     [facility]

[place]                   [facility]



                                                                



                                                                



                                                                

 

        CHEM PANEL Phosphorus 2.9     2.5 - 4.5                     [place]



                                        /2016                   [facility]



                                                                



                                                                



                                                                

 

        ELECTROLYTE AGAP    12.1    10.0 -                      [place]



        S                       .                   [facility]



                                                                



                                                                



                                                                

 

        ELECTROLYTE eGFR    50                         Result   [place]



        S                               /2016           Comment: [facility]



                                                        eGFR is Center



                                                        calculated 



                                                        using the 



                                                        CKD-EPI 



                                                        formula. In 



                                                        most young, 



                                                        healthy 



                                                        individuals 



                                                        the eGFR will 



                                                        be >90  



                                                        mL/min/1.73m2 



                                                        . The eGFR 



                                                        declines with 



                                                        age. An eGFR 



                                                        of 60-89 may 



                                                        be normal in 



                                                        some    



                                                        populations, 



                                                        particularly 



                                                        the elderly, 



                                                        for whom the 



                                                        CKD-EPI 



                                                        formula has 



                                                        not been 



                                                        extensively 



                                                        validated. 



                                                        Use of the 



                                                        eGFR is not 



                                                        recommended 



                                                        in the  



                                                        following 



                                                        populations:< 



                                                        br/><br/>[place] 



                                                        viduals with 



                                                        unstable 



                                                        creatinine 



                                                        concentration 



                                                        s, including 



                                                        pregnant 



                                                        patients and 



                                                        those with 



                                                        serious 



                                                        co-morbid 



                                                        conditions.<b 



                                                        r/><br/>Patie 



                                                        nts with 



                                                        extremes in 



                                                        muscle mass 



                                                        or diet. 



                                                        <br/><br/>The 



                                                        data above 



                                                        are obtained 



                                                        from the 



                                                        [employer] 



                                                        [place]  



                                                        Disease 



                                                        Education 



                                                        Program 



                                                        (NKDEP) which 



                                                        additionally 



                                                        recommends 



                                                        that when the 



                                                        eGFR is used 



                                                        in patients 



                                                        with extremes 



                                                        of body mass 



                                                        index for 



                                                        purposes of 



                                                        drug dosing, 



                                                        the eGFR 



                                                        should be 



                                                        multiplied by 



                                                        the estimated 



                                                        BMI.    



                                                                

 

        ELECTROLYTE CO2     28      24 - 32                     [place]



        S                               /2016                   [facility]



                                                                [facility]



                                                                



                                                                



                                                                

 

        ELECTROLYTE Calcium Lvl 8.9     8.5 - 10.5                     [place]



        [facility]                                                  [facility]



                                                                



                                                                



                                                                

 

        ELECTROLYTE Potassium Lvl 4.1     3.5 - 5.1                     [place]

[place]



                           [facility]



                                                                



                                                                



                                                                

 

        ELECTROLYTE Sodium Lvl 141     135 - 145                     [place]

[place]



        S                                                  [facility]



                                                                



                                                                



                                                                

 

        ELECTROLYTE Chloride Lvl 105     95 - 109                     [place]

[place]



                           [facility]



                                                                



                                                                



                                                                

 

        ELECTROLYTE Creatinine 1.35    0.50 -                     [facility] [place]



        S       Lvl             1.40    /                   [facility]



                                                                



                                                                



                                                                

 

        ELECTROLYTE BUN     34      7 - 22                      [place]



        S                                                  [facility]



                                                                



                                                                



                                                                

 

        ELECTROLYTE Glucose Lvl 72      70 - 99                    [facility] [place]



        S                                                  [facility]



                                                                



                                                                



                                                                

 

        HEMATOLOGY Microcyte 1+      None Seen                     [place]



                        *ABN*           /                   Medical



                        (16 6:56 PM)                                 [place]



                                                                



                                                                



                                                                

 

        HEMATOLOGY Lymphocytes # 2.4     1.0 - 5.5                     [facility]

xa                   [facility]



                                                                



                                                                



                                                                

 

        HEMATOLOGY Monocytes # 0.8     0.0 - 0.8                     [place]

                   [facility]



                                                                



                                                                



                                                                

 

        HEMATOLOGY Eosinophils # 0.1     0.0 - 0.5                     [facility]

[facility]                   [facility]



                                                                



                                                                



                                                                

 

        HEMATOLOGY Segs    53.5    45.0 -                      [place]



                                75.0                       [facility]



                                                                



                                                                



                                                                

 

        HEMATOLOGY Monocytes 10.9    2.0 - 12.0                     [place]



                                        /2016                   [facility]



                                                                



                                                                



                                                                

 

        HEMATOLOGY Eosinophils 1.5     0.0 - 4.0                     [place]

                   [facility]



                                                                



                                                                



                                                                

 

        HEMATOLOGY Basophils 0.3     0.0 - 1.0                     [place]



                                        /2016                   [facility]



                                                                



                                                                



                                                                

 

        HEMATOLOGY Lymphocytes 33.8    20.0 -                      [place]



                                40.0                       [facility]



                                                                



                                                                



                                                                

 

        HEMATOLOGY Segs-Bands # 3.7     1.5 - 8.1                     [place]

[place]



                                        /2016                   [facility]



                                                                



                                                                



                                                                

 

        HEMATOLOGY MPV     8.5     7.4 - 10.4                     [place]



                                        /2016                   [facility]



                                                                



                                                                



                                                                

 

        HEMATOLOGY WBC     7.0     3.7 - 10.4                     [place]



                                        /2016                   [facility]



                                                                



                                                                



                                                                

 

        HEMATOLOGY RBC     5.41    4.70 -                      [place]



                                6.10                       [facility]



                                                                



                                                                



                                                                

 

        HEMATOLOGY Hgb     13.6    14.0 -                      [place]



                                18.0    /                   [facility]



                                                                



                                                                



                                                                

 

        HEMATOLOGY Hct     42.4    42.0 -                      [place]



                                54.0    /2016                   [facility]



                                                                



                                                                



                                                                

 

        HEMATOLOGY MCV     78.3    80.0 -                     [facility] [place]



                                94.0    /                   [facility]



                                                                



                                                                



                                                                

 

        HEMATOLOGY MCH     25.2    27.0 -                      [place]



                                31.0    /                   [facility]



                                                                



                                                                



                                                                

 

        HEMATOLOGY MCHC    32.1    32.0 -                      [place]



                                36.0    /                   [facility]



                                                                



                                                                



                                                                

 

        HEMATOLOGY RDW     18.5    11.5 -                      [place]



                                14.5    /                   [facility]



                                                                



                                                                



                                                                

 

        HEMATOLOGY Platelet 148     133 - 450                     [place]



                                        /                   [facility]



                                                                



                                                                



                                                                

 

        HEMATOLOGY PTT     37.6    22.9 -                      [place]



                                35.8    /2016                   [facility]



                                                                



                                                                



                                                                

 

        HEMATOLOGY PT      28.6    12.0 -                      [place]



                                14.7    /                   [facility]



                                                                



                                                                



                                                                

 

        HEMATOLOGY INR     2.65    0.85 -                      [place]



                                1.17                       [facility]



                                                                



                                                                



                                                                

 

        ELECTROLYTE Potassium WB 5.1     3.5 - 5.1                    [facility]



                           [facility]



                                                                



                                                                



                                                                

 

        ELECTROLYTE AGAP    12.2    10.0 -                     [facility] [place]



        S                       20.0                       [facility]



                                                                



                                                                



                                                                

 

        ELECTROLYTE eGFR    63                         [facility]



                   Comment: The [facility]



                                                        eGFR is Center



                                                        calculated 



                                                        using the 



                                                        CKD-EPI 



                                                        formula. In 



                                                        most young, 



                                                        healthy 



                                                        individuals 



                                                        the eGFR will 



                                                        be >90  



                                                        mL/min/1.73m2 



                                                        . The eGFR 



                                                        declines with 



                                                        age. An eGFR 



                                                        of 60-89 may 



                                                        be normal in 



                                                        some    



                                                        populations, 



                                                        particularly 



                                                        the elderly, 



                                                        for whom the 



                                                        CKD-EPI 



                                                        formula has 



                                                        not been 



                                                        extensively 



                                                        validated. 



                                                        Use of the 



                                                        eGFR is not 



                                                        recommended 



                                                        in the  



                                                        following 



                                                        populations:< 



                                                        br/><br/>[place] 



                                                        viduals with 



                                                        unstable 



                                                        creatinine 



                                                        concentration 



                                                        s, including 



                                                        pregnant 



                                                        patients and 



                                                        those with 



                                                        serious 



                                                        co-morbid 



                                                        conditions.<b 



                                                        r/><br/>[place] 



                                                        nts with 



                                                        extremes in 



                                                        muscle mass 



                                                        or diet. 



                                                        <br/><br/>The 



                                                        data above 



                                                        are obtained 



                                                        from the 



                                                        [employer] 



                                                        [place]  



                                                        Disease 



                                                        Education 



                                                        Program 



                                                        (NKDEP) which 



                                                        additionally 



                                                        recommends 



                                                        that when the 



                                                        eGFR is used 



                                                        in patients 



                                                        with extremes 



                                                        of body mass 



                                                        index for 



                                                        purposes of 



                                                        drug dosing, 



                                                        the eGFR 



                                                        should be 



                                                        multiplied by 



                                                        the estimated 



                                                        BMI.    



                                                                

 

        ELECTROLYTE CO2     30      24 - 32                    [facility] [place]



                           [facility]



                                                                



                                                                



                                                                

 

        ELECTROLYTE Calcium Lvl 8.6     8.5 - 10.5                     [place]



                           [facility]



                                                                



                                                                



                                                                

 

        ELECTROLYTE Chloride Lvl 104     95 - 109                     [place]

[place]



                           [facility]



                                                                



                                                                



                                                                

 

        ELECTROLYTE Potassium Lvl 5.2     3.5 - 5.1                     [place]

[place]



                           [facility]



                                                                



                                                                



                                                                

 

        ELECTROLYTE Sodium Lvl 141     135 - 145                    [facility]

s



                           [facility]



                                                                



                                                                



                                                                

 

        ELECTROLYTE Creatinine 1.12    0.50 -  02                   [facility] [place]



        S       Lvl             1.40    /                   [facility]



                                                                



                                                                



                                                                

 

        ELECTROLYTE BUN     27      7 - 22                     [facility] [place]



                           [facility]



                                                                



                                                                



                                                                

 

        ELECTROLYTE Glucose Lvl 84      70 - 99                    [facility] [place]



        S                                                  [facility]



                                                                



                                                                



                                                                

 

        HEMATOLOGY Eosinophils # 0.1     0.0 - 0.5 02                   [facility]



                                                           [facility]



                                                                



                                                                



                                                                

 

        HEMATOLOGY Monocytes # 0.7     0.0 - 0.8                    [facility]

s



                                                           [facility]



                                                                



                                                                



                                                                

 

        HEMATOLOGY Lymphocytes # 2.0     1.0 - 5.5                    [facility]

xa                   [facility]



                                                                



                                                                



                                                                

 

        HEMATOLOGY Segs-Bands # 3.3     1.5 - 8.1                    [facility]

as



                                        /2016                   [facility]



                                                                



                                                                



                                                                

 

        HEMATOLOGY Lymphocytes 32.2    20.0 -                     [facility] [place]



                                40.0                       [facility]



                                                                



                                                                



                                                                

 

        HEMATOLOGY Segs    53.9    45.0 -                     [facility] [place]



                                75.0                       [facility]



                                                                



                                                                



                                                                

 

        HEMATOLOGY Basophils 0.6     0.0 - 1.0                    [facility] [place]



                                                           [facility]



                                                                



                                                                



                                                                

 

        HEMATOLOGY Eosinophils 1.7     0.0 - 4.0                    [facility]

s



                                                           [facility]



                                                                



                                                                



                                                                

 

        HEMATOLOGY Monocytes 11.6    2.0 - 12.0                    [facility] [place]



                                        [address] [place] [address]



                                                                



                                                                



                                                                

 

        HEMATOLOGY G-value 5.3     4.5 - 11.0                    [facility] [place]



                                                           [facility]



                                                                



                                                                



                                                                

 

        HEMATOLOGY Angle   66.9    53.0 -                     [facility] [place]



                                72.0                       [facility]



                                                                



                                                                



                                                                

 

        HEMATOLOGY K-time  1.8     1.0 - 3.0                    [facility] [place]



                                                           [facility]



                                                                



                                                                



                                                                

 

        HEMATOLOGY Max Amp 51.5    50.0 -                      [place]



                                70.0                       [facility]



                                                                



                                                                



                                                                

 

        HEMATOLOGY Ly30    3.7     0.0 - 7.5                    [facility] [place]



                                                           [facility]



                                                                



                                                                



                                                                

 

        HEMATOLOGY Coag Index 0.0     -3.0-3.0 -                    [facility]



                                3.0                        [facility]



                                                                



                                                                



                                                                

 

        HEMATOLOGY R-time  4.5     5.0 - 10.0                    [facility] [place]



                                        [address] [place] [address]



                                                                



                                                                



                                                                

 

        HEMATOLOGY TEG Data See Note                            [facility] [place]



                        (16 1:45 PM)         /                   [facility]



                                                                



                                                                



                                                                

 

        HEMATOLOGY TEG Interp Thrombelas                            [facility]



                        tograph                            [facility]



                        show                                    



                        shortened                                 



                        value of                                 



                        R. This                                 



                        finding is                                 



                        suggestive                                 



                        of                                      



                        enzymatic                                 



                        hypercoagu                                 



                        lation.                                 



                        [id number]                                 



                                                                



                                                                

 

        HEMATOLOGY PT      20.2    12.0 -                      [place]



                                14.7                       [facility]



                                                                



                                                                



                                                                

 

        HEMATOLOGY INR     1.69    0.85 -                      [place]



                                1.17                       [facility]



                                                                



                                                                



                                                                

 

        HEMATOLOGY PTT     34.9    22.9 -                      [place]



                                35.8    /                   [facility]



                                                                



                                                                



                                                                

 

        HEMATOLOGY MCHC    31.3    32.0 -                      [place]



                                36.0    /                   [facility]



                                                                



                                                                



                                                                

 

        HEMATOLOGY MCH     25.1    27.0 -                      [place]



                                31.0    /                   [facility]



                                                                



                                                                



                                                                

 

        HEMATOLOGY Platelet 153     133 - 450 02                    [place]



                                        /2016                   [facility]



                                                                



                                                                



                                                                

 

        HEMATOLOGY RDW     18.7    11.5 -                      [place]



                                14.5                       [facility]



                                                                



                                                                



                                                                

 

        HEMATOLOGY MPV     9.0     7.4 - 10.4                     [place]



                                        /2016                   [facility]



                                                                



                                                                



                                                                

 

        HEMATOLOGY RBC     5.12    4.70 -                     [facility] [place]



                                6.10    /                   [facility]



                                                                



                                                                



                                                                

 

        HEMATOLOGY WBC     6.1     3.7 - 10.4                     [place]



                                        /2016                   [facility]



                                                                



                                                                



                                                                

 

        HEMATOLOGY Hgb     12.9    14.0 -                      [place]



                                18.0                       [facility]



                                                                



                                                                



                                                                

 

        HEMATOLOGY Hct     41.0    42.0 -                      [place]



                                54.0    /                   [facility]



                                                                



                                                                



                                                                

 

        HEMATOLOGY MCV     80.0    80.0 -                      [place]



                                94.0    /                   [facility]



                                                                



                                                                



                                                                

 

        HEMATOLOGY INR     1.26    0.85 -     [facility]      <sup>3</sup>I  [facility]

s



                                1.           nterpretive Medical



                                                        Data:   Center



                                                        RECOMMENDED 



                                                        RANGES FOR 



                                                        PROTIME 



                                                        INR:<br/> 



                                                        2.0-3.0 for 



                                                        most medical 



                                                        and surgical 



                                                        thromboemboli 



                                                        c       



                                                        states.<br/> 



                                                         2.5-3.5 for 



                                                        artificial 



                                                        heart valves 



                                                        and recurrent 



                                                        embolism.<br/ 



                                                        ><br/>INR 



                                                        SHOULD BE 



                                                        USED ONLY FOR 



                                                        PATIENTS ON 



                                                        STABLE  



                                                        ANTICOAGULANT 



                                                        THERAPY. 



                                                                



                                                                

 

        HEMATOLOGY PT      16.0    12.0 -     [facility]               [place]



                                14.                   [facility]



                                                                



                                                                



                                                                

 

        HEMATOLOGY PTT     33.4    22.9 -     Normal  <sup>5</sup>I  [place]

s



                                35.           nterpretive Medical



                                                        Data: [facility]



                                                        Therapeutic 



                                                        Range:  57 - 



                                                        92 Second[facility] 



                                                                



                                                                

 

        CHEMISTRY AGAP    12.8    10.0 -     Normal           [place]



                                20.0                       [facility]



                                                                



                                                                



                                                                

 

        CHEMISTRY eGFR    73                 NA      <sup>1</sup>R  [place]



                                        /2013           esult   Medical



                                                        Comment: The Center



                                                        eGFR is 



                                                        calculated 



                                                        using the 



                                                        CKD-EPI 



                                                        formula. In 



                                                        most young, 



                                                        healthy 



                                                        individuals 



                                                        the eGFR will 



                                                        be >90  



                                                        mL/min/1.73m2 



                                                        . The eGFR 



                                                        declines with 



                                                        age. An eGFR 



                                                        of 60-89 may 



                                                        be normal in 



                                                        some    



                                                        populations, 



                                                        particularly 



                                                        the elderly, 



                                                        for whom the 



                                                        CKD-EPI 



                                                        formula has 



                                                        not been 



                                                        extensively 



                                                        validated. 



                                                        Use of the 



                                                        eGFR is not 



                                                        recommended 



                                                        in the  



                                                        following 



                                                        populations:& 



                                                        lt;br/><br/>I 



                                                        ndividuals 



                                                        with unstable 



                                                        creatinine 



                                                        concentration 



                                                        s, including 



                                                        pregnant 



                                                        patients and 



                                                        those with 



                                                        serious 



                                                        co-morbid 



                                                        conditions.<b 



                                                        r/><br/>Patie 



                                                        nts with 



                                                        extremes in 



                                                        muscle mass 



                                                        or diet. 



                                                        <br/><br/>The 



                                                        data above 



                                                        are obtained 



                                                        from the 



                                                        [employer] 



                                                        [place]  



                                                        Disease 



                                                        Education 



                                                        Program 



                                                        (NKDEP) which 



                                                        additionally 



                                                        recommends 



                                                        that when the 



                                                        eGFR is used 



                                                        in patients 



                                                        with extremes 



                                                        of body mass 



                                                        index for 



                                                        purposes of 



                                                        drug dosing, 



                                                        the eGFR 



                                                        should be 



                                                        multiplied by 



                                                        the estimated 



                                                        BMI.    



                                                                

 

        CHEMISTRY Potassium Lvl 4.8     3.5 - 5.1    Normal           [facility]

[facility]



                                        /2013                   [facility]



                                                                



                                                                



                                                                

 

        CHEMISTRY Glucose Lvl 85      70 - 99    Normal  <sup>2</sup>I  [place]

[place]



                                        /2013           nterpretive Medical



                                                        Data: [facility]



                                                        reference 



                                                        range values 



                                                        reflect the 



                                                        clinical 



                                                        guidelines<br 



                                                        />of the 



                                                        [employer] 



                                                        Diabetes 



                                                        Association. 



                                                                



                                                                

 

        CHEMISTRY CO2     32      24 - 32    Normal           [place]



                                        /2013                   [facility]



                                                                



                                                                



                                                                

 

        CHEMISTRY Chloride Lvl 103     95 - 109    Normal           [place]



                                        /2013                   [facility]



                                                                



                                                                



                                                                

 

        CHEMISTRY Sodium Lvl 143     135 - 145    Normal           [place]



                                        /2013                   [facility]



                                                                



                                                                



                                                                

 

        CHEMISTRY Creatinine 1.0     0.5 - 1.4    Normal          [facility]



                                   [facility]



                                                                



                                                                



                                                                

 

        CHEMISTRY BUN     19      7 - 22     Normal           [place]



                                        /2013                   [facility]



                                                                



                                                                



                                                                

 

        CHEMISTRY Calcium Lvl 8.9     8.5 - 10.5    Normal                              [facility]



                                                                



                                                                



                                                                

 

        HEMATOLOGY Segs    58.9    45.0 -     Normal           [place]



                                75.0                       [facility]



                                                                



                                                                



                                                                

 

        HEMATOLOGY Monocytes # 0.8     0.0 - 0.8    Normal                              [facility]



                                                                



                                                                



                                                                

 

        HEMATOLOGY Eosinophils # 0.2     0.0 - 0.5    Normal           [place]

[place]



                                                           [facility]



                                                                



                                                                



                                                                

 

        HEMATOLOGY Lymphocytes 26.3    20.0 -     Normal           [place]



                                40.0                       [facility]



                                                                



                                                                



                                                                

 

        HEMATOLOGY Monocytes 11.1    2.0 - 12.0    Normal           [place]



                                        /2013                   [facility]



                                                                



                                                                



                                                                

 

        HEMATOLOGY Eosinophils 3.2     0.0 - 4.0    Normal           [place]

[facility]



                                        /                   Medical



                                                                Center



                                                                



                                                                



                                                                

 

        HEMATOLOGY Basophils 0.5     0.0 - 1.0    Normal           [place]



                                        /2013                   [facility]



                                                                



                                                                



                                                                

 

        HEMATOLOGY Segs-Bands # 4.4     1.5 - 8.1    Normal           [facility]

[facility]



                                        /2013                   [facility]



                                                                



                                                                



                                                                

 

        HEMATOLOGY Lymphocytes # 2.0     1.0 - 5.5    Normal           [facility]

[place]



                                                           [facility]



                                                                



                                                                



                                                                

 

        HEMATOLOGY PTT     48.3    22.9 -     [facility]      <sup>6</sup>I  [place]

[place]



                                35.8               nterpretive Medical



                                                        Data: [facility]



                                                        Therapeutic 



                                                        Range:  57 - 



                                                        92 Seconds 



                                                                



                                                                

 

        HEMATOLOGY INR     3.03    0.85 -     [facility]      <sup>4</sup>I  [place]

s



                                1.17               nterpretive Medical



                                                        Data:   Center



                                                        RECOMMENDED 



                                                        RANGES FOR 



                                                        PROTIME 



                                                        INR:<br/> 



                                                        2.0-3.0 for 



                                                        most medical 



                                                        and surgical 



                                                        thromboemboli 



                                                        c       



                                                        states.<br/> 



                                                         2.5-3.5 for 



                                                        artificial 



                                                        heart valves 



                                                        and recurrent 



                                                        embolism.<br/ 



                                                        ><br/>INR 



                                                        SHOULD BE 



                                                        USED ONLY FOR 



                                                        PATIENTS ON 



                                                        STABLE  



                                                        ANTICOAGULANT 



                                                        THERAPY. 



                                                                



                                                                

 

        HEMATOLOGY PT      31.2    12.0 -     [facility] [place]



                                14.7    /                   [facility]



                                                                



                                                                



                                                                

 

        HEMATOLOGY RBC     4.66    4.70 -     [facility] [place]



                                6.10    /                   [facility]



                                                                



                                                                



                                                                

 

        HEMATOLOGY Platelet 154     133 - 450    Normal           [place]



                                        /2013                   [facility]



                                                                



                                                                



                                                                

 

        HEMATOLOGY RDW     15.5    11.5 -     [facility] [place]



                                14.5                       [facility]



                                                                



                                                                



                                                                

 

        HEMATOLOGY MPV     8.7     7.4 - 10.4    Normal           [place]



                                        /2013                   [facility]



                                                                



                                                                



                                                                

 

        HEMATOLOGY Hct     41.4    42.0 -     [facility] [place]



                                54.0    /                   [facility]



                                                                



                                                                



                                                                

 

        HEMATOLOGY Hgb     13.5    14.0 -     [facility] [place]



                                18.0    /                   [facility]



                                                                



                                                                



                                                                

 

        HEMATOLOGY MCHC    32.6    32.0 -     [facility] [place]



                                36.0                       [facility]



                                                                



                                                                



                                                                

 

        HEMATOLOGY MCV     88.9    80.0 -     [facility] [place]



                                94.0    /                   [facility]



                                                                



                                                                



                                                                

 

        HEMATOLOGY MCH     29.0    27.0 -     [facility] [place]



                                31.0                       [facility]



                                                                



                                                                



                                                                

 

        HEMATOLOGY WBC     7.5     3.7 - 10.4    Normal           [place]



                                        /2013                   [facility]



                                                                



                                                                



                                                                

 

        HEMATOLOGY TEG Interp Thrombelas            NA               [facility]

s



                        tograph                            Medical



                        results                                 [place]



                        are within                                 



                        reference                                 



                        ranges.                                 



                        These                                   



                        indicate                                 



                        adequate                                 



                        hemostasis                                 



                        .  If                                   



                        there is                                 



                        evidence                                 



                        of                                      



                        bleeding,                                 



                        consider                                 



                        anatomical                                 



                        or                                      



                        surgical                                 



                        bleeding.                                 



                        CPT:[id number]                                 



                                                                



                                                                

 

        HEMATOLOGY Angle   63.7    53.0 -     Normal          [facility]



                                72.0                       [facility]



                                                                



                                                                



                                                                

 

        HEMATOLOGY K-time  1.9     1.0 - 3.0    Normal          [facility] [place]



                                        /                   [facility]



                                                                



                                                                



                                                                

 

        HEMATOLOGY R-time  7.2     5.0 - 10.0    Normal          [facility]



                                                           [facility]



                                                                



                                                                



                                                                

 

        HEMATOLOGY TEG Data See Note 7            Normal  <sup>7</sup>I [facility] 

[place]



                        (2013 15:10:00)                    nterpretiv

e Medical



                                                        Data: [facility]



                                                        ranges are 



                                                        for citrated 



                                                        whole blood 



                                                        with kaolin 



                                                        activator.<br 



                                                        /><br/>R 



                                                        TIME:   



                                                        Reflects the 



                                                        degree of 



                                                        anti-coagulat 



                                                        ion due to 



                                                        [facility],   



                                                        unfractionate 



                                                        d heparin, 



                                                        and coumadin 



                                                        as well as 



                                                        non-specific 



                                                        <br/>factor 



                                                        deficiencies. 



                                                        <br/><br/&gt; 



                                                        K TIME, ALPHA 



                                                        ANGLE, AND 



                                                        MA(MAX  



                                                        AMPLITUDE): 



                                                        Have been 



                                                        <br/>associat 



                                                        ed with the 



                                                        platelet 



                                                        release 



                                                        reaction and 



                                                        fibrin  



                                                        <br/>polymer 



                                                        formation. 



                                                        These   



                                                        parameters 



                                                        assess the 



                                                        speed of clot 



                                                        <br/>formatio 



                                                        n and the 



                                                        tensil  



                                                        strength of 



                                                        the clot. 



                                                        Higher levels 



                                                        <br/>are 



                                                        associated 



                                                        with    



                                                        hypercoagulab 



                                                        le      



                                                        states.<br/>< 



                                                        br/>G VALUE: 



                                                        Another 



                                                        measure of 



                                                        clot    



                                                        strength.<br/ 



                                                        >&lt;br/>LY30 



                                                        :  Percent 



                                                        lysis in 30 



                                                        Minutes is 



                                                        associated 



                                                        with the 



                                                        degree  



                                                        <br/>of 



                                                        fibrinolysis. 



                                                        &lt;br/><br/> 



                                                        CI or COAG 



                                                        INDEX:  A 



                                                        calculation 



                                                        from the 



                                                        above   



                                                        measured data 



                                                        <br/>indicati 



                                                        ng an overall 



                                                        hyper or hypo 



                                                        coagulability 



                                                        with values 



                                                        <br/>above 



                                                        (plus) +3.0 



                                                        being   



                                                        relatively 



                                                        hypercoagulab 



                                                        le and those 



                                                        <br/>below 



                                                        (minus) -3.0 



                                                        being   



                                                        relatively 



                                                        hypocoagulabl 



                                                        e.<br/>&lt;br 



                                                        />These 



                                                        measurements 



                                                        are     



                                                        functional in 



                                                        nature with 



                                                        many    



                                                        variables 



                                                        <br/>and 



                                                        require close 



                                                        clinical 



                                                        correlation.< 



                                                        br/><br/>Thro 



                                                        mboelasograph 



                                                        y (TEG) is 



                                                        mostly used 



                                                        to monitor 



                                                        significant 



                                                        coagulopathy 



                                                        in trauma 



                                                        patients or 



                                                        surgical 



                                                        patients.  It 



                                                        assesses 



                                                        gloabal 



                                                        (primary and 



                                                        secondary) 



                                                        hemostasis 



                                                        using whole 



                                                        blood and 



                                                        therefore, 



                                                        not expected 



                                                        to correlate 



                                                        well with 



                                                        conventional 



                                                        coagulation 



                                                        tests.  TEG 



                                                        results need 



                                                        to be   



                                                        correlated 



                                                        with clinical 



                                                        evaluation 



                                                        for patient 



                                                        management. 



                                                                



                                                                

 

        HEMATOLOGY Coag Index -1.3    -3.0-3.0 -    Normal           [place]

s



                                3.0                        [facility]



                                                                



                                                                



                                                                

 

        HEMATOLOGY Ly30    0.6     0.0 - 7.5    Normal           [place]



                                        /2013                   [facility]



                                                                



                                                                



                                                                

 

        HEMATOLOGY G-value 6.8     4.5 - 11.0    Normal           [place]



                                        /2013                   [facility]



                                                                



                                                                



                                                                

 

        HEMATOLOGY Max Amp 57.7    50.0 -     Normal          [facility]



                                70.0                       [facility]



                                                                



                                                                



                                                                

 

        CHEMISTRY Troponin-T <0.010  0.000 -    [facility]



                                0.100                      [facility]



                                                                



                                                                



                                                                

 

        CHEMISTRY Troponin-I <0.02   0.00 -     [facility]



                                0.40                       [facility]



                                                                



                                                                



                                                                

 

        CHEMISTRY Total CK 43      12 - 191    Normal           [place]



                                        /2012                   [facility]



                                                                



                                                                



                                                                

 

        CHEMISTRY eGFR    87                 NA      <sup>1</sup>R  [place]



                                        /2012           esult   Medical



                                                        Comment: [facility]



                                                        eGFR is 



                                                        calculated 



                                                        using the 



                                                        CKD-EPI 



                                                        formula. In 



                                                        most young, 



                                                        healthy 



                                                        individuals 



                                                        the eGFR will 



                                                        be >90  



                                                        mL/min/1.73m2 



                                                        . The eGFR 



                                                        declines with 



                                                        age. An eGFR 



                                                        of 60-89 may 



                                                        be normal in 



                                                        some    



                                                        populations, 



                                                        particularly 



                                                        the elderly, 



                                                        for whom the 



                                                        CKD-EPI 



                                                        formula has 



                                                        not been 



                                                        extensively 



                                                        validated. 



                                                        Use of the 



                                                        eGFR is not 



                                                        recommended 



                                                        in the  



                                                        following 



                                                        populations:& 



                                                        lt;br/><br/>I 



                                                        ndividuals 



                                                        with unstable 



                                                        creatinine 



                                                        concentration 



                                                        s, including 



                                                        pregnant 



                                                        patients and 



                                                        those with 



                                                        serious 



                                                        co-morbid 



                                                        conditions.<b 



                                                        r/><br/>Patie 



                                                        nts with 



                                                        extremes in 



                                                        muscle mass 



                                                        or diet. 



                                                        <br/><br/>The 



                                                        data above 



                                                        are obtained 



                                                        from the 



                                                        [employer] 



                                                        [place]  



                                                        Disease 



                                                        Education 



                                                        Program 



                                                        (NKDEP) which 



                                                        additionally 



                                                        recommends 



                                                        that when the 



                                                        eGFR is used 



                                                        in patients 



                                                        with extremes 



                                                        of body mass 



                                                        index for 



                                                        purposes of 



                                                        drug dosing, 



                                                        the eGFR 



                                                        should be 



                                                        multiplied by 



                                                        the estimated 



                                                        BMI.    



                                                                

 

        CHEMISTRY Calcium Lvl 8.4     8.5 - 10.5    LOW              [place]

s



                                                           [facility]



                                                                



                                                                



                                                                

 

        CHEMISTRY CO2     24      24 - 32    Normal           [place]



                                        /2012                   [facility]



                                                                



                                                                



                                                                

 

        CHEMISTRY Chloride Lvl 105     95 - 109    Normal           [place]



                                        /2012                   [facility]



                                                                



                                                                



                                                                

 

        CHEMISTRY Potassium Lvl 4.3     3.5 - 5.1    Normal           [facility]

[facility]



                                        /2012                   [facility]



                                                                [facility]



                                                                



                                                                



                                                                

 

        CHEMISTRY Sodium Lvl 142     135 - 145    Normal           [place]



                                        /2012                   [facility]



                                                                [facility]



                                                                



                                                                



                                                                

 

        CHEMISTRY Creatinine 0.8     0.5 - 1.4    Normal           [place]



                Lvl                     /2012                   [facility]



                                                                Center



                                                                



                                                                



                                                                

 

        CHEMISTRY BUN     20      7 - 22     Normal           [place]



                                        /2012                   [facility]



                                                                [facility]



                                                                



                                                                



                                                                

 

        CHEMISTRY Glucose Lvl 131     70 - 99    HI      <sup>4</sup>I [facility]

[place]



                                        /2012           nterpretive Medical



                                                        Data: [facility]



                                                        reference 



                                                        range values 



                                                        reflect the 



                                                        clinical 



                                                        guidelines<br 



                                                        />of the 



                                                        [employer] 



                                                        [employer] 



                                                        Association. 



                                                                



                                                                

 

        CHEMISTRY AGAP    17.3    10.0 -     Normal           [place]



                                20.0                       [facility]



                                                                [facility]



                                                                



                                                                



                                                                

 

        CHEMISTRY A/G Ratio 1.5     0.7 - 1.6    Normal           [place]



                                        /2012                   [facility]



                                                                



                                                                



                                                                

 

        CHEMISTRY Globulin 2.5     2.0 - 4.0    Normal           [place]



                                        /2012                   [facility]



                                                                [facility]



                                                                



                                                                



                                                                

 

        CHEMISTRY Bili Indirect 1.1     0.0 - 1.0    HI               [facility]

as



                                        /2012                   [facility]



                                                                [facility]



                                                                



                                                                



                                                                

 

        CHEMISTRY AST     22      0 - 37     Normal           [place]



                                        /2012                   [facility]



                                                                



                                                                



                                                                

 

        CHEMISTRY Total Protein 6.2     6.4 - 8.4    LOW              [facility]

[facility]



                                        /2012                   [facility]



                                                                [facility]



                                                                



                                                                



                                                                

 

        CHEMISTRY Bili Total 1.7     0.2 - 1.3    HI               [place]



                                        /2012                   [facility]



                                                                [facility]



                                                                



                                                                



                                                                

 

        CHEMISTRY Bili Direct 0.6     0.0 - 0.3    HI               [place]



                                        /2012                   [facility]



                                                                [facility]



                                                                



                                                                



                                                                

 

        CHEMISTRY Alk Phos 113     39 - 136    Normal           [place]



                                        /2012                   [facility]



                                                                [facility]



                                                                



                                                                



                                                                

 

        CHEMISTRY Albumin Lvl 3.7     3.5 - 5.0    Normal           [place]



                                        /2012                   [facility]



                                                                



                                                                



                                                                

 

        CHEMISTRY ALT     23      0 - 65     Normal           [place]



                                        /2012                   [facility]



                                                                



                                                                



                                                                

 

        CHEMISTRY Phosphorus 4.3     2.5 - 4.5    Normal           [place]



                                        /2012                   [facility]



                                                                



                                                                



                                                                

 

        CHEMISTRY Magnesium Lvl 2.3     1.8 - 2.4    Normal           [facility]

as



                                        /2012                   [facility]



                                                                [facility]



                                                                



                                                                



                                                                

 

        CHEMISTRY Ca Norm mgdL 4.64    4.65 -     LOW              [place]



                                5.                   [facility]



                                                                [facility]



                                                                



                                                                



                                                                

 

        CHEMISTRY Ca Ion mgdL 4.64    4.65 -     LOW              [place]



                                5.                   [facility]



                                                                [facility]



                                                                



                                                                



                                                                

 

        CHEMISTRY Ca Norm 1.16    1.16 -     Normal           [place]



                                1.                   [facility]



                                                                [facility]



                                                                



                                                                



                                                                

 

        CHEMISTRY Ca Ion  1.16    1.16 -     Normal           [place]



                                1.30                       [facility]



                                                                [facility]



                                                                



                                                                



                                                                

 

        CHEMISTRY Troponin-T <0.010  0.000 -    Normal           [place]



                                0.100                      [facility]



                                                                [facility]



                                                                



                                                                



                                                                

 

        CHEMISTRY Troponin-I <0.02   0.00 -     Normal           [place]



                                0.40                       [facility]



                                                                [facility]



                                                                



                                                                



                                                                

 

        CHEMISTRY Total CK 38      12 - 191    Normal           [place]



                                        /2012                   [facility]



                                                                



                                                                



                                                                

 

        CHEMISTRY POC A O2 Sat 91.0    95.0 -     LOW              [place]



                                100.0                      [facility]



                                                                [facility]



                                                                



                                                                



                                                                

 

        CHEMISTRY POC A HCO3 25      22 - 26    Normal           [place]



                                        /2012                   [facility]



                                                                



                                                                



                                                                

 

        CHEMISTRY POC A Na 138     135 - 145    Normal           [place]



                                        /2012                   [facility]



                                                                



                                                                



                                                                

 

        CHEMISTRY POC A K 4.0     3.5 - 5.1    Normal           [place]



                                        /2012                   [facility]



                                                                [facility]



                                                                



                                                                



                                                                

 

        CHEMISTRY POC A BE 1       -2-2 - 2    Normal           [place]



                                        /2012                   [facility]



                                                                



                                                                



                                                                

 

        CHEMISTRY POC A LA 1.8     0.5 - 2.2    Normal           [place]



                                        /2012                   [facility]



                                                                [facility]



                                                                



                                                                



                                                                

 

        CHEMISTRY POC A Glu 145     70 - 99    HI               [place]



                                        /2012                   [facility]



                                                                [facility]



                                                                



                                                                



                                                                

 

        CHEMISTRY POC A Ca Ion 1.16    1.16 -     Normal           [place]



                                1.30                       [facility]



                                                                Center



                                                                



                                                                



                                                                

 

        CHEMISTRY POC A Source ART                NA               [place]



                                        /2012                   [facility]



                                                                



                                                                



                                                                

 

        CHEMISTRY POC A Temp 37.0               NA               [place]



                                        /2012                   [facility]



                                                                



                                                                



                                                                

 

        CHEMISTRY POC A PCO2 40      35 - 45    Normal           [place]



                                        /2012                   [facility]



                                                                



                                                                



                                                                

 

        CHEMISTRY POC A pH 7.41    7.35 -     Normal           [place]



                                7.45                       [facility]



                                                                Center



                                                                



                                                                



                                                                

 

        CHEMISTRY POC A PO2 60      80 - 100    CRIT             [place]



                                        /2012                   [facility]



                                                                



                                                                



                                                                

 

        HEMATOLOGY Segs    84.9    45.0 -     HI               [place]



                                75.0                       [facility]



                                                                Center



                                                                



                                                                



                                                                

 

        HEMATOLOGY Lymphocytes 10.4    20.0 -     LOW              [place]



                                40.0                       [facility]



                                                                Center



                                                                



                                                                



                                                                

 

        HEMATOLOGY Monocytes 3.4     2.0 - 12.0    Normal           [place]



                                        /2012                   [facility]



                                                                [facility]



                                                                



                                                                



                                                                

 

        HEMATOLOGY Monocytes # 0.3     0.0 - 0.8    Normal                              [facility]



                                                                Center



                                                                



                                                                



                                                                

 

        HEMATOLOGY Basophils # 0.1     0.0 - 0.2    Normal                              [facility]



                                                                [facility]



                                                                



                                                                



                                                                

 

        HEMATOLOGY Eosinophils 0.1     0.0 - 4.0    Normal                              [facility]



                                                                [facility]



                                                                



                                                                



                                                                

 

        HEMATOLOGY Basophils 1.2     0.0 - 1.0    HI               [place]



                                        /2012                   [facility]



                                                                [facility]



                                                                



                                                                



                                                                

 

        HEMATOLOGY Segs-Bands # 7.7     1.5 - 8.1    Normal           [facility]

[facility]



                                                           [facility]



                                                                



                                                                



                                                                

 

        HEMATOLOGY Lymphocytes # 1.0     1.0 - 5.5    Normal           [place]



                                                           [facility]



                                                                



                                                                



                                                                

 

        HEMATOLOGY PTT     31.2    22.9 -     Normal  <sup>10</sup> [facility]

s



                                35.8               Interpretive Medical



                                                        Data: [facility]



                                                        Therapeutic 



                                                        Range:  57 - 



                                                        92 [facility] 



                                                                



                                                                

 

        HEMATOLOGY INR     1.24    0.85 -     HI      <sup>7</sup>I [facility]

s



                                1.17               nterpretive Medical



                                                        Data:   Center



                                                        RECOMMENDED 



                                                        RANGES FOR 



                                                        PROTIME 



                                                        INR:<br/> 



                                                        2.0-3.0 for 



                                                        most medical 



                                                        and surgical 



                                                        thromboemboli 



                                                        c       



                                                        states.<br/> 



                                                         2.5-3.5 for 



                                                        artificial 



                                                        heart valves 



                                                        and recurrent 



                                                        embolism.<br/ 



                                                        ><br/>INR 



                                                        SHOULD BE 



                                                        USED ONLY FOR 



                                                        PATIENTS ON 



                                                        STABLE  



                                                        ANTICOAGULANT 



                                                        THERAPY. 



                                                                



                                                                

 

        HEMATOLOGY PT      15.8    12.0 -     [facility] [place]



                                14.7                       [facility]



                                                                



                                                                



                                                                

 

        HEMATOLOGY Platelet 134     133 - 450    Normal           [place]



                                        /2012                   [facility]



                                                                



                                                                



                                                                

 

        HEMATOLOGY MCH     30.0    27.0 -     Normal           [place]



                                31.0                       [facility]



                                                                



                                                                



                                                                

 

        HEMATOLOGY MCHC    33.5    32.0 -     Normal          [facility]



                                36.0                       [facility]



                                                                



                                                                



                                                                

 

        HEMATOLOGY MCV     89.5    80.0 -     Normal          [facility]



                                94.0                       [facility]



                                                                



                                                                



                                                                

 

        HEMATOLOGY RDW     16.2    11.5 -     [facility]



                                14.5                       [facility]



                                                                



                                                                



                                                                

 

        HEMATOLOGY WBC     9.1     3.7 - 10.4    Normal           [place]



                                        /2012                   [facility]



                                                                



                                                                



                                                                

 

        HEMATOLOGY RBC     4.22    4.70 -     LOW              [place]



                                6.10                       [facility]



                                                                



                                                                



                                                                

 

        HEMATOLOGY Hgb     12.7    14.0 -     LOW             [facility]



                                18.0                       [facility]



                                                                



                                                                



                                                                

 

        HEMATOLOGY Hct     37.8    42.0 -     LOW             [facility]



                                54.0                       [facility]



                                                                



                                                                



                                                                

 

        HEMATOLOGY MPV     8.8     7.4 - 10.4    Normal           [place]



                                        /2012                   [facility]



                                                                



                                                                



                                                                

 

        URINALYSIS UA              0.1 - 1.0    NA              [facility] [place]



                Urobilinogen                                    [facility]



                                                                



                                                                



                                                                

 

        URINALYSIS UA Sq Epi None Seen            NA               [place]



                                        /2012                   [facility]



                                                                



                                                                



                                                                

 

        URINALYSIS UA Hyal Cast 1       0 - 2      Normal           [place]



                                        /2012                   [facility]



                                                                



                                                                



                                                                

 

        URINALYSIS UA Bacteria Occasional /HPF None Seen    NA             

 [facility] [place]



                        *NA                   Medical



                        (2012 22:50:00)                                 Ce

nter



                                                                



                                                                



                                                                

 

        URINALYSIS UA RBC  <1      0 - 2      Normal           [place]



                                        /2012                   Medical



                                                                Center



                                                                



                                                                



                                                                

 

        URINALYSIS UA WBC  2       0 - 5      Normal           [place]



                                        /2012                   [facility]



                                                                



                                                                



                                                                

 

        URINALYSIS UA Leuk Est Trace   Negative    ABN             [facility]



                        *ABN                   Medical



                        (2012 22:50:00)                                 Ce

nter



                                                                



                                                                



                                                                

 

        URINALYSIS UA Nitrite Negative Negative    Normal          [facility]



                        (2012 22:50:00)                            Me

dical



                                                                Center



                                                                



                                                                



                                                                

 

        URINALYSIS UA Blood Negative Negative    Normal          [facility]



                        (2012 22:50:00)                            Me

dical



                                                                Center



                                                                



                                                                



                                                                

 

        URINALYSIS UA Ketones Negative mg/dL Negative    NA              [facility]

 [place]



                        *NA                   [facility]



                        (2012 22:50:00)                                 Ce

nter



                                                                



                                                                



                                                                

 

        URINALYSIS UA Mucus Few /LPF None Seen    NA              [facility] [place]



                        *NA                   Medical



                        (2012 22:50:00)                                 Ce

nter



                                                                



                                                                



                                                                

 

        URINALYSIS UA Bili Negative Negative    NA              [facility] [place]



                        *NA                   Medical



                        (2012 22:50:00)                                 Ce

nter



                                                                



                                                                



                                                                

 

        URINALYSIS UA Glucose Negative mg/dL Negative    NA              

 [place]



                                           [facility]



                        (2012 22:50:00)                                 Ce

nter



                                                                



                                                                



                                                                

 

        URINALYSIS UA Spec Grav 1.011   <=1.030    Normal           [place]



                                        /2012                   Medical



                                                                Center



                                                                



                                                                



                                                                

 

        URINALYSIS UA Turbidity Clear   Clear      Normal          [facility]



                        (2012 22:50:00)                            Me

dical



                                                                Center



                                                                



                                                                



                                                                

 

        URINALYSIS UA Color Yellow  Yellow     NA               [place]



                        *NA                   Medical



                        (2012 22:50:00)                                 Ce

nter



                                                                



                                                                



                                                                

 

        URINALYSIS UA Protein Negative mg/dL Negative    Normal          [facility]



                        (2012 22:50:00)                            Me

dical



                                                                Center



                                                                



                                                                



                                                                

 

        URINALYSIS UA pH   5.5     5.0 - 8.0    Normal           [place]



                                        /2012                   [facility]



                                                                



                                                                



                                                                

 

        CHEMISTRY Total CK 38      12 - 191    Normal           [place]



                                        /2012                   [facility]



                                                                



                                                                



                                                                

 

        CHEMISTRY Troponin-T <0.010  0.000 - 11/29   Normal          [facility]



                                0.100                      [facility]



                                                                



                                                                



                                                                

 

        CHEMISTRY Troponin-I <0.02   0.00 -     Normal          [facility]



                                0.40                       [facility]



                                                                



                                                                



                                                                

 

        CHEMISTRY T4 Free 1.45    0.76 -     Normal          [facility]



                                1.46                       [facility]



                                                                



                                                                



                                                                

 

        Microbiolog Culture:                                    [facility]



        y       Urine                                      [facility]



                                                                



                                                                



                                                                

 

        CHEMISTRY Magnesium Lvl 1.7     1.8 - 2.4    LOW              [facility]

[facility]



                                        /2012                   [facility]



                                                                Center



                                                                



                                                                



                                                                

 

        CHEMISTRY Phosphorus 4.5     2.5 - 4.5    Normal           [place]



                                        /2012                   [facility]



                                                                



                                                                



                                                                

 

        CHEMISTRY TSH     0.291   0.360 -    LOW              [place]



                                3.740                      [facility]



                                                                



                                                                



                                                                

 

        CHEMISTRY eGFR    83                 NA      <sup>2</sup>R  [place]



                                        /2012           esult   Medical



                                                        Comment: The Center



                                                        eGFR is 



                                                        calculated 



                                                        using the 



                                                        CKD-EPI 



                                                        formula. In 



                                                        most young, 



                                                        healthy 



                                                        individuals 



                                                        the eGFR will 



                                                        be >90  



                                                        mL/min/1.73m2 



                                                        . The eGFR 



                                                        declines with 



                                                        age. An eGFR 



                                                        of 60-89 may 



                                                        be normal in 



                                                        some    



                                                        populations, 



                                                        particularly 



                                                        the elderly, 



                                                        for whom the 



                                                        CKD-EPI 



                                                        formula has 



                                                        not been 



                                                        extensively 



                                                        validated. 



                                                        Use of the 



                                                        eGFR is not 



                                                        recommended 



                                                        in the  



                                                        following 



                                                        populations:& 



                                                        lt;br/><br/>I 



                                                        ndividuals 



                                                        with unstable 



                                                        creatinine 



                                                        concentration 



                                                        s, including 



                                                        pregnant 



                                                        patients and 



                                                        those with 



                                                        serious 



                                                        co-morbid 



                                                        conditions.<b 



                                                        r/><br/>Patie 



                                                        nts with 



                                                        extremes in 



                                                        muscle mass 



                                                        or diet. 



                                                        <br/><br/>The 



                                                        data above 



                                                        are obtained 



                                                        from the 



                                                        [employer] 



                                                        [place]  



                                                        Disease 



                                                        Education 



                                                        Program 



                                                        (NKDEP) which 



                                                        additionally 



                                                        recommends 



                                                        that when the 



                                                        eGFR is used 



                                                        in patients 



                                                        with extremes 



                                                        of body mass 



                                                        index for 



                                                        purposes of 



                                                        drug dosing, 



                                                        the eGFR 



                                                        should be 



                                                        multiplied by 



                                                        the estimated 



                                                        BMI.    



                                                                

 

        CHEMISTRY BUN     18      7 - 22     Normal           [place]



                                        /2012                   [facility]



                                                                



                                                                



                                                                

 

        CHEMISTRY Glucose Lvl 129     70 - 99    HI      <sup>5</sup>I  T

exas



                                                   nterpretive Medical



                                                        Data: [facility]



                                                        reference 



                                                        range values 



                                                        reflect the 



                                                        clinical 



                                                        guidelines<br 



                                                        />of the 



                                                        [employer] 



                                                        [employer]abetes 



                                                        Association. 



                                                                



                                                                

 

        CHEMISTRY Alk Phos 130     39 - 136    Normal           [place]



                                        /2012                   [facility]



                                                                



                                                                



                                                                

 

        CHEMISTRY Creatinine 0.9     0.5 - 1.4    Normal          [facility]



                Lvl                                        [facility]



                                                                



                                                                



                                                                

 

        CHEMISTRY Sodium Lvl 141     135 - 145    Normal           [place]



                                        /2012                   [facility]



                                                                



                                                                



                                                                

 

        CHEMISTRY ALT     24      0 - 65     Normal           [place]



                                        /2012                   Medical



                                                                Center



                                                                



                                                                



                                                                

 

        CHEMISTRY Albumin Lvl 4.4     3.5 - 5.0    Normal           [place]



                                        /2012                   [facility]



                                                                



                                                                



                                                                

 

        CHEMISTRY Total Protein 7.2     6.4 - 8.4    Normal           [facility]

[facility]



                                        /2012                   [facility]



                                                                [facility]



                                                                



                                                                



                                                                

 

        CHEMISTRY AST     18      0 - 37     Normal           [place]



                                        /2012                   [facility]



                                                                [facility]



                                                                



                                                                



                                                                

 

        CHEMISTRY Calcium Lvl 8.8     8.5 - 10.5    Normal           [facility]

                   [facility]



                                                                [facility]



                                                                



                                                                



                                                                

 

        CHEMISTRY Potassium Lvl 4.5     3.5 - 5.1    Normal           [facility]

[facility]



                                        /2012                   [facility]



                                                                [facility]



                                                                



                                                                



                                                                

 

        CHEMISTRY CO2     25      24 - 32    Normal           [place]



                                        /2012                   [facility]



                                                                [facility]



                                                                



                                                                



                                                                

 

        CHEMISTRY Chloride Lvl 105     95 - 109    Normal           [place]



                                        /2012                   [facility]



                                                                



                                                                



                                                                

 

        CHEMISTRY Bili Total 1.9     0.2 - 1.3    HI               [place]



                                        /2012                   [facility]



                                                                [facility]



                                                                



                                                                



                                                                

 

        CHEMISTRY A/G Ratio 1.6     0.7 - 1.6    Normal           [place]



                                        /2012                   [facility]



                                                                [facility]



                                                                



                                                                



                                                                

 

        CHEMISTRY Globulin 2.8     2.0 - 4.0    Normal           [place]



                                        /2012                   [facility]



                                                                



                                                                



                                                                

 

        CHEMISTRY B/C Ratio 20      6 - 25     Normal           [place]



                                        /2012                   [facility]



                                                                [facility]



                                                                



                                                                



                                                                

 

        CHEMISTRY AGAP    15.5    10.0 -     Normal           [place]



                                20.0                       [facility]



                                                                [facility]



                                                                



                                                                



                                                                

 

        HEMATOLOGY RDW     16.0    11.5 -     HI               [place]



                                14.5                       [facility]



                                                                [facility]



                                                                



                                                                



                                                                

 

        HEMATOLOGY MCHC    32.8    32.0 -     Normal           [place]



                                36.0                       [facility]



                                                                [facility]



                                                                



                                                                



                                                                

 

        HEMATOLOGY Platelet 156     133 - 450    Normal           [place]



                                        /2012                   [facility]



                                                                [facility]



                                                                



                                                                



                                                                

 

        HEMATOLOGY MPV     8.9     7.4 - 10.4    Normal           [place]



                                        /2012                   [facility]



                                                                [facility]



                                                                



                                                                



                                                                

 

        HEMATOLOGY WBC     8.3     3.7 - 10.4    Normal           [place]



                                        /2012                   [facility]



                                                                [facility]



                                                                



                                                                



                                                                

 

        HEMATOLOGY RBC     4.57    4.70 -     LOW              [place]



                                6.10                       [facility]



                                                                [facility]



                                                                



                                                                



                                                                

 

        HEMATOLOGY Hgb     13.5    14.0 -     LOW              [place]



                                18.0                       [facility]



                                                                [facility]



                                                                



                                                                



                                                                

 

        HEMATOLOGY MCV     90.0    80.0 -     Normal           [place]



                                94.0                       [facility]



                                                                [facility]



                                                                



                                                                



                                                                

 

        HEMATOLOGY Hct     41.1    42.0 -     LOW              [place]



                                54.0    /                   Medical



                                                                Center



                                                                



                                                                



                                                                

 

        HEMATOLOGY MCH     29.6    27.0 -     Normal           [place]



                                31.0                       [facility]



                                                                [facility]



                                                                



                                                                



                                                                

 

        HEMATOLOGY Basophils 0.2     0.0 - 1.0    Normal           [place]



                                        /2012                   [facility]



                                                                [facility]



                                                                



                                                                



                                                                

 

        HEMATOLOGY Monocytes # 0.1     0.0 - 0.8    Normal           [facility]

[facility]



                                                           [facility]



                                                                [facility]



                                                                



                                                                



                                                                

 

        HEMATOLOGY Lymphocytes # 0.9     1.0 - 5.5    LOW              [facility]



                                                           [facility]



                                                                



                                                                



                                                                

 

        HEMATOLOGY Segs-Bands # 7.2     1.5 - 8.1    Normal           [facility]

[facility]



                                                           [facility]



                                                                [facility]



                                                                



                                                                



                                                                

 

        HEMATOLOGY Segs    86.6    45.0 -     HI               [place]



                                75.0                       [facility]



                                                                [facility]



                                                                



                                                                



                                                                

 

        HEMATOLOGY Eosinophils 0.3     0.0 - 4.0    Normal           [facility]

[facility]



                                                           [facility]



                                                                [facility]



                                                                



                                                                



                                                                

 

        HEMATOLOGY Monocytes 1.5     2.0 - 12.0    LOW              [place]



                                        /2012                   [facility]



                                                                [facility]



                                                                



                                                                



                                                                

 

        HEMATOLOGY Lymphocytes 11.4    20.0 -     LOW              [place]



                                40.0                       [facility]



                                                                Center



                                                                



                                                                



                                                                

 

        CHEMISTRY POC A Ca Ion 1.15    1.16 -     LOW              [place]



                                1.30                       [facility]



                                                                Center



                                                                



                                                                



                                                                

 

        CHEMISTRY POC A LA 0.7     0.5 - 2.2    Normal           [place]



                                        /2012                   [facility]



                                                                [facility]



                                                                



                                                                



                                                                

 

        CHEMISTRY POC A O2 Sat 81.0    95.0 -     LOW              [place]



                                100.0                      [facility]



                                                                Center



                                                                



                                                                



                                                                

 

        CHEMISTRY POC A BE 1       -2-2 - 2    Normal           [place]



                                        /2012                   [facility]



                                                                [facility]



                                                                



                                                                



                                                                

 

        CHEMISTRY POC A Na 137     135 - 145    Normal           [place]



                                        /2012                   [facility]



                                                                



                                                                



                                                                

 

        CHEMISTRY POC A K 4.3     3.5 - 5.1    Normal           [place]



                                        /2012                   [facility]



                                                                [facility]



                                                                



                                                                



                                                                

 

        CHEMISTRY POC A Hct 41.0    42.0 -     LOW              [place]



                                54.0                       [facility]



                                                                Center



                                                                



                                                                



                                                                

 

        CHEMISTRY POC A Glu 115     70 - 99    HI               [place]



                                        /2012                   [facility]



                                                                [facility]



                                                                



                                                                



                                                                

 

        CHEMISTRY POC A PO2 46      80 - 100    CRIT             [place]



                                        /2012                   [facility]



                                                                [facility]



                                                                



                                                                



                                                                

 

        CHEMISTRY POC A pH 7.39    7.35 -     Normal           [place]



                                7.45                       [facility]



                                                                Center



                                                                



                                                                



                                                                

 

        CHEMISTRY POC A HCO3 26      22 - 26    Normal           [place]



                                        /2012                   [facility]



                                                                [facility]



                                                                



                                                                



                                                                

 

        CHEMISTRY POC A PCO2 43      35 - 45    Normal           [place]



                                        /2012                   [facility]



                                                                [facility]



                                                                



                                                                



                                                                

 

        CHEMISTRY POC A Temp 37.0               NA               [place]



                                        /2012                   [facility]



                                                                [facility]



                                                                



                                                                



                                                                

 

        CHEMISTRY POC A Source ART                NA               [place]



                                        /2012                   [facility]



                                                                [facility]



                                                                



                                                                



                                                                

 

        HEMATOLOGY PT      15.4    12.0 -     HI               [place]



                                14.7                       Medical



                                                                Center



                                                                



                                                                



                                                                

 

        HEMATOLOGY INR     1.20    0.85 -     HI      <sup>8</sup>I [facility]

s



                                1.           nterpretive Medical



                                                        Data:   Center



                                                        RECOMMENDED 



                                                        RANGES FOR 



                                                        PROTIME 



                                                        INR:<br/> 



                                                        2.0-3.0 for 



                                                        most medical 



                                                        and surgical 



                                                        thromboemboli 



                                                        c       



                                                        states.<br/> 



                                                         2.5-3.5 for 



                                                        artificial 



                                                        heart valves 



                                                        and recurrent 



                                                        embolism.<br/ 



                                                        ><br/>INR 



                                                        SHOULD BE 



                                                        USED ONLY FOR 



                                                        PATIENTS ON 



                                                        STABLE  



                                                        ANTICOAGULANT 



                                                        THERAPY. 



                                                                



                                                                

 

        HEMATOLOGY PTT     32.9    22.9 -     Normal  <sup>11</sup>  [facility]

s



                                35.8               Interpretive Medical



                                                        Data: [facility]



                                                        Therapeutic 



                                                        Range:  57 - 



                                                        92 Seconds 



                                                                



                                                                

 

        CHEMISTRY Globulin 2.8     2.0 - 4.0    Normal           [place]



                                        /2012                   [facility]



                                                                



                                                                



                                                                

 

        CHEMISTRY A/G Ratio 1.5     0.7 - 1.6    Normal           [place]



                                        /2012                   [facility]



                                                                



                                                                



                                                                

 

        CHEMISTRY AGAP    13.0    10.0 -     Normal          [facility]



                                20.0                       [facility]



                                                                



                                                                



                                                                

 

        CHEMISTRY B/C Ratio 20      6 - 25     Normal           [place]



                                        /2012                   [facility]



                                                                



                                                                



                                                                

 

        CHEMISTRY eGFR    84                 NA      <sup>3</sup>R  [place]



                                        /2012           esult   Medical



                                                        Comment: [facility]



                                                        eGFR is 



                                                        calculated 



                                                        using the 



                                                        CKD-EPI 



                                                        formula. In 



                                                        most young, 



                                                        healthy 



                                                        individuals 



                                                        the eGFR will 



                                                        be >90  



                                                        mL/min/1.73m2 



                                                        . The eGFR 



                                                        declines with 



                                                        age. An eGFR 



                                                        of 60-89 may 



                                                        be normal in 



                                                        some    



                                                        populations, 



                                                        particularly 



                                                        the elderly, 



                                                        for whom the 



                                                        CKD-EPI 



                                                        formula has 



                                                        not been 



                                                        extensively 



                                                        validated. 



                                                        Use of the 



                                                        eGFR is not 



                                                        recommended 



                                                        in the  



                                                        following 



                                                        populations:& 



                                                        lt;br/><br/>I 



                                                        ndividuals 



                                                        with unstable 



                                                        creatinine 



                                                        concentration 



                                                        s, including 



                                                        pregnant 



                                                        patients and 



                                                        those with 



                                                        serious 



                                                        co-morbid 



                                                        conditions.<b 



                                                        r/><br/>Patie 



                                                        nts with 



                                                        extremes in 



                                                        muscle mass 



                                                        or diet. 



                                                        <br/><br/>The 



                                                        data above 



                                                        are obtained 



                                                        from the 



                                                        [employer] 



                                                        [place]  



                                                        Disease 



                                                        Education 



                                                        Program 



                                                        (NKDEP) which 



                                                        additionally 



                                                        recommends 



                                                        that when the 



                                                        eGFR is used 



                                                        in patients 



                                                        with extremes 



                                                        of body mass 



                                                        index for 



                                                        purposes of 



                                                        drug dosing, 



                                                        the eGFR 



                                                        should be 



                                                        multiplied by 



                                                        the estimated 



                                                        BMI.    



                                                                

 

        CHEMISTRY AST     28      0 - 37     Normal           [place]



                                        /2012                   [facility]



                                                                



                                                                



                                                                

 

        CHEMISTRY Sodium Lvl 141     135 - 145    Normal           [place]



                                        /2012                   [facility]



                                                                



                                                                



                                                                

 

        CHEMISTRY Total Protein 7.0     6.4 - 8.4    Normal          [facility]

as



                                        /2012                   [facility]



                                                                



                                                                



                                                                

 

        CHEMISTRY Chloride Lvl 104     95 - 109    Normal           [place]



                                        /2012                   [facility]



                                                                



                                                                



                                                                

 

        CHEMISTRY Potassium Lvl 5.0     3.5 - 5.1    Normal           [facility]

[facility]



                                        /2012                   [facility]



                                                                



                                                                



                                                                

 

        CHEMISTRY CO2     29      24 - 32    Normal           [place]



                                        /2012                   [facility]



                                                                



                                                                



                                                                

 

        CHEMISTRY Bili Total 1.3     0.2 - 1.3    Normal           [place]



                                        /2012                   [facility]



                                                                



                                                                



                                                                

 

        CHEMISTRY Calcium Lvl 8.8     8.5 - 10.5    Normal                              [facility]



                                                                



                                                                



                                                                

 

        CHEMISTRY Creatinine 0.9     0.5 - 1.4    Normal          [facility]



                                   [facility]



                                                                



                                                                



                                                                

 

        CHEMISTRY BUN     18      7 - 22     Normal           [place]



                                        /2012                   [facility]



                                                                



                                                                



                                                                

 

        CHEMISTRY Albumin Lvl 4.2     3.5 - 5.0    Normal           [place]



                                        /2012                   [facility]



                                                                



                                                                



                                                                

 

        CHEMISTRY Glucose Lvl 93      70 - 99    Normal  <sup>6</sup>I  [facility]

[facility]



                                        /2012           [facility]



                                                        Data: [facility]



                                                        reference 



                                                        range values 



                                                        reflect the 



                                                        clinical 



                                                        guidelines<br 



                                                        />of the 



                                                        American 



                                                        Diabetes 



                                                        Association. 



                                                                



                                                                

 

        CHEMISTRY Alk Phos 102     39 - 136    Normal           [place]



                                        /2012                   [facility]



                                                                



                                                                



                                                                

 

        CHEMISTRY ALT     27      0 - 65     Normal           [place]



                                        /2012                   [facility]



                                                                



                                                                



                                                                

 

        HEMATOLOGY Basophils # 0.1     0.0 - 0.2    Normal                              [facility]



                                                                



                                                                



                                                                

 

        HEMATOLOGY Basophils 2.0     0.0 - 1.0    HI               [place]



                                        /2012                   [facility]



                                                                



                                                                



                                                                

 

        HEMATOLOGY Eosinophils 2.9     0.0 - 4.0    Normal                              [facility]



                                                                



                                                                



                                                                

 

        HEMATOLOGY Segs-Bands # 4.4     1.5 - 8.1    Normal           [facility]

[facility]



                                        /2012                   [facility]



                                                                



                                                                



                                                                

 

        HEMATOLOGY Lymphocytes # 1.8     1.0 - 5.5    Normal           [facility]

                   [facility]



                                                                



                                                                



                                                                

 

        HEMATOLOGY Monocytes # 0.7     0.0 - 0.8    Normal                              [facility]



                                                                



                                                                



                                                                

 

        HEMATOLOGY Eosinophils # 0.2     0.0 - 0.5    Normal           Te

                   [facility]



                                                                



                                                                



                                                                

 

        HEMATOLOGY Monocytes 9.7     2.0 - 12.0    Normal           [place]



                                        /2012                   [facility]



                                                                



                                                                



                                                                

 

        HEMATOLOGY Segs    61.0    45.0 -     Normal           [place]



                                75.0                       [facility]



                                                                Center



                                                                



                                                                



                                                                

 

        HEMATOLOGY Lymphocytes 24.4    20.0 -     Normal          [facility] [place]



                                40.0    /2012                   [facility]



                                                                



                                                                



                                                                

 

        HEMATOLOGY RBC     4.44    4.70 -  11   LOW              [place]



                                6.10    /                   [facility]



                                                                



                                                                



                                                                

 

        HEMATOLOGY Hgb     13.0    14.0 -  11   LOW             [facility]



                                18.0    /                   [facility]



                                                                



                                                                



                                                                

 

        HEMATOLOGY Hct     40.2    42.0 -  11   LOW             [facility]



                                54.0    /                   [facility]



                                                                



                                                                



                                                                

 

        HEMATOLOGY WBC     7.3     3.7 - 10.4    Normal           [place]



                                        /2012                   [facility]



                                                                



                                                                



                                                                

 

        HEMATOLOGY MCH     29.4    27.0 -  11   Normal          [facility]



                                31.0    /                   [facility]



                                                                



                                                                



                                                                

 

        HEMATOLOGY MCHC    32.4    32.0 -  11   Normal          [facility]



                                36.0    /                   [facility]



                                                                



                                                                



                                                                

 

        HEMATOLOGY RDW     16.0    11.5 -     [facility]



                                14.                   [facility]



                                                                



                                                                



                                                                

 

        HEMATOLOGY MCV     90.6    80.0 -     Normal          [facility]



                                94.0    /                   [facility]



                                                                



                                                                



                                                                

 

        HEMATOLOGY Platelet 153     133 - 450    Normal           [place]



                                        /                   [facility]



                                                                



                                                                



                                                                

 

        HEMATOLOGY MPV     8.9     7.4 - 10.4    Normal           [place]



                                        /2012                   [facility]



                                                                



                                                                



                                                                

 

        HEMATOLOGY PT      20.1    12.0 -     [facility]



                                14.7                       [facility]



                                                                



                                                                



                                                                

 

        HEMATOLOGY PTT     60.9    22.9 -     [place]      <sup>12</sup>  [place]

[place]



                                35.8    /           [facility]



                                                        Data: [facility]



                                                        Therapeutic 



                                                        Range:  57 - 



                                                        92 [facility] 



                                                                



                                                                

 

        HEMATOLOGY INR     1.70    0.85 -     [place]      <sup>9</sup>I  [place]

[facility]



                                1.17               [facility]



                                                        Data:   Center



                                                        RECOMMENDED 



                                                        RANGES FOR 



                                                        PROTIME 



                                                        INR:<br/> 



                                                        2.0-3.0 for 



                                                        most medical 



                                                        and surgical 



                                                        thromboemboli 



                                                        c       



                                                        states.<br/> 



                                                         2.5-3.5 for 



                                                        artificial 



                                                        heart valves 



                                                        and recurrent 



                                                        embolism.<br/ 



                                                        ><br/>INR 



                                                        SHOULD BE 



                                                        USED ONLY FOR 



                                                        PATIENTS ON 



                                                        STABLE  



                                                        ANTICOAGULANT 



                                                        THERAPY. 



                                                                



                                                                







Pathology Reports







                                        No Data Provided for This Section







Diagnostic Reports







                Report          Value           Date            Source



                                                                

 

                Barium swallow DX Study: Barium swallow DX 2019       OP

[facility]



                                                    Clinical Indication:  - R13.

10   Dysphagia, unspecified,J39.2   Other diseases 

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       

                    



                                Comparison: None                 



                                Fluoroscopy time: 1.23 minutes                 



                                                    FINDINGS: The barium swallow

 examination shows indentation along the posterior 

pharynx at the level of the lower cervical spine, suspicious for a 
cricopharyngeal bar. There are no erosions, ulcerations                         

  



                                                    or masses. There is normal i

nitiation of the swallowing. Aspiration of barium 

was noted followed by protective cough. Large amount of residual contrast was 
noted in the piriform sinuses.                           



                                                    There is normal esophageal c

ontour and morphology without erosions, 

ulcerations, strictures or masses. Scattered tertiary esophageal contractions 
are seen. There is no hiatal hernia. There was no gastroesophageal reflux 
elicited during the exam.                           



                                                                



                                IMPRESSION:                     



                                                    1. Aspiration of contrast no

allyssa during the examination, followed by protective 

cough. Further evaluation with modified barium swallow study performed with 
speech language pathology may be helpful.                           



                                2. Incidentally noted cricopharyngeal bar.      

           



                                3. Scattered tertiary esophageal contractions.  

               



                                                                



                                SL: [id number]                     



                                                                

 

                Facial Bones wo EXAM: CT ORBITS WITHOUT CONTRAST 2017     

 [facility]



                contrast CT (ER) DATE: 3/20/2017 8:54 PM CDT                 [facility]

ter



                                INDICATION: Pain Post Trauma - trauma           

      



                                COMPARISON: None                 



                                                    TECHNIQUE: Volumetric CT acq

uisition of the orbits without contrast. Axial, 

coronal and sagittal reconstructions.                           



                                IV contrast: None.                 



                                DLP: 123 mGy-cm                 



                                FINDINGS:                       



                                Bones: No fracture or other acute bony abnormali

ty is identified.                 



                                                    The paranasal sinuses and ma

stoid air cells are clear. Dentures are visualized 

and are intact.                                     



                                                    Soft tissues: No abnormality

 of the globes is seen. There is no intraconal 

hematoma. Mild soft tissue contusion is seen in the left frontal region and 
possibly at the left cheek. No radiopaque foreign body is identified.           

                



                                IMPRESSION:                     



                                                    1.  Mild soft tissue contusi

on seen in the left frontal region and possibly the

 left cheek                                         



                                2.  No acute fracture or malalignment abnormalit

y.                 



                                3.  Diffuse osteopenia.                 



                                4.  Mild atherosclerotic calcification of the in

ternal carotid arteries.                 



                                                                

 

                Spine cervical wo EXAM: CT CERVICAL SPINE WITHOUT CONTRAST       [facility] 

Medical



                contrast CT     DATE: 3/20/2017 8:54 PM CDT                 Cent

er



                                INDICATION: Pain Post Trauma - trauma           

      



                                COMPARISON: None                 



                                                    TECHNIQUE:  Volumetric acqui

sition of the cervical spine without contrast. 

Axial, sagittal and coronal reconstructions.                           



                                IV contrast: None.                 



                                DLP: 478 mGy-cm                 



                                                    FINDINGS: The spine is image

d from the skull base to the level of superior 

endplate of T2.                                     



                                                    No acute fracture or malalig

nment is identified. There is severe diffuse bone 

demineralization. Moderate to severe degenerative changes as evidenced by 
multilevel uncovertebral joint arthropathy C4-C7,                           



                                                    posterior disc osteophyte co

mplexes from C5 to C7 and anterior endplate 

osteophytosis from C2 to C6. No soft tissue abnormality is identified.          

                 



                                                    Bilateral carotid bulbs are 

moderately calcified. Biapical atelectasis and/or 

scarring is seen.                                   



                                IMPRESSION:                     



                                1.  No acute abnormality of the cervical spine. 

                



                                2.  Severe diffuse bone demineralization        

         



                                3.  Moderate to severe degenerative changes.    

             



                                4.  Biapical atelectasis and/or scarring.       

          



                                5.  Moderate desiccation of bilateral carotid bu

lbs.                 



                                                                

 

                Brain wo contrast CT CT HEAD WITHOUT CONTRAST 2017      [facility]



                                DATE: 3/20/2017 at 10:10 PM.                 Marek

ter



                                COMPARISON: None.                 



                                HISTORY: Pain Post Trauma - trauma.             

    



                                                    TECHNIQUE: Contiguous axial 

images of the brain were obtained without 

intravenous contrast administration. Sagittal and coronal reformats were also 
provided. DLP: 997 mGy-cm.                           



                                FINDINGS:                       



                                                    There are no acute hemorrhag

es or acute infarcts. The gray-white interfaces are

 well defined.                                      



                                                    Low density is noted within 

the periventricular white matter consistent with 

chronic small vessel ischemic disease. There is prominence of the cortical 
sulci, fissures, cisterns and ventricles consisten                           



                                t with cortical atrophy appropriate for the leobardo

ent's stated age of 81 years.                 



                                                    There are no acute bony abno

rmalities. The calvarium is intact. Atherosclerotic

 calcification is noted within the walls of the left vertebral and bilateral 
cavernous internal carotid arteries. A prosthe                           



                                        tic lens is noted within the left lobe c

onsistent with prior cataract surgery. 

                                        



                                IMPRESSION:                     



                                1. No acute intracranial abnormality.           

      



                                2. Age related volume loss.                 



                                                    3. White matter hypodensity 

consistent with chronic small vessel ischemic 

disease.                                            



                                4. Arterial atherosclerosis.                 



                                                    Resident preliminary report 

by Dr. Connor Dan:  neg acute diffuse 

cerebral volume loss and chronic microangiopathic ischemic changes.             

              



                                                                

 

                Chest/Abdomen/Pelvis EXAM: CT CHEST WITH CONTRAST 2017    

  [facility] IV contrast CT EXAM: CT ABDOMEN AND PELVIS WITH CONTRAST      

           Center



                                DATE: 3/20/2017 8:54 PM CDT                 



                                INDICATION: Pain Post Trauma - trauma           

      



                                COMPARISON: None.                 



                                                    TECHNIQUE: Volumetric acquis

ition of the chest, abdomen and pelvis following 

intravenous administration of contrast. Delayed imaging was then performed 
through the abdomen and pelvis, using a radiation                           



                                reduction technique. Axial, coronal and sagittal

 reformats.                 



                                IV contrast: 150 mL Visipaque 320               

  



                                Oral contrast: None.                 



                                DLP: 2304 mGy-cm                 



                                FINDINGS:                       



                                Lines and Tubes: None.                 



                                Lower Neck: Visible portions unremarkable.      

           



                                                    Thoracic Aorta and Mediastin

um: No mediastinal hematoma or thoracic aortic 

injury. Postsurgical changes status post coronary artery                        

   



                                                    Lungs and Pleura: There is b

iapical scarring and/or atelectasis. right upper 

lung scattered hazy groundglass opacities. Mild bibasilar subsegmental 
atelectasis. No pleural fluid or pneumothorax.                           



                                Hepatobiliary: Normal.                 



                                Gallbladder: No injury.                 



                                Spleen: Normal.                 



                                Pancreas: Normal.                 



                                Adrenals: Normal.                 



                                                    Kidneys: No acute renal inju

ry. Multiple cysts are noted bilaterally with the 

largest measuring 2.4 cm in the lower pole of the right kidney.                 

          



                                Ureters and Bladder: No injury.                 



                                Reproductive Organs: No injury.                 



                                Gastrointestinal Tract: No injury.              

   



                                Peritoneum and Retroperitoneum: No fluid collect

ions or free air.                 



                                                    Abdominal/Pelvic Vasculature

: No vascular injury. The abdominal aorta and 

bilateral proximal iliac arteries are severely calcified.                       

    



                                Lymphadenopathy: None.                 



                                                    Spine/Bones: There is a 

anabela appearing compression deformity of L2 40% 

height loss. No acute abnormalities noted of spine. Mild multilevel degenerative
 changes are present. No other acute bony injury.  Diffuse osteopenia seen      

                     



                                                    Soft Tissues: No acute injur

y. A small lipoma is seen in the left anterior 

pelvic wall.                                        



                                IMPRESSION:                     



                                1.  No acute thoracic, abdominal, or pelvic abno

rmality                 



                                2.  Right upper lobe scarring and edema, but no 

anastasiya contusion                 



                                3.  Chronic compression deformity of L2 with corey

roximately 40% height loss                 



                                4.  Mild multilevel degenerative changes of thor

acic and lumbar spine                 



                                5.  Biapical scarring and/or atelectasis        

         



                                6.  Diffuse osteopenia                 



                                7.  Multiple bilateral renal cysts              

   



                                8.  Small lipoma in the left anterior pelvic wal

l                 



                                                                

 

                Elbow 3 views DX EXAM: XR LEFT ELBOW 3 VIEWS 2017      [facility]



                                DATE: 3/20/2017 9:20 PM CDT                 Cent

er



                                INDICATION: trauma - trauma                 



                                COMPARISON: None                 



                                TECHNIQUE:  AP, lateral and oblique radiographs 

of the left elbow                 



                                                    DISCUSSION: No acute fractur

e or malalignment is identified. There is no 

excessive joint fluid. No soft tissue abnormality is identified.                

           



                                IMPRESSION:  No acute abnormality.              

   



                                                                

 

                Knee 3 views DX EXAM: XR LEFT KNEE 3 VIEWS 2017      [facility]



                                DATE: 3/20/2017 9:20 PM CDT                 Cent

er



                                INDICATION: trauma - trauma                 



                                COMPARISON: None.                 



                                TECHNIQUE:  AP, lateral and oblique views of the

 left knee                 



                                                    DISCUSSION: Total arthroplas

ty hardware is seen in usual position and anatomic 

alignment. No acute fracture or malalignment is identified. There is no 
excessive joint fluid. No soft tissue abnormality is identified.                

           



                                IMPRESSION:  No acute abnormality.              

   



                                                                

 

                Hand 3 views DX EXAM: XR LEFT HAND 3 VIEWS 2017       [facility]



                                EXAM: XR RIGHT HAND 3 VIEWS                 Cent

er



                                DATE: 3/20/2017 9:20 PM CDT                 



                                INDICATION: trauma - trauma                 



                                COMPARISON: None                 



                                TECHNIQUE:  PA, lateral and oblique radiographs 

of each hand                 



                                                    DISCUSSION: No acute fractur

e or malalignment is identified. Osteoarthrosis is 

noted bilaterally and rather diffuse distribution. There is swelling in the 
dorsal aspect of the metacarpophalangeal area o                           



                                                    f the right hand, on the lat

eral view. Swelling is also present in the left 

wrist area.                                         



                                                    IMPRESSION:  Soft tissue swe

lling at the right hand, and left wrist, but no 

other acute abnormality.                            



                                                                

 

                Hand 3 views DX EXAM: XR LEFT HAND 3 VIEWS 2017       [facility]



                                EXAM: XR RIGHT HAND 3 VIEWS                 Cent

er



                                DATE: 3/20/2017 9:20 PM CDT                 



                                INDICATION: trauma - trauma                 



                                COMPARISON: None                 



                                TECHNIQUE:  PA, lateral and oblique radiographs 

of each hand                 



                                                    DISCUSSION: No acute fractur

e or malalignment is identified. Osteoarthrosis is 

noted bilaterally and rather diffuse distribution. There is swelling in the 
dorsal aspect of the metacarpophalangeal area o                           



                                                    f the right hand, on the lat

eral view. Swelling is also present in the left 

wrist area.                                         



                                                    IMPRESSION:  Soft tissue swe

lling at the right hand, and left wrist, but no 

other acute abnormality.                            



                                                                

 

                Knee 3 views DX EXAM: XR RIGHT KNEE 3 VIEWS 2017       [facility]

[facility]



                                DATE: 3/20/2017 9:20 PM CDT                 Cent

er



                                INDICATION: trauma - trauma                 



                                COMPARISON: None.                 



                                TECHNIQUE:  AP, lateral and oblique views of the

 right knee                 



                                                    DISCUSSION: Total arthroplas

ty hardware is seen in usual position and anatomic 

alignment. No acute fracture or malalignment is identified. There is no 
excessive joint fluid. No soft tissue abnormality is identified.                

           



                                IMPRESSION:  No acute abnormality.              

   



                                                                

 

                Chest 1view DX  EXAM: XR CHEST 1 VIEW 2017      [facility]



                                DATE: 3/20/2017 at 2043 hours                 Ce

nter



                                                                



                                INDICATION: Pain Post Trauma - trauma           

      



                                COMPARISON: None available.                 



                                TECHNIQUE: AP chest                 



                                FINDINGS:                       



                                                    Lines, tubes and hardware: P

ulse generator projects over the left anterior 

chest with pacing lead in the right atrium, AICD lead in the right ventricle, 
and pacing lead in the coronary sinus. Median sternotomy wires are seen and 
intact.                                             



                                                    Lungs and pleura: Diffuse bi

lateral airspace disease consistent with pulmonary 

edema.                                              



                                Heart and mediastinum: The cardiomediastinal [place]

houette is enlarged.                 



                                Bones: No acute bony abnormality is identified. 

                



                                IMPRESSION:                     



                                Cardiomegaly with diffuse pulmonary edema       

          



                                                                

 

                Pelvis AP DX    EXAM: XR PELVIS 1 VIEW 2017      [facility]



                                DATE: 3/20/2017 at 2043 hours                 Ce

nter



                                INDICATION: Pain Post Trauma - trauma           

      



                                COMPARISON: None available.                 



                                TECHNIQUE:  A single AP supine radiograph of the

 pelvis                 



                                FINDINGS:     No acute fracture or malalignment 

is identified.                 



                                Large and small bowel loops are filled with stoo

l and gas and are nondilated.                 



                                IMPRESSION:  No acute abnormality.              

   



                                                                

 

                Esophagus BA swallow EXAM: FLUOROSCOPY MODIFIED BARIUM SWALLOW 0

2016      [facility]



                function video DX DATE: 2016 at 0958 hours                

 Center



                                INDICATION: Dysphagia.                 



                                COMPARISON: Modified barium swallow 2013. 

                



                                                    TECHNIQUE: Oral barium contr

ast of differing consistencies was given to the 

patient to assess swallowing mechanism. The study was performed in conjunction 
with speech pathology.                              



                                FLUOROSCOPY TIME: 2 minutes 8 seconds           

      



                                DISCUSSION:                     



                                                    The patient was given barium

 contrast of different consistencies.  The 

swallowing reflex is delayed with difficulty with pharyngeal peristalsis.       

                    



                                Thin barium with spoon: Symptomatic aspiration. 

                



                                Nectar barium with spoon: Symptomatic aspiration

.                 



                                Honey barium with spoon: Severe vallecular pooli

ng.                 



                                                    Pudding barium with spoon: S

evere vallecular pooling causing laryngeal 

penetration.                                        



                                IMPRESSION:                     



                                                    1.  Please refer to speech p

athologist's notes for further details and 

recommendations.                                    



                                2.  Symptomatic aspiration with thin and nectar 

barium.                 



                                                                







Consultation Notes







                                        No Data Provided for This Section







Discharge Summaries







                                        No Data Provided for This Section







History and Physicals







                                        No Data Provided for This Section







Vital Signs







             Vital Sign   Value        Date         Comments     Source



                                                                 

 

             Heart Rate   80           2019                [facility]

l



                                                                 Center



                                                                 

 

             Respitory Rate 18           2019                [facility]

[facility]



                                                                 Center



                                                                 

 

             Systolic (mm Hg) 120          2019                [facility]

[facility]



                                                                 Center



                                                                 

 

             Diastolic (mm Hg) 70           2019                [facility]

[facility]



                                                                 Center



                                                                 

 

             Respitory Rate 20           2017                [facility]

[facility]



                                                                 Center



                                                                 

 

             Systolic (mm Hg) 125          2017                [facility]

[facility]



                                                                 Center



                                                                 

 

             Diastolic (mm Hg) 74           2017                [facility]

[facility]



                                                                 Center



                                                                 

 

             Heart Rate   85           2017                [facility]

l



                                                                 Center



                                                                 

 

             Temperature Oral (F) 97.9 F       2017                [facility]



                                                                 Center



                                                                 

 

             Temperature Oral (F) 97.6 F       2017                [facility]



                                                                 Center



                                                                 

 

             Heart Rate   86           2017                [facility]

l



                                                                 Center



                                                                 

 

             Respitory Rate 20           2017                [facility]

[facility]



                                                                 Center



                                                                 

 

             Systolic (mm Hg) 99           2017                [facility]

[facility]



                                                                 Center



                                                                 

 

             Diastolic (mm Hg) 59           2017                [facility]

[facility]



                                                                 Center



                                                                 

 

             Temperature Oral (F) 98.0 F       2017                [facility]



                                                                 Center



                                                                 

 

             Systolic (mm Hg) 97           2017                [facility]

[facility]



                                                                 Center



                                                                 

 

             Diastolic (mm Hg) 60           2017                [facility]

[facility]



                                                                 Center



                                                                 

 

             Heart Rate   84           2017                [facility]

l



                                                                 Center



                                                                 

 

             Respitory Rate 20           2017                [facility]

[facility]



                                                                 Center



                                                                 

 

             Height       180.34 cm    2017                 [facility]

l



                                                                 Center



                                                                 

 

             Weight       68.182       2017                 [facility]

l



                                                                 Center



                                                                 

 

             BMI Calculated 20.96        2017                [facility]

[facility]



                                                                 Center



                                                                 

 

             BMI Calculated 20.96        2017                [facility]

[facility]



                                                                 [facility]



                                                                 

 

             Weight       68.182       2017                 [facility]

[facility]



                                                                 [facility]



                                                                 

 

             Height       180.34 cm    2017                 [facility]

[facility]



                                                                 Center



                                                                 

 

             Height       180.34 cm    10/21/2016                 [facility]

[facility]



                                                                 Center



                                                                 

 

             Respitory Rate 18           10/21/2016                [facility]

[facility]



                                                                 [facility]



                                                                 

 

             Heart Rate   86           10/21/2016                 [facility]

[facility]



                                                                 Center



                                                                 

 

             BMI Calculated 19.18        10/21/2016                 [facility]

[facility]



                                                                 [facility]



                                                                 

 

             Weight       62.386       10/21/2016                 [facility]

[facility]



                                                                 Center



                                                                 

 

             Systolic (mm Hg) 105          10/21/2016                [facility]

[facility]



                                                                 [facility]



                                                                 

 

             Diastolic (mm Hg) 63           10/21/2016                [facility]

[facility]



                                                                 [facility]



                                                                 

 

             Weight       64.716       2016                 [facility]

[facility]



                                                                 Center



                                                                 

 

             Height       177.8 cm     2016                 [facility]

[facility]



                                                                 Center



                                                                 

 

             BMI Calculated 20.47        2016                [facility]

[facility]



                                                                 [facility]



                                                                 

 

             Systolic (mm Hg) 107          2016                [facility]

[facility]



                                                                 [facility]



                                                                 

 

             Diastolic (mm Hg) 67           2016                [facility]

[facility]



                                                                 [facility]



                                                                 

 

             Heart Rate   84           2016                [facility]

l



                                                                 Center



                                                                 

 

             Temperature Oral (F) 96.9 F       2016                [facility]



                                                                 

 

             Weight       62.273       2016                [facility]

l



                                                                 Center



                                                                 

 

             BMI Calculated 19.7         2016                [facility]

[facility]



                                                                 [facility]



                                                                 

 

             Height       177.8 cm     2016                [facility]

l



                                                                 Center



                                                                 

 

             Heart Rate   54           2016                [facility]

[facility]



                                                                 Center



                                                                 

 

             Systolic (mm Hg) 76           2016                [facility]

[facility]



                                                                 [facility]



                                                                 

 

             Diastolic (mm Hg) 51           2016                [facility]

[facility]



                                                                 [place]



                                                                 

 

             BMI Calculated 26.89        2016                [facility]

[facility]



                                                                 [facility]



                                                                 

 

             Weight       68.864       2016                [facility]

l



                                                                 Center



                                                                 

 

             Height       160.02 cm    2016                [facility]

l



                                                                 Center



                                                                 

 

             Systolic (mm Hg) 120          2016                [facility]

[facility]



                                                                 [facility]



                                                                 

 

             Diastolic (mm Hg) 71           2016                [facility]



                                                                 

 

             Temperature Oral (F) 97.2 F       2016                [facility]



                                                                 

 

             Heart Rate   53           2016                [facility]

[facility]



                                                                 Center



                                                                 

 

             Systolic (mm Hg) 107          2016                [facility]

[facility]



                                                                 [facility]



                                                                 

 

             Diastolic (mm Hg) 63           2016                [facility]

[facility]



                                                                 [facility]



                                                                 

 

             Respitory Rate 18           2016                [facility]

[facility]



                                                                 Center



                                                                 

 

             Heart Rate   86           2016                [facility]

[facility]



                                                                 Center



                                                                 

 

             Systolic (mm Hg) 111          2016                [facility]

[facility]



                                                                 [facility]



                                                                 

 

             Diastolic (mm Hg) 66           2016                [facility]

[facility]



                                                                 [facility]



                                                                 

 

             Respitory Rate 18           2016                [facility]

[facility]



                                                                 [facility]



                                                                 

 

             Systolic (mm Hg) 121          2016                [facility]

[facility]



                                                                 [facility]



                                                                 

 

             Diastolic (mm Hg) 76           2016                [facility]

[facility]



                                                                 Center



                                                                 

 

             Heart Rate   86           2016                [facility]

[facility]



                                                                 Center



                                                                 

 

             Respitory Rate 18           2016                [facility]

[facility]



                                                                 Center



                                                                 

 

             Heart Rate   85           2016                [facility]

l



                                                                 Center



                                                                 

 

             Temperature Oral (F) 97.1 F       2016                [facility]



                                                                 

 

             Temperature Oral (F) 96.9 F       2016                [facility]



                                                                 

 

             BMI Calculated 24.87        2016                [facility]

[facility]



                                                                 [facility]



                                                                 

 

             Weight       78.636       2016                [facility]

[facility]



                                                                 Center



                                                                 

 

             Height       177.8 cm     2016                [facility]

[facility]



                                                                 Center



                                                                 

 

             Systolic (mm Hg) 117          2016                [facility]

[facility]



                                                                 [facility]



                                                                 

 

             Diastolic (mm Hg) 74           2016                [facility]

[facility]



                                                                 [facility]



                                                                 

 

             Respitory Rate 84           2016                [facility]

[facility]



                                                                 [facility]



                                                                 

 

             BMI Calculated 24.44        2016                [facility]

[facility]



                                                                 [facility]



                                                                 

 

             Height       177.8 cm     2016                [facility]

l



                                                                 Center



                                                                 

 

             Weight       77.273       2016                [facility]

[facility]



                                                                 Center



                                                                 

 

             Respitory Rate 16           2016                 [facility]

[facility]



                                                                 [facility]



                                                                 

 

             Systolic (mm Hg) 107          2016                [facility]

[facility]



                                                                 [facility]



                                                                 

 

             Diastolic (mm Hg) 65           2016                [facility]

[facility]



                                                                 [facility]



                                                                 

 

             Respitory Rate 20           2016                [facility]

[facility]



                                                                 [facility]



                                                                 

 

             Systolic (mm Hg) 112          2016                [facility]

[facility]



                                                                 [facility]



                                                                 

 

             Diastolic (mm Hg) 74           2016                [facility]

[facility]



                                                                 [facility]



                                                                 

 

             Respitory Rate 19           2016                [facility]

[facility]



                                                                 [facility]



                                                                 

 

             Systolic (mm Hg) 110          2016                [facility]

[facility]



                                                                 [facility]



                                                                 

 

             Diastolic (mm Hg) 71           2016                [facility]

[facility]



                                                                 [facility]



                                                                 

 

             Heart Rate   93           2016                [facility]

l



                                                                 Center



                                                                 

 

             Height       177.8 cm     2016                [facility]

l



                                                                 Center



                                                                 

 

             BMI Calculated 24.44        2016                [facility]

[facility]



                                                                 [facility]



                                                                 

 

             Weight       77.273       2016                [facility]

l



                                                                 Center



                                                                 

 

             Temperature Oral (F) 97.8 F       2016                [facility]



                                                                 

 

             BMI Calculated 23.8         2016                [facility]

[facility]



                                                                 Center



                                                                 

 

             Weight       75.227       2016                [facility]

[facility]



                                                                 Center



                                                                 

 

             Height       177.8 cm     2016                 [facility]

[facility]



                                                                 Center



                                                                 

 

             Diastolic (mm Hg) 63           2013                [facility]

[facility]



                                                                 [facility]



                                                                 

 

             Systolic (mm Hg) 98           2013                [facility]

[facility]



                                                                 Center



                                                                 

 

             Respitory Rate 18           2013                [facility]

[facility]



                                                                 [facility]



                                                                 

 

             Diastolic (mm Hg) 66           2013                [facility]

[facility]



                                                                 [facility]



                                                                 

 

             Systolic (mm Hg) 103          2013                [facility]

[facility]



                                                                 Center



                                                                 

 

             Respitory Rate 18           2013                [facility]

[facility]



                                                                 [facility]



                                                                 

 

             Systolic (mm Hg) 102          2013                [facility]

[facility]



                                                                 [facility]



                                                                 

 

             Diastolic (mm Hg) 66           2013                [facility]

[facility]



                                                                 [facility]



                                                                 

 

             Respitory Rate 20           2013                [facility]

[facility]



                                                                 [facility]



                                                                 

 

             Height       180.34 cm    2013                 [facility]

[facility]



                                                                 Center



                                                                 

 

             Weight       82.273       2013                 [facility]

[facility]



                                                                 Center



                                                                 

 

             Heart Rate   63           2013                 [facility]

[facility]



                                                                 Center



                                                                 

 

             Weight       82.273       2013                 [facility]

[facility]



                                                                 Center



                                                                 

 

             Height       180.34 cm    2013                 [facility]

[facility]



                                                                 Center



                                                                 

 

             Diastolic (mm Hg) 58           2012                [facility]

[facility]



                                                                 [facility]



                                                                 

 

             Respitory Rate 20           2012                [facility]

[facility]



                                                                 [facility]



                                                                 

 

             Systolic (mm Hg) 112          2012                [facility]

[facility]



                                                                 [facility]



                                                                 

 

             Temperature Oral (F) 96.0 F       2012                [facility]



                                                                 Center



                                                                 

 

             Heart Rate   83           2012                [facility]

[facility]



                                                                 Center



                                                                 

 

             Systolic (mm Hg) 121          2012                [facility]

[facility]



                                                                 [facility]



                                                                 

 

             Diastolic (mm Hg) 76           2012                [facility]

[facility]



                                                                 [facility]



                                                                 

 

             Heart Rate   82           2012                [facility]

[facility]



                                                                 Center



                                                                 

 

             Systolic (mm Hg) 120          2012                [facility]

[facility]



                                                                 Center



                                                                 

 

             Respitory Rate 20           2012                [facility]

[facility]



                                                                 [facility]



                                                                 

 

             Diastolic (mm Hg) 75           2012                [facility]

[facility]



                                                                 [facility]



                                                                 

 

             Heart Rate   83           2012                [facility]

[facility]



                                                                 Center



                                                                 

 

             Temperature Oral (F) 97.1 F       2012                [facility]

[facility]



                                                                 Center



                                                                 

 

             Respitory Rate 18           2012                [facility]

[facility]



                                                                 Center



                                                                 

 

             Temperature Oral (F) 97.0 F       2012                [facility]

[facility]



                                                                 Center



                                                                 

 

             Weight       79.545       2012                 [facility]

[facility]



                                                                 Center



                                                                 

 

             Height       180.34 cm    2012                 [facility]

[facility]



                                                                 Center



                                                                 

 

             Height       180.34 cm    2012                 [facility]

[facility]



                                                                 Center



                                                                 

 

             Weight       80.909       2012                 [facility]

[facility]



                                                                 Center



                                                                 

 

             Weight       86.136       2012                 [facility]

[facility]



                                                                 Center



                                                                 

 

             Height       180.34 cm    2012                 [facility]

[facility]



                                                                 Center



                                                                 

 

             Systolic (mm Hg) 111          2012                [facility] Me

dical



                                                                 Center



                                                                 

 

             Diastolic (mm Hg) 72           2012                [facility]



                                                                 

 

             Heart Rate   70           2012                [facility]

l



                                                                 [place]



                                                                 







Encounters







       Location Location Encounter Encounter Reason Attending ADM    DC     Stat

us Source



              Details Type   Number For    Provider Date   Date          



                                   Visit                              



                                                                      



                                                                      



                                                                      

 

       [facility]        Outpatient [id number] RIGHT  ELLEN            Active

 [facility]



       Medical               1      UPPER  IBARRA /2012                Medica

l



       Center                      QUADRANT                             Center



                                   PAIN                               



                                                                      



                                                                      



                                                                      

 

       [facility]        Outpatient [id number] DDC- NEW JAS [place]ORTON          

Active [facility]



       Medical               2      CLINIC                        Medical



       Center                      VISIT                              Center



                                                                      



                                                                      



                                                                      

 

       [facility]        Outpatient [id number] RUQ PAIN JAS NORTON          

Active [facility]               3                    /2012                Medical



       Center                                                         Center



                                                                      



                                                                      



                                                                      

 

       [facility]        OU     [id number] DDC/   JAS NORTON   Active

 [facility]               4      DYSPHAGI        /2012         Medical



       Center                      A                                  Center



                                                                      



                                                                      



                                                                      

 

       [facility]        Outpatient [id number] 787.20/4 GENTRY           Acti

ve [facility]               5      78.29                  Medica

l



       Center                                                         Center



                                                                      



                                                                      



                                                                      

 

       [facility]        Outpatient [id number] DYSPHAGI GENTRY           Acti

ve [facility]               0      A                      [facility]

l



       [facility]        Outpatient [id number]        Non    12/10  12/11        

 



       Caleb               1             Physician /2015         [facility]        Outpatient [id number]        Rakesh         

 [facility]



       Caleb               2             Lanre         [facility]        Bedded [id number]        Rakesh          [facility]        Outpatient 3                      [facility]        OBS Day [id number]        Rakesh          [facility]



       [place]        Surgery 3             Lanre         [facility]        Outpatient [id number]        Rakesh         

 [facility]



       [place]aleb               4                      [facility]        Outpatient [id number]        Rakesh         

 [facility]



       [place]               5             Lanre         [facility]        OBS    [id number]        Breezy           [facility]



       [place]        Observation 7             Carlos Welsh          

[facility]        Outpatient [id number]        Rakesh         

 [facility]



       [place]               6             [place]          [facility]



                                                                      



                                                                      



                                                                      

 

                     Outpatient [id number]        ALEXANDRA OSULLIVAN          Activ

e Memorial



                                            [facility]        Outpatient [id number]        Rakesh         

 [facility]



       [place]               9             [place]          [facility]        Outpatient [id number]        [place]entry          

 [facility]



       [place]               0                      [facility]        Outpatient [id number]        Rakesh         

 [facility]



       [place]               1             [place]         [facility]        Outpatient [id number]        Rakesh 10/21  10/22        

 [facility]               3             [place]         [facility]        Inpatient [id number]        Carmen Willoughby      

    [facility]                        [facility]          Outpt Diag [id number]        [place]           M

H OPID



       Outpatient        Services 4                      P

[place]



       Imaging                                                         



       [facility]        Outpatient [id number]        [place]          

 [facility]               5                      [facility]        DS     [id number] DYSPHAGI GENTRY                Active M

[facility]               6      A,ICD.9- REMINGTON                      Medi

rajani



       Center                      787.2                              Center



                                                                      



                                                                      



                                                                      

 

       [facility]        Outpatient [id number] ABNORMAL NON                  Canc

[place] [address]      WEIGHT PHYSICIAN                      [facility]



       Center                      LOSS                               Center



                                                                      



                                                                      



                                                                      

 

                     OD     [id number] 783.21 - [place]                Cancel  

OPID



                            1      ABNORMAL REMINGTON                      S[place]



                                   LOSS O                             Land



                                   478.2 -                             



                                   DISEASE                             



                                   OF PHAR                             



                                   787.20                             



                                                                      



                                                                      



                                                                      

 

                     OD     [id number] 478.29 - GENTRY                Cancel  

OPID



                            0      DISEASE REMINGTON Petit

n



                                   OF PHAR                             



                                                                      



                                                                      



                                                                      







Procedures







           Procedure  Code       Date       Perfomer   Comments   Source



                                                                  



                                                                  

 

           CABG - Coronary [id number]                        90304      [facility]



           artery [facility]



           graft                                                  [place]



           <sup>1</sup>                                             



                                                                  

 

           Hernia repair [id number]                                    [facility]



                                                                  

 

           Knee replacement [id number]                                   [facility]



                                                                  

 

           Operation  [id number]                       2Placement of [facility]



           <sup>2</sup>                                  [facility] Medical



                                                       AICD () Center



                                                                  



                                                                  

 

           Abdomen endoscopy [id number]                                   [facility]



                                                                  OPID [place]



                                                                  



                                                                  

 

           Barium swallow [id number]                                   [facility]



                                                                  OPID [place]



                                                                  



                                                                  

 

           CABG - Coronary [id number]                        1993patietn [facility] had Medical



           graft<sup>1,                                  quadraple CABG Center,M

H



           2</sup>                                                OPID [place]



                                                                  



                                                                  

 

           Cataract   [id number]                        left eye with [facility]



           surgery<sup>3</rosario                                  lens       Medical



           p>                                                     Center,



                                                                  OPID [place]



                                                                  



                                                                  

 

           Colonoscopy<sup>4 [id number]                                [facility]



           </sup>                                                 Medical



                                                                  Center,



                                                                  OPID [place]



                                                                  



                                                                  

 

           Hernia     [id number]                         hernia repair [facility]



           repair<sup>5,                                  (INGUINAL) Medical



           6</sup>                                     x4x'3 in the Center,



                                                            [facility]



                                                                  



                                                                  

 

           Knee replacement [id number]                                    [facility]



                                                                  



                                                                  

 

           Operation<sup>7</ [id number]                        Placement of  [place]

[place]



           sup>                                        [facility] Medical



                                                       AICD () Center,[facility]



                                                                  



                                                                  

 

           CABG - Coronary [id number]                               [facility]



           artery bypass                                             Medical



           graft<sup>1</sup>                                             Center,

[facility]



                                                                  



                                                                  

 

           Hernia repair [id number]                                    [facility]



                                                                  



                                                                  

 

           Operation<sup>2</ [id number]                        Placement of  Te

[place]



           sup>                                        Pacemaker and Medical



                                                       AICD () Center,[facility]



                                                                  



                                                                  

 

           Colonoscopy<sup>2 [id number]                                [facility]



           </sup>                                                 [facility]



                                                                  

 

           Hernia     [id number]                         x'3 in the [facility]



           repair<sup>3</sup                                       Medical



           >                                                      Center



                                                                  

 

           Operation<sup>4</ [id number]                        Placement of  Te

[place]



           sup>                                        Pacemaker and Medical



                                                       AICD () Center



                                                                  



                                                                  







Assessment and Plan







                    Assessment and Plan Date                Source



                                                            

 

                    Extracted from:Title: Progress Note 2017          [facility]



                    Author: Carmen Willoughby MD                     



                    Date: 3/22/17                           



                     Assessment/Plan                        



                                                            



                                                            



                                        Mr. Brewster is a 80 y/o male with a PMH C

AD s/p CABG, CHF c EF 35% s/p AICD, 

Afib, HTN, HLD, Hx of throat ca s/p chemoradiation who sustained bilateral UE 
abrasions following a lawnmower accident                           



                                                            



                    1.Degloving injury of right hand, Degloving injury of right 

hand                     



                       Post-Op Dx: B/L hand abrasions                     



                                                            



                                                            



                    Procedure: I&D of skin and subq to B/L hand, Application of 

integra 2 x 2 [6]                     



                                                            



                                                            



                                        -Cleared by ORS for discharge, PT seen a

nd has cleared the patient for discharge

                                                    



                                                            



                                                            



                                        -Needs more pain management, currently u

nable to assess pain as patient has 

received a block early this morning, will transition to p.o. pain meds 
anddischarge tomorrow                               



                                                            



                                                            



                                        -Wife present at bedside in the evening 

and she was given trainingregarding the 

dressings                                           



                      3.Atrial fibrillation                     



                        -Currently rate controlled                     



                                                            



                                                            



                                        -Amiodarone and digoxin and metoprolol h

as been on hold due to low BP, 500 cc 

bolus givenecho shows EF of 35-40%                           



                      4.CAD, s/p CABG in , AICD/PPM in                  

    



                                            -at home he is on metoprolol, Lipito

r, Lasix, Aldactone, statin: BP meds on 

hold due to low BP                                  



                      5.Hypertension                        



                                            -Currently on the low normal side, w

ill monitor. BP meds on hold at this 

time                                                



                      6.Hypothyroidism                      



                        -Continue levothyroxine home medication                 

    



                      7.GERD - Gastro-esophageal reflux disease                 

    



                        -On PPI, continue same                     



                      8.Acute pain due to injury                     



                        -Currently onblock and multimodal pain regimen          

           



                      9.History of laryngeal cancer                     



                        Completed chemoradiation in , not active at this sherice

e                     



                      10.Osteopenia                         



                        -On vitamin D supplementation                     



                      11.Compression fracture of L2                     



                        Chronic, no intervention at this time                   

  



                      12.Anemia                             



                        -Normocytic anemia, no indication for transfusion at thi

s time                     



                                                            



                                                            



                    13. Pulmonary Hypertension                     



                                                            



                                                            



                    Sildenafil as BP allows                     



                      Orders:                               



                                                            



                                        AMIODarone, 100 mg, 1 tab, Route: PODr mccain form: TAB, Daily, Dosing Weight 

68.182, kg, Start date: 17 9:00:00 CDT, Duration: 30 day, Stop date: 
17 9:00:00 CDT                                



                       MD to Nurse Order, Misc                     



                                                            



                                                            



                                                            



                      Prophylaxis                           



                      Warfarinon hold, will resume upon discharge               

      



                       Disposition                          



                      Possible DC tomorrow to home                     



                                                            



                                                            



                    Extracted from:Title: History and Physical                  

   



                    Author: Leti Miramontes MD                     



                    Date: 3/21/17                           



                     Assessment/Plan                        



                                                            



                                                            



                                                            



                    1.Degloving injury of right hand                     



                        Surgery in the morning                     



                                                            



                                                            



                    ECHO before clearance                     



                      2.Atrial fibrillation                     



                       Rate controlled now                     



                                                            



                                                            



                    Amiodarone, Digoxin and metoprolol                     



                                                            



                                                            



                    AICD/PPM interogation ordered                     



                                                            



                                                            



                    EKG is paced                            



                                                            



                                                            



                    ECHO ordered (noneon file here)                     



                      3.CAD, s/p CABG in , AICD/PPM in                  

    



                       Metoprolol, lipitor, lasix, aldactone, statin            

         



                      4.Hypertension                        



                        BP relatively low now                     



                                                            



                                                            



                    Begin IVF                               



                      5.Hypothyroidism                      



                       Levoxyl                              



                      6.GERD - Gastro-esophageal reflux disease                 

    



                        PPI                                 



                      7.Acute pain due to injury                     



                        Norco as needed                     



                      8.History of laryngeal cancer                     



                       Chemoradiation completed in                      



                      9.Osteopenia                          



                       Vitamin D                            



                      10.Compression fracture of L2                     



                       Chronic                              



                                                            



                                                            



                                                            



                                                            



                    Pulmonary Hypertension                     



                                                            



                                                            



                    Sildenafil as BP allows                     



                                                            



                                                            



                      Prophylaxis                           



                      INR therapeutic today                     



                                                            



                                                            



                    Lovenox tomorrow                        



                       Disposition                          



                      Pending surgery and clearance                     



                                                            



                                                            



                    [facility] Hospitalist service is primary. Page [phone] with 

concerns.                     



                                                            



                                                            



                    Extracted from:Title: ORS HAND TRAUMA INITIAL CONSULT H&P   

                  



                    Author: Libia Garrido                     



                    Date: 3/20/17                           



                    ORS Hand Trauma Consult History and Physical                

     



                    Reason for Consult: B hand degloving injuries               

      



                    Source of Consult: ER                     



                    Date of Service: 3/20/17                     



                    Consulting Physician: Ancelmo Diaz                     



                    Orthopaedic Attending: Iesha                     



                    CC: B hand pain                         



                                        HPI: The pt is a 80 y/o M s/p fall down 

hill and rollover by lawnmower 

sustaining B hand degloving injuries at approximately 7pm. Reports B pain in 
dorsum of hands worse with ROM and palpation and impro                          

 



                                        brigido with rest. Denies other trauma/injur

y, pain or loss of ROM in other 

extremities. Denies radiation, N/T, or other paresthesias. DREA: 230pm.          

                 



                                        PMH: CHF, Stage 3 Throat CA (chemo/radia

tion), B hearing loss R > L, A fib, HLD,

 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  

                         



                                        PSH: CABG X4 (20 yrs ago), B TKA, throat

 resection, hernia repair x 3, L 

cataract sx                                         



                                        Medications: Coumadin, Amiodarone, Metop

rolol, Digoxin, Spironolactone, 

Levothyroxine, Atorvastatin, Furosemide, Citalopram, Finasteride, Sildenafil, 
Rapaflo, Nitrostat, Entresto                           



                    Allergies: Tetracycline, Morphine                     



                    Review of Systems:                      



                    Gen: Denies fever/chills/night sweats.                     



                    HEENT: Denies vision change, sore throat, ulcers in mouth, e

pistaxis                     



                    CV: Denies CP, Palpitations                     



                    Resp: Denies SOB, wheezing, cough                     



                    GI: Denies Abd pain, N/V/D/Constipation. Denies incontinence

.                     



                    : Denies urinary retention, urgency, burning, discharge.  

                   



                    Back/Spine: Denies pain.                     



                    Neuro:  Denies N/T, other paresthesias in extremities.      

               



                    Endocrine: Denies recent weight changes, heat/cold insensiti

vity.                     



                    Psych: Denies depression, anxiety, labile mood, suicidal/shane

icidal ideation.                     



                    Musculoskeletal: Denies all but HPI.                     



                    All other systems negative except HPI.                     



                    Social History:                         



                    Tobacco: Denies                         



                    EtOH: Occasional                        



                    Illicit drugs: none                     



                    RHD. Lives with wife.                     



                    Family History: MI, HTN, DM, CA (types unknown)             

        



                    Vitals:                                 



                    Systolic Blood Pressure :   145 mmHg (HI)                   

  



                    Diastolic Blood Pressure :   61 mmHg                     



                    Peripheral Pulse Rate :   85 bpm                     



                    Respiratory Rate :   18 BRMIN                     



                    SpO2 percent :   90 % (LOW)                     



                    ED Temperature Taken :   Yes                     



                    Temperature Oral :   97.8 DegF                     



                    Exam:                                   



                    Gen: A&O x 3. in NAD.                     



                    RUE:                                    



                                        Inspection: + Degloving to dorsum of martinez

d with exposed tendons. + Gross 

contamination with dirt. Neg. pulsatile bleeding.  Neg. crepitis, laxity at 
joints.                                             



                    Sensation: sensation intact to light touch m/u/r nerves     

                



                                        Motor: intact AIN/PIN/Ulnar in hand; 5/5

 Wrist flex/ext; Elbow flex/ext; 

Shoulder ABd,Flex; chronic limited elevation and ABduction of shoulder          

                 



                    FDS/FDP and extensors intact all digits.                    

 



                    Vascular: radial pulse palpated, BCR all fingers <2 sec.    

                 



                    LUE:                                    



                                        Inspection: + Degloving to dorsum of martinez

d with exposed tendon. + Skin tears at 

elbow/forearm. + Gross contamination with dirt. Neg. crepitis, laxity.          

                 



                    Sensation: sensation intact to light touch m/r/u n          

           



                                        Motor: intact AIN/PIN/Ulnar in hand; 5/5

 Wrist flex/ext; Elbow flex/ext; 

Shoulder ABd,Flex                                   



                    FDS/FDP and extensors intact all digits.                    

 



                    Vascular: radial pulse palpated, BCR all fingers <2 sec.    

                 



                    Imaging:                                



                                        R hand x-rays: Soft tissue swelling at t

he right hand, and left wrist, but no 

other acute abnormality.                            



                                        L hand x-rays: Soft tissue swelling at t

he right hand, and left wrist, but no 

other acute abnormality.                            



                    A/P:                                    



                                        80 y/o M c B hand degloving injuries, R 

> L s/p crush by lawnmower/fall down 

hill                                                



                    - Weight bearing status: NWB BUE                     



                    - Antibiotics: Ancef q8hrs, Gentamycin                     



                    - Pain control                          



                    - Dressings: keep c/d/i                     



                    - NPO                                   



                    - Pre-op labs, EKG                      



                    - Marked, consented                     



                                        - Dispo: Admit to Hospitalist, plan for 

OR in AM with Dr. Julian for I&D, local

 flap, skin vs. allograft B hands; will continue to follow while in-house       

                    



                                                            

 

                    Extracted from:Title: Clinical Document 2016          

[facility]



                    Author: Parish Solis                     



                    Date: 16                           



                    Discharge Summary                       



                    Name: Tamar Brewster                     



                    Record Number: [id number]                     



                    Admission Date: 2016                     



                    Discharge Date: 2016                     



                    Copies to:                              



                    Diagnoses: Dyphagia                     



                    Procedures: PEG tube placement by GI                     



                    Chief complaint: Dysphagia                     



                    Hospital course:                        



                                        Patient is a 81 y/o CM with hx of throat

 cancer s/p radiation and chemo in the 

past, Atrial fibrillation on Eliquis, HTN, CAD, systolic CHF who was admitted 
for worsening of dysphagia since 2015. B                           



                                        arium swallow study done showed symptoma

tic aspiration with thin and nectar 

Barium.  Patient received PEG tube placement on 2016 by GI.  GI re-
evaluated the PEG tube today (2016) and cleared p                          

 



                                        atient for discharge.  Patients denies a

ny fever, abdominal pain, nausea, and 

vomiting.  While patient was admitted, he has been evaluated by Home Health and 
nutritionist.                                       



                    Discharge therapy:                      



                       Medications: see medication reconciliation               

      



                       Diet: enteral tube feedings per PEG                     



                       Activity: regular ADL                     



                       Follow up: Dr. Ng in 2 weeks.  Primary care physician 

in 2 days                     



                    Addendum by Ela Condon MD on 2016 19:47            

         



                    pt seen and examined on discharge day. agree with d/c summar

y                     



                    total time 32 min                       



                                                            



                    Extracted from:Title: Infection Control Isolation Alert     

                



                    Author: Korey Monson                     



                    Date: 16                           



                    ISOLATION ALERT                         



                    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                     



                     Site  Date                             



                                                            



                     [facility]  10/14/2009                      



                                                            



                    Isolation Required:  CONTACT                     



                                        Before isolation precautions may be disc

ontinued for this hospital visit, the 

following protocol must be followed and Infection Control should be notified:   

                        



                                            Patient must be off antibiotics for

 72 hours or 7 days if on dialysis and 

vancomycin.                                         



                                            Send one MRSA PCR [facility]ares specimen.  

Please place order for MRSA PCR.  Do 

not order MRSA Culture.                           



                        If MRSA PCR is negative, isolation may be discontinued.

                     



                                                            



                                        Patient can only be cleared from isolati

on for this visit.  If readmitted, 

patient must be isolated and screened for MRSA again.  Consult Infection Control
 for suggested regimens for decolonization of p                           



                                        atients with MRSA as well as for any que

stions.  We can be reached at 

[phone].                                       



                                                            



                    Extracted from:Title: Hospitalist History and Physical      

               



                    Author: Siomara Jauregui MD                     



                    Date: 16                           



                                         Assessment/Plan 1.Dysphagia  PEG in am.

 npo after midnight will recheck INR in 

am and hold eliquis                                 



                                         2.Atrial fibrillation  beta blocker and

 dig has pacemaker in place. hold 

eliquis                                             



                     3.CAD - Coronary artery disease  beta blokcer, statin      

               



                                         4.Systolic CHF  beta blocker, ace i and

 lasix will continue and monitor. has 

AICD in place                                       



                     5.Depression  continue home meds                     



                     6.HLD - Hyperlipidemia  statin                     



                     7.HTN - Hypertension  controlled continuecurrent regimen an

dmonitor                     



                     8.Hypothyroidism  synthroid at home dose                   

  



                                         Orders:  amLODIPine, 10 mg, 1 tab, Rout

e: PO, Drug form: TAB, Daily, Dosing 

Weight 78.636, kg, Start date: 16 9:00:00, Duration: 30 day, Stop date: 
16 9:00:00                                    



                                          atorvastatin, 40 mg, 1 tab, Route: PO,

 Drug form: TAB, Bedtime, Dosing Weight 

78.636, kg, Start date: 16 21:00:00, Duration: 30 day, Stop date: 16
 21:00:00                                           



                                          citalopram, 20 mg, 1 tab, Route: PO, D

rug form: TAB, Daily, Dosing Weight 

78.636, kg, Start date: 16 9:00:00, Duration: 30 day, Stop date: 16 
9:00:00                                             



                                          digoxin, 125 microgram, 1 tab, Route: 

PO, Drug form: TAB, Daily, Dosing Weight

 78.636, kg, Start date: 16 9:00:00, Duration: 30 day, Stop date: 16
 9:00:00                                            



                                          enalapril, 5 mg, 1 tab, Route: PO, Ashish

g form: TAB, BID, Dosing Weight 78.636, 

kg, Start date: 16 21:00:00, Duration: 30 day, Stop date: 16 
17:00:00                                            



                                          furosemide, 40 mg, 1 tab, Route: PO, D

rug form: TAB, Daily, Dosing Weight 

78.636, kg, Start date: 16 9:00:00, Duration: 30 day, Stop date: 16 
9:00:00                                             



                                          levothyroxine, 100 microgram, 1 tab, R

oute: PO, Drug form: TAB, Daily, Dosing 

Weight 78.636, kg, Start date: 16 9:00:00, Duration: 30 day, Stop date: 
16 9:00:00                                    



                                          metoprolol, 75 mg, 3 tab, Route: PO, D

rug form: ERTAB, BID, Start date: 

16 21:00:00, Duration: 30 day, Stop date: 16 17:00:00               

            



                                          nitroglycerin, 0.4 mg, 1 tab, Route: S

L, Drug form: TAB, Q5Min, Dosing Weight 

78.636, kg, PRN Chest Pain, Start date: 16 19:37:00, Duration: 30 day, 
Stop date: 16 20:36:00                           



                                          pantoprazole, 40 mg, 1 tab, Route: PO,

 Drug form: ECTAB, Daily, Dosing Weight 

78.636, kg, Start date: 16 9:00:00, Duration: 30 day, Stop date: 16 
9:00:00                                             



                      Diet NPO After Midnight                     



                      Diet NPO Except For                     



                      Prophylaxis                           



                      scds on eliquis will hold for procedure                   

  



                      Disposition                           



                                                            







Plan of Care







                                        No Data Provided for This Section







Social History







                    Social History      Date                Source



                                                            

 

                    Social History TypeResponse 2017          [facility]



                    Substance Abuse                         



                    Use: None.                              



                    Alcohol                                 



                                        Past, Type Beer.  Frequency: 1-2 times p

er year.  Last use: in the past.  

Previous treatment: None.  Alcohol use interferes with work or home: No.  Drinks
 more than intended: No.  Others hurt by drinkin                           



                    g: No.  Ready to change: No.  Household alcohol concerns: No

.                     



                    Smoking Status                          



                                        Never smoker; Exposure to Tobacco Smoke 

None; Cigarette Smoking Last 365 Days 

No; Reg Smoking Cessation Counseling No                           



                    entered on: 19                      



                                                            

 

                    Social History TypeResponse 2017           [facility] [place]



                    Substance Abuse                         



                    Use: None.                              



                    Alcohol                                 



                                        Past, Type Beer.  Frequency: 1-2 times p

er year.  Last use: in the past.  

Previous treatment: None.  Alcohol use interferes with work or home: No.  Drinks
 more than intended: No.  Others hurt by drinkin                           



                    g: No.  Ready to change: No.  Household alcohol concerns: No

.                     



                    Smoking Status                          



                                        Never smoker; Exposure to Tobacco Smoke 

None; Cigarette Smoking Last 365 Days 

No; Reg Smoking Cessation Counseling No                           



                    entered on: 3/20/17                     



                                                            

 

                    No data available for this 2015          [facility]

ity



                    section                                 







Family History







                                        No Data Provided for This Section







Advance Directives







                                        No Data Provided for This Section







Functional Status







                                        No Data Provided for This Section

## 2020-06-16 NOTE — ER
Nurse's Notes                                                                                     

 [facility]                                                                 

Name: Kennedy Flynn                                                                               

Age: 84 yrs                                                                                       

Sex: Male                                                                                         

: 1935                                                                                   

MRN: [id number]                                                                                   

Arrival Date: 2020                                                                          

Time: 03:50                                                                                       

Account#: [id number]                                                                            

Bed 5                                                                                             

Private MD:                                                                                       

Diagnosis: Pneumonia, unspecified organism;Hypoxemia                                              

                                                                                                  

Presentation:                                                                                     

                                                                                             

03:54 Chief complaint: EMS states: FEVER FOR SEVERAL DAYS WITH GENERAL WEAKNESS AND ANOREXIA, rv  

      AND COUGH. DENIES CHEST PAIN AND SOB, AND DYSURIA. COMPLAINS OF RIGHT LEG SORENESS.         

      Coronavirus screen: Surgical mask placed on patient. Patient moved to private room,         

      placed in contact and droplet isolation with eye protection until further assessment.       

      Patient reports a cough. Patient reports a measured and/or subjective temperature           

      greater than 100.4F. Patient denies travel on a cruise ship or to a country the [facility]         

      currently lists as an affected area. Patient denies contact with known and/or suspected     

      case of COVID-19. Ebola Screen: No symptoms or risks identified at this time. Initial       

      Sepsis Screen: Does the patient meet any 2 criteria? RR > 20 per min. Temp <36.0*C          

      (96.8*F)) or > 38.3*C (100.9*F). Does the patient have a suspected source of infection?     

      Yes: Productive cough/pneumonia. Risk Assessment: Do you want to hurt yourself or           

      someone else? Patient reports no desire to harm self or others. Onset of symptoms was       

      Dominga 15, 2020 at 08:00.                                                                     

03:54 Method Of Arrival: EMS: [facility]                                                rv  

03:54 Acuity: JOSE D 2                                                                           rv  

                                                                                                  

Triage Assessment:                                                                                

03:57 General: Appears uncomfortable, Behavior is calm, cooperative. Pain: Complains of pain  rv  

      in right leg. EENT: No signs and/or symptoms were reported regarding the EENT system.       

      Neuro: Level of Consciousness is awake, alert, obeys commands, Oriented to person,          

      place, time, situation. Cardiovascular: Patient's skin is warm and dry. Rhythm is           

      regular. Respiratory: Airway is patent Respiratory effort is even, Respiratory pattern      

      is tachypnea Breath sounds are coarse bilaterally. Derm: Skin is intact.                    

                                                                                                  

Historical:                                                                                       

- Allergies:                                                                                      

03:57 Morphine;                                                                               rv  

03:57 TETRACYCLINES;                                                                          rv  

- Home Meds:                                                                                      

03:57 Coumadin 2.5 mg 5 days per week [Active]; digoxin 125 mcg Oral tab 1 tab once daily     rv  

      [Active]; Entresto 24-26 mg Oral tab [Active]; finasteride 5 mg Oral tab 1 tab once         

      daily [Active]; levothyroxine 100 mcg tab 1 tab evrey other day [Active]; Lipitor 40 mg     

      Oral tab 1 tab once daily [Active];                                                         

- PMHx:                                                                                           

03:57 a-fib; CHF; Hyperlipidemia; Hypertension; Hypothyroidism; Pacemaker; Vertigo;           rv  

- PSHx:                                                                                           

03:57 Unable to obtain;                                                                       rv  

                                                                                                  

- Immunization history:: Adult Immunizations up to date.                                          

- Social history:: Smoking status: Patient denies any tobacco usage or history of.                

                                                                                                  

                                                                                                  

Screenin:58 Abuse screen: Denies threats or abuse. Denies injuries from another. Nutritional        rv  

      screening: No deficits noted. Tuberculosis screening: No symptoms or risk factors           

      identified. Fall Risk None identified.                                                      

                                                                                                  

Assessment:                                                                                       

04:00 General: Appears distressed, uncomfortable, Behavior is calm, cooperative, appropriate  jb4 

      for age. Pain: Denies pain. Neuro: Level of Consciousness is awake, alert, obeys            

      commands, Oriented to person, place, time, situation. Cardiovascular: Heart tones S1 S2     

      present Patient's skin is warm and dry. Respiratory: Airway is patent Respiratory           

      effort is even, labored, Respiratory pattern is symmetrical, tachypnea Breath sounds        

      are clear bilaterally. GI: No signs and/or symptoms were reported involving the             

      gastrointestinal system. : No signs and/or symptoms were reported regarding the           

      genitourinary system. EENT: No signs and/or symptoms were reported regarding the EENT       

      system. Derm: Skin is intact, Skin is pink, warm \T\ dry. Musculoskeletal: Circulation,     

      motion, and sensation intact. Range of motion: intact in all extremities.                   

04:05 Reassessment: Gave patient one 500 mg Tylenol. PT appeared to choke after taking PO     jb4 

      medication. Provider notified.                                                              

05:00 Reassessment: Patient and/or family updated on plan of care and expected duration. Pain jb4 

      level reassessed. PT is resting with eyes closed, respirations remain tachypneic,           

      labored, and symmetrical, no s/s of pain noted.                                             

06:00 Reassessment: Patient appears in no apparent distress at this time. No changes from     jb4 

      previously documented assessment. Patient and/or family updated on plan of care and         

      expected duration. Pain level reassessed.                                                   

07:16 Reassessment: Patient appears in no apparent distress at this time. No changes from     em  

      previously documented assessment. Patient and/or family updated on plan of care and         

      expected duration. Pain level reassessed. Patient denies pain at this time.                 

                                                                                                  

Vital Signs:                                                                                      

03:54  / 67; Pulse 80; Resp 26; Temp 102.4; Pulse Ox 96% on R/A; Weight 65.77 kg;       rv  

05:00  / 51; Pulse 82; Resp 33; Temp 99.6(O); Pulse Ox 98% on R/A;                      jb4 

06:00 BP 93 / 58; Pulse 81; Resp 28; Pulse Ox 93% on R/A;                                     jb4 

07:16  / 62; Pulse 80; Resp 24; Pulse Ox 98% on R/A;                                    em  

                                                                                                  

ED Course:                                                                                        

03:50 Patient arrived in ED.                                                                  cl3 

03:53 Rick Watts MD is Attending Physician.                                            tw4 

03:54 Tom Bartlett, RN is Primary Nurse.                                                  rv  

03:56 Triage completed.                                                                       rv  

03:57 Arm band placed on Patient placed in the treatment room, on a stretcher, Patient        rv  

      notified of wait time. EKG completed in triage. Results shown to MD.                        

03:58 Patient has correct armband on for positive identification. Bed in low position. Call   rv  

      light in reach. Side rails up X 1. Cardiac monitor on. Pulse ox on. NIBP on.                

04:15 Inserted saline lock: 20 gauge in right forearm, using aseptic technique. Blood         jb4 

      collected.                                                                                  

04:15 Initial lab(s) drawn, by me, sent to lab. First set of blood cultures drawn by me.      jb4 

04:16 Chest Single View XRAY In Process Unspecified.                                          EDMS

04:23 Flu and/or RSV swab sent to lab. Strep swab sent to lab. COVID SWAB SENT TO LAB.        rv  

04:30 Second set of blood cultures drawn by me.                                               jb4 

07:24 Thomas Ortiz MD is Hospitalizing Provider.                                           tw4 

07:42 No provider procedures requiring assistance completed. Patient admitted, IV remains in  em  

      place.                                                                                      

                                                                                                  

Administered Medications:                                                                         

04:05 Drug: Tylenol 1000 mg {Note: given only 500 mg, Pt appeared to choke after taking the   jb4 

      firts pill..} Route: PO;                                                                    

05:00 Follow up: Response: No adverse reaction; Temperature is decreased                      [facility] 

05:25 Drug: Rocephin - (cefTRIAXone) 1 grams {Note: Administered IVP per pharmacy protocol..} 4 

      Route: IVPB; Infused Over: 30 mins; Site: right forearm;                                    

07:45 Follow up: Response: No adverse reaction; IV Status: Completed infusion; IV Intake: 10mlem  

05:30 Drug: AZITHromycin 500 mg Route: IVPB; Infused Over: 1 hrs; Site: right antecubital;    jb4 

07:45 Follow up: Response: No adverse reaction; IV Status: Completed infusion; IV Intake:     em  

      250ml                                                                                       

05:53 Not Given (Physician Discretion): NS 0.9% (30 ml/kg) 30 ml/kg IV at bolus once; Sepsis  [facility] 

      Protocol                                                                                    

                                                                                                  

                                                                                                  

Intake:                                                                                           

07:45 IV: 250ml; Total: 250ml.                                                                em  

07:45 IV: 10ml; Total: 260ml.                                                                 em  

                                                                                                  

Outcome:                                                                                          

07:24 Decision to Hospitalize by Provider.                                                     

07:42 Admitted to Med/surg accompanied by tech, via stretcher, room 416, with chart, Report   em  

      called to  AMRITA Salazar                                                                       

07:42 Condition: good                                                                             

07:42 Instructed on the need for admit, Demonstrated understanding of instructions.               

08:02 Patient left the ED.                                                                    em  

                                                                                                  

Signatures:                                                                                       

Dispatcher [employer]                           Satinder Thompson, Nabeel Roberts RN, RN RN   jb4                                                  

Rick Watts MD MD   tw4                                                  

Tom Bartlett RN RN rv Lewis, Charde                                3                                                  

                                                                                                  

**************************************************************************************************

## 2020-06-16 NOTE — EDPHYS
Physician Documentation                                                                           

 [facility]                                                                 

Name: Kennedy Flynn                                                                               

Age: 84 yrs                                                                                       

Sex: Male                                                                                         

: 1935                                                                                   

MRN: [id number]                                                                                   

Arrival Date: 2020                                                                          

Time: 03:50                                                                                       

Account#: [id number]                                                                            

Bed 5                                                                                             

Private MD:                                                                                       

ED Physician Rick Watts                                                                     

HPI:                                                                                              

                                                                                             

04:52 This 84 yrs old  Male presents to ER via EMS with complaints of Fever.         tw4 

04:52 The patient reports fever, not measured (subjective). Onset: The symptoms/episode       tw4 

      began/occurred 3 day(s) ago. Modifying factors: there are no obvious modifying factors.     

      Associated signs and symptoms: Pertinent positives: shortness of breath. Severity of        

      symptoms: At their worst the symptoms were moderate in the emergency department the         

      symptoms are unchanged. The patient has not experienced similar symptoms in the past.       

                                                                                                  

Historical:                                                                                       

- Allergies:                                                                                      

03:57 Morphine;                                                                               rv  

03:57 TETRACYCLINES;                                                                          rv  

- Home Meds:                                                                                      

03:57 Coumadin 2.5 mg 5 days per week [Active]; digoxin 125 mcg Oral tab 1 tab once daily     rv  

      [Active]; Entresto 24-26 mg Oral tab [Active]; finasteride 5 mg Oral tab 1 tab once         

      daily [Active]; levothyroxine 100 mcg tab 1 tab evrey other day [Active]; Lipitor 40 mg     

      Oral tab 1 tab once daily [Active];                                                         

- PMHx:                                                                                           

03:57 a-fib; CHF; Hyperlipidemia; Hypertension; Hypothyroidism; Pacemaker; Vertigo;           rv  

- PSHx:                                                                                           

03:57 Unable to obtain;                                                                       rv  

                                                                                                  

- Immunization history:: Adult Immunizations up to date.                                          

- Social history:: Smoking status: Patient denies any tobacco usage or history of.                

                                                                                                  

                                                                                                  

ROS:                                                                                              

04:52 Eyes: Negative for injury, pain, redness, and discharge, Cardiovascular: Negative for   tw4 

      chest pain, palpitations, and edema, Respiratory: Negative for shortness of breath,         

      cough, wheezing, and pleuritic chest pain, Abdomen/GI: Negative for abdominal pain,         

      nausea, vomiting, diarrhea, and constipation, Back: Negative for injury and pain,           

      MS/Extremity: Negative for injury and deformity, Skin: Negative for injury, rash, and       

      discoloration, Neuro: Negative for headache, weakness, numbness, tingling, and seizure.     

04:52 Constitutional: Positive for fever.                                                         

                                                                                                  

Exam:                                                                                             

04:52 Constitutional:  This is a well developed, well nourished patient who is awake, alert,  tw4 

      and in no acute distress. Eyes:  Pupils equal round and reactive to light, extra-ocular     

      motions intact.  Lids and lashes normal.  Conjunctiva and sclera are non-icteric and        

      not injected.  Cornea within normal limits.  Periorbital areas with no swelling,            

      redness, or edema. Chest/axilla:  Normal chest wall appearance and motion.  Nontender       

      with no deformity.  No lesions are appreciated. Cardiovascular:  Regular rate and           

      rhythm with a normal S1 and S2.  No gallops, murmurs, or rubs.  Normal PMI, no JVD.  No     

      pulse deficits. Abdomen/GI:  Soft, non-tender, with normal bowel sounds.  No distension     

      or tympany.  No guarding or rebound.  No evidence of tenderness throughout.                 

04:52 Respiratory: mild respiratory distress is noted,  Respirations: labored breathing, that     

      is mild, Breath sounds: rales, are located in both bases, rhonchi, are scattered.           

                                                                                                  

Vital Signs:                                                                                      

03:54  / 67; Pulse 80; Resp 26; Temp 102.4; Pulse Ox 96% on R/A; Weight 65.77 kg;       rv  

05:00  / 51; Pulse 82; Resp 33; Temp 99.6(O); Pulse Ox 98% on R/A;                      jb4 

06:00 BP 93 / 58; Pulse 81; Resp 28; Pulse Ox 93% on R/A;                                     jb4 

07:16  / 62; Pulse 80; Resp 24; Pulse Ox 98% on R/A;                                    em  

                                                                                                  

MDM:                                                                                              

03:53 Patient medically screened.                                                                                                                                                          

03:53 Order name: Amylase, Serum; Complete Time: 05:15                                                                                                                                     

05:16 Interpretation: Normal except: NAT 34.                                                                                                                                               

03:53 Order name: Basic Metabolic Panel; Complete Time: 05:15                                                                                                                              

05:15 Interpretation: Normal except: GLUC 136; BUN 21; GFR 63.                                                                                                                             

03:53 Order name: Blood Culture Adult (2)                                                                                                                                                  

03:53 Order name: CBC with Diff; Complete Time: 05:15                                                                                                                                      

05:16 Interpretation: Normal except: RBC 4.27; HGB 13.2; HCT 39.5; LYM% 10.6; MACIEJ% 81.0.                                                                                                   

03:53 Order name: Ckmb; Complete Time: 05:15                                                                                                                                               

05:16 Interpretation: Within normal limits: CKMB < 1.0.                                                                                                                                    

03:53 Order name: CPK; Complete Time: 05:15                                                                                                                                                

05:16 Interpretation: Within normal limits: CPK 62.                                                                                                                                        

03:53 Order name: Lactate; Complete Time: 05:15                                                                                                                                            

05:17 Interpretation: Within normal limits.                                                                                                                                                

03:53 Order name: LFT's; Complete Time: 05:15                                                                                                                                              

05:16 Interpretation: Normal except: BILID 0.4; BILIT 1.3.                                                                                                                                 

03:53 Order name: Lipase; Complete Time: 05:15                                                                                                                                             

05:17 Interpretation: Within normal limits.                                                                                                                                                

03:53 Order name: Procalcitonin                                                                                                                                                            

03:53 Order name: Protime (+inr); Complete Time: 05:15                                                                                                                                     

05:16 Interpretation: Normal except: PT 24.4.                                                                                                                                              

03:53 Order name: Ptt, Activated; Complete Time: 05:15                                                                                                                                     

05:17 Interpretation: Within normal limits: PTT 34.1.                                                                                                                                      

03:53 Order name: Troponin (emerg Dept Use Only); Complete Time: 05:15                                                                                                                     

05:17 Interpretation: TROPED 0.03.                                                                                                                                                         

03:53 Order name: Urine Microscopic Only                                                                                                                                                   

03:53 Order name: Chest Single View XRAY                                                                                                                                                   

03:53 Order name: Accucheck; Complete Time: 04:26                                                                                                                                          

03:53 Order name: Cardiac monitoring; Complete Time: 04:26                                                                                                                                 

03:53 Order name: EKG - Nurse/Tech; Complete Time: 04:26                                                                                                                                   

03:53 Order name: COVID-19                                                                                                                                                                 

03:53 Order name: Flu; Complete Time: 05:15                                                                                                                                                

05:17 Interpretation: Within normal limits.                                                                                                                                                

03:53 Order name: Strep; Complete Time: 05:15                                                                                                                                              

05:17 Interpretation: Within normal limits.                                                                                                                                                

05:08 Order name: Throat Culture                                                              EDMS

                                                                                             

05:53 Order name: Urine Dipstick--Ancillary (enter results)                                   2 

                                                                                             

05:56 Order name: Heart Healthy                                                               EDMS

                                                                                             

05:56 Order name: Troponin I                                                                  EDMS

                                                                                             

05:56 Order name: Troponin I                                                                  [facility]

                                                                                             

03:53 Order name: IV Saline Lock - Large Bore; Complete Time: 04:26                                                                                                                        

03:53 Order name: Labs collected and sent; Complete Time: 04:26                                                                                                                            

03:53 Order name: O2 Per Protocol; Complete Time: 04:26                                                                                                                                    

03:53 Order name: O2 Sat Monitoring; Complete Time: 04:26                                                                                                                                  

03:53 Order name: Urine Dipstick-Ancillary (obtain specimen); Complete Time: 05:53                                                                                                         

03:53 Order name: Document PUI#; Complete Time: 04:25                                                                                                                                      

03:53 Order name: Droplet/Contact Precautions; Complete Time: 04:25                                                                                                                        

03:53 Order name: Labs collected and sent; Complete Time: 04:27                                                                                                                            

03:53 Order name: Notify [facility] [phone]; Complete Time: 04:27                                                                                                  

03:53 Order name: O2 Per Protocol; Complete Time: 04:27                                       tw 

                                                                                                  

Administered Medications:                                                                         

04:05 Drug: Tylenol 1000 mg {Note: given only 500 mg, Pt appeared to choke after taking the   jb4 

      firts pill..} Route: PO;                                                                    

05:00 Follow up: Response: No adverse reaction; Temperature is decreased                      [facility] 

05:25 Drug: Rocephin - (cefTRIAXone) 1 grams {Note: Administered IVP per pharmacy protocol..} jb4 

      Route: IVPB; Infused Over: 30 mins; Site: right forearm;                                    

07:45 Follow up: Response: No adverse reaction; IV Status: Completed infusion; IV Intake: 10mlem  

05:30 Drug: AZITHromycin 500 mg Route: IVPB; Infused Over: 1 hrs; Site: right antecubital;    4 

07:45 Follow up: Response: No adverse reaction; IV Status: Completed infusion; IV Intake:     em  

      250ml                                                                                       

05:53 Not Given (Physician Discretion): NS 0.9% (30 ml/kg) 30 ml/kg IV at bolus once; Sepsis  jb4 

      Protocol                                                                                    

                                                                                                  

                                                                                                  

Disposition:                                                                                      

20 07:24 Hospitalization ordered by Thomas Ortiz for Inpatient Admission. Preliminary     

  diagnosis are Pneumonia, unspecified organism, Hypoxemia.                                       

- Bed requested for Telemetry/MedSurg (Inpatient).                                                

- Status is Inpatient Admission.                                                              em  

- Condition is Fair.                                                                              

- Problem is new.                                                                                 

- Symptoms are unchanged.                                                                         

                                                                                                  

                                                                                                  

                                                                                                  

Signatures:                                                                                       

Dispatcher [employer]                           EDMS                                                 

Satinder Zavala RN                        RN                                                      

Nabeel Parker RN RN   jb4                                                  

Rick Watts MD MD   tw4                                                  

Ayaka Gage Ronaldo RN                    RN   rv                                                   

                                                                                                  

Corrections: (The following items were deleted from the chart)                                    

07:26 07:24 Hospitalization Ordered by Thomas Ortiz MD for Inpatient Admission. Preliminary  eb  

      diagnosis is Pneumonia, unspecified organism; Hypoxemia. Bed requested for                  

      Telemetry/MedSurg (Inpatient). Status is Inpatient Admission. Condition is Fair.            

      Problem is new. Symptoms are unchanged. tw4                                                 

08:02 07:26 2020 07:24 Hospitalization Ordered by Thomas Ortiz MD for Inpatient        em  

      Admission. Preliminary diagnosis is Pneumonia, unspecified organism; Hypoxemia. Bed         

      requested for Telemetry/MedSurg (Inpatient). Status is Inpatient Admission. Condition       

      is Fair. Problem is new. Symptoms are unchanged. eb                                         

                                                                                                  

**************************************************************************************************

## 2020-06-16 NOTE — XMS REPORT
Clinical Summary

                            Created on:2020



Patient:Tamar Brewster

Sex:Male

:1935

External Reference #:[id number]





Demographics







                          Address                   [address] [place] [address]

 

                          Mobile Phone              [phone]

 

                          Home Phone                [phone]

 

                          Home Phone                [phone]

 

                          Work Phone                [phone]

 

                          Email Address             [email or web address]

 

                          Email Address             [email or web address]

 

                          Preferred Language        English

 

                          Marital Status            

 

                          Advent Affiliation     Unknown

 

                          Race                      White

 

                          Ethnic Group              Not  or 









Author







                          Organization              [facility]

 

                          Address                   [address] [place] [address]









Support







                Name            Relationship    Address         Phone

 

                Lacey Brewster Spouse          Unavailable     [phone]









Care Team Providers







                    Name                Role                Phone

 

                    Asked,  Pcp         Primary Care Provider Unavailable









Allergies







             Active Allergy Reactions    Severity     Noted Date   Comments

 

             Morphine     GI Intolerance              2016   

 

             Tetracycline Hives                     2016   







Medications







          Medication Sig       Dispensed Refills   Start Date End Date  Status

 

          warfarin (COUMADIN) 5 MG TAKE 1 TABLET(5 90 tablet 0         

9           Active



          tabletIndications: MG) BY MOUTH                                       

  



          Paroxysmal atrial DAILY                                             



          fibrillation (HCC)                                                   









                                                    



                                                    Additional Information



                                                    Patient taking differently: 

Take 5 mg by mouth daily 2.5 mg daily on 5 mg 

,, Reported on 2020  3:44 PM









          levothyroxine (SYNTHROID) 100 TAKE 1 TABLET(100 MCG) 45 tablet 0      

   2020           

Active



          mcg tabletIndications: BY MOUTH EVERY OTHER                           

              



          Hypothyroidism, unspecified DAY                                       

        



          type                                                        









                                                    



                                                    Additional Information



                                                    Patient taking differently: 

TAKE 1 TABLET(100 MCG) BY MOUTH EVERY OTHER 

DAYSUNDAY, Reported on 2020 11:51 PM









          levothyroxine TAKE 1 TABLET(88 45 tablet 0         2020         

  Active



          (SYNTHROID) 88 mcg MCG) BY MOUTH                                      

   



          tablet    EVERY OTHER DAY                                         

 

          sacubitriL-valsartan Take 1 tablet by 60 tablet 11        2020  

         Active



          (ENTRESTO) 24-26 mg mouth 2 (two)                                     

    



          tablet per tablet times a day.                                        

 

 

          digOXIN (LANOXIN) TAKE 1 TABLET BY 90 tablet 0         2020     

      Active



          125 mcg (0.125 mg) MOUTH EVERY DAY                                    

     



          tabletIndications:                                                   



          Persistent atrial                                                   



          fibrillation,                                                   



          Essential                                                   



          hypertension                                                   

 

          atorvastatin TAKE 1 TABLET BY 90 tablet 0         2020          

 Active



          (LIPITOR) 40 MG MOUTH DAILY                                         



          tabletIndications:                                                   



          Persistent atrial                                                   



          fibrillation,                                                   



          Essential                                                   



          hypertension                                                   

 

          latanoprost Administer 1           0                             Activ

e



          (XALATAN) 0.005 % drop to both                                        

 



          ophthalmic solution eyes nightly.                                     

    

 

          levothyroxine TAKE 1    45 tablet 0         02/15/2019 07/25/    Disco

ntinued



          (SYNTHROID, LEVOXYL) TABLET(100 MCG)                               201

9      (Reorder)



          100 mcg tablet BY MOUTH EVERY                                         



                    OTHER DAY                                         

 

          warfarin (COUMADIN) TAKE 2 TABLETS 135 tablet 0         2019/    Discontinued 

(Stop



          2.5 MG    BY MOUTH 4 DAYS                                     Mark adair at



          tabletIndications: PER WEEK AND 1                                     

    Discharge)



          Paroxysmal atrial TABLET BY MOUTH                                     

    



          fibrillation (HCC) 1 DAY PER WEEK                                     

    

 

          sacubitril-valsartan Take 1 tablet by 60 tablet 11        2019 0

/    

Discontinued



          (ENTRESTO) 24-26 mg mouth 2 (two)                                 

    (Reorder)



          tablet per tablet times a day.                                        

 

 

          digOXIN (LANOXIN) Take 125 mcg by           0                   /

    Discontinued



          125 mcg tablet mouth daily.                                     (D

uplicate order)

 

          levothyroxine Take 88 mcg by           0                   10/03/    D

iscontinued



          (SYNTHROID, LEVOXYL) mouth every                                  

   (Reorder)



          88 mcg tablet other day.                                         

 

          metoprolol succinate Take 1 tablet 30 tablet 11        2019 10/1

6/    Discontinued



          XL (TOPROL-XL) 25 mg (25 mg total) by                               20

19      (Discontinued by



          24 hr tablet mouth daily.                                         anot

her clinician)



                    Take an 1/2                                         



                    tablet (12.5) mg                                         



                    daily                                             

 

          diclofenac Apply topically 100 g     0         2019    Di

scontinued



          (VOLTAREN) 1 % gel 4 (four) times a                               

      (Therapy



                    day.                                              completed)

 

          atorvastatin TAKE 1 TABLET BY 90 tablet 0         2019/   

 Discontinued



          (LIPITOR) 40 MG MOUTH DAILY                                     (R

eorder)



          tabletIndications:                                                   



          Persistent atrial                                                   



          fibrillation,                                                   



          Essential                                                   



          hypertension                                                   

 

          levothyroxine TAKE 1    45 tablet 0         2019/    Disco

ntinued



          (SYNTHROID, LEVOXYL) TABLET(100 MCG)                               201

9      (Reorder)



          100 mcg tablet BY MOUTH EVERY                                         



                    OTHER DAY                                         

 

          atorvastatin TAKE 1 TABLET BY 90 tablet 0         2019/   

 Discontinued



          (LIPITOR) 40 MG MOUTH DAILY                                     (R

eorder)



          tabletIndications:                                                   



          Persistent atrial                                                   



          fibrillation,                                                   



          Essential                                                   



          hypertension                                                   

 

          warfarin (COUMADIN) One tablet daily 200 tablet 3         09/10/2019 0

/    Discontinued



          5 MG tablet or as directed                                     (Du

plicate order)



                    by MD office                                         

 

          levothyroxine TAKE 1    15 tablet 0         2019 10/01/    Disco

ntinued



          (SYNTHROID, LEVOXYL) TABLET(100 MCG)                               201

9      (Reorder)



          100 mcg tablet BY MOUTH EVERY                                         



                    OTHER DAY                                         

 

          levothyroxine TAKE 1    45 tablet 0         10/01/2019 01/21/    Disco

ntinued



          (SYNTHROID, LEVOXYL) TABLET(100 MCG)                               202

0      (Duplicate order)



          100 mcg tablet BY MOUTH EVERY                                         



                    OTHER DAY                                         

 

          levothyroxine Take 1 tablet 45 tablet 0         10/03/2019 01/13/    D

iscontinued



          (SYNTHROID) 88 mcg (88 mcg total)                               2020  

    



          tablet    by mouth every                                         



                    other day.                                         

 

          guaiFENesin Take 10 mL (200 236 mL    0         10/18/2019 10/25/    E

xpired



          (ROBITUSSIN) 100 mg total) by                               2019      



          mg/5 mL syrup mouth every 8                                         



                    (eight) hours as                                         



                    needed for cough                                         



                    for up to 7                                         



                    days.                                             

 

          benzonatate Take 1 capsule 9 capsule 0         10/18/2019 10/21/    Ex

pired



          (TESSALON) 100 MG (100 mg total)                               2019   

   



          capsule   by mouth 3                                         



                    (three) times a                                         



                    day as needed                                         



                    for cough for up                                         



                    to 3 days.                                         

 

          levothyroxine TAKE 1    30 tablet 0         2019    Disco

ntinued



          (SYNTHROID) 100 mcg TABLET(100 MCG)                               

      (Reorder)



          tabletIndications: BY MOUTH EVERY                                     

    



          Hypothyroidism, OTHER DAY                                         



          unspecified type                                                   

 

          atorvastatin TAKE 1 TABLET BY 90 tablet 0         2019/   

 Discontinued



          (LIPITOR) 40 MG MOUTH DAILY                                     



          tabletIndications:                                                   



          Persistent atrial                                                   



          fibrillation,                                                   



          Essential                                                   



          hypertension                                                   

 

          digOXIN (LANOXIN) TAKE 1 TABLET BY 90 tablet 0         2019 01/3

1/    Discontinued



          125 mcg (0.125 mg) MOUTH EVERY DAY                                

     



          tabletIndications:                                                   



          Persistent atrial                                                   



          fibrillation,                                                   



          Essential                                                   



          hypertension                                                   

 

          levothyroxine TAKE 1    15 tablet 0         2019/    Disco

ntinued



          (SYNTHROID) 100 mcg TABLET(100 MCG)                               

      



          tabletIndications: BY MOUTH EVERY                                     

    



          Hypothyroidism, OTHER DAY                                         



          unspecified type                                                   

 

          digOXIN (LANOXIN) TAKE 1 TABLET BY 90 tablet 0         2020 04/0

5/    Discontinued



          125 mcg (0.125 mg) MOUTH EVERY DAY                                

     



          tabletIndications:                                                   



          Persistent atrial                                                   



          fibrillation,                                                   



          Essential                                                   



          hypertension                                                   

 

          atorvastatin TAKE 1 TABLET BY 90 tablet 0         2020/   

 Discontinued



          (LIPITOR) 40 MG MOUTH DAILY                                     



          tabletIndications:                                                   



          Persistent atrial                                                   



          fibrillation,                                                   



          Essential                                                   



          hypertension                                                   

 

          amoxicillin-pot Take 1 tablet by 10 tablet 0         2020//

    



          clavulanate mouth 2 (two)                                     



          (AUGMENTIN) 875-125 times a day for                                   

      



          mg per tablet 5 days.                                           

 

          enoxaparin (LOVENOX) Inject 0.4 mL 10 Syringe 0         2020/    Discontinued 

(Med



          40 mg/0.4 mL syringe (40 mg total)                                

     List Cleanup)



                    under the skin                                         



                    daily for 10                                         



                    days.                                             







Active Problems







                          Problem                   Noted Date

 

                          Rhinovirus infection      10/16/2019

 

                          Spondylosis of cervical region without myelopathy or r

adiculopathy 2019

 

                          Cervical arthritis        2019

 

                          Neck pain                 2019









                                        Overview: 



Patient states that he was holding something on his head that fell a little bit 
and applied more sudden pressure a few months back. He didn't think much of it, 
but has been having neck pain since, and i



                                        s now complaining of pain and stiffness 

with significantly reduced range of 

motion of the neck.



                                        



                                        Has tried tylenol for pain which helps h

im to rest. States he ignores the pain 

during the day. Patient denies numbness or tingling in the hands. Has some in 
the L leg, but states it did not start after the accident.









                          Complete heart block (HCC) s/p AVN ablation 3/29/2019 

2019

 

                          Rhinorrhea                11/15/2018









                                        Overview: 



Patient has some clear drainage from the nose



                                        Says he's not overly worried about it, b

ut wanted to bring it up.



                                        



Last Assessment & Plan: 



Can try flonase topically



                                        If not helpful, can try atrovent for pos

sible vasomotor rhinitis









                          Biventricular implantable cardioverter-defibrillator (

[employer], upgraded 2018 by Dr Shin from dual ICD by Ani) 

 

                          Abnormal findings on diagnostic imaging of other abdom

inal regions, 2017



                          including retroperitoneum 

 

                          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

stive heart failure 

2017

 

                          Atrial tachycardia        2017

 

                          Gynecomastia              10/25/2017









                                        Overview: 







                                        Normal breast tissue on mammogram 

7









                          Coronary arteriosclerosis 2016

 

                          Ischemic congestive cardiomyopathy 2016

 

                          Mitral and aortic incompetence 2016

 

                          Ventricular tachycardia   2016

 

                          Pulmonary hypertension    2016

 

                          Hypothyroidism            06/10/2016









                                        Overview: 



Doing well on current dose of medications



                                        Patient denies change in energy level, h

eat / cold intolerance, nervousness and 

palpitations.



                                        Last labs in range.



                                        



Last Assessment & Plan: 



Continue current dose.









                          Benign non-nodular prostatic hyperplasia with lower ur

inary tract symptoms 

2016









                                        Overview: 



Previously had some urinary retention; patient says this has improved. Not using
a catheter, no frequent infections.



                                        



                                        Saw Dr. Pinzon 2017; currently not yves

ing the medications. In regard to 

flomax, has had some orthostatic dizziness in the past, so this may be limiting.



                                        



Last Assessment & Plan: 



Continue to monitor.









                          Hyperlipidemia            









                                        Overview: 



On statin without myalgia



                                        



Last Assessment & Plan: 



Continue current medication









                          Congestive heart failure  

 

                          Persistent atrial fibrillation 

 

                          On continuous oral anticoagulation 









                                        Overview: 



now on warfarin; changed from elaquis



                                        Followed by cardiology









                          Hearing loss              









                                        Overview: 







                                        with hearing aides







Resolved Problems







                    Problem             Noted Date          Resolved Date

 

                    Influenza A         2020

 

                    Acute lung edema    10/16/2019          10/17/2019

 

                    Demand ischemia     10/16/2019          10/17/2019

 

                    Acute pulmonary edema 10/15/2019          10/17/2019







Encounters







             Date         Type         Specialty    Care Team    Description

 

             06/15/2020   Travel                                 

 

             2020   Orders Only  Cardiology   Maximiliano,      Persistent atri

al



                                                    Addie, LVN fibrillation

 

             2020   Orders Only  Cardiology   Maximiliano,      Persistent atri

al



                                                    Addie, LVN fibrillation

 

             05/15/2020   Telephone    Cardiology   Maximiliano,      Anticoagulation



                                                    Addie, LVN 

 

             2020   Telephone    Cardiology   Azul Willoughby anticoaguljam FERNANDEZ MA        

 

             2020   Telephone Consult Cardiology   Dagoberto Rodriguez, French

ve heart failure,

unspecified HF chronicity, unspecified heart failure type (HCC) (Primary Dx);



                                                    MD           Persistent atri

al fibrillation;



                                                                 Coronary arteri

osclerosis;



                                                                 Ischemic conges

tive cardiomyopathy

 

             2020   Orders Only  Cardiology   Maximiliano,      Persistent atri

al



                                                    Addie, LVN fibrillation 

(Primary



                                                                 Dx)

 

             2020   Telephone    Cardiology   Serene Lundberg medication rev

iew



                                                    MA           

 

             2020   Telephone    Cardiology   Alex Butler, AMRITA   

 

             2020   Refill       Cardiology   Rory Shin MD PhD   

 

             2020   Travel                                 

 

             2020   Refill       Cardiology   Rory Shin MD PhD   

 

             2020   Travel                                 

 

             2020   Anticoagulation Visit Cardiology                Persis

tent atrial



                                                                 fibrillation

 

             2020   Lab          Lab          System,      Acute on chroni

c



                                                    Provider Not combined systol

ic and



                                                    In, MD       diastolic conge

stive



                                                                 heart failure (

HCC)

 

             2020   Travel                                 

 

             2020   Travel                                 

 

             03/10/2020   Telephone    Cardiology   Antonio,     blood tests



                                                    [place], AMRITA   

 

             03/10/2020   Orders Only  Transplant   Jaydon Leonard Acute on 

anabela



                                                    [place],   combined systol

ic and



                                                    NP-C         diastolic conge

stive



                                                                 heart failure (

HCC)



                                                                 (Primary Dx)

 

             2020   Office Visit Cardiology   Lacey Cai, Congestive he

art



                                                    NP           failure, unspec

ified



                                                                 HF chronicity,



                                                                 unspecified hea

rt



                                                                 failure type (H

CC)



                                                                 (Primary Dx)

 

             2020   Orders Only  Transplant   Jaydon Leonard Acute on 

anabela



                                                    [place],   combined systol

ic and



                                                    NP-C         diastolic conge

stive



                                                                 heart failure (

HCC)



                                                                 (Primary Dx)

 

             2020   Anticoagulation Visit Cardiology                Persis

tent atrial



                                                                 fibrillation

 

             2020   Telephone    Cardiology   Johnathon Viera, LVJIM 

 

             2020   Refill       Cardiology   Luke,   Med Refsergio Figueroa, AMRITA   

 

             2020   Hospital Encounter Procedural   Kip Spiritism  Atrial fl

utter (HCC)



                                       Cardiology   MD CB PhD   

 

             2020   Telephone    Cardiology   Maximiliano      Anticoagulation



                                                    Addie, LVN 

 

             2020   Hospital Encounter Nephrology   Lux,     Influenza

 A (Primary Dx);



             -                                      Toya      Acute systolic 

congestive heart failure (HCC);



                2020                                      Tomiwa, DO



                                        Dehydration;



                                                    Joglekar,    Pneumonia of le

ft lower lobe due to infectious organism (HCC)



                                                                MD Jagruti Millan Umar, MD 

 

             2020   Anticoagulation Visit Cardiology                Persis

tent atrial



                                                                 fibrillation

 

             2020   Anticoagulation Visit Cardiology                Persis

tent atrial



                                                                 fibrillation

 

             2020   Refill       Cardiology   Rory Shin MD PhD   

 

             2020   Office Visit Cardiology   Michael, Ashrith, Chronic combi

scottie systolic and 

diastolic congestive heart failure (HCC) (Primary Dx);



                                                    MD           Persistent atri

al fibrillation

 

             2020   Refill       Internal Medicine Chantal Das Newsome, MD  unspecified typ

e

 

             2020   Anticoagulation Visit Cardiology                Persis

tent atrial



                                                                 fibrillation

 

             2019   Anticoagulation Visit Cardiology                Persis

tent atrial



                                                                 fibrillation

 

             2019   Hospital Encounter Procedural   Rory Shin  AF (atria

l



                                       Cardiology   MD CB PhD   fibrillation) (

HCC)

 

             2019   Anticoagulation Visit Cardiology                Persis

tent atrial



                                                                 fibrillation

 

             2019   Lab          Lab          Chantal Das Newsome, MD  unspecified typ

e

 

             2019   Refill       Internal Medicine Chantal Das Newsome, MD  unspecified typ

e

 

             2019   Refill       Cardiology   Rory Shin  Med Dayna VIVAR MD PhD   

 

             2019   Refill       Internal Medicine Chantal Das Newsome, MD  unspecified typ

e



                                                                 (Primary Dx)

 

             10/31/2019   Anticoagulation Visit Cardiology                Persis

tent atrial



                                                                 fibrillation

 

             10/28/2019   Office Visit Cardiology   Jaydon Leonard



                                                    Theordore,   systolic and



                                                    NP-C         diastolic conge

stive



                                                                 heart failure (

HCC)



                                                                 (Primary Dx)

 

             10/23/2019   Orders Only  Cardiology   Maria Teresa Madrigal, Chronic conges

tive



                                                    RN           heart failure,



                                                                 unspecified hea

rt



                                                                 failure type (H

CC)



                                                                 (Primary Dx)

 

             10/18/2019   Patient Outreach Quality      Charisma Dewitt RN           

 

             10/16/2019   Hospital Encounter Procedural   Rory Shin  Atrial fi

brillation,



                                       Cardiology   MD CB PhD   persistent

 

             10/15/2019   Hospital Encounter General Internal [facility] Travis Rosalesu

te pulmonary 

edema (HCC) (Primary Dx);



             -                         Medicine     Jaydon Gutierrez, Acute on 

anabela congestive heart failure, 

unspecified heart failure type (HCC);



                10/18/2019                                      MD



                                        Paroxysmal atrial fibrillation (HCC);



                                                    Fidelina England Rhinovirus inf

ection



                                                    MD REGGIE       

 

             10/15/2019   Travel                                 

 

             10/03/2019   Anticoagulation Visit Cardiology                Persis

tent atrial



                                                                 fibrillation

 

             10/03/2019   Telephone    Family Medicine Chantal Das MD  

 

             10/01/2019   Refill       Internal Medicine Chantal Das MD  

 

             2019   Refill       Internal Medicine Chantal Das MD  

 

             2019   Anticoagulation Visit Cardiology                Persis

tent atrial



                                                                 fibrillation (H

CC)

 

             09/10/2019   Orders Only  Cardiology   Addie Viera LVN 

 

             2019   Refill       Cardiology   Dagoberto Rodriguez Med Refill



                                                    MD           

 

             2019   Hospital Encounter Procedural   Kip, Spiritism  S/P ablat

ion of



                                       Cardiology   MD CB PhD   ventricular



                                                                 arrhythmia

 

             2019   Hospital Encounter Procedural   Kip, Spiritism  Congestiv

e heart



                                       Cardiology   MD CB PhD   failure, unspec

ified



                                                                 HF chronicity,



                                                                 unspecified hea

rt



                                                                 failure type (H

CC)

 

             2019   Refill       Cardiology   Rory Shin  Med Dayna VIVAR MD PhD   

 

             2019   Anticoagulation Visit Cardiology                Persis

tent atrial



                                                                 fibrillation (H

CC)

 

             2019   Refill       Internal Medicine Chantal Das MD  

 

             2019   Office Visit Cardiology   Dagoberto Rodriguez, Paroxysmal at

rial fibrillation

(HCC) (Primary Dx);



                                                    MD           Persistent atri

al fibrillation (HCC)

 

             2019   Office Visit Orthopedic   Neelima,     Spondylosis of



                                       Surgery      Alexandro POZO MD cervical regio

n



                                                                 without myelopa

thy or



                                                                 radiculopathy



                                                                 (Primary Dx)



after 2019



Immunizations







                    Name                Administration Dates Next Due

 

                    FLUZONE HIGH-DOSE PF 2017          

 

                    Influenza (IM) Preservative Free 2013          

 

                    Influenza Trivalent 2013, 10/15/2009 

 

                    Pneumococcal Conjugate 13-Valent 2017          

 

                    Pneumococcal Polysaccharide 2012          

 

                    Tdap                2017          







Family History







                Medical History Relation        Name            Comments

 

                Hypertension    Brother                         

 

                Prostate cancer Brother                         

 

                Coronary artery disease Father          Tj Rina       

 

                Diabetes        Father          Tj Keota       

 

                Heart attack    Father          Tj Rina       

 

                Heart disease   Father          Tj Rina       

 

                Hypertension    Father          Tj Keota       

 

                Heart attack    Mother          Anita Rina    

 

                Heart disease   Mother          Anita Brewster    

 

                Hypertension    Mother          Anita Brewster    

 

                Hypertension    Sister                          









                Relation        Name            Status          Comments

 

                Brother                                         

 

                Father          Gordo Keota         (Age 68) 

 

                Mother          Anita Brewster      (Age 73) 

 

                Sister                                          







Social History







             Tobacco Use  Types        Packs/Day    Years Used   Date

 

             Never Smoker              0            0            









                Smokeless Tobacco: Never Used                                 









                                        Tobacco Cessation: Counseling Given: No









                Alcohol Use     Drinks/Week     oz/Week         Comments

 

                No                                              









                          Sex Assigned at Birth     Date Recorded

 

                          Not on file               









                    Job Start Date      Occupation          Industry

 

                    Not on file         Not on file         Not on file









                    Travel History      Travel Start        Travel End









                                        No recent travel history available.









                    COVID-19 Exposure   Response            Date Recorded

 

                    In the last month, have you been in contact with No / Unsure

         6/15/2020 11:18 AM 

CDT



                    someone who was confirmed or suspected to have              

       



                    Coronavirus / COVID-19?                     







Last Filed Vital Signs







                Vital Sign      Reading         Time Taken      Comments

 

                Blood Pressure  122/69          2020  3:50 PM CDT 

 

                Pulse           85              2020  3:50 PM CDT 

 

                Temperature     35.9 C (96.6 F) 2020 10:29 AM CDT 

 

                Respiratory Rate 17              2020 11:05 AM CDT 

 

                Oxygen Saturation 100%            2020 11:05 AM CDT 

 

                Inhaled Oxygen Concentration -               -               

 

                Weight          69.4 kg (153 lb) 2020  3:50 PM CDT 

 

                Height          177.8 cm (5' 10") 2020  3:50 PM CDT 

 

                Body Mass Index 21.95           2020  3:50 PM CDT 







Plan of Treatment







             Date         Type         Specialty    Care Team    Description

 

                2020      Office Visit    Internal Medicine Skinny Zarate MD



                                        



                                                                [address]

et



                                        



                                                                [address]



                                        



                                                                [place], [place] [address]

0



                                        



                                                                [phone] (Fax) 

 

                2020      Appointment     Procedural Cardiology Brayan Rodriguez MD



                                        



                                                                [address]

et



                                        



                                                                [address]



                                        



                                                                [place], [place] [address]

0



                                        



                                                                [phone] (Fax) 

 

                2020      Office Visit    Cardiology      Rory Shin MD PhD



                                        



                                                                [address]

et



                                        



                                                                [address]



                                        



                                                                [place], [place] [address]

0



                                        



                                                                [phone] (Fax) 

 

                2020      Office Visit    Cardiology      Dagoberto Rodriguez M D



                                        



                                                                [address]

et



                                        



                                                                [address]



                                        



                                                                [place], [place] [address]

0



                                        



                                                                [phone] (Fax) 









                Health Maintenance Due Date        Last Done       Comments

 

                SHINGLES VACCINES (#1) 1987                      

 

                INFLUENZA VACCINE 2020      09/15/2018, 2017, 2013, 



                                                Additional history exists 

 

                65+ PNEUMOCOCCAL VACCINE Completed       2017, 2012 







Procedures







             Procedure Name Priority     Date/Time    Associated Diagnosis Comme

nts

 

             PROTHROMBIN TIME WITH Routine      06/15/2020  8:07 Persistent atri

al Results for 

this



             INR                       AM CDT       fibrillation procedure are i

n



                                                                 the results



                                                                 section.

 

             PROTHROMBIN TIME WITH Routine      2020  8:17 Persistent atri

al Results for 

this



             INR                       AM CDT       fibrillation procedure are i

n



                                                                 the results



                                                                 section.

 

             PROTHROMBIN TIME WITH Routine      2020 11:04 Persistent atri

al Results for 

this



             INR                       AM CDT       fibrillation procedure are i

n



                                                                 the results



                                                                 section.

 

             MAGNESIUM LEVEL STAT         2020 10:30 Acute on chronic Resu

lts for this



                                       AM CDT       combined systolic procedure 

are in



                                                    and diastolic the results



                                                    congestive heart section.



                                                    failure ([facility]) 

 

             B NATRIURETIC PEPTIDE STAT         2020 10:30 Acute on chroni

c Results for this



                                       AM CDT       combined systolic procedure 

are in



                                                    and diastolic the results



                                                    congestive heart section.



                                                    failure ([facility]) 

 

             COMPREHENSIVE STAT         2020 10:30 Acute on chronic Result

s for this



             METABOLIC PANEL              AM CDT       combined systolic procedu

re are in



                                                    and diastolic the results



                                                    congestive heart section.



                                                    failure ([facility]) 

 

             PROTHROMBIN TIME WITH STAT         2020  2:32 Acute on chroni

c 



             INR                       PM CDT       combined systolic 



                                                    and diastolic 



                                                    congestive heart 



                                                    failure ([facility]) 

 

             POC PT/INR   Routine      2020 10:30 Persistent atrial Result

s for this



                                       AM CDT       fibrillation procedure are i

n



                                                                 the results



                                                                 section.

 

             ESTIMATED GFR STAT         2020 12:05              Results fo

r this



                                       PM CDT                    procedure are i

n



                                                                 the results



                                                                 section.

 

             MAGNESIUM LEVEL STAT         2020 12:05 Acute on chronic Resu

lts for this



                                       PM CDT       combined systolic procedure 

are in



                                                    and diastolic the results



                                                    congestive heart section.



                                                    failure ([facility]) 

 

             B NATRIURETIC PEPTIDE STAT         2020 12:05 Acute on chroni

c Results for this



                                       PM CDT       combined systolic procedure 

are in



                                                    and diastolic the results



                                                    congestive heart section.



                                                    failure ([facility]) 

 

             COMPREHENSIVE STAT         2020 12:05 Acute on chronic Result

s for this



             METABOLIC PANEL              PM CDT       combined systolic procedu

re are in



                                                    and diastolic the results



                                                    congestive heart section.



                                                    failure ([facility]) 

 

             POC PT/INR   Routine      2020 10:47 Persistent atrial Result

s for this



                                       AM CST       fibrillation procedure are i

n



                                                                 the results



                                                                 section.

 

             ESTIMATED GFR Routine      2020  3:33              Results fo

r this



                                       AM CST                    procedure are i

n



                                                                 the results



                                                                 section.

 

             PROTHROMBIN TIME WITH Routine      2020  3:33              Re

sults for this



             INR                       AM CST                    procedure are i

n



                                                                 the results



                                                                 section.

 

             MAGNESIUM LEVEL Routine      2020  3:33              Results 

for this



                                       AM CST                    procedure are i

n



                                                                 the results



                                                                 section.

 

             BASIC METABOLIC PANEL Routine      2020  3:33              Re

sults for this



                                       AM CST                    procedure are i

n



                                                                 the results



                                                                 section.

 

             ESTIMATED GFR Routine      2020  4:00              Results fo

r this



                                       AM CST                    procedure are i

n



                                                                 the results



                                                                 section.

 

             MAGNESIUM LEVEL Routine      2020  4:00              Results 

for this



                                       AM CST                    procedure are i

n



                                                                 the results



                                                                 section.

 

             BASIC METABOLIC PANEL Routine      2020  4:00              Re

sults for this



                                       AM CST                    procedure are i

n



                                                                 the results



                                                                 section.

 

             PROTHROMBIN TIME WITH Routine      2020  3:30              Re

sults for this



             INR                       AM CST                    procedure are i

n



                                                                 the results



                                                                 section.

 

             B NATRIURETIC PEPTIDE Routine      2020  3:30              Re

sults for this



                                       AM CST                    procedure are i

n



                                                                 the results



                                                                 section.

 

             TTE COMPLETE, WO Routine      2020  9:31              Results

 for this



             CONTRAST, W DOPPLER              AM CST                    procedur

e are in



             (03330)                                             the results



                                                                 section.

 

             PROTHROMBIN TIME WITH Routine      2020  5:45              Re

sults for this



             INR                       AM CST                    procedure are i

n



                                                                 the results



                                                                 section.

 

             ESTIMATED GFR Routine      2020  4:00              Results fo

r this



                                       AM CST                    procedure are i

n



                                                                 the results



                                                                 section.

 

             THYROID STIMULATING Routine      2020  4:00              Resu

lts for this



             HORMONE                   AM CST                    procedure are i

n



                                                                 the results



                                                                 section.

 

             MAGNESIUM LEVEL Routine      2020  4:00              Results 

for this



                                       AM CST                    procedure are i

n



                                                                 the results



                                                                 section.

 

             BASIC METABOLIC PANEL Routine      2020  4:00              Re

sults for this



                                       AM CST                    procedure are i

n



                                                                 the results



                                                                 section.

 

             TROPONIN     Routine      2020  5:30              Results for

 this



                                       AM CST                    procedure are i

n



                                                                 the results



                                                                 section.

 

             ESTIMATED GFR Routine      2020  5:30              Results fo

r this



                                       AM CST                    procedure are i

n



                                                                 the results



                                                                 section.

 

             PROTHROMBIN TIME WITH Routine      2020  5:30              Re

sults for this



             INR                       AM CST                    procedure are i

n



                                                                 the results



                                                                 section.

 

             B NATRIURETIC PEPTIDE Routine      2020  5:30              Re

sults for this



                                       AM CST                    procedure are i

n



                                                                 the results



                                                                 section.

 

             COMPREHENSIVE Routine      2020  5:30              Results fo

r this



             METABOLIC PANEL              AM CST                    procedure ar

e in



                                                                 the results



                                                                 section.

 

             HC COMPLETE BLD COUNT Routine      2020  5:30              Re

sults for this



             W/AUTO DIFF               AM CST                    procedure are i

n



                                                                 the results



                                                                 section.

 

             LACTIC ACID LEVEL Timed        2020 10:50              Result

s for this



                                       PM CST                    procedure are i

n



                                                                 the results



                                                                 section.

 

             TROPONIN     Timed        2020 10:50              Results for

 this



                                       PM CST                    procedure are i

n



                                                                 the results



                                                                 section.

 

             LACTIC ACID LEVEL Timed        2020  7:50              Result

s for this



                                       PM CST                    procedure are i

n



                                                                 the results



                                                                 section.

 

             BLOOD CULTURE, AEROBIC STAT         2020  7:25              R

esults for this



             & ANAEROBIC               PM CST                    procedure are i

n



                                                                 the results



                                                                 section.

 

             BLOOD CULTURE, AEROBIC STAT         2020  7:08              R

esults for this



             & ANAEROBIC               PM CST                    procedure are i

n



                                                                 the results



                                                                 section.

 

             STREP SCREEN CULTURE Routine      2020  6:17              Res

ults for this



                                       PM CST                    procedure are i

n



                                                                 the results



                                                                 section.

 

             GROUP A STREP, RAPID Routine      2020  6:17              Res

ults for this



             ANTIGEN                   PM CST                    procedure are i

n



                                                                 the results



                                                                 section.

 

             XR CHEST 1 VW PORTABLE STAT         2020  5:56              R

esults for this



                                       PM CST                    procedure are i

n



                                                                 the results



                                                                 section.

 

             ECG ED PRELIMINARY Routine      2020  5:07              Resul

ts for this



             INTERPRETATION              PM CST                    procedure are

 in



                                                                 the results



                                                                 section.

 

             INFLUENZA ANTIGEN Routine      2020  5:03              Result

s for this



             TEST, REFLEX NEGATIVE              PM CST                    proced

ure are in



             TO RPP                                              the results



                                                                 section.

 

             ESTIMATED GFR STAT         2020  4:59              Results fo

r this



                                       PM CST                    procedure are i

n



                                                                 the results



                                                                 section.

 

             HC COMPLETE BLD COUNT STAT         2020  4:59              Re

sults for this



             W/AUTO DIFF               PM CST                    procedure are i

n



                                                                 the results



                                                                 section.

 

             B NATRIURETIC PEP, STAT         2020  4:59              Resul

ts for this



             I-STAT                    PM CST                    procedure are i

n



                                                                 the results



                                                                 section.

 

             TROPONIN, I-STAT STAT         2020  4:59              Results

 for this



                                       PM CST                    procedure are i

n



                                                                 the results



                                                                 section.

 

             CREATINE KINASE, TOTAL STAT         2020  4:59              R

esults for this



             (CPK)                     PM CST                    procedure are i

n



                                                                 the results



                                                                 section.

 

             LACTIC ACID, I-STAT STAT         2020  4:59              Resu

lts for this



                                       PM CST                    procedure are i

n



                                                                 the results



                                                                 section.

 

             COMPREHENSIVE STAT         2020  4:59              Results fo

r this



             METABOLIC PANEL              PM CST                    procedure ar

e in



                                                                 the results



                                                                 section.

 

             ECG 12-LEAD  STAT         2020  4:58              Results for

 this



                                       PM CST                    procedure are i

n



                                                                 the results



                                                                 section.

 

             POC PT/INR   Routine      2020 10:31 Persistent atrial Result

s for this



                                       AM CST       fibrillation procedure are i

n



                                                                 the results



                                                                 section.

 

             POC PT/INR   Routine      2020 10:32 Persistent atrial Result

s for this



                                       AM CST       fibrillation procedure are i

n



                                                                 the results



                                                                 section.

 

             POC PT/INR   Routine      2020  2:42 Chronic combined Results

 for this



                                       PM CST       systolic and procedure are i

n



                                                    diastolic congestive the res

ults



                                                    heart failure (HCC) section.

 

             POC PT/INR   Routine      2020 10:40 Persistent atrial Result

s for this



                                       AM CST       fibrillation procedure are i

n



                                                                 the results



                                                                 section.

 

             POC PT/INR   Routine      2019 10:54 Persistent atrial Result

s for this



                                       AM CST       fibrillation procedure are i

n



                                                                 the results



                                                                 section.

 

             POC PT/INR   Routine      2019 10:09 Persistent atrial Result

s for this



                                       AM CST       fibrillation procedure are i

n



                                                                 the results



                                                                 section.

 

             T4, FREE     Routine      2019  9:51 Hypothyroidism, Results 

for this



                                       AM CST       unspecified type procedure a

re in



                                                                 the results



                                                                 section.

 

             THYROID STIMULATING Routine      2019  9:51 Hypothyroidism, R

esults for this



             HORMONE                   AM CST       unspecified type procedure a

re in



                                                                 the results



                                                                 section.

 

             POC PT/INR   Routine      10/31/2019  2:23 Persistent atrial Result

s for this



                                       PM CDT       fibrillation procedure are i

n



                                                                 the results



                                                                 section.

 

             NT-PROBNP    STAT         10/28/2019 11:25              Results for

 this



                                       AM CDT                    procedure are i

n



                                                                 the results



                                                                 section.

 

             ESTIMATED GFR Routine      10/18/2019  4:15              Results fo

r this



                                       AM CDT                    procedure are i

n



                                                                 the results



                                                                 section.

 

             PROTHROMBIN TIME WITH Routine      10/18/2019  4:15              Re

sults for this



             INR                       AM CDT                    procedure are i

n



                                                                 the results



                                                                 section.

 

             PHOSPHORUS LEVEL Routine      10/18/2019  4:15              Results

 for this



                                       AM CDT                    procedure are i

n



                                                                 the results



                                                                 section.

 

             MAGNESIUM LEVEL Routine      10/18/2019  4:15              Results 

for this



                                       AM CDT                    procedure are i

n



                                                                 the results



                                                                 section.

 

             BASIC METABOLIC PANEL Routine      10/18/2019  4:15              Re

sults for this



                                       AM CDT                    procedure are i

n



                                                                 the results



                                                                 section.

 

             HC COMPLETE BLD COUNT Routine      10/18/2019  4:15              Re

sults for this



             W/AUTO DIFF               AM CDT                    procedure are i

n



                                                                 the results



                                                                 section.

 

             NM MYOCARDIAL Routine      10/17/2019  1:51              Results fo

r this



             PERFUSION STRESS REST              PM CDT                    proced

ure are in



             1 DAY                                               the results



                                                                 section.

 

             ECG 12-LEAD  STAT         10/17/2019 11:05              Results for

 this



                                       AM CDT                    procedure are i

n



                                                                 the results



                                                                 section.

 

             CV STRESS TEST Routine      10/17/2019 11:05              Results f

or this



                                       AM CDT                    procedure are i

n



                                                                 the results



                                                                 section.

 

             TROPONIN     STAT         10/17/2019  8:17              Results for

 this



                                       AM CDT                    procedure are i

n



                                                                 the results



                                                                 section.

 

             PROTHROMBIN TIME WITH Routine      10/17/2019  4:16              Re

sults for this



             INR                       AM CDT                    procedure are i

n



                                                                 the results



                                                                 section.

 

             HC COMPLETE BLD COUNT Routine      10/17/2019  4:16              Re

sults for this



             W/AUTO DIFF               AM CDT                    procedure are i

n



                                                                 the results



                                                                 section.

 

             ESTIMATED GFR Routine      10/17/2019  4:00              Results fo

r this



                                       AM CDT                    procedure are i

n



                                                                 the results



                                                                 section.

 

             PHOSPHORUS LEVEL Routine      10/17/2019  4:00              Results

 for this



                                       AM CDT                    procedure are i

n



                                                                 the results



                                                                 section.

 

             MAGNESIUM LEVEL Routine      10/17/2019  4:00              Results 

for this



                                       AM CDT                    procedure are i

n



                                                                 the results



                                                                 section.

 

             BASIC METABOLIC PANEL Routine      10/17/2019  4:00              Re

sults for this



                                       AM CDT                    procedure are i

n



                                                                 the results



                                                                 section.

 

             RESPIRATORY PATHOGEN Routine      10/16/2019  4:45              Res

ults for this



             PANEL                     AM CDT                    procedure are i

n



                                                                 the results



                                                                 section.

 

             INFLUENZA ANTIGEN Routine      10/16/2019  4:45              Result

s for this



             TEST, REFLEX NEGATIVE              AM CDT                    proced

ure are in



             TO RPP                                              the results



                                                                 section.

 

             DIGOXIN LEVEL Routine      10/16/2019  4:32              Results fo

r this



                                       AM CDT                    procedure are i

n



                                                                 the results



                                                                 section.

 

             CBC WITH PLATELET AND Routine      10/16/2019  4:32              Re

sults for this



             DIFFERENTIAL              AM CDT                    procedure are i

n



                                                                 the results



                                                                 section.

 

             ESTIMATED GFR Routine      10/16/2019  4:32              Results fo

r this



                                       AM CDT                    procedure are i

n



                                                                 the results



                                                                 section.

 

             PROTHROMBIN TIME WITH Routine      10/16/2019  4:32              Re

sults for this



             INR                       AM CDT                    procedure are i

n



                                                                 the results



                                                                 section.

 

             HC COMPLETE BLD COUNT Routine      10/16/2019  4:32              Re

sults for this



             W/AUTO DIFF               AM CDT                    procedure are i

n



                                                                 the results



                                                                 section.

 

             PHOSPHORUS LEVEL Routine      10/16/2019  4:32              Results

 for this



                                       AM CDT                    procedure are i

n



                                                                 the results



                                                                 section.

 

             MAGNESIUM LEVEL Routine      10/16/2019  4:32              Results 

for this



                                       AM CDT                    procedure are i

n



                                                                 the results



                                                                 section.

 

             BASIC METABOLIC PANEL Routine      10/16/2019  4:32              Re

sults for this



                                       AM CDT                    procedure are i

n



                                                                 the results



                                                                 section.

 

             TROPONIN     Timed        10/16/2019  4:02              Results for

 this



                                       AM CDT                    procedure are i

n



                                                                 the results



                                                                 section.

 

             ECG 12-LEAD  STAT         10/16/2019  3:54              Results for

 this



                                       AM CDT                    procedure are i

n



                                                                 the results



                                                                 section.

 

             TROPONIN, I-STAT Timed        10/15/2019 10:00              Results

 for this



                                       PM CDT                    procedure are i

n



                                                                 the results



                                                                 section.

 

             CT HEAD WO CONTRAST STAT         10/15/2019  8:20              Resu

lts for this



                                       PM CDT                    procedure are i

n



                                                                 the results



                                                                 section.

 

             CT ABDOMEN PELVIS WO STAT         10/15/2019  8:12              Res

ults for this



             CONTRAST                  PM CDT                    procedure are i

n



                                                                 the results



                                                                 section.

 

             XR CHEST 1 VW PORTABLE STAT         10/15/2019  7:57              R

esults for this



                                       PM CDT                    procedure are i

n



                                                                 the results



                                                                 section.

 

             URINALYSIS   STAT         10/15/2019  7:48              Results for

 this



                                       PM CDT                    procedure are i

n



                                                                 the results



                                                                 section.

 

             BLOOD CULTURE, AEROBIC STAT         10/15/2019  7:40              R

esults for this



             & ANAEROBIC               PM CDT                    procedure are i

n



                                                                 the results



                                                                 section.

 

             ESTIMATED GFR STAT         10/15/2019  7:28              Results fo

r this



                                       PM CDT                    procedure are i

n



                                                                 the results



                                                                 section.

 

             B NATRIURETIC PEP, STAT         10/15/2019  7:28              Resul

ts for this



             I-STAT                    PM CDT                    procedure are i

n



                                                                 the results



                                                                 section.

 

             TROPONIN, I-STAT STAT         10/15/2019  7:28              Results

 for this



                                       PM CDT                    procedure are i

n



                                                                 the results



                                                                 section.

 

             LACTIC ACID, I-STAT STAT         10/15/2019  7:28              Resu

lts for this



                                       PM CDT                    procedure are i

n



                                                                 the results



                                                                 section.

 

             COMPREHENSIVE STAT         10/15/2019  7:28              Results fo

r this



             METABOLIC PANEL              PM CDT                    procedure ar

e in



                                                                 the results



                                                                 section.

 

             PROTHROMBIN TIME WITH STAT         10/15/2019  7:28              Re

sults for this



             INR, I-STAT               PM CDT                    procedure are i

n



                                                                 the results



                                                                 section.

 

             HC COMPLETE BLD COUNT STAT         10/15/2019  7:28              Re

sults for this



             W/AUTO DIFF               PM CDT                    procedure are i

n



                                                                 the results



                                                                 section.

 

             BLOOD CULTURE, AEROBIC STAT         10/15/2019  7:28              R

esults for this



             & ANAEROBIC               PM CDT                    procedure are i

n



                                                                 the results



                                                                 section.

 

             INFLUENZA ANTIGEN Routine      10/15/2019  7:25              Result

s for this



                                       PM CDT                    procedure are i

n



                                                                 the results



                                                                 section.

 

             ECG 12-LEAD  STAT         10/15/2019  7:18              Results for

 this



                                       PM CDT                    procedure are i

n



                                                                 the results



                                                                 section.

 

             ECG ED PRELIMINARY Routine      10/15/2019  7:16              Resul

ts for this



             INTERPRETATION              PM CDT                    procedure are

 in



                                                                 the results



                                                                 section.

 

             POC PT/INR   Routine      10/03/2019 10:53 Persistent atrial Result

s for this



                                       AM CDT       fibrillation procedure are i

n



                                                                 the results



                                                                 section.

 

             POC PT/INR   Routine      2019 10:45 Persistent atrial Result

s for this



                                       AM CDT       fibrillation (HCC) procedure

 are in



                                                                 the results



                                                                 section.

 

             POC PT/INR   Routine      2019 10:35 Persistent atrial Result

s for this



                                       AM CDT       fibrillation (HCC) procedure

 are in



                                                                 the results



                                                                 section.

 

             POC PT/INR   Routine      2019  2:46 Paroxysmal atrial Result

s for this



                                       PM CDT       fibrillation (HCC) procedure

 are in



                                                                 the results



                                                                 section.



after 2019



Results

Prothrombin time with INR (06/15/2020  8:07 AM CDT)Only the most recent of10 
resultswithin the time period is included.





             Component    Value        Ref Range    Performed At Pathologist



                                                                 Signature

 

             INR          1.9 (H)                   [facility] 



                          Comment:                  [facility]      



                          Reference Range           0.9-1.1 

                          



                          Moderate-intensity Warfarin Therapy 2.0-3.0           

                



                          Higher-intensity Warfarin Therapy  3.0-4.0          

                 



                                                                 



                                                                 

 

             Prothrombin time 19.4 (H)     9.0 - 11.5   [facility] 



                          Comment:     tyler ANN      



                                                                 



                          For more information on this test, go to:             

              



                          http://education.DeRev/faq/NEH244      

                     



                                                                 



                                                                 









                                        Specimen

 

                                        Blood









                          Narrative                 Performed At

 

                                        FASTING:YES



                                        [facility]



                          FASTING: YES              









                                        Resulting Agency Comment

 

                                        Performing Organization Information:







                                          Site ID: [facility]







                                          Name: [facility]







                                          Address: [address] [place] [id number]







                                          Director: Alexis Anderson









                Performing Organization Address         City/State/Zipcode Phone

 Number

 

                [place]                                           

 

                [facility] [place] [address] [place] [address] [phone]



B natriuretic peptide (2020 10:30 AM CDT)Only the most recent of4 results
within the time period is included.





             Component    Value        Ref Range    Performed At Pathologist Sig

nature

 

             BNP          314 (H)      <100 pg/mL   [facility] 



                          Comment:                  [place]      



                                                                 



                          BNP levels increase with age in the general           

                



                          population with the highest values seen in            

               



                          individuals greater than 75 years of age.             

              



                          Reference: J. Am. Ed. Cardiol. 2002; 40:976-982.    

                       



                                                                 



                          NO COLLECTION DATE RECEIVED. WE HAVE USED             

              



                          THE DATE THE SPECIMEN WAS RECEIVED BY THIS            

               



                          LABORATORY AS THE COLLECTION DATE. IF THIS            

               



                          IS INCORRECT, PLEASE CONTACT CLIENT SERVICES.         

                  



                          PHONE NUMBER: [phone]                           



                                                                 









                                        Specimen

 

                                        Blood









                                        Resulting Agency Comment

 

                                        Performing Organization Information:







                                          Site ID: [facility]







                                          Name: [facility]







                                          Address: [address] [place], [place] [id number]







                                          Director: Alexis Anderson









                Performing Organization Address         City/State/[facility] Phone

 Number

 

                [place] [id number] [phone]



Magnesium level (2020 10:30 AM CDT)Only the most recent of8 resultswithin 
the time period is included.





             Component    Value        Ref Range    Performed At Pathologist Sig

nature

 

             Magnesium    2.0          1.5 - 2.5 mg/dL [facility]

 









                                        Specimen

 

                                        Blood









                                        Resulting Agency Comment

 

                                        Performing Organization Information:







                                          Site ID: [facility]







                                          Name: [facility]







                                          Address: [address] [place], [place] [id number]







                                          Director: Alexis Anderson









                Performing Organization Address         City/[facility] Phone

 Number

 

                [place] [address] 

[phone]



Comprehensive metabolic panel (2020 10:30 AM CDT)Only the most recent of5 
resultswithin the time period is included.





             Component    Value        Ref Range    Performed At Pathologist



                                                                 Signature

 

             Glucose      76           65 - 99      [facility] 



                          Comment:     mg/dL        [facility]      



                                                                 



                                Fasting reference interval            

               



                                                                 



                                                                 

 

             BUN          21           7 - 25 mg/dL [facility] 



                                                    [facility]      

 

             Creatinine   1.10         0.70 - 1.11  [facility] 



                          Comment:     mg/dL        [place]      



                          For patients >49 years of age, the reference limit    

                       



                          for Creatinine is approximately 13% higher for people 

                          



                          identified as -American.                       

    



                                                                 



                                                                 

 

             EGFR Non-Afr. 62           > OR = 60    [facility] 



             American                  mL/min/1.73m [place]      



                                       2                         

 

             EGFR  72           > OR = 60    [facility] 



             American                  mL/min/1.73m [place]      



                                       2                         

 

             BUN/creatinine NOT APPLICABLE 6 - 22       [facility] 



             ratio                     (calc)       [place]      

 

             Sodium       140          135 - 146    [facility] 



                                       mmol/L       [place]      

 

             Potassium    4.5          3.5 - 5.3    [facility] 



                                       mmol/L       [place]      

 

             Chloride     103          98 - 110     [facility] 



                                       mmol/L       [place]      

 

             CO2          31           20 - 32      [facility] 



                                       mmol/L       [place]      

 

             Calcium      9.3          8.6 - 10.3   [facility] 



                                       mg/dL        [place]      

 

             Protein      6.3          6.1 - 8.1    [facility] 



                                       g/dL         [place]      

 

             Albumin, S   4.2          3.6 - 5.1    [facility] 



                                       g/dL         [place]      

 

             Globulin, total 2.1          1.9 - 3.7    [facility] 



                                       g/dL (calc)  [place]      

 

             Albumin/globulin 2.0          1.0 - 2.5    [facility] 



             ratio                     (calc)       [place]      

 

             Total bilirubin 0.8          0.2 - 1.2    [facility] 



                                       mg/dL        [place]      

 

             Alkaline     80           35 - 144 U/L [facility] 



             phosphatase                            [place]      

 

             AST          30           10 - 35 U/L  [facility] 



                                                    [place]      

 

             ALT          22           9 - 46 U/L   [facility] 



                                                    [place]      









                                        Specimen

 

                                        Blood









                                        Resulting Agency Comment

 

                                        Performing Organization Information:







                                          Site ID: [facility]







                                          [facility]







                                          Address: [address] [place] [phone]







                                          Director: Alexis Anderson









                Performing Organization Address         City/State/Zipcode Phone

 Number

 

                [place] [id number] [phone]



POC PT/INR (2020 10:30 AM CDT)Only the most recent of13 resultswithin the 
time period is included.





             Component    Value        Ref Range    Performed At Pathologist Sig

nature

 

             POC prothrombin time 24.8                                   

 

             POC INR      2.1                                    









                                        Specimen

 

                                        Blood



Estimated GFR (2020 12:05 PM CDT)Only the most recent of10 resultswithin 
the time period is included.





             Component    Value        Ref Range    Performed At Pathologist



                                                                 Signature

 

             Estimated GFR 57 (A)       mL/min/1.73  [facility] 



                          Comment:     m2           [facility]OSPITAL     



                          Catergory Units  Interpretation                 

          



                          G1     >=90   Normal or high              

             



                          G2     60-89  Mildly decreased            

               



                          G3a    45-59  Mildly to moderately decreas

ed                           



                          G3b    30-44  Moderately to severely decre

ased                           



                          G4     15-29  Severely decreased          

                 



                          G5     <15   Kidney failure             

              



                          The eGFR was calculated using the Chronic Kidney Disea

se                           



                          Epidemiology Collaboration (CKD-EPI) equation.        

                   



                          Interpretation is based on recommendations of the     

                      



                          National Kidney Foundation-Kidney Disease Outcomes Heladio

lity                           



                          Initiative (NKF-KDOQI) published in 2014.             

              



                                                                 









                                        Specimen

 

                                        Plasma specimen









                Performing Organization Address         City/[facility]/Zipcode Phone

 Number

 

                [facility] DEPARTMENT OF PATHOLOGY AND [address] [place] [address]

0 



                [place] [id number] 



Basic metabolic panel (2020  3:33 AM CST)Only the most recent of6 results
within the time period is included.





             Component    Value        Ref Range    Performed At Pathologist Sig

nature

 

             Sodium       143          135 - 148 mEq/L [facility]     

 

             Potassium    4.2          3.5 - 5.0 mEq/L [facility]     

 

             Chloride     106          98 - 112 mEq/L [facility]     

 

             CO2          21 (L)       24 - 31 mEq/L [facility]     

 

             Anion gap    16@ANIO (H)  7 - 15 mEq/L [facility]     

 

             BUN          18           8 - 23 mg/dL [facility]     

 

             Creatinine   1.08         0.70 - 1.20 mg/dL [facility]     

 

             Glucose      87           65 - 99 mg/dL [facility]     

 

             Calcium      9.1          8.8 - 10.2 mg/dL [facility]     









                                        Specimen

 

                                        Plasma specimen









                Performing Organization Address         City/[facility]/Zipcode Phone

 Number

 

                [facility] DEPARTMENT OF PATHOLOGY AND [address] [place] [address] 



Echocardiogram complete w contrast and 3D if needed (2020  9:31 AM CST)





                                        Specimen

 

                                        









                          Narrative                 Performed At

 

                                                      

    



                                         [facility]



                                                    Echo

cardiography Report



                                        



                                              [address] [facility], [facility] [address], [place] [address]



                                        



                                                      

    



                                        



                          Pat.Name: TAMAR BREWSTER    Pat.ID:  0

[id number]    



                                            



                                        



                          .Date:  2020        Refer.MD: 

SHAMA MCCULLOUGH NP   



                                        



                          Exam Time: 8:48:00 AM       Study Type:R

outine Echo   



                                           



                                        



                          Height:  70in          Weight:

  154lb   



                                               



                                        



                          BSA:    1.87 m2          Ag

e: 



                                        1935,[id number]     



                                        



                          Sex:    MALE          BP: 

   98/57  



                                                



                                        



                          HR:    86 bpm         Sonogr

phr: CHRISTOPHER Mcmahan    



                                        



                          Pat. Stat.:Inpatient        Room:   

[facility]     



                                             



                                        



                                        Study Status:Final        

 



                                        



                                        Echo Event ID:[id number]      





                                        



                                        Order ID: [id number]      

 



                                        



                                        Reason for Study:CHF       

 



                                        



                                        History / Clinical:Congestive Heart Fail

ure, Pulmonary Hypertension



                                        



                                        Procedures: 2D Echo, Colorflow Doppler, 

Strain, Portable



                                        



                                        Race:         

  



                                        



                                        ------------------------------------



                                        



                                        SUMMARY:



                                        



                                        ------------------------------------



                                        



                                        LV EF is mild to moderately depressed.



                                        



                                        Basal Inferior, Basal Inferolateral, Mid

 Inferior, Mid Inferolateral



                                        



                                        walls are akinetic. Basal Anterolatera

l, Mid Anterolateral walls are



                                        



                                        mildly hypokinetic.  Normal wall motio

n in all other walls.



                                        



                                        RV systolic function is lower limits of 

normal.



                                        



                                        Mild aortic valve stenosis.



                                        



                                        LV filling pressure is elevated.



                                        



                                        ------------------------------------



                                        



                                        FINDINGS:



                                        



                                        ------------------------------------



                                        



                                        LV:    LV size is upper limits of 

normal. There is moderate



                                        



                                             concentric LV hypertrop

hy. Reduced average LV global



                                        



                               longitudinal strain at -12.5%. LV EF 

is mild to 



                                        moderately



                                        



                                             



                                        



                                        depressed. Estimated EF is 40-44%. Bas

al Inferior, Basal



                                        



                                        Inferolateral, Mid Inferior, Mid Inferol

ateral walls are akinetic. 



                                        



                                        Basal Anterolateral, Mid Anterolateral w

alls are mildly hypokinetic. 



                                        



                                        Normal wall motion in all other walls.



                                        



                                        RV:    RV size is moderately enlar

ged. A pacemaker wire is seen in



                                        



                                             the RV. RV systolic fun

ction is lower limits of normal.



                                        



                                        LA:    LA volume is severely enlar

ged.



                                        



                                        RA:    RA volume is severely enlar

ged. A pacemaker wire is seen.



                                        



                                        AO:    Aortic root diameter is nor

mal.



                                        



                                        VALERIE:   No pericardial effusion.



                                        



                          AV:    Mild thickening and calcification of AV l

eaflets. A trace 



                                        of



                                        



                               aortic regurgitation. Mild aortic zonia

ve stenosis. 



                                        Estimated



                                        



                               mean aortic valve gradient 18 mmHg wi

th a valve area of 



                                        1.1



                                        



                                             cm2. 



                                        



                                        MV:    Mild thickening and calcifi

cation of mitral leaflets.



                                        



                                             Moderate mitral annular

 calcification. Mild mitral



                                        



                                             regurgitation. 



                                        



                                        PV:    No structural PV abnormalit

ies noted.



                                        



                                        TV:    No structural TV abnormalit

ies noted. A trace of tricuspid



                                        



                                             regurgitation 



                                        



                                        [place]:   LV relaxation is impaired. L

V filling pressure is elevated.



                                        



                                        Other:  Estimated PA systolic pressu

re is 33 mmHg, assuming a mean



                                        



                                             RAP of 10 mmHg.



                                        



                                        ------------------------------------



                                        



                                        MEASUREMENTS:



                                        



                                        ------------------------------------



                                        



                                                      

   2D



                                        



                                        Parasternal Long Axis



                                        



                           Ao An      1.9 cm      LVPWd 

     



                                        1.2 cm 



                                        



                           Ao Rtd      3.2 cm      Index 

   1.7 



                                        cm/m2      



                                        



                                         LA Ds      4.9 cm 



                                        



                           IVSd       1.3 cm      RWT 

      



                                        0.45   



                                        



                           LVIDd      5.3 cm      Index 

   2.8 



                                        cm/m2      



                                        



                                         LV Mass     267 g  (122-1

74)



                                        



                           LVIDs      2.1 cm      LVM In

dex    143 



                                        g/m



                                        



                                         LV%fs       61 %    

  



                                        



                                        LA Sng Plane



                                        



                           LA Area      40 cm (8.8-23.4) LA Vol 

     187 



                                        ml 



                                        



                                         Index    100 ml/m2



                                        



                                         LA LngAx     7.3 cm    

  



                                        



                                        RA Sng Plane



                                        



                           RA Vol      102 ml      Index 

   54 ml/m2 



                                              



                                        



                                         RA LngAx     6.2 cm 



                                        



                                         RA Area      28 cm (8.3-1

9.5)



                                        



                                        LVOT For Flow



                                        



                           LVOT       1.9 cm      LVOT A

patty    2.8 



                                        cm 



                                        



                                        EF Biplane



                                        



                           EDV       130 ml      SV 

       



                                        54 ml 



                                        



                           ESV        76 ml      EF 

       



                                        42 %  



                                        



                                                      

  DOPPLER



                                        



                                        AV For Flow/ARIANA



                                        



                           AV pkVel     264 cm/s (100-170) AV TVI   

   48 cm 



                                        



                                        



                           AV mnVel     200 cm/s     AVpkAcRt 

   5352 



                                        cm/s



                                        



                           AV pkPG      28 mmHg     AV DeRt 

    1104 



                                        cm/s



                                        



                           AV Mean G     18 mmHg     AV Area 

    1.1 



                                        cm (3-5)



                                        



                           AV ET      239 msec     AV AC 

      



                                        79 msec ()



                                        



                                         AV AC/ET    0.33     

  



                                        



                                        Aortic Valve 



                                        



                                         AV DI      0.4     

  



                                        



                                        LVOT For Flow



                                        



                           LVOT TVI     19 cm      LVOT CI

     2.3 



                                        l/m/m



                                        



                           LVOT SV      54 ml      LVOTpkP

G     5.1 



                                        mmHg



                                        



                           LVOTpkVel    113 cm/s     LVOTmnPG 

    2.8 



                                        mmHg



                                        



                           LVOT CO     4.3 l/min     HR   

     



                                        79 bpm 



                                        



                                        LVOT 



                                        



                                         SVi        29 ml/m  

   



                                        



                                         



                                        



                                        ------------------------------------



                                        



                                        WALL MOTION:



                                        



                                        ------------------------------------



                                        



                                        RESTING WALL MOTION:



                                        



                                        Basal Inferior, Basal Inferolateral, Mid

 Inferior, Mid Inferolateral



                                        



                                        walls are akinetic. Basal Anterolatera

l, Mid Anterolateral walls are



                                        



                                        mildly hypokinetic.  Normal wall motio

n in all other walls.



                                        



                                        Wall Index = 1.5



                                        



                                        Signed 2020 02:59 PM



                                        



                          Nicole Julian M.D.   









                                        Procedure Note

 

                                        Interface, Radiology Results In - 2020  3:00 PM CST







                                                                Echocardiography

 Report



                                                   [address] [place] [address]



                                        



                                        Pat.Name:  TAMAR BREWSTER        Pat.I

D:    [id number]



                                        .Date:   2020               Refer

.MD:  SHAMA MCCULLOUGH NP



                                        Exam Time: 8:48:00 AM              Study

 Type:Routine Echo



                                        Height:    70in                    Weigh

t:    154lb



                                        BSA:       1.87 m2                   

Age:  1935,84Y



                                        Sex:       MALE                    BP:  

      98/57



                                        HR:        86 bpm                  Sonog

rphr: CHRISTOPHER Mcmahan



                                        Pat. Stat.:Inpatient               Room:

      [facility]



                                        Study Status:Final



                                        Echo Event ID:[id number]



                                        Order ID:  [id number]



                                        Reason for Study:CHF



                                        History / Clinical:Congestive Heart Fail

ure, Pulmonary Hypertension



                                        Procedures: 2D Echo, Colorflow Doppler, 

Strain, Portable



                                        Race:      



                                        ------------------------------------



                                        SUMMARY:



                                        ------------------------------------



                                        LV EF is mild to moderately depressed.



                                        Basal Inferior, Basal Inferolateral, Mid

 Inferior, Mid Inferolateral



                                        walls are akinetic.  Basal Anterolateral

, Mid Anterolateral walls are



                                        mildly hypokinetic.   Normal wall motion

 in all other walls.



                                        RV systolic function is lower limits of 

normal.



                                        Mild aortic valve stenosis.



                                        LV filling pressure is elevated.



                                        ------------------------------------



                                        FINDINGS:



                                        ------------------------------------



                                        LV:       LV size is upper limits of nor

mal. There is moderate



                                                  concentric  LV hypertrophy. Re

duced average LV global



                                                  longitudinal  strain at -12.5%

. LV EF is mild to moderately



                                        



                                        depressed.  Estimated EF is 40-44%. Basa

l Inferior, Basal



                                        Inferolateral, Mid Inferior, Mid Inferol

ateral walls are akinetic.



                                        Basal Anterolateral, Mid Anterolateral w

alls are mildly hypokinetic.



                                        Normal wall motion in all other walls.



                                        RV:       RV size is moderately enlarged

. A pacemaker wire is seen in



                                                  the  RV. RV systolic function 

is lower limits of normal.



                                        LA:       LA volume is severely enlarged

.



                                        RA:       RA volume is severely enlarged

. A pacemaker wire is seen.



                                        AO:       Aortic root diameter is normal

.



                                        VALERIE:     No pericardial effusion.



                                        AV:       Mild thickening and calcificat

ion of AV leaflets. A trace of



                                                  aortic  regurgitation. Mild ao

rtic valve stenosis. Estimated



                                                  mean  aortic valve gradient 18

 mmHg with a valve area of 1.1



                                                  cm2.



                                        MV:       Mild thickening and calcificat

ion of mitral leaflets.



                                                  Moderate  mitral annular calci

fication. Mild mitral



                                                  regurgitation.



                                        PV:       No structural PV abnormalities

 noted.



                                        TV:       No structural TV abnormalities

 noted. A trace of tricuspid



                                                  regurgitation



                                        [place]:     LV relaxation is impaired. LV 

filling pressure is elevated.



                                        Other:    Estimated PA systolic pressure

 is 33 mmHg, assuming a mean



                                                  RAP  of 10 mmHg.



                                        ------------------------------------



                                        MEASUREMENTS:



                                        ------------------------------------



                                                                          2D



                                        Parasternal Long Axis



                                         Ao An            1.9 cm            LVPW

d            1.2 cm



                                         Ao Rtd           3.2 cm            Inde

x        1.7 cm/m2



                                         LA Ds            4.9 cm



                                         IVSd             1.3 cm            RWT 

            0.45



                                         LVIDd            5.3 cm            Inde

x        2.8 cm/m2



                                         LV Mass          267 g    (122-174)



                                         LVIDs            2.1 cm            LVM 

Index        143 g/m



                                         LV%fs             61 %



                                        LA Sng Plane



                                         LA Area           40 cm  (8.8-23.4) L

A Vol           187 ml



                                         Index        100 ml/m2



                                         LA LngAx         7.3 cm



                                        RA Sng Plane



                                         RA Vol           102 ml            Inde

x        54 ml/m2



                                         RA LngAx         6.2 cm



                                         RA Area           28 cm  (8.3-19.5)



                                        LVOT For Flow



                                         LVOT             1.9 cm            LVOT

 Area        2.8 cm



                                        EF Biplane



                                         EDV              130 ml            SV  

              54 ml



                                         ESV               76 ml            EF  

              42 %



                                                                        DOPPLER



                                        AV For Flow/ARIANA



                                         AV pkVel         264 cm/s (100-170) AV 

TVI            48 cm



                                         AV mnVel         200 cm/s          AVpk

AcRt        5352 cm/s



                                         AV pkPG           28 mmHg          AV D

eRt         1104 cm/s



                                         AV Mean G         18 mmHg          AV A

patty          1.1 cm  (3-5)



                                         AV ET            239 msec          AV A

C             79 msec ()



                                         AV AC/ET        0.33



                                        Aortic Valve



                                         AV DI            0.4



                                        LVOT For Flow



                                         LVOT TVI          19 cm            LVOT

 CI          2.3 l/m/m



                                         LVOT SV           54 ml            LVOT

pkPG         5.1 mmHg



                                         LVOTpkVel        113 cm/s          LVOT

mnPG         2.8 mmHg



                                         LVOT CO          4.3 l/min         HR  

              79 bpm



                                        LVOT



                                         SVi               29 ml/m



                                        



                                        ------------------------------------



                                        WALL MOTION:



                                        ------------------------------------



                                        RESTING WALL MOTION:



                                        Basal Inferior, Basal Inferolateral, Mid

 Inferior, Mid Inferolateral



                                        walls are akinetic.  Basal Anterolateral

, Mid Anterolateral walls are



                                        mildly hypokinetic.   Normal wall motion

 in all other walls.



                                        Wall Index = 1.5



                                        Signed 2020 02:59 PM



                                        Nicole Julian M.D.









                Performing Organization Address         City/State/Zipcode Phone

 Number

 

                 [facility]        [id number] [place] [address] 



Thyroid stimulating hormone (2020  4:00 AM CST)Only the most recent of2 
resultswithin the time period is included.





             Component    Value        Ref Range    Performed At Pathologist Sig

nature

 

             TSH          0.65         0.27 - 4.20 uIU/mL [facility] 









                                        Specimen

 

                                        Plasma specimen









                Performing Organization Address         City/[facility]/Zipcode Phone

 Number

 

                [facility] DEPARTMENT OF PATHOLOGY AND [address] [place], [place] [address]

0 



                [facility] [address] [place] [address] 



Troponin (2020  5:30 AM CST)Only the most recent of4 resultswithin the 
time period is included.





             Component    Value        Ref Range    Performed At Pathologist



                                                                 Signature

 

             Troponin     0.041 (H)    0.000 - 0.040 [facility] 



                          Comment:     ng/mL        HOSPITAL     



                          In patients suspected of having a myocardial infarctio

n, along with all                           



                          other appropriate clinical measures and actions includ

ing ECG and other                           



                          diagnostics as appropriate, measure Ultra TnI at 0 hrs

 and at 3 hrs.                           



                          Myocardial infarction VERY LIKELY                     

      



                          The 0 hr TnI level is > 0.10 ng/mL                    

       



                          ------------------------------------------------------

------------------                           



                          Myocardial infarction LIKELY                          

 



                          The 0 hr TnI level is > 0.04 ng/mL and 3 hr level is i

ncreased or decreased                           



                          by at least 0.020 ng/mL                           



                          ------------------------------------------------------

------------------                           



                          Myocardial infarction VERY UNLIKELY                   

        



                          Both the 0 hr and 3 hr TnI levels <= 0.04 ng/mL(within

 normal limits) OR 0                           



                          hr is > 0.04 ng/mL and 3 hr is increased OR decreased 

by less than 0.020                           



                          ng/mL                                  



                                                                 









                                        Specimen

 

                                        Plasma specimen









                Performing Organization Address         City/State/Zipcode Phone

 Number

 

                [facility] DEPARTMENT OF PATHOLOGY AND [address] [place], [place] [address]

0 



                [facility] [address] [place] [address] 



CBC with platelet and differential (2020  5:30 AM CST)Only the most recent
of7 resultswithin the time period is included.





             Component    Value        Ref Range    Performed At Pathologist



                                                                 Signature

 

             WBC          4.50         4.50 - 11.00 [facility]     

 

             RBC          4.12 (L)     4.40 - 6.00  [facility] 



                                       m/uL         HOSPITAL     

 

             HGB          12.6 (L)     14.0 - 18.0  [facility] 



                                       g/dL         HOSPITAL     

 

             HCT          39.6 (L)     41.0 - 51.0 % [facility]     

 

             MCV          96.1         82.0 - 100.0 [facility]     

 

             MCH          30.6         27.0 - 34.0 pg [facility]     

 

             MCHC         31.8         31.0 - 37.0  [facility]         hospit[facility]     

 

             RDW - SD     51.3         37.0 - 55.0 fL [facility]     

 

             MPV          10.5         8.8 - 13.2 [facility]     

 

             Platelet count 89 (L)       150 - 400 k/uL [facility]     

 

             Nucleated RBC 0.00         /100 WBC     [facility]     

 

             Neutrophils  49.0         39.0 - 69.0 % [facility]     

 

             Lymphocytes  35.3         25.0 - 45.0 % [facility]     

 

             Monocytes    13.3 (H)     0.0 - 10.0 % [facility]     

 

             Eosinophils  2.0          0.0 - 5.0 %  [facility]     

 

             Basophils    0.2          0.0 - 1.0 %  [facility]     

 

             Immature granulocytes 0.2Comment:  0.0 - 1.0 %  [facility]     



                          granulocytes"                           



                          (promyelocytes                           



                          , myelocytes,                           



                          metamyelocytes                           



                          )                                      









                                        Specimen

 

                                        Blood









                Performing Organization Address         City/[facility] Phone

 Number

 

                [facility] DEPARTMENT OF PATHOLOGY AND [address] [place] [address]

0 



                [address] [address] 



Lactic acid level (2020 10:50 PM CST)Only the most recent of2 results
within the time period is included.





             Component    Value        Ref Range    Performed At Pathologist Sig

nature

 

             Lactic acid  1.3          0.5 - 2.2 mmol/L [facility]

[facility] 









                                        Specimen

 

                                        Plasma specimen









                Performing Organization Address         City/[facility]/Zipcode Phone

 Number

 

                [facility] DEPARTMENT OF PATHOLOGY AND [address] [place] [address]

0 



                [address] [address] 



Blood culture, aerobic &amp; anaerobic (2020  7:25 PM CST)Only the most 
recent of4 resultswithin the time period is included.





             Component    Value        Ref Range    Performed At Pathologist



                                                                 Signature

 

             Blood culture No growth after 5 days of incubation.              HO

LILO Rastafari 



             isolate      Comment:                  HOSPITAL     



                          Specimen Information                           



                          Specimen Source: Blood                           



                          Specimen Site: Forearm, left                          

 



                                                                 









                                        Specimen

 

                                        Blood - Forearm, left









                Performing Organization Address         City/State/Zipcode Phone

 Number

 

                [facility] DEPARTMENT OF PATHOLOGY AND [address] [place], [place] [address]

0 



                [address] [address] 



Group A strep, rapid antigen (2020  6:17 PM CST)





             Component    Value        Ref Range    Performed At Pathologist



                                                                 [facility]

 

             Group A strep, Negative for Group A Streptococcus antigen.         

     [facility] 



             rapid antigen Comment:                  [place]     



             result       Specimen Information              EMERGENCY CARE 



                          Specimen Source: Throat              CENTER       



                          Specimen Site: Not otherwise specified                

           



                                                                 









                                        Specimen

 

                                        Throat - Not otherwise specified









                Performing Organization Address         City/State/Zipcode Phone

 Number

 

                 DEPARTMENT OF PATHOLOGY AND [id number] [place] [address] [phone]

[phone] 



                [facility] [address] [place] [address] 



                EMERGENCY CARE CENTER                                 



Strep screen culture (2020  6:17 PM CST)





             Component    Value        Ref Range    Performed At Pathologist



                                                                 [facility]

 

             Strep screen No beta hemolytic Streptococci isolated              H

LAZARO Rastafari 



             culture isolate Comment:                  HOSPITAL     



                          Specimen Information                           



                          Specimen Source: Throat                           



                          Specimen Site: Not otherwise specified                

           



                                                                 









                                        Specimen

 

                                        Throat - Not otherwise specified









                Performing Organization Address         City/[facility]/Zipcode Phone

 Number

 

                [facility] DEPARTMENT OF PATHOLOGY AND [address] [place] [address]

0 



                [address] [address] 



XR Chest 1 Vw Portable (2020  5:56 PM CST)Only the most recent of2 results
within the time period is included.





                                        Specimen

 

                                        









                          Narrative                 Performed At

 

                                        EXAMINATION: XR CHEST 1 VW PORTABLE



                                         [facility]



                                         



                                        



                                        CLINICAL HISTORY: chf



                                        



                                         



                                        



                                        COMPARISON: 10/15/2019



                                        



                                         



                                        



                                        IMPRESSION:



                                        



                                         



                                        



                                        Heart and mediastinum stable. Left-sided

 AICD is again noted.



                                        



                                         



                                        



                          Central pulmonary vascular enlargement again noted. Mi

ld interstitial 



                          edema may be present and should be correlated. Left ba

silar patchy 



                                        opacities are noted. 



                                        



                                         



                                        



                          [facility]-[id number]            









                                        Procedure Note

 

                                         Interface, Radiology Results Incoming

 - 2020  6:08 PM CST



EXAMINATION:  XR CHEST 1 VW PORTABLE



                                        



                                        CLINICAL HISTORY:  chf



                                        



                                        COMPARISON:  10/15/2019



                                        



                                        IMPRESSION:



                                        



                                        Heart and mediastinum stable. Left-sided

 AICD is again noted.



                                        



                                        Central pulmonary vascular enlargement a

gain noted. Mild interstitial edema may 

be present and should be correlated. Left basilar patchy opacities are noted.



                                        



                                        [facility]-[id number]









                Performing Organization Address         City/State/Zipcode Phone

 Number

 

                 [facility]      [address] [place] [address] 



ECG ED Preliminary Interpretation - Not an Order (2020  5:07 PM CST)Only 
the most recent of2 resultswithin the time period is included.





                          Narrative                 Performed At

 

                                        Toya Wasserman DO   

020 9:54 AM



                                        



                                        ECG ED Preliminary Interpretation - Not 

an Order



                                        



                                        Performed by: Toya Wasserman D

O



                                        



                                        Authorized by: Toya Wasserman DO 



                                        



                                         



                                        



                                        ECG reviewed by ED Physician in the abse

nce of a cardiologist: yes 



                                        



                                        Interpretation: 



                                        



                                         Interpretation: abnormal 



                                        



                                        Rhythm: 



                                        



                                         Rhythm: paced 



                                        



                                        Pacing: 



                                        



                                         Capture: Complete



                                        



                                         Type of pacing: Ventricular



                                        



                                        Ectopy: 



                                        



                                         Ectopy: none 



                                        



                                        QRS: 



                                        



                                         QRS axis: Indeterminate



                                        



                                        ST segments: 



                                        



                           ST segments: Normal  



Influenza antigen test, reflex negative to RPP (2020  5:03 PM CST)Only the
most recent of2 resultswithin the time period is included.





             Component    Value        Ref Range    Performed At Pathologist



                                                                 Signature

 

             Influenza antigen Positive for Influenza A antigen. (A)            

  [facility] 



                          Comment:                  HOSPITAL     



                          Specimen Information                           



                          Specimen Source: [place]ares                           



                          Specimen Site: Left                           



                                                                 









                                        Specimen

 

                                        Nares - Left









                Performing Organization Address         City/[facility] Phone

 Number

 

                [facility] DEPARTMENT OF PATHOLOGY AND [address] [place], [place] [address]

0 



                [facility] [address] [place] [address] 



Troponin, I-Stat (2020  4:59 PM CST)Only the most recent of3 resultswithin
the time period is included.





             Component    Value        Ref Range    Performed At Pathologist



                                                                 Signature

 

             Troponin, I-Stat 0.01         0.00 - 0.08  [facility] 



                          Comment:     ng/mL        [place]     



                                0.09 - 1.49 ng/ml    May indicate increa

sed risk of acute                     

EMERGENCY CARE                          



                                                              

   coronary syndrome.        

                              CENTER              



                          >=1.5 ng/ml       Consistent with acute 

myocardial                           



                                       infarction.      

                     



                                                              

                 

                                                



                          The diagnostic value of a single normal or non-diagnos

tic                           



                          result is questionable. Serial samples at 2-6 hour i

ntervals                           



                          are required to rule out acute myocardial injury.     

                      



                                                                 









                                        Specimen

 

                                        Plasma specimen









                Performing Organization Address         City/[facility]/Zipcode Phone

 Number

 

                 DEPARTMENT OF PATHOLOGY AND [address] [place], [place] 7

[address] 



                [place] [address] 



                EMERGENCY CARE CENTER                                 



Lactic acid, I-Stat (2020  4:59 PM CST)Only the most recent of2 results
within the time period is included.





             Component    Value        Ref Range    Performed At Pathologist Sig

[facility]

 

             Lactic acid, I-Stat 1.0          0.5 - 2.2 mmol/L [facility]  









                                        Specimen

 

                                        Plasma specimen









                Performing Organization Address         City/[facility] Phone

 Number

 

                 DEPARTMENT OF PATHOLOGY AND [address] [place] [address] 7

[address] 



                [place] [address] 



                EMERGENCY CARE CENTER                                 



B natriuretic pep, I-Stat (2020  4:59 PM CST)Only the most recent of2 
resultswithin the time period is included.





             Component    Value        Ref Range    Performed At Pathologist Sig

[facility]

 

             BNP, I-Stat  796 (H)      0 - 100 pg/mL [facility] 









                                        Specimen

 

                                        Blood









                Performing Organization Address         City/[facility] Phone

 Number

 

                 DEPARTMENT OF PATHOLOGY AND [address] [place] [address] 7

[address] 



                [place] [address] 



                EMERGENCY CARE CENTER                                 



Creatine kinase, total (CPK) (2020  4:59 PM CST)





             Component    Value        Ref Range    Performed At Pathologist Sig

[facility]

 

             Creatine kinase 84           39 - 380 U/L [facility] 









                                        Specimen

 

                                        Plasma specimen









                Performing Organization Address         City/[facility] Phone

 Number

 

                 DEPARTMENT OF PATHOLOGY AND [address] [place] [address] 7

[address] 



                [place] [address] 



                EMERGENCY CARE CENTER                                 



ECG 12 lead (2020  4:58 PM CST)Only the most recent of4 resultswithin the 
time period is included.





             Component    Value        Ref Range    Performed At Pathologist Sig

nature

 

             Ventricular rate 79                        [facility]     

 

             QRSD interval 178                       [facility]     

 

             QT interval  414                       [facility]     

 

             QTC interval 474                       [facility]     

 

             QRS axis 1   205                       [facility]     

 

             T wave axis  68                        [facility]     

 

             EKG impression Ventricular-paced              [facility]     



                          rhythm-Biventricular                           



                          pacemaker                              



                          detected-Atrial                           



                          fibrillation-Abnormal                           



                          ECG-In automated                           



                          comparison with ECG of                           



                          17-OCT-2019                            



                          11:05,-Vent. rate has                           



                          decreased BY   6                           



                          BPM-Electronically                           



                          Signed By Rory Shin MD ([id number]) on                           



                          2020 4:19:49 PM                           









                                        Specimen

 

                                        









                          Narrative                 Performed At

 

                                        This result has an attachment that is no

t available.



                                        









                Performing Organization Address         City/[facility]/Zipcode Phone

 Number

 

                [facility]        [address] [place] [address] 



T4, free (2019  9:51 AM CST)





             Component    Value        Ref Range    Performed At Pathologist Sig

nature

 

             T4, free     1.3          0.8 - 1.8 ng/dL [facility]

 









                                        Specimen

 

                                        Blood









                                        Resulting Agency Comment

 

                                        Performing Organization Information:







                                          Site ID: [id number]







                                          Name: [facility]







                                          Address: [address] [place] [id number]







                                          Director: Alexis Anderson









                Performing Organization Address         City/[facility] Phone

 Number

 

                [facility] [address] [place] [address] [phone]



NT-proBNP (10/28/2019 11:25 AM CDT)





             Component    Value        Ref Range    Performed At Pathologist Sig

nature

 

             NT-proBNP    1,904 (H)    0 - 450 pg/mL [facility] 









                                        Specimen

 

                                        









                Performing Organization Address         City/[facility] Phone

 Number

 

                [facility] DEPARTMENT OF PATHOLOGY AND [address] [place] [address]

0 



                [address] [address] 



Phosphorus level (10/18/2019  4:15 AM CDT)Only the most recent of3 resultswithin
the time period is included.





             Component    Value        Ref Range    Performed At Pathologist Sig

nature

 

             Phosphorus   2.9          2.4 - 4.5 mg/dL [facility]

L 









                                        Specimen

 

                                        Plasma specimen









                Performing Organization Address         City[facility] Phone

 Number

 

                [facility] DEPARTMENT OF PATHOLOGY AND [address] [place] [address]

0 



                [address] [address] 



Nm myocardial perfusion (10/17/2019  1:51 PM CDT)





                                        Specimen

 

                                        









                          Narrative                 Performed At

 

                                        This result has an attachment that is no

t available.



                   [facility]



                                   Nuclear Cardiology and Card

iac CT 



                                [address] [address] 



                                         [phone]

0 



                                            



                                   Myocardial Perfusion Imagin

g Report 



                             Stress ECG tracings are available in MUSE, EP

IC and CV Web 



                               All ECG interpretations are included in

 this report 



                                        Pat.Name: TAMAR BREWSTER    

Pat.ID:  [id number]      

                                      



                                        .Date:  10/17/2019      

 Refer.MD: FIDELINA ENGLAND MD   

                                      



                          Exam Time: 11:35:00 AM       



                          Study Type:Myocardial Perfusion Imaging 



                                        Height:  67in        

  BSA:    1.79 m2    

                                



                                         Age: 1935,83Y     

Sex:    MALE       

                                



                                        BP:    133/65       

  HR:    80 bpm    

                            



                          Nuclear Tech:Jaida COBBMT,[facility] Center[facility]/MEGHAN Cabrera

NMT, ARRT 



                          Nuclear Event ID:[id number]     



                          Order ID: [id number]        



                          Reason for Study:Elevated troponin 



                          History / Clinical:A.FIB, CAD, Congestive HF, Fam. Hx 

of CAD, HTN, 



                          HDL, DVT                  



                          Procedures: Single Day Stress / Rest 



                          Risk Factors:Cardiovascular Disease, Hyperlipidemia, H

ypertension, 



                          Family history of cardiovascular disease 



                          Clinical Symptoms:Regadenoson    



                          Physical Exam:S1, S2        



                          ------------------------------------ 



                          SUMMARY:                  



                          ------------------------------------ 



                          SCINTIGRAPHIC RESULTS     



                          Perfusion Defect Size (% LV) 



                          25% Total 5% Ischemia 20% Scar 



                          Left Ventricular Perfusion Results 



                          There is a severe lateral (anterolateral, mid-lateral)

 perfusion 



                          defect during stress which minimally improves with res

t imaging.  



                          Gated SPECT Results       



                          The post stress left ventricular ejection fraction is 

52% with 



                          akinesis of all hypoperfused segments. Left ventricu

lar end-diastolic 



                          volume is 213 ml; end-systolic volume is 103 ml. T

he left ventricle 



                          is moderately enlarged at stress.  



                          Conclusion                



                          Abnormal regadenoson Tc-99m tetrofosmin myocardial per

fusion study 



                          compatible with scar in the circumflex coronary artery

 vascular 



                          territory. The LVEF is low-normal. 



                          CT Findings:              



                          Coronary calcification is present. The ascending and

 descending aorta 



                          are normal in size. There is no significant pericardia

l effusion. 



                          Limited lung fields show no significant abnormalities.

 



                          Comments                  



                          The study results indicate a high (>2%) annual risk fo

r a cardiac 



                          death or non-fatal myocardial infarction. 



                          Study Quality/Artifacts   



                          The study quality is good. 



                          Comparison to Previous Study 



                          None available.       



                          ------------------------------------ 



                          FINDINGS:                 



                          ------------------------------------ 



                          ------------------------------------ 



                          STRESS:                   



                          ------------------------------------ 



                          Baseline Vital Signs:       Intervention

 Regadenoson 0.4mg/5ml 



                          IV over 10 seconds followed by radiotracer injection a

nd 5ml saline 



                          flush                     



                          Baseline EKG Ventricular pacemaker 



                          Heart Rate: 80           Atropine 

Administered: 0 



                          Rest BP:  133/65          



                          Stress Test Results:      



                          Target HR: 118           



                          Symptoms and Complications: 



                          Stress-induced Arrhythmias Ventricular premature depol

arizations 



                          Reason for Stopping Test: As per Regadenoson protocol 



                          Symptoms During Test GI discomfort, Shortness of breat

h, 



                          Lightheadedness           



                          Complications None        



                          Other:   Normal heart rate response to pharmacolog

ical stress, 



                          Normal blood pressure response to pharmacological stre

ss 



                          ECG / Stress Interpretation: Pacemaker rhythm preclude

s ST-segment 



                          analysis for diagnosing myocardial ischemia. 



                          Signed 10/17/2019 04:23 PM 



                          Saad Ta MD    









                                        Procedure Note

 

                                        Interface, Radiology Results In - 10/17/

2019  4:24 PM CDT







                                                          Nuclear Cardiology and

 Cardiac CT



                                                   [address] [place] [address]



                                                                     [phone]

0



                                        



                                                          Myocardial Perfusion I

maging Report



                                              Stress ECG tracings are available 

in M[facility], EPIC and [employer]



                                                  All ECG interpretations are in

cluded in this report



                                        Pat.Name:  TAMAR BREWSTER        Pat.I

D:    [id number]



                                        St.Date:   10/17/2019              Refer

.MD:  FIDELINA ENGLAND MD



                                        Exam Time: 11:35:00 AM



                                        Study Type:Myocardial Perfusion Imaging



                                        Height:    67in                    BSA: 

      1.79 m2



                                          Age:  1935,83Y          Sex: 

      MALE



                                        BP:        133/65                  HR:  

      80 bpm



                                        Nuclear Tech:Jaida COBBMT,[facility]/Jonathan Alba CNMT, ARRT



                                        Nuclear Event ID:[id number]



                                        Order ID:  [id number]



                                        Reason for Study:Elevated troponin



                                        History / Clinical:A.FIB, CAD, Congestiv

e HF, Fam. Hx of CAD, HTN,



                                        HDL, DVT



                                        Procedures: Single Day Stress / Rest



                                        Risk Factors:Cardiovascular Disease, Hyp

erlipidemia, Hypertension,



                                        Family history of cardiovascular disease



                                        Clinical Symptoms:Regadenoson



                                        Physical Exam:S1, S2



                                        ------------------------------------



                                        SUMMARY:



                                        ------------------------------------



                                        SCINTIGRAPHIC RESULTS



                                        Perfusion Defect Size (% LV)



                                        25% Total 5% Ischemia  20% Scar



                                        Left Ventricular Perfusion Results



                                        There is a severe lateral (anterolateral

, mid-lateral) perfusion



                                        defect during stress which minimally imp

roves with rest imaging.



                                        Gated SPECT Results



                                        The post stress left ventricular ejectio

n fraction is 52% with



                                        akinesis of all hypoperfused segments.  

Left ventricular end-diastolic



                                        volume is 213 ml; end-systolic volume is

  103 ml.  The left ventricle



                                        is moderately enlarged at stress.



                                        Conclusion



                                        Abnormal regadenoson Tc-99m tetrofosmin 

myocardial perfusion study



                                        compatible with scar in the circumflex c

oronary artery vascular



                                        territory. The LVEF is low-normal.



                                        CT Findings:



                                        Coronary calcification is present.  The 

ascending and descending aorta



                                        are normal in size. There is no signific

ant pericardial effusion.



                                        Limited lung fields show no significant 

abnormalities.



                                        Comments



                                        The study results indicate a high (>2%) 

annual risk for a cardiac



                                        death or non-fatal myocardial infarction

.



                                        Study Quality/Artifacts



                                        The study quality is good.



                                        Comparison to Previous Study



                                        None available.



                                        ------------------------------------



                                        FINDINGS:



                                        ------------------------------------



                                        ------------------------------------



                                        STRESS:



                                        ------------------------------------



                                        Baseline Vital Signs:              Inter

vention  Regadenoson 0.4mg/5ml



                                        IV over 10 seconds followed by radiotrac

er injection and 5ml saline



                                        flush



                                        Baseline EKG Ventricular pacemaker



                                        Heart Rate: 80                     Atrop

ine Administered: 0



                                        Rest BP:   133/65



                                        Stress Test Results:



                                        Target HR: 118



                                        Symptoms and Complications:



                                        Stress-induced Arrhythmias Ventricular p

remature depolarizations



                                        Reason for Stopping Test: As per Regaden

oson protocol



                                        Symptoms During Test GI discomfort, Shor

tness of breath,



                                        Lightheadedness



                                        Complications None



                                        Other:     Normal heart rate response to

 pharmacological stress,



                                        Normal blood pressure response to pharma

cological stress



                                        ECG / Stress Interpretation: Pacemaker r

hythm precludes ST-segment



                                        analysis for diagnosing myocardial ische

siddharth.



                                        Signed 10/17/2019 04:23 PM



                                        Saad Ta MD









                Performing Organization Address         City/[facility] Phone

 Number

 

                [facility]        [address] [place] [address] 



Cv stress test (10/17/2019 11:05 AM CDT)





             Component    Value        Ref Range    Performed At Pathologist



                                                                 Signature

 

             Resting HR   84                        [facility]     

 

             Resting BP   133                       [facility]     

 

             Peak MET Achieved 1.0                       [facility]     

 

             Protocol Name REGADENO                  [facility]     

 

             Time in Exercise 00:01:00                  [facility]     



             Phase                                               

 

             Max Systolic                        [facility]     

 

             Max Diastolic BP 65                        [facility]     

 

             Max Heart Rate 85                        [facility]     

 

             Max Predicted Heart 139                       [facility]     



             Rate                                                

 

             Target HR Formula (220 - Age)*100%              [facility]     

 

             Test Indication                           [facility]     

 

             Arrhy During Ex                           [facility]     

 

             ECG Interp Before EX                           [facility]     

 

             ECG Interp During Ex                           [facility]     

 

             Ex Summary Comment                           [facility]     

 

             Overall HR Response                           [facility]     



             to Exercise                                         

 

             Overall BP Response                           [facility]     



             To Exercise                                         

 

             Reason for                             [facility]     



             Termination                                         

 

             Stress Test  -Waveform interpreted              [facility]     



             Impression   in report associated                           



                          with image study.  No                           



                          interpretation is                           



                          provided as part of                           



                          this Stress ECG                           



                          report.-Electronically                           



                          Signed By Lorna LÓPEZ, Vince MATOS ([id number]),                           



                           Brook Rodriguez ([id number]) on                           



                          10/17/2019 1:59:34 PM                           









                                        Specimen

 

                                        









                          Narrative                 Performed At

 

                                        This result has an attachment that is no

t available.



                                        









                Performing Organization Address         City/[facility] Phone

 Number

 

                [facility]        [address] [place] [address] 



Respiratory pathogen panel (10/16/2019  4:45 AM CDT)





             Component    Value        Ref Range    Performed At Pathologist



                                                                 Signature

 

             Respiratory  Positive for Rhinovirus/Enterovirus              [facility]

ON      



             pathogen panel -----------------------              [facility]    



                          Negative for all other pathogens tested:              

HOSPITAL     



                          Negative for Adenovirus                           



                          Negative for Coronavirus HKU1                         

  



                          Negative for Coronavirus NL63                         

  



                          Negative for Coronavirus 229E                         

  



                          Negative for Coronavirus OC43                         

  



                          Negative for Human Metapneumovirus                    

       



                          Negative for Influenza A                           



                          Negative for Influenza A/H1                           



                          Negative for Influenza A/H3                           



                          Negative for Influenza A/H1-2009                      

     



                          Negative for Influenza B                           



                          Negative for Parainfluenza Virus 1                    

       



                          Negative for Parainfluenza Virus 2                    

       



                          Negative for Parainfluenza Virus 3                    

       



                          Negative for Parainfluenza Virus 4                    

       



                          Negative for Respiratory Syncytial Virus              

             



                          Negative for Bordetella pertussis                     

      



                          Negative for Chlamydophila pneumoniae                 

          



                          Negative for Mycoplasma pneumoniae                    

       



                          This real-time PCR assay detects the presence of nucle

ic                           



                          acids (RNA or DNA) for the respiratory pathogens liste

d.                            



                          A result of "Not-detected" does not exclude the possib

ility                           



                          of the presence of one or more pathogens at concentrat

ions                           



                          less than the detectable limits of the assa (A)       

                    



                          Comment:                               



                          Specimen Information                           



                          Specimen Source: Nares                           



                          Specimen Site: Left                           



                                                                 









                                        Specimen

 

                                        Nares - Left









                Performing Organization Address         City/State/Zipcode Phone

 Number

 

                [facility] DEPARTMENT OF PATHOLOGY AND [address] [place] [phone] 



Digoxin level (10/16/2019  4:32 AM CDT)





             Component    Value        Ref Range    Performed At Pathologist



                                                                 [facility]

 

             Digoxin      0.5 (L)      0.8 - 2.0 ng/mL [facility] 



                          Comment:                  HOSPITAL     



                          For valid Digoxin results, at least 6 hours should arya

pse                           



                          between time of last dose and collection of blood.    

                       



                          Otherwise, result may be false high.                  

         



                          Therapeutic Range:                           



                          0.8 - 2.0 ng/mL                           



                                                                 









                                        Specimen

 

                                        Plasma specimen









                Performing Organization Address         [facility] Phone

 Number

 

                [facility] DEPARTMENT OF PATHOLOGY AND [address] [place] [phone] 



CT Head Wo Contrast (10/15/2019  8:20 PM CDT)





                                        Specimen

 

                                        









                          Narrative                 Performed At

 

                                        EXAMINATION: CT HEAD WO CONTRAST



                                         [facility]



                                         



                                        



                          CT IMAGING WAS PERFORMED WITH ITERATIVE RECONSTRUCTION

 TECHNIQUE AND/OR 



                                        AUTOMATED EXPOSURE CONTROL TO REDUCE RAD

IATION DOSE.



                                        



                                         



                                        



                                        CLINICAL HISTORY:  dizzy vomiting 

on coumadin



                                        



                                         



                                        



                                        COMPARISON: CT brain 2018



                                        



                                         



                                        



                                         



                                        



                                        FINDINGS:



                                        



                                         



                                        



                           There is no acute intracranial abnormality. Specifica

lly there is no 



                                        intracranial hemorrhage, mass effect or 

acute infarction.



                                        



                                         



                                        



                          There are mild nonspecific cerebral white matter micro

vascular changes. 



                          This chronic lacunar infarction versus perivascular sp

ace in the lateral 



                                        basal ganglia region bilaterally.



                                        



                                         



                                        



                          There is mild-to-moderate cerebral cortical volume los

s and mild 



                                        cerebellar volume loss.



                                        



                                         



                                        



                          There is minimal basal ganglia calcification. Atherosc

lerotic 



                          calcifications noted in the distal internal carotid an

d to a lesser 



                                        degree vertebral arteries.



                                        



                                         



                                        



                          There is amild mucosal thickening/mucus in the ethmoid

 sinus, maxillary 



                                        sinuses and to a lesser degree sphenoid 

sinus.



                                        



                                         



                                        



                                         



                                        



                                        IMPRESSION:



                                        



                                         



                                        



                                        No acute abnormality and no significant 

change from the prior study.



                                        



                          [id number]            









                                        Procedure Note

 

                                         Interface, Radiology Results Incoming

 - 10/15/2019  8:30 PM CDT



EXAMINATION:  CT HEAD WO CONTRAST



                                        



                                        CT IMAGING WAS PERFORMED WITH ITERATIVE 

RECONSTRUCTION TECHNIQUE AND/OR 

AUTOMATED EXPOSURE CONTROL TO REDUCE RADIATION DOSE.



                                        



                                        CLINICAL HISTORY:    dizzy  vomiting  on

 coumadin



                                        



                                        COMPARISON:  CT brain 2018



                                        



                                        



                                        FINDINGS:



                                        



                                         There is no acute intracranial abnormal

ity. Specifically there is no 

intracranial hemorrhage, mass effect or acute infarction.



                                        



                                        There are mild nonspecific cerebral whit

e matter microvascular changes. This 

chronic lacunar infarction versus perivascular space in the lateral basal 
ganglia region bilaterally.



                                        



                                        There is mild-to-moderate cerebral corti

rajani volume loss and mild cerebellar 

volume loss.



                                        



                                        There is minimal basal ganglia calcifica

tion. Atherosclerotic calcifications 

noted in the distal internal carotid and to a lesser degree vertebral arteries.



                                        



                                        There is amild mucosal thickening/mucus 

in the ethmoid sinus, maxillary sinuses 

and to a lesser degree sphenoid sinus.



                                        



                                        



                                        IMPRESSION:



                                        



                                        No acute abnormality and no significant 

change from the prior study.



                                        [id number]









                Performing Organization Address         City/State/Zipcode Phone

 Number

 

                 [facility]      [address] [place] [address] 



CT Abdomen Pelvis Wo Contrast (10/15/2019  8:12 PM CDT)





                                        Specimen

 

                                        









                          Narrative                 Performed At

 

                                        This result has an attachment that is no

t available.



EXAMINATION: CT ABDOMEN PELVIS WO CONTRAST  [facility]



                                                    



                          CLINICAL HISTORY: 83 yearsMale abd pain vomiting 



                                                    



                                        TECHNIQUE: Multidetector computed axial 

tomography of the abdomen and pelvis was

performed without contrast utilizing automated exposure control and/or iterative
reconstruction techniques to  rad 



                          iation dose. Coronal and sagittal reformatted images 



                                         created at the CT scanner CT technologi

st were also submitted for 

interpretation.                         



                                                    



                          COMPARISON: 2016 



                                                    



                          IMPRESSION:               



                                                    



                          LUNG BASES:               



                                        Cardiomegaly with mildly thickened inter

lobular septal thickening suggestive of 

pulmonary edema cardiogenic pulmonary edema. Pacing leads are partially 
visualized. Median sternotomy wires are present. There is no pleural or 
pericardial effusion.                   



                                                    



                          ABDOMEN:                  



                          Liver: The liver is normal. No focal mass. 



                                        Gallbladder/Biliary: The gallbladder is 

contracted and contains a small 

gallstone. There is no evidence of intra or extrahepatic biliary ductal 
dilatation.                             



                          Spleen: The spleen is not enlarged. 



                          Pancreas: The pancreas is unremarkable. 



                          Adrenal Glands: The adrenal glands are unremarkable. 



                                        Kidneys: The kidneys are atrophic. There

 is no mass or hydronephrosis. Bilateral

hypodensities arise from the kidney measuring up to 2 cm on the right and 2 cm 
on the left both of which demonstrate atte 



                          nuation values suggestive of simple cysts. There is 



                                        mild nonspecific perinephric fat strandi

ng more so on the left. Within the left 

upper pole there is a punctate renal stone measuring 4 mm. A subcentimeter 
hypodensity arising from the lower pole of the 



                          right kidney displays attenuation values which may 



                          represent a proteinaceous or hemorrhagic cyst. 



                                        Vascular: Dense atherosclerotic calcific

ation of the abdominal aorta and its 

branches. The aorta is nonaneurysmal.   



                          Nodes: No enlarged retroperitoneal or mesenteric lymph

adenopathy. 



                                        Bowel: Small hiatal hernia. No bowel obs

truction or inflammatory changes.The 

appendix is normal. A moderate stool burden is present within colon. 



                          Ascites/fluid collections: No ascites or fluid collect

ions. 



                                                    



                          PELVIS:                   



                                        No mass, fluid collection or significant

 adenopathy. The prostate is enlarged 

and indents the posterior bladder wall. Prominence of the bladder wall is likely
secondary to chronic bladder outlet obstruc 



                          tion. If concern for cystitis is present urinalysis 



                                        is recommended. There are coarse calcifi

cations within the prostate. Scattered 

pelvic phleboliths are noted. A small hypodensity arises anterior wall the 
bladder is suspected to be a small diverticula. 



                                                    



                          MUSCULOSKELETAL:          



                                        Chronic L2 body compression fracture wit

h approximately 50% height loss. There 

is no aggressive height loss compared to the prior exam. 



                                                    



                          SUMMARY:                  



                                                    



                          1. Cardiomegaly with findings suggestive of cardiogeni

c pulmonary edema. 



                          2. Nonobstructive left renal stone and stable bilatera

l renal cysts. 



                                        3. Enlarged prostate with findings of ch

ronic bladder outlet obstruction, 

clinical correlation recommended.       



                                        4. Chronic L2 vertebral body compression

 fracture with approximately 50% height 

loss.                                   



                          5. Cholelithiasis without evidence of acute cholecysti

tis. 



                          6. Moderate stool burden query constipation. 



                          7. Small hiatal hernia.   



                                                    



                                                    



                          [id number]           









                                        Procedure Note

 

                                        Hm Interface, Radiology Results Incoming

 - 10/15/2019  8:35 PM CDT



EXAMINATION:  CT ABDOMEN PELVIS WO CONTRAST



                                        



                                        CLINICAL HISTORY: 83 yearsMale abd pain 

 vomiting



                                        



                                        TECHNIQUE: Multidetector computed axial 

tomography of the abdomen and pelvis was

performed without contrast utilizing automated exposure control and/or iterative
reconstruction techniques to  radiation dose.



                                        Coronal and sagittal reformatted images



                                         created at the CT scanner CT technologi

st were also submitted for 

interpretation.



                                        



                                        COMPARISON:  2016



                                        



                                        IMPRESSION:



                                        



                                        LUNG BASES:



                                        Cardiomegaly with mildly thickened inter

lobular septal thickening suggestive of 

pulmonary edema cardiogenic pulmonary edema. Pacing leads are partially 
visualized. Median sternotomy wires are present. There is no pleural or 
pericardial effusion.



                                        



                                        ABDOMEN:



                                        Liver: The liver is normal. No focal mas

s.



                                        Gallbladder/Biliary: The gallbladder is 

contracted and contains a small 

gallstone. There is no evidence of intra or extrahepatic biliary ductal 
dilatation.



                                        Spleen: The spleen is not enlarged.



                                        Pancreas: The pancreas is unremarkable.



                                        Adrenal Glands: The adrenal glands are u

nremarkable.



                                        Kidneys: The kidneys are atrophic. There

 is no mass or hydronephrosis. Bilateral

hypodensities arise from the kidney measuring up to 2 cm on the right and 2 cm 
on the left both of which demonstrate attenuation values



                                        suggestive of simple cysts. There is



                                        mild nonspecific perinephric fat strandi

ng more so on the left. Within the left 

upper pole there is a punctate renal stone measuring 4 mm. A subcentimeter 
hypodensity arising from the lower pole of the right kidney



                                        displays attenuation values which may



                                        represent a proteinaceous or hemorrhagic

 cyst.



                                        Vascular: Dense atherosclerotic calcific

ation of the abdominal aorta and its 

branches. The aorta is nonaneurysmal.



                                        Nodes: No enlarged retroperitoneal or me

senteric lymphadenopathy.



                                        Bowel: Small hiatal hernia. No bowel obs

truction or inflammatory changes.The 

appendix is normal. A moderate stool burden is present within colon.



                                        Ascites/fluid collections: No ascites or

 fluid collections.



                                        



                                        PELVIS:



                                        No mass, fluid collection or significant

 adenopathy. The prostate is enlarged 

and indents the posterior bladder wall. Prominence of the bladder wall is likely
secondary to chronic bladder outlet obstruction. If concern



                                        for cystitis is present urinalysis



                                        is recommended. There are coarse calcifi

cations within the prostate. Scattered 

pelvic phleboliths are noted. A small hypodensity arises anterior wall the 
bladder is suspected to be a small diverticula.



                                        



                                        MUSCULOSKELETAL:



                                        Chronic L2 body compression fracture wit

h approximately 50% height loss. There 

is no aggressive height loss compared to the prior exam.



                                        



                                        SUMMARY:



                                        



                                        1. Cardiomegaly with findings suggestive

 of cardiogenic pulmonary edema.



                                        2. Nonobstructive left renal stone and s

table bilateral renal cysts.



                                        3. Enlarged prostate with findings of ch

ronic bladder outlet obstruction, 

clinical correlation recommended.



                                        4. Chronic L2 vertebral body compression

 fracture with approximately 50% height 

loss.



                                        5. Cholelithiasis without evidence of ac

[place] cholecystitis.



                                        6. Moderate stool burden query constipat

ion.



                                        7. Small hiatal hernia.



                                        



                                        



                                        [id number]









                Performing Organization Address         City/[facility]/Zipcode Phone

 Number

 

                 [facility]      [address] [place] [address] 



Urinalysis (10/15/2019  7:48 PM CDT)





             Component    Value        Ref Range    Performed At Pathologist Sig

nature

 

             Glucose, UA  Negative     Negative     [facility]  

 

             Bilirubin, UA Negative     Negative     [facility]  

 

             Ketones, UA  Negative     Negative     [facility]  

 

             Specific gravity, UA 1.020        1.001 - 1.035 [facility]  

 

             Blood, UA    Negative     Negative     [facility]  

 

             pH, UA       5.0          5.0 - 8.5    [facility]  

 

             Protein, UA  Negative     Negative     [facility]  

 

             Urobilinogen, UA <2.0         <2.0         [facility]  

 

             Nitrite, UA  Negative     Negative     [facility]  

 

             Leukocyte esterase, Negative     Negative     [facility]  

 

             Color, UA    Yellow                    [facility]  

 

             Appearance, UA Clear                     [facility]  









                                        Specimen

 

                                        Urine









                Performing Organization Address         City/State/Zipcode Phone

 Number

 

                 DEPARTMENT OF PATHOLOGY AND [address] [place], [place] [address]

[address] 



                GENOMIC MEDICINE, [facility] [address] [place] [address] 



                EM[facility] C[facility]                                 



Prothrombin time with INR, I-Stat (10/15/2019  7:28 PM CDT)





             Component    Value        Ref Range    Performed At Pathologist



                                                                 Signature

 

             POC prothrombin 21.0 (H)     11.0 - 14.5  ANN      



             time                      sec          [facility]       

 

             POC INR      1.8                       EM[facility]      



                          Comment:                  [facility]    



                          The International Normalized Ratio (INR) is a therapeu

[facility]     



                          monitoring tool for patients who are stable on oral   

           EMERGENCY CARE 



                          vitamin K antagonist therapy. An INR of 2.0-3.0 is sug

gested              CENTER       



                          for deep vein thrombosis/pulmonary embolism.          

                 



                          An INR of 2.5-3.5 (high dose) is suggested for some pa

tients with                           



                          mechanical heart valves)                           



                                                                 









                                        Specimen

 

                                        Blood









                Performing Organization Address         City/[facility]

 Number

 

                 DEPARTMENT OF PATHOLOGY AND [address] [place] [address]

[address] 



                [place] [address] 



                EMERGENCY CARE CENTER                                 



Influenza antigen (10/15/2019  7:25 PM CDT)





             Component    Value        Ref Range    Performed At Pathologist



                                                                 [facility]

 

             Influenza antigen Negative for Influenza A/B antigen.              

[facility] VILLAREAL 



                          Comment:                  [facility]  



                          Specimen Source: Nares                           



                          Specimen Site: Left                           



                                                                 









                                        Specimen

 

                                        Nares - Left









                Performing Organization Address         City/[facility] Phone

 Number

 

                 DEPARTMENT OF PATHOLOGY AND [address] [place] [address]

[address] 



                [place] [address] 



                EMERGENCY CARE CENTER                                 



after 2019



Insurance







          Payer     Benefit Plan / Group Subscriber ID Effective Dates Phone    

 Address   Type

 

          AETNA MEDICARE AETNA MEDICARE xxxxxxxx  2014-Present              

       HMO



                    HMO/O [facility]                                         









           Guarantor Name Account Type Relation to Date of    Phone      Billing



                                 Patient    Birth                 Address

 

           Tamar Brewster Personal/Family Self       1935 [phone]

[address]







                                                       (Home)     [place] [address]







Advance Directives

For more information, please contact: [phone]





                Type            Date Recorded   Patient Representative Explanati

on

 

                Advance Directives, Living Will 2005  8:55 AM              

   



                and Medical Power of                                  

 

                Advance Directives, Living Will                                 



                and Medical Power of                                  









                Code Status     Date Activated  Date Inactivated Comments

 

                Full Code       10/16/2019  1:53 AM 10/18/2019  9:31 PM 









                    Code Status decision reached by: Patient

## 2020-06-17 LAB
ALBUMIN SERPL BCP-MCNC: 3 G/DL (ref 3.4–5)
ALP SERPL-CCNC: 73 U/L (ref 45–117)
ALT SERPL W P-5'-P-CCNC: 17 U/L (ref 12–78)
AST SERPL W P-5'-P-CCNC: 15 U/L (ref 15–37)
BUN BLD-MCNC: 15 MG/DL (ref 7–18)
GLUCOSE SERPLBLD-MCNC: 88 MG/DL (ref 74–106)
HCT VFR BLD CALC: 38.5 % (ref 39.6–49)
HDLC SERPL-MCNC: 40 MG/DL (ref 40–60)
INR BLD: 2.23
LDLC SERPL CALC-MCNC: 38 MG/DL (ref ?–130)
LYMPHOCYTES # SPEC AUTO: 1.5 K/UL (ref 0.7–4.9)
MAGNESIUM SERPL-MCNC: 2.2 MG/DL (ref 1.8–2.4)
NT-PROBNP SERPL-MCNC: 8162 PG/ML (ref ?–450)
PMV BLD: 8.3 FL (ref 7.6–11.3)
POTASSIUM SERPL-SCNC: 3.9 MMOL/L (ref 3.5–5.1)
RBC # BLD: 4.16 M/UL (ref 4.33–5.43)

## 2020-06-17 RX ADMIN — ENOXAPARIN SODIUM SCH MG: 40 INJECTION SUBCUTANEOUS at 08:59

## 2020-06-17 RX ADMIN — HYDROCODONE BITARTRATE AND ACETAMINOPHEN PRN TAB: 7.5; 325 TABLET ORAL at 11:57

## 2020-06-17 RX ADMIN — CEFTRIAXONE SCH MLS: 1 INJECTION, SOLUTION INTRAVENOUS at 05:02

## 2020-06-17 RX ADMIN — POTASSIUM & SODIUM PHOSPHATES POWDER PACK 280-160-250 MG SCH PKT: 280-160-250 PACK at 06:49

## 2020-06-17 RX ADMIN — POTASSIUM & SODIUM PHOSPHATES POWDER PACK 280-160-250 MG SCH PKT: 280-160-250 PACK at 11:58

## 2020-06-17 RX ADMIN — SODIUM CHLORIDE SCH: 0.9 INJECTION, SOLUTION INTRAVENOUS at 22:00

## 2020-06-17 RX ADMIN — SODIUM CHLORIDE SCH MLS: 0.9 INJECTION, SOLUTION INTRAVENOUS at 14:21

## 2020-06-17 RX ADMIN — Medication SCH ML: at 20:27

## 2020-06-17 RX ADMIN — ACETAMINOPHEN PRN MG: 500 TABLET, FILM COATED ORAL at 09:55

## 2020-06-17 RX ADMIN — HYDROCODONE BITARTRATE AND ACETAMINOPHEN PRN TAB: 7.5; 325 TABLET ORAL at 20:28

## 2020-06-17 RX ADMIN — POTASSIUM & SODIUM PHOSPHATES POWDER PACK 280-160-250 MG SCH PKT: 280-160-250 PACK at 08:58

## 2020-06-17 RX ADMIN — SODIUM CHLORIDE SCH MLS: 9 INJECTION, SOLUTION INTRAVENOUS at 08:56

## 2020-06-17 RX ADMIN — CEFTRIAXONE SCH MLS: 1 INJECTION, SOLUTION INTRAVENOUS at 17:58

## 2020-06-17 RX ADMIN — Medication SCH ML: at 08:59

## 2020-06-17 NOTE — P.CNS
Date of Consult: 06/17/20


Reason for Consult: Possible pneumonia


Chief Complaint: Fever and shortness of breath


History of Present Illness: 





Patient is 84 years of age admitted with sudden onset of chills and fever denies

any pulmonary complaints no prior history of pulmonary problems patient does not

smoke is a chills before also been complaining of swelling of the right knee 

denies any chest pain no urinary tract symptoms


Allergies





morphine Allergy (Verified 06/16/20 09:47)


   Nausea/Vomiting


Tetracyclines Allergy (Verified 06/16/20 09:47)


   Itching/Hives/Rash





Home Medications: 








Atorvastatin Calcium [Lipitor*] 40 mg PO BEDTIME 03/11/15 


Digoxin [Lanoxin*] 0.125 mg PO DAILY 03/11/15 


Levothyroxine [Synthroid*] 88 mcg PO SEECOM 02/09/18 


Levothyroxine [Synthroid*] 100 mcg PO SEECOM 02/09/18 


Sacubitril/Valsartan [Entresto 24 mg-26 mg Tablet] 1 tab PO BID 02/09/18 


Warfarin Sodium [Coumadin*] 2.5 mg PO SEECOM 02/09/18 


Warfarin Sodium 5 mg PO SEECOM 06/16/20 








- Past Medical/Surgical History


Diabetic: No


-: History of Throat cancer, Post radiation


-: HTN


-: Hyperlipidemia


-: Atrial fibrillation


-: Chronic anticoagulation


-: CAD with history of CABG


-: Depression with anxiety


-: Hypothyroidism


-: Pacemaker/Defibrillator


-: Osteoarthritis


-: Vertigo


-: difibulator


-: pacemaker/ Defibrillator


-: bilateral knee sx


-: bypass sx


-: hernia repair x3


Psychosocial/ Personal History: , Several children, Retired [employer] 

.





- Family History


  ** Mother


Medical History: Heart disease, Hypertension





  ** Father


Medical History: Heart disease, Hypertension





- Social History


Smoking Status: Never smoker


Alcohol use: No


CD- Drugs: No


Caffeine use: Yes


Place of Residence: Home





Review of Systems


10-point ROS is otherwise unremarkable


General: Weakness


Musculoskeletal: As per HPI





Physical Examination














Temp Pulse Resp BP Pulse Ox


 


 101.3 F H  82   18   115/56 L  95 


 


 06/17/20 09:55  06/17/20 08:04  06/17/20 08:04  06/17/20 08:04  06/17/20 08:04








General: Alert, In no apparent distress, Oriented x3


HEENT: Atraumatic


Neck: Supple


Respiratory: Clear to auscultation bilaterally


Cardiovascular: No edema, Regular rate/rhythm, Irregular heart rate/rhythm


Gastrointestinal: Normal bowel sounds, Soft and benign


Musculoskeletal: Other (Right knee is swollen)





- Problems


(1) Fever of unknown origin


Current Visit: Yes   Status: Acute   


Plan: 


Patient admitted with fever of unknown origin CT scan no evidence of pneumonia 

echocardiogram is pending blood cultures positive possible contaminant knee x-

ray and ESR is also pending pro calcitonin level elevated patient is febrile 

white count was normal on admission INR therapeutic

## 2020-06-17 NOTE — RAD REPORT
EXAM DESCRIPTION:  RAD - Knee Right 3 View - 6/17/2020 2:27 pm

 

CLINICAL HISTORY:  Right knee pain

 

FINDINGS:  No fracture or dislocation is seen.

 

Right knee prosthesis is in good position without evidence of loosening

 

Moderate joint effusion with soft tissue swelling. Osteoporosis

## 2020-06-17 NOTE — RAD REPORT
EXAM DESCRIPTION:  CT - Thorax Wo Con

 

CLINICAL HISTORY:  Chest pain

RO pneumonia

 

COMPARISON:  Thorax Wo Con dated 3/30/2020; Chest Single View dated 6/17/2020

 

FINDINGS:  Minimal interstitial pulmonary edema suspected. No focal infiltrate typical of pneumonia s
een. Small bilateral pleural effusions. No pneumothorax. The heart is significantly and enlarged with
 pacer wires noted.

 

No axillary, mediastinal or hilar adenopathy.

 

Degenerative changes are present throughout the spine without lytic or blastic bone lesion. No displa
allen fracture. No gross upper abdominal finding.

 

All CT scans are performed using dose optimization technique as appropriate and may include automated
 exposure control or mA/KV adjustment according to patient size.

 

IMPRESSION:  Mild CHF.No focal consolidation typical of pneumonia seen.

## 2020-06-17 NOTE — ECHO
HEIGHT: 5 ft 10 in   WEIGHT: 144 lb 15.968 oz   DATE OF STUDY: 06/17/2020   REFER DR: 
Thomas Ortiz MD

2-DIMENSIONAL: YES

     M.MODE: YES

 DOPPLER: YES

COLOR FLOW: YES



                    TDS:  NO

PORTABLE: NO

 DEFINITY:  NO

BUBBLE STUDY: NO





DIAGNOSIS:  CONGESTIVE HEART FAILURE



CARDIAC HISTORY:  

CATHERIZATION: YES

SURGERY: YES

PROSTHETIC VALVE: NO

PACEMAKER: YES





MEASUREMENTS (cm)

    DIASTOLIC (NORMALS)                 SYSTOLIC (NORMALS)

IVSd                 0.9 (0.6-1.2)                    LA Diam 4.5 (1.9-4.0)     LVEF       
  %  

LVIDd               5.9 (3.5-5.7)                        LVIDs      3.7 (2.0-3.5)     %FS  
        37%

LVPWd             1.2 (0.6-1.2)

Ao Diam           3.2 (2.0-3.7)



2 DIMENSIONAL ASSESSMENT:

RIGHT ATRIUM:                   NORMAL

LEFT ATRIUM:       DILATED



RIGHT VENTRICLE:            PACEMAKER

LEFT VENTRICLE: NORMAL



TRICUSPID VALVE:             NORMAL

MITRAL VALVE:    MITRAL ANNULAR CALCIFICATION



PULMONIC VALVE:             NORMAL

AORTIC VALVE:     SCLEROSIS



PERICARDIAL EFFUSION: NONE

AORTIC ROOT:      NORMAL





LEFT VENTRICULAR WALL MOTION:     NORMAL LEFT VENTRICULAR EJECTION FRACTION. 



DOPPLER/COLOR FLOW:     MILD TRICUSPID REGURGITATION. 



COMMENTS:      NORMAL LEFT VENTRICULAR EJECTION FRACTION. LOWER LEFT VENTRICULAR 
COMPLIANCE. MILD TRICUSPID REGURGITATION. NORMAL RIGHT VENTRICULAR SYSTOLIC PRESSURE. 
MITRAL ANNULAR CALCIFICATION. PACEMAKER IN RIGHT VENTRICLE APEX. AORTIC SCLEROSIS WITH NO 
STENOSIS.



TECHNOLOGIST:   SRIRAM CAMPOS

## 2020-06-17 NOTE — EKG
Test Date:    2020-06-16               Test Time:    03:56:42

Technician:   MT                                     

                                                     

MEASUREMENT RESULTS:                                       

Intervals:                                           

Rate:         81                                     

ND:                                                  

QRSD:         176                                    

QT:           422                                    

QTc:          490                                    

Axis:                                                

P:                                                   

ND:                                                  

QRS:          218                                    

T:            77                                     

                                                     

INTERPRETIVE STATEMENTS:                                       

                                                     

Electronic ventricular pacemaker

Compared to ECG 03/30/2020 20:17:03

No significant changes



Electronically Signed On 06-17-20 06:24:39 CDT by Jaya Zavala

## 2020-06-17 NOTE — P.PN
Subjective


Date of Service: 06/17/20





Patient is complaining of pain to the right leg; more specifically the right 

knee.  Blood cultures have been positive for gram-positive cocci. Respiratory 

status is improved.  Patient currently on vancomycin and Rocephin.  Pulmonary 

consultation completed; awaiting orthopedics and may do arthrocentesis by esme hair. 





Review of Systems


10-point ROS is otherwise unremarkable





Physical Examination





- Vital Signs


Temperature: 98.7 F


Blood Pressure: 115/56


Pulse: 82


Respirations: 18


Pulse Ox (%): 95





- Physical Exam


General: Alert, In no apparent distress, Oriented x3


Respiratory: Diminished, Expiratory wheezes, Rhonchi/gurgles (Left lower lobe)


Cardiovascular: Regular rate/rhythm, Normal S1 S2, Systolic murmur


Gastrointestinal: Normal bowel sounds, Soft and benign, Non-distended, No 

tenderness


Musculoskeletal: Swelling, Tenderness, Warmth


Neurological: Normal tone, Sensation intact, Cranial nerves 3-12 intact





- Studies


Microbiology Data (last 24 hrs): 








06/16/20 04:10   Nasopharnyx   Coronavirus COVID-19 PCR - Final


06/16/20 04:10   Nasopharnyx   Influenza Type A Antigen Screen - Final


06/16/20 04:10   Nasopharnyx   Influenza Type B Antigen Screen - Final


06/16/20 04:10   Throat   Group A Streptococcus Rapid Screen - Final





Medications List Reviewed: Yes





Assessment & Plan





- Problems (Diagnosis)


(1) Left lower lobe pneumonia


Status: Acute   





(2) Right knee pain


Status: Acute   


Qualifiers: 


   Chronicity: acute   Qualified Code(s): M25.561 - Pain in right knee   





(3) Hyperlipidemia


Status: Acute   





(4) Hypothyroidism


Status: Acute   





(5) Systolic CHF, acute on chronic


Status: Acute   





(6) Atrial fibrillation


Status: Chronic   





(7) CAD (coronary artery disease)


Status: Chronic   





(8) HTN (hypertension)


Status: Chronic   





(9) History of cardiac pacemaker


Status: Chronic   





(10) History of placement of internal cardiac defibrillator


Status: Chronic   





(11) COVID-19 virus test result unknown


Status: Acute   





- Plan





1. Continue with IV antibiotics


2. Awaiting blood culture-Gram stain is positive


3. Will plan to do are arthrocentesis


4. CT scan of the chest did not reveal pneumonic process as seen on CXR


5. Pulmonary consultation appreciated


6. Continue with nebs as needed


7. O2 per protocol


8. Continue with gentle hydration


9. Repeat labs including CBC and renal function in a.m.


10. Continue with cardiac medications; echocardiogram; 


11. GI and DVT prophylaxis


Discharge Plan: Home


Plan to discharge in: Greater than 2 days





- Advance Directives


Does patient have a Living Will: Yes


Does patient have a Durable POA for Healthcare: Yes





- Code Status/Comfort Care


Code Status: Full Code


Critical Care: No


Time Spent Managing PTS Care (In Minutes): 30

## 2020-06-18 LAB
BUN BLD-MCNC: 15 MG/DL (ref 7–18)
GLUCOSE SERPLBLD-MCNC: 102 MG/DL (ref 74–106)
HCT VFR BLD CALC: 35.1 % (ref 39.6–49)
LYMPHOCYTES # SPEC AUTO: 1.2 K/UL (ref 0.7–4.9)
MAGNESIUM SERPL-MCNC: 2.3 MG/DL (ref 1.8–2.4)
NT-PROBNP SERPL-MCNC: 9780 PG/ML (ref ?–450)
PMV BLD: 8.6 FL (ref 7.6–11.3)
POTASSIUM SERPL-SCNC: 4.2 MMOL/L (ref 3.5–5.1)
RBC # BLD: 3.81 M/UL (ref 4.33–5.43)

## 2020-06-18 RX ADMIN — POTASSIUM & SODIUM PHOSPHATES POWDER PACK 280-160-250 MG SCH PKT: 280-160-250 PACK at 10:06

## 2020-06-18 RX ADMIN — SODIUM CHLORIDE SCH MLS: 0.9 INJECTION, SOLUTION INTRAVENOUS at 21:30

## 2020-06-18 RX ADMIN — SODIUM CHLORIDE SCH MLS: 0.9 INJECTION, SOLUTION INTRAVENOUS at 03:15

## 2020-06-18 RX ADMIN — LEVOFLOXACIN SCH MLS: 5 INJECTION, SOLUTION INTRAVENOUS at 10:05

## 2020-06-18 RX ADMIN — ACETAMINOPHEN PRN MG: 500 TABLET, FILM COATED ORAL at 08:24

## 2020-06-18 RX ADMIN — Medication SCH ML: at 21:30

## 2020-06-18 RX ADMIN — POTASSIUM & SODIUM PHOSPHATES POWDER PACK 280-160-250 MG SCH PKT: 280-160-250 PACK at 11:15

## 2020-06-18 RX ADMIN — POTASSIUM & SODIUM PHOSPHATES POWDER PACK 280-160-250 MG SCH PKT: 280-160-250 PACK at 08:25

## 2020-06-18 RX ADMIN — HYDROCODONE BITARTRATE AND ACETAMINOPHEN PRN TAB: 7.5; 325 TABLET ORAL at 16:11

## 2020-06-18 RX ADMIN — SODIUM CHLORIDE SCH MLS: 9 INJECTION, SOLUTION INTRAVENOUS at 08:09

## 2020-06-18 RX ADMIN — ENOXAPARIN SODIUM SCH MG: 40 INJECTION SUBCUTANEOUS at 08:25

## 2020-06-18 RX ADMIN — CEFTRIAXONE SCH MLS: 1 INJECTION, SOLUTION INTRAVENOUS at 17:36

## 2020-06-18 RX ADMIN — Medication SCH: at 09:00

## 2020-06-18 RX ADMIN — CEFTRIAXONE SCH MLS: 1 INJECTION, SOLUTION INTRAVENOUS at 05:09

## 2020-06-19 LAB
BUN BLD-MCNC: 16 MG/DL (ref 7–18)
GLUCOSE SERPLBLD-MCNC: 107 MG/DL (ref 74–106)
POTASSIUM SERPL-SCNC: 4.1 MMOL/L (ref 3.5–5.1)

## 2020-06-19 PROCEDURE — 0S9C3ZZ DRAINAGE OF RIGHT KNEE JOINT, PERCUTANEOUS APPROACH: ICD-10-PCS

## 2020-06-19 RX ADMIN — CEFTRIAXONE SCH MLS: 1 INJECTION, SOLUTION INTRAVENOUS at 05:04

## 2020-06-19 RX ADMIN — ENOXAPARIN SODIUM SCH MG: 40 INJECTION SUBCUTANEOUS at 07:57

## 2020-06-19 RX ADMIN — Medication SCH ML: at 21:46

## 2020-06-19 RX ADMIN — Medication SCH ML: at 07:58

## 2020-06-19 RX ADMIN — SODIUM CHLORIDE SCH: 0.9 INJECTION, SOLUTION INTRAVENOUS at 00:40

## 2020-06-19 RX ADMIN — DIGOXIN SCH MG: 125 TABLET ORAL at 09:04

## 2020-06-19 RX ADMIN — ACETAMINOPHEN PRN MG: 500 TABLET, FILM COATED ORAL at 06:09

## 2020-06-19 RX ADMIN — PANTOPRAZOLE SODIUM SCH MG: 40 TABLET, DELAYED RELEASE ORAL at 17:08

## 2020-06-19 RX ADMIN — SACUBITRIL AND VALSARTAN SCH TAB: 24; 26 TABLET, FILM COATED ORAL at 09:04

## 2020-06-19 RX ADMIN — LEVOFLOXACIN SCH MLS: 5 INJECTION, SOLUTION INTRAVENOUS at 07:57

## 2020-06-19 RX ADMIN — SACUBITRIL AND VALSARTAN SCH TAB: 24; 26 TABLET, FILM COATED ORAL at 21:46

## 2020-06-19 RX ADMIN — PANTOPRAZOLE SODIUM SCH MG: 40 TABLET, DELAYED RELEASE ORAL at 09:04

## 2020-06-19 NOTE — P.PN
Subjective


Date of Service: 06/18/20


 Patient continues to have some pain in the right knee.  Orthopedic is 

Consulted.  Concern for septic arthritis.  Blood cultures positive for strep 

agalactiae.  Continue broad-spectrum antibiotic coverage.  Arthrocentesis to 

determine etiology





Review of Systems


10-point ROS is otherwise unremarkable





Physical Examination





- Vital Signs


Temperature: 98.4 F


Blood Pressure: 137/70


Pulse: 86


Respirations: 20


Pulse Ox (%): 93





- Physical Exam


General: Alert, In no apparent distress, Oriented x3


Respiratory: Clear to auscultation bilaterally, Normal air movement


Cardiovascular: Regular rate/rhythm, Normal S1 S2, Systolic murmur


Gastrointestinal: Normal bowel sounds, Soft and benign, Non-distended, No 

tenderness


Musculoskeletal: No clubbing, Swelling (Right knee effusion), Erythema, 

Tenderness, Warmth


Neurological: Sensation intact, Cranial nerves 3-12 intact





- Studies


Microbiology Data (last 24 hrs): 








06/16/20 04:10   Throat   Culture & Sensitivity - Final


                            NORMAL UPPER RESPIRATORY [place] GROWN.





Medications List Reviewed: Yes





Assessment & Plan





- Problems (Diagnosis)


(1) Left lower lobe pneumonia


Status: Acute   





(2) Right knee pain


Status: Acute   


Qualifiers: 


   Chronicity: acute   Qualified Code(s): M25.561 - Pain in right knee   





(3) Hyperlipidemia


Status: Acute   





(4) Hypothyroidism


Status: Acute   





(5) Systolic CHF, acute on chronic


Status: Acute   





(6) Atrial fibrillation


Status: Chronic   





(7) CAD (coronary artery disease)


Status: Chronic   





(8) HTN (hypertension)


Status: Chronic   





(9) History of cardiac pacemaker


Status: Chronic   





(10) History of placement of internal cardiac defibrillator


Status: Chronic   





(11) COVID-19 virus test result unknown


Status: Acute   





- Plan





1. Continue with IV antibiotics


2. Blood cultures positive for strep agalactiae and


3. Ortho consulted for arthrocentesis; if not completed then will go ahead and 

perform


4. CT scan of the chest did not reveal pneumonic process as seen on CXR; 

continue antibiotic therapy


5. Pulmonary consultation appreciated


6. Continue with nebs as needed


7. O2 per protocol


8. Continue with gentle hydration


9. Repeat labs including CBC and renal function in a.m.


10. Continue with cardiac medications; echocardiogram was unremarkable 


11. GI and DVT prophylaxis


Discharge Plan: Home


Plan to discharge in: Greater than 2 days





- Advance Directives


Does patient have a Living Will: Yes


Does patient have a Durable POA for Healthcare: Yes





- Code Status/Comfort Care


Code Status: Full Code


Critical Care: No


Time Spent Managing PTS Care (In Minutes): 35

## 2020-06-19 NOTE — CON
Reason For Consultation:  Right knee effusion.



History Of Present Illness:  Mr. Flynn is an 84-year-old gentleman who had right total knee done ivan
e years prior.  I believe this was done by Dr. Gupta, who has been retired for at least 6 years.  
Historically, he had recurrent effusions in the knee.  He is hospitalized for pneumonia.  We are aske
d to consult for this knee.  As mentioned in the past, we have suggested that knee aspiration to be p
erformed by Interventional Radiology.  Cultures and sensitivity sent and indeed this appears to be in
fected, we will be happy to engage.  However, this patient presents to us without any difficulty and 
the fact that he has a prosthetic knee.  This was not initially communicated with us.  If this indeed
 is infected, he will need to be sent to a tertiary facility.  This is beyond the scope of what we ca
n safely do here at this hospital.  Therefore, I agree with aspiration by Interventional Radiology.  
If this is indeed turns to be a positive culture for septic knee, then [facility].  His laboratory data reveals a slightly elevated white count.  Apparently, he was b
acteremic on the 16th of June with unusual pansensitive strep species.  Again, as mentioned, if this 
knee does not return to aseptic, he will need debridement of the knee completely and placement of an 
antibiotic-laden spacer 6 weeks of intravenous antibiotics followed by a complex revision knee arthro
plasty, which as mentioned is beyond the scope of what we can safely offer in this environment.





RANULFO/WEI

DD:  06/19/2020 08:30:48Voice ID:  [id number]

DT:  06/19/2020 10:44:07Report ID:  [id number]

## 2020-06-19 NOTE — P.OP
Date of Service: 06/19/20








Findings and Operative Technique


Patient was prepped and draped for a right knee arthrocentesis.  Risks were 

discussed with patient and patient agreeable for procedure.


Patient having significant pain and desiring relief.  Superolateral approach to 

right knee arthrocentesis was performed.  Patient was prepped with Betadine and 

alcohol.  1% lidocaine given for local anesthesia.  Aspirated 50cc of yellowish 

colored fluid.  Unlikely for this to be septic arthritis.  Most likely 

inflammatory.  Labs have been sent.

## 2020-06-19 NOTE — CON
History Of Present Illness:  This is an 84-year-old male, who was brought in with fevers and generali
zed body aches.  Patient has decreased appetite and weakness.  Also prior to admission, I was consult
ed for bacteremia secondary to strep and currently being treated with Levaquin.  Patient also has rig
ht knee septic arthritis, which has bean drained and cultures are sent and waiting for the results.  
Patient denies any headache, nausea, vomiting, chest pain, abdominal pain, constipation, or diarrhea.
  Feels better now that he is on antibiotic.



Past Medical History:  Hypercholesterolemia, coronary artery disease, benign prostatic hypertrophy, h
ypothyroidism, hypertension, history of throat cancer status post radiation, atrial fibrillation, chr
onic anticoagulation, status post CABG, depression with anxiety, pacemaker and defibrillator placemen
t, osteoarthritis, bilateral knee surgery, and hernia repair x3.



Social History:  Nonsmoker after being treated for throat cancer.  No alcohol use.



Family History:  Noncontributory except heart disease.



Medications:  Levaquin.  See MAR for other medications.



Allergies:  TETRACYCLINE AND MORPHINE.



Review of Systems:

A 10-point review was performed.



Physical Examination:

General:  This is an 84-year-old male, lying in bed, not in any acute cardiopulmonary distress. 

Vital Signs:  Temperature 97.6, on arrival, patient's temperature was 100.2; pulse 80; respirations 1
9; blood pressure 141/77. 

HEENT:  Unremarkable. 

Neck:  Supple. 

Lungs:  Basal crackles. 

Heart:  S1, S2.  Regular. 

Abdomen:  Soft, nontender.  Bowel sounds present. 

Extremities:  Right knee swelling noted.  Surgical scars noted on both knee areas.



Laboratory Data:  WBC 10.6, down from 11.1; hemoglobin 11.7; platelets are 154.  Chemistry shows sodi
um 139, potassium 4.1, chloride 109, bicarb 21, BUN 16, creatinine 0.8, glucose 107.  Micro data show
s right knee cultures are pending.  Blood culture shows Strep agalactiae group B done on 06/16 and re
commend to repeat blood cultures and continue antibiotic for 14 days after cultures are negative.



Assessment And Plan:  Right knee septic arthritis bacteremia secondary to Streptococcus agalactiae.  
Continue treatment for 2 weeks after blood cultures are negative.  Leukocytosis has resolved.  Patien
t with anemia and multiple medical problems.  Agree with Levaquin.  We will follow the patient closel
y. 



Thank you for consult.





NF/MODL

DD:  06/19/2020 12:47:39Voice ID:  [id number]

DT:  06/19/2020 18:04:37Report ID:  [id number]

## 2020-06-19 NOTE — P.PN
Subjective


Date of Service: 06/19/20


 Patient experienced significant relief after arthrocentesis.  The fluid was 

fairly clear with some cloudiness but there was no purulent fluid.  Labs 

revealed inflammatory etiology and not septic arthritis.  Gram stain pending.  

Calcium pyrophosphate crystals seen-with the results of the fluid analysis is 

this is most likely inflammatory arthritis.  Patient given diagnosis pseudogout.

 Will give IV steroids and continue with IV antibiotics for positive blood 

cultures.





Review of Systems


10-point ROS is otherwise unremarkable





Physical Examination





- Vital Signs


Temperature: 98.4 F


Blood Pressure: 137/70


Pulse: 86


Respirations: 20


Pulse Ox (%): 93





- Physical Exam


General: Alert, In no apparent distress, Oriented x3


Respiratory: Clear to auscultation bilaterally, Normal air movement


Cardiovascular: Regular rate/rhythm, Normal S1 S2, No murmurs


Gastrointestinal: Normal bowel sounds, Soft and benign, Non-distended, No 

tenderness


Musculoskeletal: No clubbing, Swelling (Minimal swelling at this time), 

Tenderness (Minimal tenderness)


Integumentary: No rashes


Neurological: Sensation intact, Cranial nerves 3-12 intact





- Studies


Microbiology Data (last 24 hrs): 








06/16/20 04:10   Throat   Culture & Sensitivity - Final


                            NORMAL UPPER RESPIRATORY [place] GROWN.





Medications List Reviewed: Yes





Assessment & Plan





- Problems (Diagnosis)


(1) Left lower lobe pneumonia


Status: Acute   





(2) Right knee pain


Status: Acute   


Qualifiers: 


   Chronicity: acute   Qualified Code(s): M25.561 - Pain in right knee   





(3) Hyperlipidemia


Status: Acute   





(4) Hypothyroidism


Status: Acute   





(5) Systolic CHF, acute on chronic


Status: Acute   





(6) Atrial fibrillation


Status: Chronic   





(7) CAD (coronary artery disease)


Status: Chronic   





(8) HTN (hypertension)


Status: Chronic   





(9) History of cardiac pacemaker


Status: Chronic   





(10) History of placement of internal cardiac defibrillator


Status: Chronic   





(11) COVID-19 virus test result unknown


Status: Acute   





(12) Pseudogout


Status: Acute   





- Plan





1. Continue with IV antibiotics


2. Blood cultures positive for strep agalactiae and Gram stain is negative in 

synovial fluid; cultures are negative in synovial fluid


3. Ortho consultation discontinued; we were able to do arthrocentesis in removed

50 cc of yellowish fluid.  No purulent fluid.  Body fluid indicates inflammation

and most likely pseudogout


4. CT scan of the chest did not reveal pneumonic process as seen on CXR; 

continue antibiotic therapy


5. Pulmonary consultation appreciated


6. Continue with nebs as needed


7. O2 per protocol


8. Continue with gentle hydration


9. Repeat labs including CBC and renal function in a.m.


10. Continue with cardiac medications; echocardiogram without any abnormalities


11. GI and DVT prophylaxis


Discharge Plan: Home


Plan to discharge in: Greater than 2 days





- Advance Directives


Does patient have a Living Will: Yes


Does patient have a Durable POA for Healthcare: Yes





- Code Status/Comfort Care


Code Status: Full Code


Critical Care: No


Time Spent Managing PTS Care (In Minutes): 35

## 2020-06-20 VITALS — OXYGEN SATURATION: 96 %

## 2020-06-20 LAB
BUN BLD-MCNC: 16 MG/DL (ref 7–18)
GLUCOSE SERPLBLD-MCNC: 86 MG/DL (ref 74–106)
HCT VFR BLD CALC: 36 % (ref 39.6–49)
LYMPHOCYTES # SPEC AUTO: 0.9 K/UL (ref 0.7–4.9)
MAGNESIUM SERPL-MCNC: 2.2 MG/DL (ref 1.8–2.4)
PMV BLD: 8.3 FL (ref 7.6–11.3)
POTASSIUM SERPL-SCNC: 4.1 MMOL/L (ref 3.5–5.1)
RBC # BLD: 3.93 M/UL (ref 4.33–5.43)

## 2020-06-20 RX ADMIN — SACUBITRIL AND VALSARTAN SCH TAB: 24; 26 TABLET, FILM COATED ORAL at 09:00

## 2020-06-20 RX ADMIN — ACETAMINOPHEN PRN MG: 500 TABLET, FILM COATED ORAL at 14:09

## 2020-06-20 RX ADMIN — PANTOPRAZOLE SODIUM SCH MG: 40 TABLET, DELAYED RELEASE ORAL at 06:05

## 2020-06-20 RX ADMIN — Medication SCH ML: at 09:04

## 2020-06-20 RX ADMIN — DIGOXIN SCH MG: 125 TABLET ORAL at 09:03

## 2020-06-20 RX ADMIN — LEVOFLOXACIN SCH MLS: 5 INJECTION, SOLUTION INTRAVENOUS at 09:00

## 2020-06-21 VITALS — TEMPERATURE: 98.4 F | DIASTOLIC BLOOD PRESSURE: 70 MMHG | SYSTOLIC BLOOD PRESSURE: 137 MMHG

## 2020-06-21 NOTE — P.DS
Discharge Date: 06/20/20


Disposition: ROUTINE DISCHARGE


Discharge Condition: GOOD


Reason for Admission: Fever and shortness of breath


Consultations: 





Pulmonary





- Problems


(1) Left lower lobe pneumonia


Status: Acute   





(2) Right knee pain


Status: Acute   


Qualifiers: 


   Chronicity: acute   Qualified Code(s): M25.561 - Pain in right knee   





(3) Hyperlipidemia


Status: Acute   





(4) Hypothyroidism


Status: Acute   





(5) Systolic CHF, acute on chronic


Status: Acute   





(6) Atrial fibrillation


Status: Chronic   





(7) CAD (coronary artery disease)


Status: Chronic   





(8) HTN (hypertension)


Status: Chronic   





(9) History of cardiac pacemaker


Status: Chronic   





(10) History of placement of internal cardiac defibrillator


Status: Chronic   





(11) COVID-19 virus test result unknown


Status: Acute   





(12) Pseudogout


Status: Acute   


Brief History of Present Illness: 


Patient is an 84-year-old gentleman who was admitted to the hospital because he 

had been complaining of fever.  Patient also has some generalized weakness and 

decreased appetite.  There is concern he has been having some difficulty 

swallowing.  The nurse gave patient some medication in the emergency room and 

patient has some difficulty with swallowing.  Patient denied any chest pain. 

Patient has had old slight cough but no significant shortness of breath.  

Patient also been having some soreness in the leg.  Decision was made to admit 

the patient to the hospital for further evaluation.


Hospital Course: 





Patient's blood cultures were positive.  Patient's blood cultures revealed strep

agalactiae.  Synovial fluid was negative for infection.  Calcium pyrophosphate 

crystals were positive.  Most likely pseudogout.


Vital Signs/Physical Exam: 














Temp Pulse Resp BP Pulse Ox


 


 98.4 F   86   20   137/70   93 


 


 06/21/20 02:07  06/21/20 02:07  06/21/20 02:07  06/21/20 02:07  06/21/20 02:07








General: Alert, In no apparent distress, Oriented x3


Laboratory Data at Discharge: 














WBC  6.9 K/uL (4.3-10.9)  D 06/20/20  05:25    


 


Hgb  11.9 g/dL (13.6-17.9)  L  06/20/20  05:25    


 


Hct  36.0 % (39.6-49.0)  L  06/20/20  05:25    


 


Plt Count  187 K/uL (152-406)  D 06/20/20  05:25    


 


PT  25.9 SECONDS (9.5-12.5)  H  06/17/20  05:24    


 


INR  2.23   06/17/20  05:24    


 


APTT  37.9 SECONDS (24.3-36.9)  H  06/17/20  05:24    


 


Sodium  141 mmol/L (136-145)   06/20/20  05:25    


 


Potassium  4.1 mmol/L (3.5-5.1)   06/20/20  05:25    


 


BUN  16 mg/dL (7-18)   06/20/20  05:25    


 


Creatinine  0.75 mg/dL (0.55-1.3)   06/20/20  05:25    


 


Glucose  86 mg/dL ()   06/20/20  05:25    


 


Phosphorus  2.6 mg/dL (2.5-4.9)   06/19/20  05:18    


 


Magnesium  2.2 mg/dL (1.8-2.4)   06/20/20  05:25    


 


Total Bilirubin  1.0 mg/dL (0.2-1.0)   06/17/20  05:24    


 


AST  15 U/L (15-37)   06/17/20  05:24    


 


ALT  17 U/L (12-78)   06/17/20  05:24    


 


Alkaline Phosphatase  73 U/L ()   06/17/20  05:24    


 


Troponin I  0.04 ng/mL (0.0-0.045)   06/16/20  19:01    


 


Triglycerides  48 mg/dL (<150)   06/17/20  05:24    


 


Cholesterol  88 mg/dL (<200)   06/17/20  05:24    


 


HDL Cholesterol  40 mg/dL (40-60)   06/17/20  05:24    


 


Cholesterol/HDL Ratio  2.20   06/17/20  05:24    


 


Amylase  34 U/L ()   06/16/20  04:15    


 


Lipase  113 U/L ()   06/16/20  04:15    








Home Medications: 








Atorvastatin Calcium [Lipitor*] 40 mg PO BEDTIME 03/11/15 


Digoxin [Lanoxin*] 0.125 mg PO DAILY 03/11/15 


Levothyroxine [Synthroid*] 88 mcg PO SEECOM 02/09/18 


Levothyroxine [Synthroid*] 100 mcg PO SEECOM 02/09/18 


Sacubitril/Valsartan [Entresto 24 mg-26 mg Tablet] 1 tab PO BID 02/09/18 


Warfarin Sodium [Coumadin*] 2.5 mg PO SEECOM 02/09/18 


Warfarin Sodium 5 mg PO SEECOM 06/16/20 


Amox/Clavulanate [Augmentin 875-125 Tab] 1 each PO BID #20 tab 06/20/20 


Benzonatate [Tessalon Perle*] 200 mg PO TID PRN #30 cap 06/20/20 


predniSONE [Prednisone] 20 mg PO BID #10 tablet 06/20/20 





New Medications: 


Amox/Clavulanate [Augmentin 875-125 Tab] 1 each PO BID #20 tab


predniSONE [Prednisone] 20 mg PO BID #10 tablet


Benzonatate [Tessalon Perle*] 200 mg PO TID PRN #30 cap


 PRN Reason: Cough


Patient Discharge Instructions: OK TO DC IV AND DC HOME.  FOLLOW-UP WITH PRIMARY

CARE PROVIDER IN 1-2 WEEKS.  FOLLOW-UP WITH   Rheumatology IN 1-2 WEEKS  for 

pseudogout of the right knee.  RETURN TO THE ER IF  symptoms worsened.  CALL DR. MOORE [phone] IF ANY QUESTIONS REGARDING HOSPITAL STAY.  PLEASE CALL THE 

FLOOR -[phone] IF ANY MEDICATION OR NURSING QUESTIONS.  [facility]

been notified to go do an eval on patient.


Diet: AHA


Activity: Fall precautions


Time spent managing pt's care (in minutes): 30

## 2024-09-25 NOTE — RAD REPORT
EXAM DESCRIPTION:  RAD - Chest Single View - 6/17/2020 6:11 am

 

CLINICAL HISTORY:  pneumonia

Chest pain.

 

COMPARISON:  Chest Single View dated 6/16/2020; Chest Single View dated 3/30/2020; CHEST SINGLE VIEW 
dated 3/10/2015; CHEST SINGLE VIEW dated 2/5/2015

 

FINDINGS:  Portable technique limits examination quality.

 

Since 06/16/2020, mild improvement in lung aeration is seen. The heart is significantly enlarged with
 a multi lead pacer/defibrillator device present. Sternotomy wires present.

 

IMPRESSION:  Mild improvement lung aeration is seen since comparative study. [Negative] : Neurological